# Patient Record
Sex: FEMALE | Race: WHITE | Employment: OTHER
[De-identification: names, ages, dates, MRNs, and addresses within clinical notes are randomized per-mention and may not be internally consistent; named-entity substitution may affect disease eponyms.]

---

## 2017-01-18 ENCOUNTER — OFFICE VISIT (OUTPATIENT)
Dept: FAMILY MEDICINE CLINIC | Facility: CLINIC | Age: 77
End: 2017-01-18

## 2017-01-18 VITALS — BODY MASS INDEX: 52.91 KG/M2 | DIASTOLIC BLOOD PRESSURE: 80 MMHG | WEIGHT: 280 LBS | SYSTOLIC BLOOD PRESSURE: 114 MMHG

## 2017-01-18 DIAGNOSIS — I10 ESSENTIAL HYPERTENSION, BENIGN: ICD-10-CM

## 2017-01-18 DIAGNOSIS — E78.2 MIXED HYPERLIPIDEMIA: ICD-10-CM

## 2017-01-18 DIAGNOSIS — L98.9 SKIN LESION OF LEFT ARM: ICD-10-CM

## 2017-01-18 DIAGNOSIS — E66.01 MORBID OBESITY DUE TO EXCESS CALORIES (HCC): ICD-10-CM

## 2017-01-18 DIAGNOSIS — L29.9 ITCHING: ICD-10-CM

## 2017-01-18 DIAGNOSIS — E11.9 TYPE 2 DIABETES MELLITUS WITHOUT COMPLICATION, WITHOUT LONG-TERM CURRENT USE OF INSULIN (HCC): Primary | ICD-10-CM

## 2017-01-18 PROCEDURE — 1123F ACP DISCUSS/DSCN MKR DOCD: CPT | Performed by: FAMILY MEDICINE

## 2017-01-18 PROCEDURE — 1090F PRES/ABSN URINE INCON ASSESS: CPT | Performed by: FAMILY MEDICINE

## 2017-01-18 PROCEDURE — G8419 CALC BMI OUT NRM PARAM NOF/U: HCPCS | Performed by: FAMILY MEDICINE

## 2017-01-18 PROCEDURE — 99214 OFFICE O/P EST MOD 30 MIN: CPT | Performed by: FAMILY MEDICINE

## 2017-01-18 PROCEDURE — 1036F TOBACCO NON-USER: CPT | Performed by: FAMILY MEDICINE

## 2017-01-18 PROCEDURE — G8484 FLU IMMUNIZE NO ADMIN: HCPCS | Performed by: FAMILY MEDICINE

## 2017-01-18 PROCEDURE — G8399 PT W/DXA RESULTS DOCUMENT: HCPCS | Performed by: FAMILY MEDICINE

## 2017-01-18 PROCEDURE — G8427 DOCREV CUR MEDS BY ELIG CLIN: HCPCS | Performed by: FAMILY MEDICINE

## 2017-01-18 PROCEDURE — 4040F PNEUMOC VAC/ADMIN/RCVD: CPT | Performed by: FAMILY MEDICINE

## 2017-01-18 ASSESSMENT — ENCOUNTER SYMPTOMS
ABDOMINAL PAIN: 0
COUGH: 0
BLOOD IN STOOL: 0
VOMITING: 0
EYE DISCHARGE: 0
EYE REDNESS: 0
CONSTIPATION: 0
NAUSEA: 0
SHORTNESS OF BREATH: 0
DIARRHEA: 0

## 2017-03-15 ENCOUNTER — HOSPITAL ENCOUNTER (OUTPATIENT)
Age: 77
Setting detail: SPECIMEN
Discharge: HOME | End: 2017-03-15
Payer: MEDICARE

## 2017-03-15 ENCOUNTER — OFFICE VISIT (OUTPATIENT)
Dept: FAMILY MEDICINE CLINIC | Age: 77
End: 2017-03-15
Payer: MEDICARE

## 2017-03-15 DIAGNOSIS — D04.9 SQUAMOUS CELL CARCINOMA IN SITU OF SKIN: Primary | ICD-10-CM

## 2017-03-15 PROCEDURE — 11100 PR BIOPSY OF SKIN LESION: CPT | Performed by: FAMILY MEDICINE

## 2017-03-15 PROCEDURE — 1036F TOBACCO NON-USER: CPT | Performed by: FAMILY MEDICINE

## 2017-03-15 PROCEDURE — 88305 TISSUE EXAM BY PATHOLOGIST: CPT

## 2017-03-17 LAB — DERMATOLOGY PATHOLOGY REPORT: NORMAL

## 2017-03-19 ASSESSMENT — ENCOUNTER SYMPTOMS: COLOR CHANGE: 1

## 2017-03-22 ENCOUNTER — OFFICE VISIT (OUTPATIENT)
Dept: FAMILY MEDICINE CLINIC | Age: 77
End: 2017-03-22
Payer: MEDICARE

## 2017-03-22 VITALS — TEMPERATURE: 98.8 F

## 2017-03-22 DIAGNOSIS — D04.9 SQUAMOUS CELL CARCINOMA IN SITU OF SKIN: Primary | ICD-10-CM

## 2017-03-22 DIAGNOSIS — J06.9 ACUTE URI: ICD-10-CM

## 2017-03-22 DIAGNOSIS — Z48.02 VISIT FOR SUTURE REMOVAL: ICD-10-CM

## 2017-03-22 PROCEDURE — G8484 FLU IMMUNIZE NO ADMIN: HCPCS | Performed by: FAMILY MEDICINE

## 2017-03-22 PROCEDURE — G8428 CUR MEDS NOT DOCUMENT: HCPCS | Performed by: FAMILY MEDICINE

## 2017-03-22 PROCEDURE — 1036F TOBACCO NON-USER: CPT | Performed by: FAMILY MEDICINE

## 2017-03-22 PROCEDURE — G8419 CALC BMI OUT NRM PARAM NOF/U: HCPCS | Performed by: FAMILY MEDICINE

## 2017-03-22 PROCEDURE — 1090F PRES/ABSN URINE INCON ASSESS: CPT | Performed by: FAMILY MEDICINE

## 2017-03-22 PROCEDURE — G8399 PT W/DXA RESULTS DOCUMENT: HCPCS | Performed by: FAMILY MEDICINE

## 2017-03-22 PROCEDURE — 4040F PNEUMOC VAC/ADMIN/RCVD: CPT | Performed by: FAMILY MEDICINE

## 2017-03-22 PROCEDURE — 99213 OFFICE O/P EST LOW 20 MIN: CPT | Performed by: FAMILY MEDICINE

## 2017-03-22 PROCEDURE — 1123F ACP DISCUSS/DSCN MKR DOCD: CPT | Performed by: FAMILY MEDICINE

## 2017-03-22 ASSESSMENT — ENCOUNTER SYMPTOMS
COUGH: 1
RHINORRHEA: 1

## 2017-03-26 ASSESSMENT — ENCOUNTER SYMPTOMS
EYE REDNESS: 0
SORE THROAT: 0
EYE DISCHARGE: 0
ROS SKIN COMMENTS: SUTURES
TROUBLE SWALLOWING: 0

## 2017-03-28 RX ORDER — CEPHALEXIN 500 MG/1
500 CAPSULE ORAL 3 TIMES DAILY
Qty: 21 CAPSULE | Refills: 0 | Status: SHIPPED | OUTPATIENT
Start: 2017-03-28 | End: 2017-04-04

## 2017-03-29 ENCOUNTER — HOSPITAL ENCOUNTER (OUTPATIENT)
Age: 77
Discharge: HOME | End: 2017-03-29
Payer: MEDICARE

## 2017-03-29 ENCOUNTER — HOSPITAL ENCOUNTER (OUTPATIENT)
Dept: GENERAL RADIOLOGY | Age: 77
Discharge: HOME | End: 2017-03-29
Payer: MEDICARE

## 2017-03-29 ENCOUNTER — OFFICE VISIT (OUTPATIENT)
Dept: FAMILY MEDICINE CLINIC | Age: 77
End: 2017-03-29
Payer: MEDICARE

## 2017-03-29 VITALS
SYSTOLIC BLOOD PRESSURE: 120 MMHG | DIASTOLIC BLOOD PRESSURE: 68 MMHG | BODY MASS INDEX: 53.09 KG/M2 | TEMPERATURE: 98.3 F | WEIGHT: 281 LBS

## 2017-03-29 DIAGNOSIS — J06.9 ACUTE URI: Primary | ICD-10-CM

## 2017-03-29 DIAGNOSIS — J06.9 ACUTE URI: ICD-10-CM

## 2017-03-29 PROCEDURE — G8399 PT W/DXA RESULTS DOCUMENT: HCPCS | Performed by: FAMILY MEDICINE

## 2017-03-29 PROCEDURE — 1090F PRES/ABSN URINE INCON ASSESS: CPT | Performed by: FAMILY MEDICINE

## 2017-03-29 PROCEDURE — 99213 OFFICE O/P EST LOW 20 MIN: CPT | Performed by: FAMILY MEDICINE

## 2017-03-29 PROCEDURE — 4040F PNEUMOC VAC/ADMIN/RCVD: CPT | Performed by: FAMILY MEDICINE

## 2017-03-29 PROCEDURE — 1036F TOBACCO NON-USER: CPT | Performed by: FAMILY MEDICINE

## 2017-03-29 PROCEDURE — 1123F ACP DISCUSS/DSCN MKR DOCD: CPT | Performed by: FAMILY MEDICINE

## 2017-03-29 PROCEDURE — 71020 XR CHEST STANDARD TWO VW: CPT

## 2017-03-29 PROCEDURE — G8484 FLU IMMUNIZE NO ADMIN: HCPCS | Performed by: FAMILY MEDICINE

## 2017-03-29 PROCEDURE — G8419 CALC BMI OUT NRM PARAM NOF/U: HCPCS | Performed by: FAMILY MEDICINE

## 2017-03-29 PROCEDURE — G8427 DOCREV CUR MEDS BY ELIG CLIN: HCPCS | Performed by: FAMILY MEDICINE

## 2017-03-29 RX ORDER — BENZONATATE 100 MG/1
100 CAPSULE ORAL 3 TIMES DAILY PRN
Qty: 20 CAPSULE | Refills: 0 | Status: SHIPPED | OUTPATIENT
Start: 2017-03-29 | End: 2017-04-05

## 2017-03-29 ASSESSMENT — ENCOUNTER SYMPTOMS
VOMITING: 0
NAUSEA: 0
EYE DISCHARGE: 0
WHEEZING: 0
DIARRHEA: 0
SHORTNESS OF BREATH: 1
SORE THROAT: 0
EYE REDNESS: 0
RHINORRHEA: 1
COUGH: 1

## 2017-04-24 ENCOUNTER — HOSPITAL ENCOUNTER (OUTPATIENT)
Age: 77
Discharge: HOME | End: 2017-04-24
Payer: MEDICARE

## 2017-04-24 DIAGNOSIS — I10 ESSENTIAL HYPERTENSION, BENIGN: ICD-10-CM

## 2017-04-24 DIAGNOSIS — E11.9 TYPE 2 DIABETES MELLITUS WITHOUT COMPLICATION, WITHOUT LONG-TERM CURRENT USE OF INSULIN (HCC): ICD-10-CM

## 2017-04-24 DIAGNOSIS — E78.2 MIXED HYPERLIPIDEMIA: ICD-10-CM

## 2017-04-24 LAB
ALBUMIN SERPL-MCNC: 3.6 G/DL (ref 3.5–5.2)
ALBUMIN/GLOBULIN RATIO: ABNORMAL (ref 1–2.5)
ALP BLD-CCNC: 175 U/L (ref 35–104)
ALT SERPL-CCNC: 26 U/L (ref 5–33)
ANION GAP SERPL CALCULATED.3IONS-SCNC: 11 MMOL/L (ref 9–17)
AST SERPL-CCNC: 29 U/L
BILIRUB SERPL-MCNC: 0.41 MG/DL (ref 0.3–1.2)
BUN BLDV-MCNC: 17 MG/DL (ref 8–23)
BUN/CREAT BLD: 23 (ref 9–20)
CALCIUM SERPL-MCNC: 9.6 MG/DL (ref 8.6–10.4)
CHLORIDE BLD-SCNC: 96 MMOL/L (ref 98–107)
CHOLESTEROL/HDL RATIO: 2
CHOLESTEROL: 141 MG/DL
CO2: 30 MMOL/L (ref 20–31)
CREAT SERPL-MCNC: 0.75 MG/DL (ref 0.5–0.9)
CREATININE URINE: 200.8 MG/DL (ref 28–217)
ESTIMATED AVERAGE GLUCOSE: 131 MG/DL
GFR AFRICAN AMERICAN: >60 ML/MIN
GFR NON-AFRICAN AMERICAN: >60 ML/MIN
GFR SERPL CREATININE-BSD FRML MDRD: ABNORMAL ML/MIN/{1.73_M2}
GFR SERPL CREATININE-BSD FRML MDRD: ABNORMAL ML/MIN/{1.73_M2}
GLUCOSE BLD-MCNC: 160 MG/DL (ref 70–99)
HBA1C MFR BLD: 6.2 % (ref 4.8–5.9)
HDLC SERPL-MCNC: 70 MG/DL
LDL CHOLESTEROL: 49 MG/DL (ref 0–130)
MICROALBUMIN/CREAT 24H UR: 43 MG/L
MICROALBUMIN/CREAT UR-RTO: 21 MCG/MG CREAT
PATIENT FASTING?: YES
POTASSIUM SERPL-SCNC: 3.8 MMOL/L (ref 3.7–5.3)
SODIUM BLD-SCNC: 137 MMOL/L (ref 135–144)
TOTAL PROTEIN: 8.9 G/DL (ref 6.4–8.3)
TRIGL SERPL-MCNC: 110 MG/DL
VLDLC SERPL CALC-MCNC: NORMAL MG/DL (ref 1–30)

## 2017-04-24 PROCEDURE — 36415 COLL VENOUS BLD VENIPUNCTURE: CPT

## 2017-04-24 PROCEDURE — 82043 UR ALBUMIN QUANTITATIVE: CPT

## 2017-04-24 PROCEDURE — 80053 COMPREHEN METABOLIC PANEL: CPT

## 2017-04-24 PROCEDURE — 82570 ASSAY OF URINE CREATININE: CPT

## 2017-04-24 PROCEDURE — 83036 HEMOGLOBIN GLYCOSYLATED A1C: CPT

## 2017-04-24 PROCEDURE — 80061 LIPID PANEL: CPT

## 2017-04-25 ENCOUNTER — OFFICE VISIT (OUTPATIENT)
Dept: FAMILY MEDICINE CLINIC | Age: 77
End: 2017-04-25
Payer: MEDICARE

## 2017-04-25 VITALS — SYSTOLIC BLOOD PRESSURE: 124 MMHG | BODY MASS INDEX: 52.72 KG/M2 | DIASTOLIC BLOOD PRESSURE: 64 MMHG | WEIGHT: 279 LBS

## 2017-04-25 DIAGNOSIS — Z23 NEED FOR 23-POLYVALENT PNEUMOCOCCAL POLYSACCHARIDE VACCINE: ICD-10-CM

## 2017-04-25 DIAGNOSIS — E11.9 TYPE 2 DIABETES MELLITUS WITHOUT COMPLICATION, WITHOUT LONG-TERM CURRENT USE OF INSULIN (HCC): ICD-10-CM

## 2017-04-25 DIAGNOSIS — I10 ESSENTIAL HYPERTENSION, BENIGN: Primary | ICD-10-CM

## 2017-04-25 DIAGNOSIS — E78.2 MIXED HYPERLIPIDEMIA: ICD-10-CM

## 2017-04-25 PROCEDURE — 1036F TOBACCO NON-USER: CPT | Performed by: FAMILY MEDICINE

## 2017-04-25 PROCEDURE — 1090F PRES/ABSN URINE INCON ASSESS: CPT | Performed by: FAMILY MEDICINE

## 2017-04-25 PROCEDURE — G8399 PT W/DXA RESULTS DOCUMENT: HCPCS | Performed by: FAMILY MEDICINE

## 2017-04-25 PROCEDURE — 90732 PPSV23 VACC 2 YRS+ SUBQ/IM: CPT | Performed by: FAMILY MEDICINE

## 2017-04-25 PROCEDURE — G0009 ADMIN PNEUMOCOCCAL VACCINE: HCPCS | Performed by: FAMILY MEDICINE

## 2017-04-25 PROCEDURE — 99214 OFFICE O/P EST MOD 30 MIN: CPT | Performed by: FAMILY MEDICINE

## 2017-04-25 PROCEDURE — G8427 DOCREV CUR MEDS BY ELIG CLIN: HCPCS | Performed by: FAMILY MEDICINE

## 2017-04-25 PROCEDURE — G8419 CALC BMI OUT NRM PARAM NOF/U: HCPCS | Performed by: FAMILY MEDICINE

## 2017-04-25 PROCEDURE — 4040F PNEUMOC VAC/ADMIN/RCVD: CPT | Performed by: FAMILY MEDICINE

## 2017-04-25 PROCEDURE — 1123F ACP DISCUSS/DSCN MKR DOCD: CPT | Performed by: FAMILY MEDICINE

## 2017-04-25 RX ORDER — LOSARTAN POTASSIUM 100 MG/1
100 TABLET ORAL DAILY
Qty: 90 TABLET | Refills: 1 | Status: SHIPPED | OUTPATIENT
Start: 2017-04-25 | End: 2017-10-25 | Stop reason: SDUPTHER

## 2017-04-25 RX ORDER — FUROSEMIDE 80 MG
80 TABLET ORAL DAILY
Qty: 90 TABLET | Refills: 1 | Status: SHIPPED | OUTPATIENT
Start: 2017-04-25 | End: 2018-04-16 | Stop reason: SDUPTHER

## 2017-04-25 RX ORDER — SIMVASTATIN 20 MG
20 TABLET ORAL NIGHTLY
Qty: 90 TABLET | Refills: 1 | Status: SHIPPED | OUTPATIENT
Start: 2017-04-25 | End: 2017-10-25 | Stop reason: SDUPTHER

## 2017-04-25 RX ORDER — ATENOLOL 50 MG/1
50 TABLET ORAL DAILY
Qty: 90 TABLET | Refills: 1 | Status: SHIPPED | OUTPATIENT
Start: 2017-04-25 | End: 2017-08-23 | Stop reason: ALTCHOICE

## 2017-04-25 ASSESSMENT — ENCOUNTER SYMPTOMS
VISUAL CHANGE: 0
EYE DISCHARGE: 0
FACIAL SWELLING: 0
NAUSEA: 0
COUGH: 0
VOMITING: 0
DIARRHEA: 0
BLURRED VISION: 0
CONSTIPATION: 0
SHORTNESS OF BREATH: 0
EYE REDNESS: 0
ABDOMINAL PAIN: 0
BLOOD IN STOOL: 0

## 2017-04-28 DIAGNOSIS — E11.9 TYPE 2 DIABETES MELLITUS WITHOUT COMPLICATION, WITHOUT LONG-TERM CURRENT USE OF INSULIN (HCC): ICD-10-CM

## 2017-05-01 RX ORDER — GLIPIZIDE 5 MG/1
TABLET ORAL
Qty: 90 TABLET | Refills: 1 | Status: SHIPPED | OUTPATIENT
Start: 2017-05-01 | End: 2017-10-25 | Stop reason: SDUPTHER

## 2017-06-03 ENCOUNTER — HOSPITAL ENCOUNTER (EMERGENCY)
Age: 77
Discharge: HOME | End: 2017-06-03
Attending: EMERGENCY MEDICINE
Payer: MEDICARE

## 2017-06-03 ENCOUNTER — APPOINTMENT (OUTPATIENT)
Dept: GENERAL RADIOLOGY | Age: 77
End: 2017-06-03
Payer: MEDICARE

## 2017-06-03 VITALS
WEIGHT: 280 LBS | OXYGEN SATURATION: 97 % | HEART RATE: 77 BPM | DIASTOLIC BLOOD PRESSURE: 63 MMHG | TEMPERATURE: 98.3 F | SYSTOLIC BLOOD PRESSURE: 136 MMHG | BODY MASS INDEX: 52.91 KG/M2 | RESPIRATION RATE: 20 BRPM

## 2017-06-03 DIAGNOSIS — R09.89 CHOKING EPISODE: Primary | ICD-10-CM

## 2017-06-03 PROCEDURE — 71010 XR CHEST LIMITED ONE VIEW: CPT

## 2017-06-03 PROCEDURE — 99283 EMERGENCY DEPT VISIT LOW MDM: CPT

## 2017-06-03 PROCEDURE — 6370000000 HC RX 637 (ALT 250 FOR IP): Performed by: EMERGENCY MEDICINE

## 2017-06-03 RX ADMIN — SALINE NASAL SPRAY 1 SPRAY: 1.5 SOLUTION NASAL at 04:04

## 2017-06-19 ENCOUNTER — OFFICE VISIT (OUTPATIENT)
Dept: FAMILY MEDICINE CLINIC | Age: 77
End: 2017-06-19
Payer: MEDICARE

## 2017-06-19 ENCOUNTER — HOSPITAL ENCOUNTER (EMERGENCY)
Age: 77
Discharge: HOME | End: 2017-06-20
Attending: FAMILY MEDICINE
Payer: MEDICARE

## 2017-06-19 ENCOUNTER — HOSPITAL ENCOUNTER (OUTPATIENT)
Age: 77
Discharge: HOME | End: 2017-06-19
Payer: MEDICARE

## 2017-06-19 ENCOUNTER — APPOINTMENT (OUTPATIENT)
Dept: CT IMAGING | Age: 77
End: 2017-06-19
Payer: MEDICARE

## 2017-06-19 ENCOUNTER — HOSPITAL ENCOUNTER (OUTPATIENT)
Dept: GENERAL RADIOLOGY | Age: 77
Discharge: HOME | End: 2017-06-19
Payer: MEDICARE

## 2017-06-19 VITALS
SYSTOLIC BLOOD PRESSURE: 130 MMHG | BODY MASS INDEX: 51.53 KG/M2 | RESPIRATION RATE: 20 BRPM | HEART RATE: 70 BPM | DIASTOLIC BLOOD PRESSURE: 60 MMHG | HEIGHT: 62 IN | WEIGHT: 280 LBS

## 2017-06-19 DIAGNOSIS — Z51.81 ENCOUNTER FOR MONITORING DIURETIC THERAPY: ICD-10-CM

## 2017-06-19 DIAGNOSIS — Z79.899 ENCOUNTER FOR MONITORING DIURETIC THERAPY: ICD-10-CM

## 2017-06-19 DIAGNOSIS — M25.562 ACUTE PAIN OF LEFT KNEE: Primary | ICD-10-CM

## 2017-06-19 DIAGNOSIS — R60.0 LEG EDEMA, LEFT: Primary | ICD-10-CM

## 2017-06-19 DIAGNOSIS — R79.89 ELEVATED D-DIMER: ICD-10-CM

## 2017-06-19 DIAGNOSIS — I89.0 LYMPHEDEMA: ICD-10-CM

## 2017-06-19 DIAGNOSIS — M79.605 LEFT LEG PAIN: ICD-10-CM

## 2017-06-19 DIAGNOSIS — M25.562 ACUTE PAIN OF LEFT KNEE: ICD-10-CM

## 2017-06-19 DIAGNOSIS — E79.0 ELEVATED URIC ACID IN BLOOD: ICD-10-CM

## 2017-06-19 DIAGNOSIS — R70.0 ELEVATED SED RATE: ICD-10-CM

## 2017-06-19 LAB
ANION GAP SERPL CALCULATED.3IONS-SCNC: 14 MMOL/L (ref 9–17)
BUN BLDV-MCNC: 18 MG/DL (ref 8–23)
BUN/CREAT BLD: 21 (ref 9–20)
CALCIUM SERPL-MCNC: 10.1 MG/DL (ref 8.6–10.4)
CHLORIDE BLD-SCNC: 96 MMOL/L (ref 98–107)
CO2: 30 MMOL/L (ref 20–31)
CREAT SERPL-MCNC: 0.86 MG/DL (ref 0.5–0.9)
D-DIMER QUANTITATIVE: 6.39 MG/L FEU (ref 0–0.5)
GFR AFRICAN AMERICAN: >60 ML/MIN
GFR NON-AFRICAN AMERICAN: >60 ML/MIN
GFR SERPL CREATININE-BSD FRML MDRD: ABNORMAL ML/MIN/{1.73_M2}
GFR SERPL CREATININE-BSD FRML MDRD: ABNORMAL ML/MIN/{1.73_M2}
GLUCOSE BLD-MCNC: 111 MG/DL (ref 70–99)
POTASSIUM SERPL-SCNC: 3.6 MMOL/L (ref 3.7–5.3)
SEDIMENTATION RATE, ERYTHROCYTE: 103 MM (ref 0–30)
SODIUM BLD-SCNC: 140 MMOL/L (ref 135–144)
URIC ACID: 7.3 MG/DL (ref 2.4–5.7)

## 2017-06-19 PROCEDURE — 1123F ACP DISCUSS/DSCN MKR DOCD: CPT | Performed by: NURSE PRACTITIONER

## 2017-06-19 PROCEDURE — 85379 FIBRIN DEGRADATION QUANT: CPT

## 2017-06-19 PROCEDURE — 99284 EMERGENCY DEPT VISIT MOD MDM: CPT

## 2017-06-19 PROCEDURE — G8427 DOCREV CUR MEDS BY ELIG CLIN: HCPCS | Performed by: NURSE PRACTITIONER

## 2017-06-19 PROCEDURE — G8399 PT W/DXA RESULTS DOCUMENT: HCPCS | Performed by: NURSE PRACTITIONER

## 2017-06-19 PROCEDURE — 84550 ASSAY OF BLOOD/URIC ACID: CPT

## 2017-06-19 PROCEDURE — 71275 CT ANGIOGRAPHY CHEST: CPT

## 2017-06-19 PROCEDURE — 1090F PRES/ABSN URINE INCON ASSESS: CPT | Performed by: NURSE PRACTITIONER

## 2017-06-19 PROCEDURE — 73562 X-RAY EXAM OF KNEE 3: CPT

## 2017-06-19 PROCEDURE — 1036F TOBACCO NON-USER: CPT | Performed by: NURSE PRACTITIONER

## 2017-06-19 PROCEDURE — 80048 BASIC METABOLIC PNL TOTAL CA: CPT

## 2017-06-19 PROCEDURE — 6360000004 HC RX CONTRAST MEDICATION: Performed by: FAMILY MEDICINE

## 2017-06-19 PROCEDURE — 99214 OFFICE O/P EST MOD 30 MIN: CPT | Performed by: NURSE PRACTITIONER

## 2017-06-19 PROCEDURE — 36415 COLL VENOUS BLD VENIPUNCTURE: CPT

## 2017-06-19 PROCEDURE — 4040F PNEUMOC VAC/ADMIN/RCVD: CPT | Performed by: NURSE PRACTITIONER

## 2017-06-19 PROCEDURE — G8417 CALC BMI ABV UP PARAM F/U: HCPCS | Performed by: NURSE PRACTITIONER

## 2017-06-19 PROCEDURE — 85651 RBC SED RATE NONAUTOMATED: CPT

## 2017-06-19 RX ADMIN — IOVERSOL 100 ML: 741 INJECTION INTRA-ARTERIAL; INTRAVENOUS at 23:08

## 2017-06-19 ASSESSMENT — PAIN DESCRIPTION - PAIN TYPE: TYPE: ACUTE PAIN

## 2017-06-19 ASSESSMENT — PAIN DESCRIPTION - LOCATION: LOCATION: LEG

## 2017-06-19 ASSESSMENT — ENCOUNTER SYMPTOMS
DIARRHEA: 0
RHINORRHEA: 0
COUGH: 0
NAUSEA: 0
EYES NEGATIVE: 1

## 2017-06-19 ASSESSMENT — PAIN SCALES - GENERAL: PAINLEVEL_OUTOF10: 10

## 2017-06-19 ASSESSMENT — PAIN DESCRIPTION - ORIENTATION: ORIENTATION: LEFT

## 2017-06-20 ENCOUNTER — HOSPITAL ENCOUNTER (OUTPATIENT)
Dept: VASCULAR LAB | Age: 77
Discharge: HOME | End: 2017-06-20
Payer: MEDICARE

## 2017-06-20 ENCOUNTER — TELEPHONE (OUTPATIENT)
Dept: FAMILY MEDICINE CLINIC | Age: 77
End: 2017-06-20

## 2017-06-20 VITALS
WEIGHT: 280 LBS | HEIGHT: 62 IN | TEMPERATURE: 98.1 F | OXYGEN SATURATION: 95 % | BODY MASS INDEX: 51.53 KG/M2 | HEART RATE: 82 BPM | DIASTOLIC BLOOD PRESSURE: 57 MMHG | RESPIRATION RATE: 20 BRPM | SYSTOLIC BLOOD PRESSURE: 142 MMHG

## 2017-06-20 DIAGNOSIS — M25.562 ACUTE PAIN OF LEFT KNEE: ICD-10-CM

## 2017-06-20 DIAGNOSIS — R60.0 LEG EDEMA, LEFT: ICD-10-CM

## 2017-06-20 PROCEDURE — 93971 EXTREMITY STUDY: CPT

## 2017-06-20 RX ORDER — ALLOPURINOL 100 MG/1
100 TABLET ORAL DAILY
Qty: 30 TABLET | Refills: 2 | Status: SHIPPED | OUTPATIENT
Start: 2017-06-20 | End: 2017-08-23 | Stop reason: SDUPTHER

## 2017-06-21 ENCOUNTER — TELEPHONE (OUTPATIENT)
Dept: FAMILY MEDICINE CLINIC | Age: 77
End: 2017-06-21

## 2017-06-22 ENCOUNTER — HOSPITAL ENCOUNTER (OUTPATIENT)
Age: 77
Discharge: HOME | End: 2017-06-22
Payer: MEDICARE

## 2017-06-22 ENCOUNTER — OFFICE VISIT (OUTPATIENT)
Dept: FAMILY MEDICINE CLINIC | Age: 77
End: 2017-06-22
Payer: MEDICARE

## 2017-06-22 VITALS
HEIGHT: 62 IN | DIASTOLIC BLOOD PRESSURE: 64 MMHG | WEIGHT: 280 LBS | SYSTOLIC BLOOD PRESSURE: 118 MMHG | BODY MASS INDEX: 51.53 KG/M2 | HEART RATE: 72 BPM | RESPIRATION RATE: 20 BRPM

## 2017-06-22 DIAGNOSIS — M10.262 ACUTE DRUG-INDUCED GOUT OF LEFT KNEE: ICD-10-CM

## 2017-06-22 DIAGNOSIS — R21 SKIN RASH: ICD-10-CM

## 2017-06-22 DIAGNOSIS — E11.9 TYPE 2 DIABETES MELLITUS WITHOUT COMPLICATION, WITHOUT LONG-TERM CURRENT USE OF INSULIN (HCC): Primary | ICD-10-CM

## 2017-06-22 DIAGNOSIS — E11.9 TYPE 2 DIABETES MELLITUS WITHOUT COMPLICATION, WITHOUT LONG-TERM CURRENT USE OF INSULIN (HCC): ICD-10-CM

## 2017-06-22 LAB
BUN BLDV-MCNC: 17 MG/DL (ref 8–23)
CREAT SERPL-MCNC: 0.77 MG/DL (ref 0.5–0.9)
GFR AFRICAN AMERICAN: >60 ML/MIN
GFR NON-AFRICAN AMERICAN: >60 ML/MIN
GFR SERPL CREATININE-BSD FRML MDRD: NORMAL ML/MIN/{1.73_M2}
GFR SERPL CREATININE-BSD FRML MDRD: NORMAL ML/MIN/{1.73_M2}

## 2017-06-22 PROCEDURE — 1090F PRES/ABSN URINE INCON ASSESS: CPT | Performed by: NURSE PRACTITIONER

## 2017-06-22 PROCEDURE — G8427 DOCREV CUR MEDS BY ELIG CLIN: HCPCS | Performed by: NURSE PRACTITIONER

## 2017-06-22 PROCEDURE — G8417 CALC BMI ABV UP PARAM F/U: HCPCS | Performed by: NURSE PRACTITIONER

## 2017-06-22 PROCEDURE — 99213 OFFICE O/P EST LOW 20 MIN: CPT | Performed by: NURSE PRACTITIONER

## 2017-06-22 PROCEDURE — 82565 ASSAY OF CREATININE: CPT

## 2017-06-22 PROCEDURE — 4040F PNEUMOC VAC/ADMIN/RCVD: CPT | Performed by: NURSE PRACTITIONER

## 2017-06-22 PROCEDURE — 1123F ACP DISCUSS/DSCN MKR DOCD: CPT | Performed by: NURSE PRACTITIONER

## 2017-06-22 PROCEDURE — 36415 COLL VENOUS BLD VENIPUNCTURE: CPT

## 2017-06-22 PROCEDURE — 84520 ASSAY OF UREA NITROGEN: CPT

## 2017-06-22 PROCEDURE — G8399 PT W/DXA RESULTS DOCUMENT: HCPCS | Performed by: NURSE PRACTITIONER

## 2017-06-22 PROCEDURE — 1036F TOBACCO NON-USER: CPT | Performed by: NURSE PRACTITIONER

## 2017-06-22 RX ORDER — ASPIRIN 325 MG
81 TABLET ORAL DAILY
Status: ON HOLD | COMMUNITY
End: 2022-01-28 | Stop reason: HOSPADM

## 2017-06-22 RX ORDER — NYSTATIN 100000 [USP'U]/G
POWDER TOPICAL
Qty: 1 BOTTLE | Refills: 0 | Status: SHIPPED | OUTPATIENT
Start: 2017-06-22 | End: 2020-10-29

## 2017-06-23 ASSESSMENT — ENCOUNTER SYMPTOMS
ABDOMINAL DISTENTION: 0
SHORTNESS OF BREATH: 0
EYES NEGATIVE: 1
COUGH: 0
SINUS PRESSURE: 0

## 2017-07-22 ENCOUNTER — APPOINTMENT (OUTPATIENT)
Dept: GENERAL RADIOLOGY | Age: 77
End: 2017-07-22
Payer: MEDICARE

## 2017-07-22 ENCOUNTER — HOSPITAL ENCOUNTER (EMERGENCY)
Age: 77
Discharge: HOME | End: 2017-07-22
Attending: FAMILY MEDICINE
Payer: MEDICARE

## 2017-07-22 VITALS
DIASTOLIC BLOOD PRESSURE: 68 MMHG | BODY MASS INDEX: 51.21 KG/M2 | TEMPERATURE: 97.6 F | SYSTOLIC BLOOD PRESSURE: 134 MMHG | OXYGEN SATURATION: 99 % | WEIGHT: 280 LBS | RESPIRATION RATE: 16 BRPM | HEART RATE: 84 BPM

## 2017-07-22 DIAGNOSIS — N30.00 ACUTE CYSTITIS WITHOUT HEMATURIA: ICD-10-CM

## 2017-07-22 DIAGNOSIS — K59.00 CONSTIPATION, UNSPECIFIED CONSTIPATION TYPE: Primary | ICD-10-CM

## 2017-07-22 LAB
-: ABNORMAL
ABSOLUTE EOS #: 0.1 K/UL (ref 0–0.4)
ABSOLUTE LYMPH #: 1.6 K/UL (ref 1.1–2.7)
ABSOLUTE MONO #: 0.7 K/UL (ref 0–1)
ALBUMIN SERPL-MCNC: 3.5 G/DL (ref 3.5–5.2)
ALBUMIN/GLOBULIN RATIO: ABNORMAL (ref 1–2.5)
ALP BLD-CCNC: 158 U/L (ref 35–104)
ALT SERPL-CCNC: 14 U/L (ref 5–33)
AMORPHOUS: ABNORMAL
ANION GAP SERPL CALCULATED.3IONS-SCNC: 12 MMOL/L (ref 9–17)
AST SERPL-CCNC: 20 U/L
BACTERIA: ABNORMAL
BASOPHILS # BLD: 1 %
BASOPHILS ABSOLUTE: 0 K/UL (ref 0–0.2)
BILIRUB SERPL-MCNC: 0.58 MG/DL (ref 0.3–1.2)
BILIRUBIN URINE: NEGATIVE
BUN BLDV-MCNC: 21 MG/DL (ref 8–23)
BUN/CREAT BLD: 28 (ref 9–20)
CALCIUM SERPL-MCNC: 9.6 MG/DL (ref 8.6–10.4)
CASTS UA: ABNORMAL /LPF
CHLORIDE BLD-SCNC: 99 MMOL/L (ref 98–107)
CO2: 28 MMOL/L (ref 20–31)
COLOR: YELLOW
COMMENT UA: ABNORMAL
CREAT SERPL-MCNC: 0.75 MG/DL (ref 0.5–0.9)
CRYSTALS, UA: ABNORMAL /HPF
DIFFERENTIAL TYPE: YES
EOSINOPHILS RELATIVE PERCENT: 1 %
EPITHELIAL CELLS UA: ABNORMAL /HPF
GFR AFRICAN AMERICAN: >60 ML/MIN
GFR NON-AFRICAN AMERICAN: >60 ML/MIN
GFR SERPL CREATININE-BSD FRML MDRD: ABNORMAL ML/MIN/{1.73_M2}
GFR SERPL CREATININE-BSD FRML MDRD: ABNORMAL ML/MIN/{1.73_M2}
GLUCOSE BLD-MCNC: 116 MG/DL (ref 70–99)
GLUCOSE URINE: NEGATIVE
HCT VFR BLD CALC: 35.3 % (ref 36–46)
HEMOGLOBIN: 11.7 G/DL (ref 12–16)
KETONES, URINE: NEGATIVE
LEUKOCYTE ESTERASE, URINE: ABNORMAL
LIPASE: 26 U/L (ref 13–60)
LYMPHOCYTES # BLD: 24 %
MCH RBC QN AUTO: 29.3 PG (ref 26–34)
MCHC RBC AUTO-ENTMCNC: 33.2 G/DL (ref 31–37)
MCV RBC AUTO: 88.3 FL (ref 80–100)
MONOCYTES # BLD: 10 %
MUCUS: ABNORMAL
NITRITE, URINE: NEGATIVE
OTHER OBSERVATIONS UA: ABNORMAL
PDW BLD-RTO: 13.8 % (ref 12.1–15.2)
PH UA: 6 (ref 5–8)
PLATELET # BLD: 201 K/UL (ref 140–450)
PLATELET ESTIMATE: ABNORMAL
PMV BLD AUTO: ABNORMAL FL (ref 6–12)
POTASSIUM SERPL-SCNC: 3.9 MMOL/L (ref 3.7–5.3)
PROTEIN UA: ABNORMAL
RBC # BLD: 4 M/UL (ref 4–5.2)
RBC # BLD: ABNORMAL 10*6/UL
RBC UA: ABNORMAL /HPF (ref 0–2)
RENAL EPITHELIAL, UA: ABNORMAL /HPF
SEG NEUTROPHILS: 64 %
SEGMENTED NEUTROPHILS ABSOLUTE COUNT: 4.5 K/UL (ref 2.5–7)
SODIUM BLD-SCNC: 139 MMOL/L (ref 135–144)
SPECIFIC GRAVITY UA: 1.01 (ref 1–1.03)
TOTAL PROTEIN: 8.6 G/DL (ref 6.4–8.3)
TRICHOMONAS: ABNORMAL
TURBIDITY: ABNORMAL
URINE HGB: ABNORMAL
UROBILINOGEN, URINE: NORMAL
WBC # BLD: 6.9 K/UL (ref 3.5–11)
WBC # BLD: ABNORMAL 10*3/UL
WBC UA: ABNORMAL /HPF
YEAST: ABNORMAL

## 2017-07-22 PROCEDURE — 87186 SC STD MICRODIL/AGAR DIL: CPT

## 2017-07-22 PROCEDURE — 2580000003 HC RX 258: Performed by: FAMILY MEDICINE

## 2017-07-22 PROCEDURE — 80053 COMPREHEN METABOLIC PANEL: CPT

## 2017-07-22 PROCEDURE — 99284 EMERGENCY DEPT VISIT MOD MDM: CPT

## 2017-07-22 PROCEDURE — 87086 URINE CULTURE/COLONY COUNT: CPT

## 2017-07-22 PROCEDURE — 83690 ASSAY OF LIPASE: CPT

## 2017-07-22 PROCEDURE — 85025 COMPLETE CBC W/AUTO DIFF WBC: CPT

## 2017-07-22 PROCEDURE — 87088 URINE BACTERIA CULTURE: CPT

## 2017-07-22 PROCEDURE — 96361 HYDRATE IV INFUSION ADD-ON: CPT

## 2017-07-22 PROCEDURE — 96360 HYDRATION IV INFUSION INIT: CPT

## 2017-07-22 PROCEDURE — 74022 RADEX COMPL AQT ABD SERIES: CPT

## 2017-07-22 PROCEDURE — 81001 URINALYSIS AUTO W/SCOPE: CPT

## 2017-07-22 RX ORDER — SODIUM CHLORIDE 9 MG/ML
100 INJECTION, SOLUTION INTRAVENOUS CONTINUOUS
Status: DISCONTINUED | OUTPATIENT
Start: 2017-07-22 | End: 2017-07-22 | Stop reason: HOSPADM

## 2017-07-22 RX ORDER — NITROFURANTOIN MACROCRYSTALS 100 MG/1
100 CAPSULE ORAL 4 TIMES DAILY
Qty: 40 CAPSULE | Refills: 0 | Status: SHIPPED | OUTPATIENT
Start: 2017-07-22 | End: 2017-08-01

## 2017-07-22 RX ORDER — POLYETHYLENE GLYCOL 3350 17 G/17G
17 POWDER, FOR SOLUTION ORAL DAILY
Qty: 510 G | Refills: 0 | Status: SHIPPED | OUTPATIENT
Start: 2017-07-22 | End: 2017-08-21

## 2017-07-22 RX ADMIN — SODIUM CHLORIDE 100 ML/HR: 9 INJECTION, SOLUTION INTRAVENOUS at 11:26

## 2017-07-22 ASSESSMENT — PAIN DESCRIPTION - LOCATION: LOCATION: ABDOMEN

## 2017-07-22 ASSESSMENT — PAIN DESCRIPTION - PAIN TYPE: TYPE: ACUTE PAIN

## 2017-07-22 ASSESSMENT — PAIN SCALES - GENERAL: PAINLEVEL_OUTOF10: 5

## 2017-07-22 ASSESSMENT — PAIN DESCRIPTION - ORIENTATION: ORIENTATION: LEFT;LOWER

## 2017-07-22 ASSESSMENT — PAIN DESCRIPTION - FREQUENCY: FREQUENCY: CONTINUOUS

## 2017-07-22 ASSESSMENT — PAIN DESCRIPTION - ONSET: ONSET: AWAKENED FROM SLEEP

## 2017-07-22 ASSESSMENT — PAIN DESCRIPTION - DESCRIPTORS: DESCRIPTORS: ACHING;CRAMPING

## 2017-07-23 LAB
CULTURE: ABNORMAL
CULTURE: ABNORMAL
Lab: ABNORMAL
ORGANISM: ABNORMAL
SPECIMEN DESCRIPTION: ABNORMAL
SPECIMEN DESCRIPTION: ABNORMAL
STATUS: ABNORMAL

## 2017-07-24 ENCOUNTER — TELEPHONE (OUTPATIENT)
Dept: FAMILY MEDICINE CLINIC | Age: 77
End: 2017-07-24

## 2017-07-24 RX ORDER — METOPROLOL TARTRATE 50 MG/1
50 TABLET, FILM COATED ORAL 2 TIMES DAILY
Qty: 180 TABLET | Refills: 1 | Status: SHIPPED | OUTPATIENT
Start: 2017-07-24 | End: 2017-07-24 | Stop reason: SDUPTHER

## 2017-07-24 RX ORDER — METOPROLOL TARTRATE 50 MG/1
50 TABLET, FILM COATED ORAL 2 TIMES DAILY
Qty: 180 TABLET | Refills: 1 | Status: SHIPPED | OUTPATIENT
Start: 2017-07-24 | End: 2017-10-25 | Stop reason: SDUPTHER

## 2017-08-22 ENCOUNTER — HOSPITAL ENCOUNTER (OUTPATIENT)
Age: 77
Discharge: HOME | End: 2017-08-22
Payer: MEDICARE

## 2017-08-22 DIAGNOSIS — M10.262 ACUTE DRUG-INDUCED GOUT OF LEFT KNEE: ICD-10-CM

## 2017-08-22 LAB — URIC ACID: 6.1 MG/DL (ref 2.4–5.7)

## 2017-08-22 PROCEDURE — 36415 COLL VENOUS BLD VENIPUNCTURE: CPT

## 2017-08-22 PROCEDURE — 84550 ASSAY OF BLOOD/URIC ACID: CPT

## 2017-08-23 ENCOUNTER — CARE COORDINATION (OUTPATIENT)
Dept: CARE COORDINATION | Age: 77
End: 2017-08-23

## 2017-08-23 ENCOUNTER — OFFICE VISIT (OUTPATIENT)
Dept: FAMILY MEDICINE CLINIC | Age: 77
End: 2017-08-23
Payer: MEDICARE

## 2017-08-23 ENCOUNTER — HOSPITAL ENCOUNTER (OUTPATIENT)
Age: 77
Discharge: HOME | End: 2017-08-23
Payer: MEDICARE

## 2017-08-23 VITALS
DIASTOLIC BLOOD PRESSURE: 64 MMHG | SYSTOLIC BLOOD PRESSURE: 134 MMHG | WEIGHT: 274 LBS | HEART RATE: 109 BPM | OXYGEN SATURATION: 96 % | HEIGHT: 62 IN | BODY MASS INDEX: 50.42 KG/M2 | RESPIRATION RATE: 20 BRPM

## 2017-08-23 DIAGNOSIS — R00.0 TACHYCARDIA: Primary | ICD-10-CM

## 2017-08-23 DIAGNOSIS — I10 ESSENTIAL HYPERTENSION, BENIGN: Primary | ICD-10-CM

## 2017-08-23 DIAGNOSIS — M1A.2620 CHRONIC GOUT OF LEFT KNEE DUE TO DRUG WITHOUT TOPHUS: ICD-10-CM

## 2017-08-23 DIAGNOSIS — Z91.89 COMPLIANCE WITH MEDICATION REGIMEN: ICD-10-CM

## 2017-08-23 PROCEDURE — 1090F PRES/ABSN URINE INCON ASSESS: CPT | Performed by: NURSE PRACTITIONER

## 2017-08-23 PROCEDURE — G8427 DOCREV CUR MEDS BY ELIG CLIN: HCPCS | Performed by: NURSE PRACTITIONER

## 2017-08-23 PROCEDURE — 1036F TOBACCO NON-USER: CPT | Performed by: NURSE PRACTITIONER

## 2017-08-23 PROCEDURE — 93005 ELECTROCARDIOGRAM TRACING: CPT

## 2017-08-23 PROCEDURE — 4040F PNEUMOC VAC/ADMIN/RCVD: CPT | Performed by: NURSE PRACTITIONER

## 2017-08-23 PROCEDURE — G8417 CALC BMI ABV UP PARAM F/U: HCPCS | Performed by: NURSE PRACTITIONER

## 2017-08-23 PROCEDURE — 1123F ACP DISCUSS/DSCN MKR DOCD: CPT | Performed by: NURSE PRACTITIONER

## 2017-08-23 PROCEDURE — G8399 PT W/DXA RESULTS DOCUMENT: HCPCS | Performed by: NURSE PRACTITIONER

## 2017-08-23 PROCEDURE — 99213 OFFICE O/P EST LOW 20 MIN: CPT | Performed by: NURSE PRACTITIONER

## 2017-08-23 RX ORDER — ALLOPURINOL 100 MG/1
100 TABLET ORAL DAILY
Qty: 90 TABLET | Refills: 3 | Status: SHIPPED | OUTPATIENT
Start: 2017-08-23 | End: 2018-07-17 | Stop reason: SDUPTHER

## 2017-08-24 LAB
EKG ATRIAL RATE: 78 BPM
EKG P AXIS: 55 DEGREES
EKG P-R INTERVAL: 212 MS
EKG Q-T INTERVAL: 416 MS
EKG QRS DURATION: 142 MS
EKG QTC CALCULATION (BAZETT): 474 MS
EKG R AXIS: -31 DEGREES
EKG T AXIS: 41 DEGREES
EKG VENTRICULAR RATE: 78 BPM

## 2017-08-24 ASSESSMENT — ENCOUNTER SYMPTOMS
SHORTNESS OF BREATH: 0
ABDOMINAL DISTENTION: 0
CHEST TIGHTNESS: 0
ABDOMINAL PAIN: 0
EYES NEGATIVE: 1
SINUS PRESSURE: 0
COUGH: 0
RHINORRHEA: 0

## 2017-08-25 ENCOUNTER — CARE COORDINATION (OUTPATIENT)
Dept: CARE COORDINATION | Age: 77
End: 2017-08-25

## 2017-09-13 ENCOUNTER — CARE COORDINATION (OUTPATIENT)
Dept: CARE COORDINATION | Age: 77
End: 2017-09-13

## 2017-09-22 ENCOUNTER — CARE COORDINATION (OUTPATIENT)
Dept: CARE COORDINATION | Age: 77
End: 2017-09-22

## 2017-09-28 ENCOUNTER — CARE COORDINATION (OUTPATIENT)
Dept: CARE COORDINATION | Age: 77
End: 2017-09-28

## 2017-10-10 ENCOUNTER — CARE COORDINATION (OUTPATIENT)
Dept: CARE COORDINATION | Age: 77
End: 2017-10-10

## 2017-10-10 NOTE — CARE COORDINATION
MG tablet Take 1 tablet by mouth nightly 4/25/17   Jerry Jacob MD   losartan (COZAAR) 100 MG tablet Take 1 tablet by mouth daily 4/25/17   Jerry Jacob MD   glucose blood VI test strips (ONE TOUCH ULTRA TEST) strip Test blood sugar once daily and as needed. 8/24/16   Jerry Jacob MD   [facility] LANCETS 70R MISC Testing blood sugar once daily and as needed, uses OneTouch UltraMini 8/24/16   Jerry Jacob MD   Glucose Blood (CHOICE DM FORA G20 TEST STRIPS VI) by In Vitro route.     Historical Provider, MD       Future Appointments  Date Time Provider Eriberto Baird   10/25/2017 7:30 AM Jerry Jacob MD [facility]

## 2017-10-23 ENCOUNTER — HOSPITAL ENCOUNTER (OUTPATIENT)
Age: 77
Discharge: HOME | End: 2017-10-23
Payer: MEDICARE

## 2017-10-23 DIAGNOSIS — E78.2 MIXED HYPERLIPIDEMIA: ICD-10-CM

## 2017-10-23 DIAGNOSIS — E11.9 TYPE 2 DIABETES MELLITUS WITHOUT COMPLICATION, WITHOUT LONG-TERM CURRENT USE OF INSULIN (HCC): ICD-10-CM

## 2017-10-23 DIAGNOSIS — I10 ESSENTIAL HYPERTENSION, BENIGN: ICD-10-CM

## 2017-10-23 LAB
ALBUMIN SERPL-MCNC: 3.6 G/DL (ref 3.5–5.2)
ALBUMIN/GLOBULIN RATIO: ABNORMAL (ref 1–2.5)
ALP BLD-CCNC: 163 U/L (ref 35–104)
ALT SERPL-CCNC: 19 U/L (ref 5–33)
ANION GAP SERPL CALCULATED.3IONS-SCNC: 10 MMOL/L (ref 9–17)
AST SERPL-CCNC: 22 U/L
BILIRUB SERPL-MCNC: 0.48 MG/DL (ref 0.3–1.2)
BUN BLDV-MCNC: 20 MG/DL (ref 8–23)
BUN/CREAT BLD: 26 (ref 9–20)
CALCIUM SERPL-MCNC: 9.5 MG/DL (ref 8.6–10.4)
CHLORIDE BLD-SCNC: 99 MMOL/L (ref 98–107)
CHOLESTEROL/HDL RATIO: 2.1
CHOLESTEROL: 134 MG/DL
CO2: 29 MMOL/L (ref 20–31)
CREAT SERPL-MCNC: 0.78 MG/DL (ref 0.5–0.9)
ESTIMATED AVERAGE GLUCOSE: 120 MG/DL
GFR AFRICAN AMERICAN: >60 ML/MIN
GFR NON-AFRICAN AMERICAN: >60 ML/MIN
GFR SERPL CREATININE-BSD FRML MDRD: ABNORMAL ML/MIN/{1.73_M2}
GFR SERPL CREATININE-BSD FRML MDRD: ABNORMAL ML/MIN/{1.73_M2}
GLUCOSE BLD-MCNC: 148 MG/DL (ref 70–99)
HBA1C MFR BLD: 5.8 % (ref 4.8–5.9)
HDLC SERPL-MCNC: 63 MG/DL
LDL CHOLESTEROL: 50 MG/DL (ref 0–130)
PATIENT FASTING?: YES
POTASSIUM SERPL-SCNC: 4 MMOL/L (ref 3.7–5.3)
SODIUM BLD-SCNC: 138 MMOL/L (ref 135–144)
TOTAL PROTEIN: 8.6 G/DL (ref 6.4–8.3)
TRIGL SERPL-MCNC: 103 MG/DL
VLDLC SERPL CALC-MCNC: NORMAL MG/DL (ref 1–30)

## 2017-10-23 PROCEDURE — 83036 HEMOGLOBIN GLYCOSYLATED A1C: CPT

## 2017-10-23 PROCEDURE — 80053 COMPREHEN METABOLIC PANEL: CPT

## 2017-10-23 PROCEDURE — 80061 LIPID PANEL: CPT

## 2017-10-23 PROCEDURE — 36415 COLL VENOUS BLD VENIPUNCTURE: CPT

## 2017-10-25 ENCOUNTER — OFFICE VISIT (OUTPATIENT)
Dept: FAMILY MEDICINE CLINIC | Age: 77
End: 2017-10-25
Payer: MEDICARE

## 2017-10-25 VITALS
WEIGHT: 277 LBS | SYSTOLIC BLOOD PRESSURE: 126 MMHG | BODY MASS INDEX: 54.38 KG/M2 | DIASTOLIC BLOOD PRESSURE: 80 MMHG | HEART RATE: 86 BPM | HEIGHT: 60 IN

## 2017-10-25 DIAGNOSIS — I10 ESSENTIAL HYPERTENSION, BENIGN: ICD-10-CM

## 2017-10-25 DIAGNOSIS — E78.2 MIXED HYPERLIPIDEMIA: ICD-10-CM

## 2017-10-25 DIAGNOSIS — E11.9 TYPE 2 DIABETES MELLITUS WITHOUT COMPLICATION, WITHOUT LONG-TERM CURRENT USE OF INSULIN (HCC): ICD-10-CM

## 2017-10-25 DIAGNOSIS — Z23 NEED FOR INFLUENZA VACCINATION: Primary | ICD-10-CM

## 2017-10-25 PROCEDURE — 1036F TOBACCO NON-USER: CPT | Performed by: FAMILY MEDICINE

## 2017-10-25 PROCEDURE — G8417 CALC BMI ABV UP PARAM F/U: HCPCS | Performed by: FAMILY MEDICINE

## 2017-10-25 PROCEDURE — 99214 OFFICE O/P EST MOD 30 MIN: CPT | Performed by: FAMILY MEDICINE

## 2017-10-25 PROCEDURE — G8399 PT W/DXA RESULTS DOCUMENT: HCPCS | Performed by: FAMILY MEDICINE

## 2017-10-25 PROCEDURE — G8427 DOCREV CUR MEDS BY ELIG CLIN: HCPCS | Performed by: FAMILY MEDICINE

## 2017-10-25 PROCEDURE — G8510 SCR DEP NEG, NO PLAN REQD: HCPCS | Performed by: FAMILY MEDICINE

## 2017-10-25 PROCEDURE — 1090F PRES/ABSN URINE INCON ASSESS: CPT | Performed by: FAMILY MEDICINE

## 2017-10-25 PROCEDURE — 1123F ACP DISCUSS/DSCN MKR DOCD: CPT | Performed by: FAMILY MEDICINE

## 2017-10-25 PROCEDURE — G8484 FLU IMMUNIZE NO ADMIN: HCPCS | Performed by: FAMILY MEDICINE

## 2017-10-25 PROCEDURE — G0008 ADMIN INFLUENZA VIRUS VAC: HCPCS | Performed by: FAMILY MEDICINE

## 2017-10-25 PROCEDURE — 4040F PNEUMOC VAC/ADMIN/RCVD: CPT | Performed by: FAMILY MEDICINE

## 2017-10-25 PROCEDURE — 90686 IIV4 VACC NO PRSV 0.5 ML IM: CPT | Performed by: FAMILY MEDICINE

## 2017-10-25 RX ORDER — METOPROLOL TARTRATE 50 MG/1
50 TABLET, FILM COATED ORAL 2 TIMES DAILY
Qty: 180 TABLET | Refills: 1 | Status: SHIPPED | OUTPATIENT
Start: 2017-10-25 | End: 2018-04-25 | Stop reason: SDUPTHER

## 2017-10-25 RX ORDER — LOSARTAN POTASSIUM 100 MG/1
100 TABLET ORAL DAILY
Qty: 90 TABLET | Refills: 1 | Status: SHIPPED | OUTPATIENT
Start: 2017-10-25 | End: 2018-04-16 | Stop reason: SDUPTHER

## 2017-10-25 RX ORDER — FUROSEMIDE 80 MG
80 TABLET ORAL DAILY
Qty: 90 TABLET | Refills: 1 | Status: CANCELLED | OUTPATIENT
Start: 2017-10-25

## 2017-10-25 RX ORDER — GLIPIZIDE 5 MG/1
5 TABLET ORAL DAILY
Qty: 90 TABLET | Refills: 1 | Status: SHIPPED | OUTPATIENT
Start: 2017-10-25 | End: 2018-04-16 | Stop reason: SDUPTHER

## 2017-10-25 RX ORDER — SIMVASTATIN 20 MG
20 TABLET ORAL NIGHTLY
Qty: 90 TABLET | Refills: 1 | Status: SHIPPED | OUTPATIENT
Start: 2017-10-25 | End: 2018-04-16 | Stop reason: SDUPTHER

## 2017-10-25 ASSESSMENT — PATIENT HEALTH QUESTIONNAIRE - PHQ9
2. FEELING DOWN, DEPRESSED OR HOPELESS: 0
1. LITTLE INTEREST OR PLEASURE IN DOING THINGS: 0
SUM OF ALL RESPONSES TO PHQ QUESTIONS 1-9: 0
SUM OF ALL RESPONSES TO PHQ9 QUESTIONS 1 & 2: 0

## 2017-10-25 ASSESSMENT — ENCOUNTER SYMPTOMS
BLOOD IN STOOL: 0
CONSTIPATION: 0
EYE REDNESS: 0
VISUAL CHANGE: 0
DIARRHEA: 0
EYE DISCHARGE: 0
FACIAL SWELLING: 0
SHORTNESS OF BREATH: 0

## 2017-10-25 NOTE — PROGRESS NOTES
to light. Right eye exhibits no discharge. Left eye exhibits no discharge. Neck: Neck supple. No thyromegaly present. Cardiovascular: Normal rate, regular rhythm and normal heart sounds. No murmur heard. Pulmonary/Chest: Effort normal and breath sounds normal. No respiratory distress. Abdominal: Soft. Bowel sounds are normal. She exhibits no distension. There is no tenderness. Musculoskeletal: She exhibits no edema. Lymphadenopathy:     She has no cervical adenopathy. Neurological: She is alert and oriented to person, place, and time. Skin: Skin is warm and dry. No rash noted. Psychiatric: She has a normal mood and affect. Her behavior is normal.   Vitals reviewed. Vitals:  /80   Pulse 86   Ht 5' (1.524 m)   Wt 277 lb (125.6 kg)   BMI 54.10 kg/m²       Data:     Lab Results   Component Value Date     10/23/2017    K 4.0 10/23/2017    CL 99 10/23/2017    CO2 29 10/23/2017    BUN 20 10/23/2017    CREATININE 0.78 10/23/2017    GLUCOSE 148 10/23/2017    GLUCOSE 177 04/21/2012    PROT 8.6 10/23/2017    LABALBU 3.6 10/23/2017    LABALBU 4.1 01/20/2012    BILITOT 0.48 10/23/2017    ALKPHOS 163 10/23/2017    AST 22 10/23/2017    ALT 19 10/23/2017     Lab Results   Component Value Date    WBC 6.9 07/22/2017    RBC 4.00 07/22/2017    HGB 11.7 07/22/2017    HCT 35.3 07/22/2017    MCV 88.3 07/22/2017    MCH 29.3 07/22/2017    MCHC 33.2 07/22/2017    RDW 13.8 07/22/2017     07/22/2017    MPV NOT REPORTED 07/22/2017     Lab Results   Component Value Date    TSH 2.59 10/19/2015     Lab Results   Component Value Date    CHOL 134 10/23/2017    HDL 63 10/23/2017    LABA1C 5.8 10/23/2017          Assessment/Plan:       1. Type 2 diabetes mellitus without complication, without long-term current use of insulin (HCC)  F/U 6mos, prior CMP, Lipids, HgbA1C. Do daily foot checks and yearly eye exams  Continue on the same medication  - glipiZIDE (GLUCOTROL) 5 MG tablet;    Dispense: 90 tablet;

## 2017-11-03 ENCOUNTER — CARE COORDINATION (OUTPATIENT)
Dept: CARE COORDINATION | Age: 77
End: 2017-11-03

## 2017-11-03 NOTE — CARE COORDINATION
Ambulatory Care Coordination Note  11/3/2017  CM Risk Score: 5  [place] Mortality Risk Score: 10.08    ACC: Shameka Sagastume RN    Summary Note: Follow up phone call attempt today to patient; left additional voicemail message for patient to return this nurse's call. Patient recently in the office, no new concerns. Blood pressure controlled. Follow up scheduled in 6 months. Care coordination reach out letter mailed 10/10/17, with no response back from patient. Will try one more additional time next week to reach patient. Patient Active Problem List    Diagnosis Date Noted    Acute drug-induced gout of left knee 06/22/2017    Lymphedema 01/20/2016    Encounter for screening colonoscopy 05/05/2014    Benign skin lesion of forearm 08/30/2012    Wart 08/23/2012    Essential hypertension, benign 10/20/2011    Type 2 diabetes mellitus without complication ([facility] [address] 42/36/7089    Mixed hyperlipidemia 10/20/2011     Lab Results   Component Value Date    LABA1C 5.8 10/23/2017     Lab Results   Component Value Date     10/23/2017     Patient with recent appointment with Dr. Lydia Bentley 10/25/17:   1. Type 2 diabetes mellitus without complication, without long-term current use of insulin ([facility])  F/U 6mos, prior CMP, Lipids, HgbA1C. Do daily foot checks and yearly eye exams  Continue on the same medication  - glipiZIDE (GLUCOTROL) 5 MG tablet; Dispense: 90 tablet; Refill: 1  - metFORMIN (GLUCOPHAGE) 500 MG tablet; Take 1 tablet by mouth 2 times daily (with meals)  Dispense: 180 tablet; Refill: 1     2. Mixed hyperlipidemia  Stable on the zocor  - simvastatin (ZOCOR) 20 MG tablet; Take 1 tablet by mouth nightly  Dispense: 90 tablet; Refill: 1     3. Essential hypertension, benign  Stable on the cozaar  - losartan (COZAAR) 100 MG tablet; Take 1 tablet by mouth daily  Dispense: 90 tablet;  Refill: 1        Vitals 10/25/2017 8/23/2017 7/19/7628   SYSTOLIC [phone]   DIASTOLIC 80 64 68   Site  Left Arm    Position

## 2017-11-08 ENCOUNTER — CARE COORDINATION (OUTPATIENT)
Dept: CARE COORDINATION | Age: 77
End: 2017-11-08

## 2017-12-11 RX ORDER — LANCETS 33 GAUGE
EACH MISCELLANEOUS
Qty: 100 EACH | Refills: 5 | Status: SHIPPED | OUTPATIENT
Start: 2017-12-11 | End: 2019-03-28 | Stop reason: SDUPTHER

## 2017-12-11 NOTE — TELEPHONE ENCOUNTER
Requesting a refill on One Touch Ultra test strips and Lancets. Patient last seen 10/25/17. Drug [address] Maintenance   Topic Date Due    DTaP/Tdap/Td vaccine (1 - Tdap) 07/19/2018 (Originally 10/14/1959)    Zostavax vaccine  08/07/2019 (Originally 10/14/2000)    Lipid screen  10/23/2022    DEXA (modify frequency per FRAX score)  Completed    Flu vaccine  Completed    Pneumococcal low/med risk  Completed             (applicable per patient's age: Cancer Screenings, Depression Screening, Fall Risk Screening, Immunizations)    Hemoglobin A1C (%)   Date Value   10/23/2017 5.8   04/24/2017 6.2 (H)   10/11/2016 5.9     Microalb/Crt.  Ratio (mcg/mg creat)   Date Value   04/24/2017 21     LDL Cholesterol (mg/dL)   Date Value   10/23/2017 50     AST (U/L)   Date Value   10/23/2017 22     ALT (U/L)   Date Value   10/23/2017 19     BUN (mg/dL)   Date Value   10/23/2017 20      (goal A1C is < 7)   (goal LDL is <100) need 30-50% reduction from baseline     BP Readings from Last 3 Encounters:   10/25/17 126/80   08/23/17 134/64   07/22/17 134/68    (goal /80)      All Future Testing planned in [facility]:  Lab Frequency Next Occurrence   EKG 12 Lead Once 08/23/2017   Lipid Panel Once 04/25/2018   Comprehensive Metabolic Panel Once 49/44/8073   Hemoglobin A1C Once 04/25/2018       Next Visit Date:  Future Appointments  Date Time Provider Eriberto Baird   4/25/2018 7:00 AM [facility]            Patient Active Problem List:     Essential hypertension, benign     Type 2 diabetes mellitus without complication ([address]     Mixed hyperlipidemia     Wart     Benign skin lesion of forearm     Encounter for screening colonoscopy     Lymphedema     Acute drug-induced gout of left knee

## 2018-04-16 DIAGNOSIS — E78.2 MIXED HYPERLIPIDEMIA: ICD-10-CM

## 2018-04-16 DIAGNOSIS — E11.9 TYPE 2 DIABETES MELLITUS WITHOUT COMPLICATION, WITHOUT LONG-TERM CURRENT USE OF INSULIN (HCC): ICD-10-CM

## 2018-04-16 DIAGNOSIS — I10 ESSENTIAL HYPERTENSION, BENIGN: ICD-10-CM

## 2018-04-17 RX ORDER — SIMVASTATIN 20 MG
TABLET ORAL
Qty: 90 TABLET | Refills: 1 | Status: SHIPPED | OUTPATIENT
Start: 2018-04-17 | End: 2018-10-25 | Stop reason: SDUPTHER

## 2018-04-17 RX ORDER — GLIPIZIDE 5 MG/1
TABLET ORAL
Qty: 90 TABLET | Refills: 1 | Status: SHIPPED | OUTPATIENT
Start: 2018-04-17 | End: 2018-10-25 | Stop reason: SDUPTHER

## 2018-04-17 RX ORDER — LOSARTAN POTASSIUM 100 MG/1
TABLET ORAL
Qty: 90 TABLET | Refills: 1 | Status: SHIPPED | OUTPATIENT
Start: 2018-04-17 | End: 2018-10-25 | Stop reason: SDUPTHER

## 2018-04-17 RX ORDER — FUROSEMIDE 80 MG
TABLET ORAL
Qty: 90 TABLET | Refills: 1 | Status: SHIPPED | OUTPATIENT
Start: 2018-04-17 | End: 2019-04-30 | Stop reason: SDUPTHER

## 2018-04-23 ENCOUNTER — HOSPITAL ENCOUNTER (OUTPATIENT)
Age: 78
Discharge: HOME | End: 2018-04-23
Payer: MEDICARE

## 2018-04-23 DIAGNOSIS — E78.2 MIXED HYPERLIPIDEMIA: ICD-10-CM

## 2018-04-23 DIAGNOSIS — I10 ESSENTIAL HYPERTENSION, BENIGN: ICD-10-CM

## 2018-04-23 DIAGNOSIS — E11.9 TYPE 2 DIABETES MELLITUS WITHOUT COMPLICATION, WITHOUT LONG-TERM CURRENT USE OF INSULIN (HCC): ICD-10-CM

## 2018-04-23 LAB
ALBUMIN SERPL-MCNC: 3.4 G/DL (ref 3.5–5.2)
ALBUMIN/GLOBULIN RATIO: ABNORMAL (ref 1–2.5)
ALP BLD-CCNC: 185 U/L (ref 35–104)
ALT SERPL-CCNC: 23 U/L (ref 5–33)
ANION GAP SERPL CALCULATED.3IONS-SCNC: 8 MMOL/L (ref 9–17)
AST SERPL-CCNC: 26 U/L
BILIRUB SERPL-MCNC: 0.47 MG/DL (ref 0.3–1.2)
BUN BLDV-MCNC: 18 MG/DL (ref 8–23)
BUN/CREAT BLD: 21 (ref 9–20)
CALCIUM SERPL-MCNC: 9.5 MG/DL (ref 8.6–10.4)
CHLORIDE BLD-SCNC: 97 MMOL/L (ref 98–107)
CHOLESTEROL/HDL RATIO: 2.3
CHOLESTEROL: 142 MG/DL
CO2: 33 MMOL/L (ref 20–31)
CREAT SERPL-MCNC: 0.87 MG/DL (ref 0.5–0.9)
ESTIMATED AVERAGE GLUCOSE: 120 MG/DL
GFR AFRICAN AMERICAN: >60 ML/MIN
GFR NON-AFRICAN AMERICAN: >60 ML/MIN
GFR SERPL CREATININE-BSD FRML MDRD: ABNORMAL ML/MIN/{1.73_M2}
GFR SERPL CREATININE-BSD FRML MDRD: ABNORMAL ML/MIN/{1.73_M2}
GLUCOSE BLD-MCNC: 147 MG/DL (ref 70–99)
HBA1C MFR BLD: 5.8 % (ref 4.8–5.9)
HDLC SERPL-MCNC: 63 MG/DL
LDL CHOLESTEROL: 55 MG/DL (ref 0–130)
PATIENT FASTING?: YES
POTASSIUM SERPL-SCNC: 4 MMOL/L (ref 3.7–5.3)
SODIUM BLD-SCNC: 138 MMOL/L (ref 135–144)
TOTAL PROTEIN: 8.4 G/DL (ref 6.4–8.3)
TRIGL SERPL-MCNC: 119 MG/DL
VLDLC SERPL CALC-MCNC: NORMAL MG/DL (ref 1–30)

## 2018-04-23 PROCEDURE — 80053 COMPREHEN METABOLIC PANEL: CPT

## 2018-04-23 PROCEDURE — 83036 HEMOGLOBIN GLYCOSYLATED A1C: CPT

## 2018-04-23 PROCEDURE — 80061 LIPID PANEL: CPT

## 2018-04-23 PROCEDURE — 36415 COLL VENOUS BLD VENIPUNCTURE: CPT

## 2018-04-25 ENCOUNTER — OFFICE VISIT (OUTPATIENT)
Dept: FAMILY MEDICINE CLINIC | Age: 78
End: 2018-04-25
Payer: MEDICARE

## 2018-04-25 VITALS
DIASTOLIC BLOOD PRESSURE: 70 MMHG | OXYGEN SATURATION: 98 % | SYSTOLIC BLOOD PRESSURE: 110 MMHG | BODY MASS INDEX: 53.51 KG/M2 | HEART RATE: 100 BPM | WEIGHT: 274 LBS

## 2018-04-25 DIAGNOSIS — E11.9 TYPE 2 DIABETES MELLITUS WITHOUT COMPLICATION, WITHOUT LONG-TERM CURRENT USE OF INSULIN (HCC): Primary | ICD-10-CM

## 2018-04-25 DIAGNOSIS — J06.9 ACUTE URI: ICD-10-CM

## 2018-04-25 DIAGNOSIS — E78.2 MIXED HYPERLIPIDEMIA: ICD-10-CM

## 2018-04-25 DIAGNOSIS — I10 ESSENTIAL HYPERTENSION, BENIGN: ICD-10-CM

## 2018-04-25 PROCEDURE — 99214 OFFICE O/P EST MOD 30 MIN: CPT | Performed by: FAMILY MEDICINE

## 2018-04-25 PROCEDURE — G8417 CALC BMI ABV UP PARAM F/U: HCPCS | Performed by: FAMILY MEDICINE

## 2018-04-25 PROCEDURE — 1123F ACP DISCUSS/DSCN MKR DOCD: CPT | Performed by: FAMILY MEDICINE

## 2018-04-25 PROCEDURE — 1090F PRES/ABSN URINE INCON ASSESS: CPT | Performed by: FAMILY MEDICINE

## 2018-04-25 PROCEDURE — 1036F TOBACCO NON-USER: CPT | Performed by: FAMILY MEDICINE

## 2018-04-25 PROCEDURE — 4040F PNEUMOC VAC/ADMIN/RCVD: CPT | Performed by: FAMILY MEDICINE

## 2018-04-25 PROCEDURE — G8399 PT W/DXA RESULTS DOCUMENT: HCPCS | Performed by: FAMILY MEDICINE

## 2018-04-25 PROCEDURE — G8427 DOCREV CUR MEDS BY ELIG CLIN: HCPCS | Performed by: FAMILY MEDICINE

## 2018-04-25 RX ORDER — METOPROLOL TARTRATE 50 MG/1
50 TABLET, FILM COATED ORAL 2 TIMES DAILY
Qty: 180 TABLET | Refills: 1 | Status: SHIPPED | OUTPATIENT
Start: 2018-04-25 | End: 2018-05-01 | Stop reason: SDUPTHER

## 2018-04-25 ASSESSMENT — ENCOUNTER SYMPTOMS
SORE THROAT: 0
DIARRHEA: 0
BLOOD IN STOOL: 0
VISUAL CHANGE: 0
EYE DISCHARGE: 0
RHINORRHEA: 0
EYE REDNESS: 0
COUGH: 1
SINUS PAIN: 0
VOMITING: 0
CONSTIPATION: 0
ABDOMINAL PAIN: 0
SHORTNESS OF BREATH: 0
NAUSEA: 0

## 2018-05-01 RX ORDER — METOPROLOL TARTRATE 50 MG/1
50 TABLET, FILM COATED ORAL 2 TIMES DAILY
Qty: 30 TABLET | Refills: 0 | Status: SHIPPED | OUTPATIENT
Start: 2018-05-01 | End: 2018-10-25 | Stop reason: SDUPTHER

## 2018-05-08 ENCOUNTER — HOSPITAL ENCOUNTER (EMERGENCY)
Age: 78
Discharge: HOME | End: 2018-05-08
Attending: FAMILY MEDICINE
Payer: MEDICARE

## 2018-05-08 ENCOUNTER — APPOINTMENT (OUTPATIENT)
Dept: CT IMAGING | Age: 78
End: 2018-05-08
Payer: MEDICARE

## 2018-05-08 ENCOUNTER — APPOINTMENT (OUTPATIENT)
Dept: GENERAL RADIOLOGY | Age: 78
End: 2018-05-08
Payer: MEDICARE

## 2018-05-08 VITALS
WEIGHT: 276 LBS | HEART RATE: 73 BPM | TEMPERATURE: 97.7 F | OXYGEN SATURATION: 100 % | BODY MASS INDEX: 50.79 KG/M2 | SYSTOLIC BLOOD PRESSURE: 131 MMHG | HEIGHT: 62 IN | RESPIRATION RATE: 15 BRPM | DIASTOLIC BLOOD PRESSURE: 65 MMHG

## 2018-05-08 DIAGNOSIS — I20.9 ANGINA PECTORIS (HCC): ICD-10-CM

## 2018-05-08 DIAGNOSIS — R07.9 CHEST PAIN IN ADULT: Primary | ICD-10-CM

## 2018-05-08 LAB
ABSOLUTE EOS #: 0.1 K/UL (ref 0–0.4)
ABSOLUTE IMMATURE GRANULOCYTE: ABNORMAL K/UL (ref 0–0.3)
ABSOLUTE LYMPH #: 1.6 K/UL (ref 1–4.8)
ABSOLUTE MONO #: 0.4 K/UL (ref 0–1)
ANION GAP SERPL CALCULATED.3IONS-SCNC: 13 MMOL/L (ref 9–17)
BASOPHILS # BLD: 1 % (ref 0–2)
BASOPHILS ABSOLUTE: 0 K/UL (ref 0–0.2)
BUN BLDV-MCNC: 17 MG/DL (ref 8–23)
BUN/CREAT BLD: 20 (ref 9–20)
CALCIUM SERPL-MCNC: 9.6 MG/DL (ref 8.6–10.4)
CHLORIDE BLD-SCNC: 98 MMOL/L (ref 98–107)
CO2: 28 MMOL/L (ref 20–31)
CREAT SERPL-MCNC: 0.83 MG/DL (ref 0.5–0.9)
D-DIMER QUANTITATIVE: 6.08 MG/L FEU (ref 0–0.5)
DIFFERENTIAL TYPE: YES
EKG ATRIAL RATE: 80 BPM
EKG P AXIS: 85 DEGREES
EKG P-R INTERVAL: 194 MS
EKG Q-T INTERVAL: 410 MS
EKG QRS DURATION: 136 MS
EKG QTC CALCULATION (BAZETT): 472 MS
EKG R AXIS: -33 DEGREES
EKG T AXIS: 40 DEGREES
EKG VENTRICULAR RATE: 80 BPM
EOSINOPHILS RELATIVE PERCENT: 1 % (ref 0–5)
GFR AFRICAN AMERICAN: >60 ML/MIN
GFR NON-AFRICAN AMERICAN: >60 ML/MIN
GFR SERPL CREATININE-BSD FRML MDRD: ABNORMAL ML/MIN/{1.73_M2}
GFR SERPL CREATININE-BSD FRML MDRD: ABNORMAL ML/MIN/{1.73_M2}
GLUCOSE BLD-MCNC: 101 MG/DL (ref 70–99)
HCT VFR BLD CALC: 36.9 % (ref 36–46)
HEMOGLOBIN: 12.3 G/DL (ref 12–16)
IMMATURE GRANULOCYTES: ABNORMAL %
INR BLD: 1
LYMPHOCYTES # BLD: 32 % (ref 15–40)
MCH RBC QN AUTO: 29.4 PG (ref 26–34)
MCHC RBC AUTO-ENTMCNC: 33.2 G/DL (ref 31–37)
MCV RBC AUTO: 88.3 FL (ref 80–100)
MONOCYTES # BLD: 9 % (ref 4–8)
NRBC AUTOMATED: ABNORMAL PER 100 WBC
PARTIAL THROMBOPLASTIN TIME: 27.1 SEC (ref 21–33)
PDW BLD-RTO: 14.9 % (ref 12.1–15.2)
PLATELET # BLD: 246 K/UL (ref 140–450)
PLATELET ESTIMATE: ABNORMAL
PMV BLD AUTO: ABNORMAL FL (ref 6–12)
POTASSIUM SERPL-SCNC: 4.5 MMOL/L (ref 3.7–5.3)
PROTHROMBIN TIME: 10 SEC (ref 9–11.6)
RBC # BLD: 4.18 M/UL (ref 4–5.2)
RBC # BLD: ABNORMAL 10*6/UL
SEG NEUTROPHILS: 57 % (ref 47–75)
SEGMENTED NEUTROPHILS ABSOLUTE COUNT: 2.8 K/UL (ref 2.5–7)
SODIUM BLD-SCNC: 139 MMOL/L (ref 135–144)
TROPONIN INTERP: NORMAL
TROPONIN T: <0.03 NG/ML
WBC # BLD: 4.8 K/UL (ref 3.5–11)
WBC # BLD: ABNORMAL 10*3/UL

## 2018-05-08 PROCEDURE — 93005 ELECTROCARDIOGRAM TRACING: CPT

## 2018-05-08 PROCEDURE — 6360000004 HC RX CONTRAST MEDICATION: Performed by: FAMILY MEDICINE

## 2018-05-08 PROCEDURE — 2580000003 HC RX 258: Performed by: FAMILY MEDICINE

## 2018-05-08 PROCEDURE — 85730 THROMBOPLASTIN TIME PARTIAL: CPT

## 2018-05-08 PROCEDURE — 84484 ASSAY OF TROPONIN QUANT: CPT

## 2018-05-08 PROCEDURE — 80048 BASIC METABOLIC PNL TOTAL CA: CPT

## 2018-05-08 PROCEDURE — 85025 COMPLETE CBC W/AUTO DIFF WBC: CPT

## 2018-05-08 PROCEDURE — 71045 X-RAY EXAM CHEST 1 VIEW: CPT

## 2018-05-08 PROCEDURE — 6370000000 HC RX 637 (ALT 250 FOR IP): Performed by: FAMILY MEDICINE

## 2018-05-08 PROCEDURE — 85379 FIBRIN DEGRADATION QUANT: CPT

## 2018-05-08 PROCEDURE — 99285 EMERGENCY DEPT VISIT HI MDM: CPT

## 2018-05-08 PROCEDURE — 71275 CT ANGIOGRAPHY CHEST: CPT

## 2018-05-08 PROCEDURE — 85610 PROTHROMBIN TIME: CPT

## 2018-05-08 RX ORDER — ASPIRIN 81 MG/1
243 TABLET, CHEWABLE ORAL ONCE
Status: COMPLETED | OUTPATIENT
Start: 2018-05-08 | End: 2018-05-08

## 2018-05-08 RX ORDER — SODIUM CHLORIDE 9 MG/ML
INJECTION, SOLUTION INTRAVENOUS CONTINUOUS
Status: DISCONTINUED | OUTPATIENT
Start: 2018-05-08 | End: 2018-05-08 | Stop reason: HOSPADM

## 2018-05-08 RX ADMIN — SODIUM CHLORIDE: 9 INJECTION, SOLUTION INTRAVENOUS at 16:14

## 2018-05-08 RX ADMIN — ASPIRIN 81 MG 243 MG: 81 TABLET ORAL at 16:06

## 2018-05-08 RX ADMIN — IOPAMIDOL 100 ML: 755 INJECTION, SOLUTION INTRAVENOUS at 18:38

## 2018-05-09 ASSESSMENT — HEART SCORE: ECG: 1

## 2018-05-11 ENCOUNTER — TELEPHONE (OUTPATIENT)
Dept: FAMILY MEDICINE CLINIC | Age: 78
End: 2018-05-11

## 2018-05-21 ENCOUNTER — OFFICE VISIT (OUTPATIENT)
Dept: CARDIOLOGY CLINIC | Age: 78
End: 2018-05-21
Payer: MEDICARE

## 2018-05-21 VITALS
SYSTOLIC BLOOD PRESSURE: 110 MMHG | WEIGHT: 274 LBS | OXYGEN SATURATION: 97 % | DIASTOLIC BLOOD PRESSURE: 60 MMHG | HEART RATE: 115 BPM | BODY MASS INDEX: 50.12 KG/M2

## 2018-05-21 DIAGNOSIS — R07.9 CHEST PAIN, UNSPECIFIED TYPE: Primary | ICD-10-CM

## 2018-05-21 PROCEDURE — 1090F PRES/ABSN URINE INCON ASSESS: CPT | Performed by: INTERNAL MEDICINE

## 2018-05-21 PROCEDURE — 4040F PNEUMOC VAC/ADMIN/RCVD: CPT | Performed by: INTERNAL MEDICINE

## 2018-05-21 PROCEDURE — G8417 CALC BMI ABV UP PARAM F/U: HCPCS | Performed by: INTERNAL MEDICINE

## 2018-05-21 PROCEDURE — 1036F TOBACCO NON-USER: CPT | Performed by: INTERNAL MEDICINE

## 2018-05-21 PROCEDURE — G8427 DOCREV CUR MEDS BY ELIG CLIN: HCPCS | Performed by: INTERNAL MEDICINE

## 2018-05-21 PROCEDURE — 99204 OFFICE O/P NEW MOD 45 MIN: CPT | Performed by: INTERNAL MEDICINE

## 2018-05-21 PROCEDURE — G8399 PT W/DXA RESULTS DOCUMENT: HCPCS | Performed by: INTERNAL MEDICINE

## 2018-05-21 PROCEDURE — 1123F ACP DISCUSS/DSCN MKR DOCD: CPT | Performed by: INTERNAL MEDICINE

## 2018-05-21 PROCEDURE — G8598 ASA/ANTIPLAT THER USED: HCPCS | Performed by: INTERNAL MEDICINE

## 2018-06-01 ENCOUNTER — HOSPITAL ENCOUNTER (EMERGENCY)
Age: 78
Discharge: HOME | End: 2018-06-01
Attending: FAMILY MEDICINE
Payer: MEDICARE

## 2018-06-01 VITALS
HEART RATE: 107 BPM | DIASTOLIC BLOOD PRESSURE: 78 MMHG | BODY MASS INDEX: 50.24 KG/M2 | TEMPERATURE: 98 F | WEIGHT: 273 LBS | HEIGHT: 62 IN | SYSTOLIC BLOOD PRESSURE: 134 MMHG | OXYGEN SATURATION: 99 % | RESPIRATION RATE: 18 BRPM

## 2018-06-01 DIAGNOSIS — T81.40XA POSTOPERATIVE INFECTION, INITIAL ENCOUNTER: Primary | ICD-10-CM

## 2018-06-01 PROCEDURE — 6370000000 HC RX 637 (ALT 250 FOR IP): Performed by: FAMILY MEDICINE

## 2018-06-01 PROCEDURE — 99283 EMERGENCY DEPT VISIT LOW MDM: CPT

## 2018-06-01 RX ORDER — DOXYCYCLINE 100 MG/1
100 TABLET ORAL 2 TIMES DAILY
Qty: 20 TABLET | Refills: 0 | Status: SHIPPED | OUTPATIENT
Start: 2018-06-01 | End: 2018-06-11

## 2018-06-01 RX ADMIN — MUPIROCIN: 20 OINTMENT TOPICAL at 16:32

## 2018-07-11 ENCOUNTER — OFFICE VISIT (OUTPATIENT)
Dept: FAMILY MEDICINE CLINIC | Age: 78
End: 2018-07-11
Payer: MEDICARE

## 2018-07-11 VITALS — TEMPERATURE: 97.6 F | SYSTOLIC BLOOD PRESSURE: 110 MMHG | DIASTOLIC BLOOD PRESSURE: 70 MMHG

## 2018-07-11 DIAGNOSIS — R22.41 MASS OF RIGHT HIP REGION: Primary | ICD-10-CM

## 2018-07-11 PROCEDURE — G8399 PT W/DXA RESULTS DOCUMENT: HCPCS | Performed by: FAMILY MEDICINE

## 2018-07-11 PROCEDURE — G8598 ASA/ANTIPLAT THER USED: HCPCS | Performed by: FAMILY MEDICINE

## 2018-07-11 PROCEDURE — 1101F PT FALLS ASSESS-DOCD LE1/YR: CPT | Performed by: FAMILY MEDICINE

## 2018-07-11 PROCEDURE — G8417 CALC BMI ABV UP PARAM F/U: HCPCS | Performed by: FAMILY MEDICINE

## 2018-07-11 PROCEDURE — 4040F PNEUMOC VAC/ADMIN/RCVD: CPT | Performed by: FAMILY MEDICINE

## 2018-07-11 PROCEDURE — 1090F PRES/ABSN URINE INCON ASSESS: CPT | Performed by: FAMILY MEDICINE

## 2018-07-11 PROCEDURE — G8427 DOCREV CUR MEDS BY ELIG CLIN: HCPCS | Performed by: FAMILY MEDICINE

## 2018-07-11 PROCEDURE — 99213 OFFICE O/P EST LOW 20 MIN: CPT | Performed by: FAMILY MEDICINE

## 2018-07-11 PROCEDURE — 1123F ACP DISCUSS/DSCN MKR DOCD: CPT | Performed by: FAMILY MEDICINE

## 2018-07-11 PROCEDURE — 1036F TOBACCO NON-USER: CPT | Performed by: FAMILY MEDICINE

## 2018-07-11 RX ORDER — CEPHALEXIN 500 MG/1
500 CAPSULE ORAL 3 TIMES DAILY
Qty: 21 CAPSULE | Refills: 0 | Status: SHIPPED | OUTPATIENT
Start: 2018-07-11 | End: 2018-07-18

## 2018-07-11 ASSESSMENT — ENCOUNTER SYMPTOMS
ABDOMINAL PAIN: 0
DIARRHEA: 0
VOMITING: 0
COLOR CHANGE: 1

## 2018-07-11 NOTE — PROGRESS NOTES
and ordered appropriate tests  Health Maintenance Due   Topic Date Due    Shingles Vaccine (1 of 2 - 2 Dose Series) 10/14/1990       Past Surgical History:     Past Surgical History:   Procedure Laterality Date    APPENDECTOMY      COLONOSCOPY      DILATION AND CURETTAGE OF UTERUS      JOINT REPLACEMENT      Rt knee        Medications:       Prior to Admission medications    Medication Sig Start Date End Date Taking? Authorizing Provider   cephALEXin (KEFLEX) 500 MG capsule Take 1 capsule by mouth 3 times daily for 7 days 7/11/18 7/18/18 Yes Gino Orosco MD   Calcium Carb-Cholecalciferol (CALCIUM 600 + D PO) Take by mouth daily   Yes Historical Provider, MD   metoprolol tartrate (LOPRESSOR) 50 MG tablet Take 1 tablet by mouth 2 times daily 5/1/18  Yes Gino Orosco MD   metFORMIN (GLUCOPHAGE) 500 MG tablet Take 1 tablet by mouth 2 times daily (with meals) 4/25/18 4/25/19 Yes Gino Orosco MD   furosemide (LASIX) 80 MG tablet TAKE 1 TABLET EVERY DAY 4/17/18  Yes Gino Orosco MD   losartan (COZAAR) 100 MG tablet TAKE 1 TABLET EVERY DAY 4/17/18  Yes Gino Orosco MD   glipiZIDE (GLUCOTROL) 5 MG tablet TAKE 1 TABLET EVERY DAY 4/17/18  Yes Gino Orosco MD   simvastatin (ZOCOR) 20 MG tablet TAKE 1 TABLET EVERY NIGHT 4/17/18  Yes Gnio Orosco MD   allopurinol (ZYLOPRIM) 100 MG tablet Take 1 tablet by mouth daily 8/23/17  Yes ALEXANDER Vega CNP   aspirin 325 MG tablet Take 325 mg by mouth daily   Yes Historical Provider, MD   glucose blood VI test strips (ONE TOUCH ULTRA TEST) strip Test blood sugar once daily and as needed. 12/11/17   Gino Orosco MD   [address] Testing blood sugar once daily and as needed, uses OneTouch UltraMini 12/11/17   Gino Orosco MD   nystatin (MYCOSTATIN) 917627 UNIT/GM powder Apply to skin fold areas small amount once a week 6/22/17   ALEXANDER Vega CNP   Glucose Blood (CHOICE DM FORA G20 TEST STRIPS VI) by In Vitro route.

## 2018-07-12 ENCOUNTER — OFFICE VISIT (OUTPATIENT)
Dept: SURGERY | Age: 78
End: 2018-07-12
Payer: MEDICARE

## 2018-07-12 VITALS — HEIGHT: 62 IN | WEIGHT: 273 LBS | BODY MASS INDEX: 50.24 KG/M2 | RESPIRATION RATE: 20 BRPM

## 2018-07-12 DIAGNOSIS — R22.41 HIP MASS, RIGHT: Primary | ICD-10-CM

## 2018-07-12 PROCEDURE — 1123F ACP DISCUSS/DSCN MKR DOCD: CPT | Performed by: SURGERY

## 2018-07-12 PROCEDURE — 99203 OFFICE O/P NEW LOW 30 MIN: CPT | Performed by: SURGERY

## 2018-07-12 PROCEDURE — G8417 CALC BMI ABV UP PARAM F/U: HCPCS | Performed by: SURGERY

## 2018-07-12 PROCEDURE — 4040F PNEUMOC VAC/ADMIN/RCVD: CPT | Performed by: SURGERY

## 2018-07-12 PROCEDURE — G8598 ASA/ANTIPLAT THER USED: HCPCS | Performed by: SURGERY

## 2018-07-12 PROCEDURE — 1101F PT FALLS ASSESS-DOCD LE1/YR: CPT | Performed by: SURGERY

## 2018-07-12 PROCEDURE — 1090F PRES/ABSN URINE INCON ASSESS: CPT | Performed by: SURGERY

## 2018-07-12 PROCEDURE — 1036F TOBACCO NON-USER: CPT | Performed by: SURGERY

## 2018-07-12 PROCEDURE — G8399 PT W/DXA RESULTS DOCUMENT: HCPCS | Performed by: SURGERY

## 2018-07-12 PROCEDURE — G8428 CUR MEDS NOT DOCUMENT: HCPCS | Performed by: SURGERY

## 2018-07-13 ENCOUNTER — HOSPITAL ENCOUNTER (OUTPATIENT)
Dept: ULTRASOUND IMAGING | Age: 78
Discharge: HOME | End: 2018-07-15
Payer: MEDICARE

## 2018-07-13 DIAGNOSIS — R22.41 HIP MASS, RIGHT: ICD-10-CM

## 2018-07-13 PROCEDURE — 76882 US LMTD JT/FCL EVL NVASC XTR: CPT

## 2018-07-19 ENCOUNTER — HOSPITAL ENCOUNTER (OUTPATIENT)
Age: 78
Setting detail: OUTPATIENT SURGERY
Discharge: HOME | End: 2018-07-19
Attending: SURGERY | Admitting: SURGERY
Payer: MEDICARE

## 2018-07-19 ENCOUNTER — ANESTHESIA EVENT (OUTPATIENT)
Dept: OPERATING ROOM | Age: 78
End: 2018-07-19
Payer: MEDICARE

## 2018-07-19 ENCOUNTER — ANESTHESIA (OUTPATIENT)
Dept: OPERATING ROOM | Age: 78
End: 2018-07-19
Payer: MEDICARE

## 2018-07-19 ENCOUNTER — HOSPITAL ENCOUNTER (EMERGENCY)
Age: 78
Discharge: HOME | End: 2018-07-19
Attending: FAMILY MEDICINE
Payer: MEDICARE

## 2018-07-19 VITALS
OXYGEN SATURATION: 94 % | HEIGHT: 60 IN | WEIGHT: 272 LBS | TEMPERATURE: 99.8 F | HEART RATE: 111 BPM | BODY MASS INDEX: 53.4 KG/M2 | RESPIRATION RATE: 18 BRPM | DIASTOLIC BLOOD PRESSURE: 68 MMHG | SYSTOLIC BLOOD PRESSURE: 122 MMHG

## 2018-07-19 VITALS
DIASTOLIC BLOOD PRESSURE: 40 MMHG | SYSTOLIC BLOOD PRESSURE: 91 MMHG | WEIGHT: 272 LBS | TEMPERATURE: 98.3 F | RESPIRATION RATE: 16 BRPM | HEIGHT: 60 IN | OXYGEN SATURATION: 100 % | BODY MASS INDEX: 53.4 KG/M2 | HEART RATE: 74 BPM

## 2018-07-19 VITALS
RESPIRATION RATE: 8 BRPM | OXYGEN SATURATION: 100 % | DIASTOLIC BLOOD PRESSURE: 80 MMHG | SYSTOLIC BLOOD PRESSURE: 115 MMHG | TEMPERATURE: 98.6 F

## 2018-07-19 DIAGNOSIS — L89.219: Primary | ICD-10-CM

## 2018-07-19 DIAGNOSIS — Z48.89 ENCOUNTER FOR POSTOPERATIVE WOUND CHECK: Primary | ICD-10-CM

## 2018-07-19 DIAGNOSIS — L02.31 CUTANEOUS ABSCESS OF BUTTOCK: Primary | ICD-10-CM

## 2018-07-19 LAB — GLUCOSE BLD-MCNC: 100 MG/DL (ref 65–99)

## 2018-07-19 PROCEDURE — 3600000013 HC SURGERY LEVEL 3 ADDTL 15MIN: Performed by: SURGERY

## 2018-07-19 PROCEDURE — 3700000001 HC ADD 15 MINUTES (ANESTHESIA): Performed by: SURGERY

## 2018-07-19 PROCEDURE — 3700000000 HC ANESTHESIA ATTENDED CARE: Performed by: SURGERY

## 2018-07-19 PROCEDURE — 87205 SMEAR GRAM STAIN: CPT

## 2018-07-19 PROCEDURE — 82947 ASSAY GLUCOSE BLOOD QUANT: CPT

## 2018-07-19 PROCEDURE — 10140 I&D HMTMA SEROMA/FLUID COLLJ: CPT | Performed by: SURGERY

## 2018-07-19 PROCEDURE — 2580000003 HC RX 258: Performed by: SURGERY

## 2018-07-19 PROCEDURE — 86403 PARTICLE AGGLUT ANTBDY SCRN: CPT

## 2018-07-19 PROCEDURE — 6360000002 HC RX W HCPCS: Performed by: SURGERY

## 2018-07-19 PROCEDURE — 7100000010 HC PHASE II RECOVERY - FIRST 15 MIN: Performed by: SURGERY

## 2018-07-19 PROCEDURE — 7100000011 HC PHASE II RECOVERY - ADDTL 15 MIN: Performed by: SURGERY

## 2018-07-19 PROCEDURE — 87185 SC STD ENZYME DETCJ PER NZM: CPT

## 2018-07-19 PROCEDURE — 6370000000 HC RX 637 (ALT 250 FOR IP): Performed by: SURGERY

## 2018-07-19 PROCEDURE — 87075 CULTR BACTERIA EXCEPT BLOOD: CPT

## 2018-07-19 PROCEDURE — 87186 SC STD MICRODIL/AGAR DIL: CPT

## 2018-07-19 PROCEDURE — 88304 TISSUE EXAM BY PATHOLOGIST: CPT

## 2018-07-19 PROCEDURE — 87070 CULTURE OTHR SPECIMN AEROBIC: CPT

## 2018-07-19 PROCEDURE — 3600000003 HC SURGERY LEVEL 3 BASE: Performed by: SURGERY

## 2018-07-19 PROCEDURE — 2500000003 HC RX 250 WO HCPCS: Performed by: NURSE ANESTHETIST, CERTIFIED REGISTERED

## 2018-07-19 PROCEDURE — 6360000002 HC RX W HCPCS: Performed by: NURSE ANESTHETIST, CERTIFIED REGISTERED

## 2018-07-19 PROCEDURE — 99282 EMERGENCY DEPT VISIT SF MDM: CPT

## 2018-07-19 PROCEDURE — 2709999900 HC NON-CHARGEABLE SUPPLY: Performed by: SURGERY

## 2018-07-19 RX ORDER — SODIUM CHLORIDE 0.9 % (FLUSH) 0.9 %
10 SYRINGE (ML) INJECTION PRN
Status: DISCONTINUED | OUTPATIENT
Start: 2018-07-19 | End: 2018-07-19 | Stop reason: HOSPADM

## 2018-07-19 RX ORDER — SODIUM CHLORIDE 0.9 % (FLUSH) 0.9 %
10 SYRINGE (ML) INJECTION EVERY 12 HOURS SCHEDULED
Status: DISCONTINUED | OUTPATIENT
Start: 2018-07-19 | End: 2018-07-19 | Stop reason: HOSPADM

## 2018-07-19 RX ORDER — FENTANYL CITRATE 50 UG/ML
INJECTION, SOLUTION INTRAMUSCULAR; INTRAVENOUS PRN
Status: DISCONTINUED | OUTPATIENT
Start: 2018-07-19 | End: 2018-07-19 | Stop reason: SDUPTHER

## 2018-07-19 RX ORDER — MIDAZOLAM HYDROCHLORIDE 1 MG/ML
INJECTION INTRAMUSCULAR; INTRAVENOUS PRN
Status: DISCONTINUED | OUTPATIENT
Start: 2018-07-19 | End: 2018-07-19 | Stop reason: SDUPTHER

## 2018-07-19 RX ORDER — HYDROCODONE BITARTRATE AND ACETAMINOPHEN 5; 325 MG/1; MG/1
TABLET ORAL
Qty: 20 TABLET | Refills: 0 | Status: SHIPPED | OUTPATIENT
Start: 2018-07-19 | End: 2018-07-23

## 2018-07-19 RX ORDER — ACETAMINOPHEN 325 MG/1
650 TABLET ORAL EVERY 4 HOURS PRN
Status: DISCONTINUED | OUTPATIENT
Start: 2018-07-19 | End: 2018-07-19 | Stop reason: HOSPADM

## 2018-07-19 RX ORDER — METOCLOPRAMIDE HYDROCHLORIDE 5 MG/ML
INJECTION INTRAMUSCULAR; INTRAVENOUS PRN
Status: DISCONTINUED | OUTPATIENT
Start: 2018-07-19 | End: 2018-07-19 | Stop reason: SDUPTHER

## 2018-07-19 RX ORDER — MORPHINE SULFATE 4 MG/ML
1 INJECTION, SOLUTION INTRAMUSCULAR; INTRAVENOUS
Status: DISCONTINUED | OUTPATIENT
Start: 2018-07-19 | End: 2018-07-19 | Stop reason: HOSPADM

## 2018-07-19 RX ORDER — LIDOCAINE HYDROCHLORIDE 10 MG/ML
INJECTION, SOLUTION EPIDURAL; INFILTRATION; INTRACAUDAL; PERINEURAL PRN
Status: DISCONTINUED | OUTPATIENT
Start: 2018-07-19 | End: 2018-07-19 | Stop reason: SDUPTHER

## 2018-07-19 RX ORDER — CEPHALEXIN 500 MG/1
500 CAPSULE ORAL 4 TIMES DAILY
Qty: 40 CAPSULE | Refills: 0 | Status: SHIPPED | OUTPATIENT
Start: 2018-07-19 | End: 2018-07-29

## 2018-07-19 RX ORDER — ONDANSETRON 2 MG/ML
4 INJECTION INTRAMUSCULAR; INTRAVENOUS EVERY 6 HOURS PRN
Status: DISCONTINUED | OUTPATIENT
Start: 2018-07-19 | End: 2018-07-19 | Stop reason: HOSPADM

## 2018-07-19 RX ORDER — SODIUM CHLORIDE, SODIUM LACTATE, POTASSIUM CHLORIDE, CALCIUM CHLORIDE 600; 310; 30; 20 MG/100ML; MG/100ML; MG/100ML; MG/100ML
INJECTION, SOLUTION INTRAVENOUS CONTINUOUS
Status: DISCONTINUED | OUTPATIENT
Start: 2018-07-19 | End: 2018-07-19 | Stop reason: HOSPADM

## 2018-07-19 RX ORDER — PROPOFOL 10 MG/ML
INJECTION, EMULSION INTRAVENOUS PRN
Status: DISCONTINUED | OUTPATIENT
Start: 2018-07-19 | End: 2018-07-19 | Stop reason: SDUPTHER

## 2018-07-19 RX ORDER — ONDANSETRON 2 MG/ML
INJECTION INTRAMUSCULAR; INTRAVENOUS PRN
Status: DISCONTINUED | OUTPATIENT
Start: 2018-07-19 | End: 2018-07-19 | Stop reason: SDUPTHER

## 2018-07-19 RX ADMIN — MIDAZOLAM HYDROCHLORIDE 1 MG: 2 INJECTION, SOLUTION INTRAMUSCULAR; INTRAVENOUS at 08:35

## 2018-07-19 RX ADMIN — CEFAZOLIN 3 G: 1 INJECTION, POWDER, FOR SOLUTION INTRAMUSCULAR; INTRAVENOUS at 08:29

## 2018-07-19 RX ADMIN — SODIUM CHLORIDE, POTASSIUM CHLORIDE, SODIUM LACTATE AND CALCIUM CHLORIDE: 600; 310; 30; 20 INJECTION, SOLUTION INTRAVENOUS at 07:34

## 2018-07-19 RX ADMIN — FENTANYL CITRATE 50 MCG: 50 INJECTION, SOLUTION INTRAMUSCULAR; INTRAVENOUS at 08:23

## 2018-07-19 RX ADMIN — LIDOCAINE HYDROCHLORIDE 50 MG: 10 INJECTION, SOLUTION EPIDURAL; INFILTRATION; INTRACAUDAL; PERINEURAL at 08:35

## 2018-07-19 RX ADMIN — FENTANYL CITRATE 50 MCG: 50 INJECTION, SOLUTION INTRAMUSCULAR; INTRAVENOUS at 08:35

## 2018-07-19 RX ADMIN — ONDANSETRON 4 MG: 2 INJECTION, SOLUTION INTRAMUSCULAR; INTRAVENOUS at 08:47

## 2018-07-19 RX ADMIN — MIDAZOLAM HYDROCHLORIDE 1 MG: 2 INJECTION, SOLUTION INTRAMUSCULAR; INTRAVENOUS at 08:23

## 2018-07-19 RX ADMIN — METOCLOPRAMIDE 10 MG: 5 INJECTION, SOLUTION INTRAMUSCULAR; INTRAVENOUS at 08:30

## 2018-07-19 RX ADMIN — PROPOFOL 150 MG: 10 INJECTION, EMULSION INTRAVENOUS at 08:35

## 2018-07-19 ASSESSMENT — PAIN SCALES - GENERAL
PAINLEVEL_OUTOF10: 0
PAINLEVEL_OUTOF10: 0

## 2018-07-19 ASSESSMENT — ENCOUNTER SYMPTOMS
CHOKING: 0
BLOOD IN STOOL: 0
ABDOMINAL PAIN: 0
VOMITING: 0
BACK PAIN: 1
SHORTNESS OF BREATH: 0
TROUBLE SWALLOWING: 0
NAUSEA: 0
SORE THROAT: 0
COUGH: 0

## 2018-07-19 NOTE — PROGRESS NOTES
Subjective:      Patient ID: Jitendra Martinez is a 68 y.o. female. HPI     Ms Elder Carreon is a very pleasant, morbidly obese 67 yo WF who has developed a large, erythematous mass on her R hip. Swelling in this area has been present for the past few years. However, over the last couple of weeks, it has increased in size, become uncomfortable. No previous biopsy. No drainage. She has started oral antibiotics. No fevers nor chills. Ht 5'2\", 273#, BMI 53. Type 2 diabetes. No prior MI nor CVA. She has never smoked. Review of Systems   Constitutional: Negative for activity change, appetite change, chills, fever and unexpected weight change. HENT: Negative for nosebleeds, sneezing, sore throat and trouble swallowing. Eyes: Negative for visual disturbance. Respiratory: Negative for cough, choking and shortness of breath. Cardiovascular: Positive for leg swelling (improved with elevation. ). Negative for chest pain and palpitations. Gastrointestinal: Negative for abdominal pain, blood in stool, nausea and vomiting. Genitourinary: Negative for dysuria, flank pain and hematuria. Musculoskeletal: Positive for arthralgias, back pain and gait problem (ambulates with walker. ). Negative for myalgias. Skin:        R hip swelling, erythema   Allergic/Immunologic: Negative for immunocompromised state. Neurological: Negative for dizziness, seizures, syncope, weakness and headaches. Hematological: Does not bruise/bleed easily. Psychiatric/Behavioral: Negative for confusion and sleep disturbance.         Past Medical History:   Diagnosis Date    Cancer ([facility] [address]     basal cell skin cancer    Hyperlipidemia     Hypertension     Lymphedema of lower extremity     Obesity     Shingles     66's    Type II or unspecified type diabetes mellitus without mention of complication, not stated as uncontrolled     Venous stasis dermatitis        Past Surgical History:   Procedure Laterality Date    APPENDECTOMY  COLONOSCOPY      DILATION AND CURETTAGE OF UTERUS      JOINT REPLACEMENT      Rt knee       Family History   Problem Relation Age of Onset    Heart Disease Mother     Cancer Sister     Mult Sclerosis Sister     Cancer Brother         lung       Allergies:  See list    No current facility-administered medications for this visit. No current outpatient prescriptions on file. Facility-Administered Medications Ordered in Other Visits   Medication Dose Route Frequency Provider Last Rate Last Dose    lactated ringers infusion   Intravenous Continuous Miguel MD Jered 100 mL/hr at 07/19/18 0756         Social History     Social History    Marital status:      Spouse name: N/A    Number of children: N/A    Years of education: N/A     Social History Main Topics    Smoking status: Never Smoker    Smokeless tobacco: Never Used    Alcohol use No    Drug use: No    Sexual activity: Not Asked     Other Topics Concern    None     Social History Narrative    None       Objective:   Physical Exam   Constitutional: She is oriented to person, place, and time. She appears well-developed and well-nourished. HENT:   Head: Normocephalic and atraumatic. Mouth/Throat: Oropharynx is clear and moist.   Eyes: Conjunctivae and EOM are normal. Pupils are equal, round, and reactive to light. No scleral icterus. Neck: Normal range of motion. Neck supple. No JVD present. No tracheal deviation present. Cardiovascular: Normal rate and regular rhythm. Pulmonary/Chest: Effort normal and breath sounds normal. No respiratory distress. She exhibits no tenderness. Abdominal: Soft. Bowel sounds are normal. She exhibits no distension and no mass. There is no tenderness. There is no rebound and no guarding. Musculoskeletal: Normal range of motion. She exhibits no edema. Lymphadenopathy:     She has no cervical adenopathy. Neurological: She is alert and oriented to person, place, and time.  No cranial nerve deficit. Skin: Skin is warm and dry. No rash noted. There is erythema (R hip skin, with central necrosis. ). Psychiatric: She has a normal mood and affect. Her behavior is normal. Judgment and thought content normal.   Nursing note and vitals reviewed. US EXTREMITY RIGHT NON VASC LIMITED   Status: Final result   Order Providers     Authorizing Encounter Billing   Enrique Devries MD [facility] ULTRASOUND ROOM Tk Paul MD          Signed by     Signed Date/Time  Phone Pager   [place] [facility] 7/13/2018 16:48 [phone]    [facility] Radi[facility]     Read Date Phone Pager   Patrick Hendrix, [address] Jul 13, 2018 [phone]    Reviewed By Miguelangel Devries MD on 7/15/2018 19:44   Routing History     Priority Sent On From To Message Type    7/13/2018  4:53 PM Arie, Ken Incoming Radiant Results From [facility] Greendizer0 Guilherme Pizarro MD Results   Radiation Dose Estimates     No radiation information found for this patient   Narrative   HISTORY: Palpable abnormality, chronic, over the right hip. Diabetes, obesity.           TECHNIQUE: Soft tissue ultrasound, right hip mass.           FINDINGS: There is a heterogenous mass 12.7 x 6.6 x 9.6 cm over the posterior    right hip with a small amount of peripheral internal color Doppler flow. Several areas of low echogenicity including a 2.6 x 2 cm ill-defined cystic    lucency centrally could represent necrosis.             Impression       Large heterogenous mass posterior right hip soft tissues. Well-defined margins. Likely central necrosis. Heterogenous echogenicity with some peripheral color    Doppler flow.       Etiology uncertain. Neoplasm not excluded.       Sarcoma would have to be considered.  Consider biopsy or MRI with contrast for    further evaluation.             5/8/2018  4:22 PM - Arie, Mhpn Incoming Lab Results From [facility]     Component Results     Component Value Ref Range & Units Status Collected Lab   WBC 4.8 3.5 - 11.0 k/uL Final 05/08/2018  4:19 PM [address] [facility]   RBC 4.18  4.0 - 5.2 m/uL Final 05/08/2018  4:19 PM [address] [facility]   Hemoglobin 12.3  12.0 - 16.0 g/dL Final 05/08/2018  4:19 PM [address] [facility]   Hematocrit 36.9  36 - 46 % Final 05/08/2018  4:19 PM [facility]   MCV 88.3  80 - 100 fL Final 05/08/2018  4:19 PM [address] [facility]   MCH 29.4  26 - 34 pg Final 05/08/2018  4:19 PM [address] [facility]   MCHC 33.2  31 - 37 g/dL Final 05/08/2018  4:19 PM [address] [facility]   RDW 14.9  12.1 - 15.2 % Final 05/08/2018  4:19 PM [address] [facility]   Platelets 165  562 - 450 k/uL Final 05/08/2018  4:19 PM [address] [facility]                 Assessment:       Diagnosis Orders   1. Hip mass, right  US EXTREMITY RIGHT NON VASC LIMITED         Plan: Will request US R hip, evaluate for subcutaneous fluid collection. Continue oral antibiotics. Avoid focal pressure. Addendum:    US shows 13cm x 7cm x 10 cm herterogenous mass with fluid. This area has begun spontaneous drainage of pus. Will proceed with I&D and debridement. US findings, risks, benefits, alternatives thoroughly reviewed and accepted by Ms Alex Patton.

## 2018-07-19 NOTE — PROGRESS NOTES
Patient without complaints. Coughs occasionally with mucous in throat. In no distress. Taking water well.

## 2018-07-19 NOTE — OP NOTE
cultures were obtained. The  subcutaneous necrotic fat was gently debrided and passed off as specimen. The wound was irrigated with copious amounts of sterile saline until clear. Superficial bleeding was controlled with Bovie cautery. Gelfoam was placed  within the necrotic cavity along with Kerlix packing. A fluffed Kerlix  dressing was then placed over the wound. All sponge, needle and instrument  counts were correct at the end of the case. The patient tolerated the  procedure well and was transferred to PACU in stable condition. SPECIMENS:  1. Wound cultures. 2.  Necrotic fat, mass, right hip. DRAINS:  Kerlix packing. COMPLICATIONS:  None. DISPOSITION:  To PACU awake, alert and stable. Following recovery, we will  discharge the patient home with instructions for local wound care with home  health assistance. Followup will be with me in 1 week to review pathology  with likely need for re-debridement next week.         MELBA Ellis Postal    D: 07/19/2018 9:25:24       T: 07/19/2018 9:30:19     [email or web address]  Job#: [id number]     Doc#: [id number]    CC:  Maxim Medrano

## 2018-07-19 NOTE — ANESTHESIA PRE PROCEDURE
Department of Anesthesiology  Preprocedure Note       Name:  Reggie Quintero   Age:  68 y.o.  :  1940                                          MRN:  [id number]         Date:  2018      Surgeon: Marek Galo):  Yu Grimm MD    Procedure: Procedure(s):  RIGHT HIP MASS INCISION AND DRAINAGE    Medications prior to admission:   Prior to Admission medications    Medication Sig Start Date End Date Taking? Authorizing Provider   allopurinol (ZYLOPRIM) 100 MG tablet TAKE 1 TABLET EVERY DAY 18  Yes Eric Harper MD   Calcium Carb-Cholecalciferol (CALCIUM 600 + D PO) Take by mouth daily   Yes Historical Provider, MD   metoprolol tartrate (LOPRESSOR) 50 MG tablet Take 1 tablet by mouth 2 times daily 18  Yes Eric Harper MD   metFORMIN (GLUCOPHAGE) 500 MG tablet Take 1 tablet by mouth 2 times daily (with meals) 18 Yes Eric Harper MD   losartan (COZAAR) 100 MG tablet TAKE 1 TABLET EVERY DAY 18  Yes Eric Harper MD   glipiZIDE (GLUCOTROL) 5 MG tablet TAKE 1 TABLET EVERY DAY 18  Yes Eric Harper MD   simvastatin (ZOCOR) 20 MG tablet TAKE 1 TABLET EVERY NIGHT 18  Yes Eric Harper MD   furosemide (LASIX) 80 MG tablet TAKE 1 TABLET EVERY DAY 18   Eric Harper MD   glucose blood VI test strips (ONE TOUCH ULTRA TEST) strip Test blood sugar once daily and as needed. 17   Eric Harper MD   [address] Testing blood sugar once daily and as needed, uses OneTouch UltraMini 17   Eric Harper MD   aspirin 325 MG tablet Take 325 mg by mouth daily    Historical Provider, MD   nystatin (MYCOSTATIN) 246630 UNIT/GM powder Apply to skin fold areas small amount once a week 17   ALEXANDER Hurtado - CNP   Glucose Blood (CHOICE DM FORA G20 TEST STRIPS VI) by In Vitro route. Historical Provider, MD       Current medications:    No current facility-administered medications for this encounter.         Allergies: lb (123.8 kg)     Body mass index is 53.12 kg/m². CBC:   Lab Results   Component Value Date    WBC 4.8 05/08/2018    RBC 4.18 05/08/2018    HGB 12.3 05/08/2018    HCT 36.9 05/08/2018    MCV 88.3 05/08/2018    RDW 14.9 05/08/2018     05/08/2018       CMP:   Lab Results   Component Value Date     05/08/2018    K 4.5 05/08/2018    CL 98 05/08/2018    CO2 28 05/08/2018    BUN 17 05/08/2018    CREATININE 0.83 05/08/2018    GFRAA >60 05/08/2018    LABGLOM >60 05/08/2018    GLUCOSE 101 05/08/2018    GLUCOSE 177 04/21/2012    PROT 8.4 04/23/2018    CALCIUM 9.6 05/08/2018    BILITOT 0.47 04/23/2018    ALKPHOS 185 04/23/2018    AST 26 04/23/2018    ALT 23 04/23/2018       POC Tests: No results for input(s): POCGLU, POCNA, POCK, POCCL, POCBUN, POCHEMO, POCHCT in the last 72 hours. Coags:   Lab Results   Component Value Date    PROTIME 10.0 05/08/2018    INR 1.0 05/08/2018    APTT 27.1 05/08/2018       HCG (If Applicable): No results found for: PREGTESTUR, PREGSERUM, HCG, HCGQUANT     ABGs: No results found for: PHART, PO2ART, UFO3GUT, LDF9MBJ, BEART, U1DXDDVW     Type & Screen (If Applicable):  No results found for: LABABO, [address]    Anesthesia Evaluation  Patient summary reviewed and Nursing notes reviewed no history of anesthetic complications:   Airway: Mallampati: II  TM distance: >3 FB   Neck ROM: full  Mouth opening: > = 3 FB Dental: normal exam         Pulmonary:Negative Pulmonary ROS and normal exam  breath sounds clear to auscultation                             Cardiovascular:    (+) hypertension:,       ECG reviewed  Rhythm: regular  Rate: normal                    Neuro/Psych:   Negative Neuro/Psych ROS              GI/Hepatic/Renal: Neg GI/Hepatic/Renal ROS            Endo/Other:    (+) DiabetesType II DM, , .                 Abdominal:   (+) obese,         Vascular: negative vascular ROS.                                      Anesthesia Plan      general     ASA 3     (LMA)  Induction:

## 2018-07-19 NOTE — H&P
APPENDECTOMY        COLONOSCOPY        DILATION AND CURETTAGE OF UTERUS        JOINT REPLACEMENT         Rt knee            Family History         Family History   Problem Relation Age of Onset    Heart Disease Mother      Cancer Sister      Mult Sclerosis Sister      Cancer Brother           lung            Allergies:  See list     Current Facility-Administered Medications   No current facility-administered medications for this visit.       No current outpatient prescriptions on file.                Facility-Administered Medications Ordered in Other Visits   Medication Dose Route Frequency Provider Last Rate Last Dose    lactated ringers infusion   Intravenous Continuous Maisha Arenas  mL/hr at 07/19/18 0734              Social History   Social History            Social History    Marital status:        Spouse name: N/A    Number of children: N/A    Years of education: N/A           Social History Main Topics    Smoking status: Never Smoker    Smokeless tobacco: Never Used    Alcohol use No    Drug use: No    Sexual activity: Not Asked           Other Topics Concern    None          Social History Narrative    None            Objective:   Physical Exam   Constitutional: She is oriented to person, place, and time. She appears well-developed and well-nourished. HENT:   Head: Normocephalic and atraumatic. Mouth/Throat: Oropharynx is clear and moist.   Eyes: Conjunctivae and EOM are normal. Pupils are equal, round, and reactive to light. No scleral icterus. Neck: Normal range of motion. Neck supple. No JVD present. No tracheal deviation present. Cardiovascular: Normal rate and regular rhythm. Pulmonary/Chest: Effort normal and breath sounds normal. No respiratory distress. She exhibits no tenderness. Abdominal: Soft. Bowel sounds are normal. She exhibits no distension and no mass. There is no tenderness. There is no rebound and no guarding.    Musculoskeletal: Normal range of motion. She exhibits no edema. Lymphadenopathy:     She has no cervical adenopathy. Neurological: She is alert and oriented to person, place, and time. No cranial nerve deficit. Skin: Skin is warm and dry. No rash noted. There is erythema (R hip skin, with central necrosis. ). Psychiatric: She has a normal mood and affect. Her behavior is normal. Judgment and thought content normal.   Nursing note and vitals reviewed.        US EXTREMITY RIGHT NON VASC LIMITED   Status: Final result   Order Providers      Authorizing Encounter Billing   Corazon Avina MD [address] [facility] Eyal Martino MD           Signed by      Signed Date/Time   Phone Pager   [facility] 7/13/2018 16:48 [phone]     [facility]      Read Date Phone Pager   Elie Doran, [address] [place] [address] Jul 13, 2018 [phone]     Reviewed By Miguelangel Avina MD on 7/15/2018 19:44   Routing History      Priority Sent On From To Message Type     7/13/2018  4:53 PM Arie, Mhpn Incoming Radiant Results From [employer] 1540 Eyal Martino MD Results   Radiation Dose Estimates      No radiation information found for this patient   Narrative   HISTORY: Palpable abnormality, chronic, over the right hip. Diabetes, obesity.           TECHNIQUE: Soft tissue ultrasound, right hip mass.           FINDINGS: There is a heterogenous mass 12.7 x 6.6 x 9.6 cm over the posterior    right hip with a small amount of peripheral internal color Doppler flow. Several areas of low echogenicity including a 2.6 x 2 cm ill-defined cystic    lucency centrally could represent necrosis.             Impression       Large heterogenous mass posterior right hip soft tissues. Well-defined margins. Likely central necrosis. Heterogenous echogenicity with some peripheral color    Doppler flow.       Etiology uncertain. Neoplasm not excluded.       Sarcoma would have to be considered.  Consider biopsy or MRI with contrast for    further

## 2018-07-20 ENCOUNTER — CARE COORDINATION (OUTPATIENT)
Dept: CARE COORDINATION | Age: 78
End: 2018-07-20

## 2018-07-20 LAB — SURGICAL PATHOLOGY REPORT: NORMAL

## 2018-07-20 NOTE — ED PROVIDER NOTES
HPI 77-year-old female who comes to the emergency room with has been because of bleeding after surgery to her right gluteal area. Her  could not take care of the address she has been seen this week by Dr. Sabrina Drake in for gluteal abscess and concern over his decubitus care. Surgery was 2 gluteal abscess which has been packed and dressed at the time of surgery. Great concern by patient and  because of significant bleeding from the area. There bleeding through the bandage. The nature. No trauma to the area. She has not had any fever    Review of Systems oboe systems reviewed with pertinent positive findings mentioned in the history of present illness. Physical Exam   Constitutional: She is oriented to person, place, and time. Well-developed, well-nourished 77-year-old female who is a base for her age. No acute distress. She wears diaper type garment that reinforces an area of the right gluteal surgical site. Eyes:   Show conjugate movement with EOMI. No conjunctival drainage. She is wearing glasses. Neck: Normal range of motion. Neck supple. Cardiovascular: Normal rate and regular rhythm. Pulmonary/Chest: Effort normal and breath sounds normal.   Abdominal:   Soft and obese and nontender. Genitourinary:   Genitourinary Comments: No vaginal discharge or irritation   Musculoskeletal:   The patient shows controlled and coordinated movements of her extremities. Neurological: She is alert and oriented to person, place, and time. Skin:        Surgical site is infected with open area packed with gauze dressing. There is no active bleeding. There is bloody saturation of the dressing. Wound appears clear and open. No purulence. There is mild surrounding erythema. There is some erythema of the other gluteal area but no skin breakdown on the gluteal area examined. No rectal bleeding.        Procedures    MDM     77-year-old female with concern for right gluteal surgical site packing. This area bled today and is examined in the ER. The bleeding has stopped and may relate to drainage. There is stable surgical site with packing in place. The wound appears clean. It is redressed. Findings are discussed with patient and her . Overall she is stable. On review of her current treatment plan and she has follow-up care nursing and wound attention already planned. She was afebrile and has stable vital signs. She will continue her current regimen and is discharged in stable condition.                [address], DO  07/20/18 182

## 2018-07-20 NOTE — CARE COORDINATION
Patient was called to follow up with most recent ER visit. There was no answer. A message was left on voicemail to have patient call back regarding ER visit. Office number given [phone].

## 2018-07-22 ENCOUNTER — HOSPITAL ENCOUNTER (OUTPATIENT)
Dept: NURSING | Age: 78
Setting detail: INFUSION SERIES
Discharge: HOME | End: 2018-07-22
Payer: MEDICARE

## 2018-07-22 NOTE — PROGRESS NOTES
Dressing and packing to right gluteal wound removed. Moderate amount of bright red bloody drainage noted to old packing. Surrounding tissue to wound pink, red, warm and flaky. Small pinpoint blister above wound opening draining scant clear yellow drainage. Edges of wound opening blackened and flaky. Wound irrigated with NS and packed with kerlix. Covered with stack of 4x4's and ABD with paper tape. Tolerated without complaint or pain. Leaves with spouse.

## 2018-07-23 ENCOUNTER — HOSPITAL ENCOUNTER (OUTPATIENT)
Dept: NURSING | Age: 78
Setting detail: INFUSION SERIES
Discharge: HOME | End: 2018-07-23
Payer: MEDICARE

## 2018-07-23 PROCEDURE — 99211 OFF/OP EST MAY X REQ PHY/QHP: CPT

## 2018-07-23 NOTE — PROGRESS NOTES
Pt arrives per w/c for wound care/drsg change. Old packing removed from open wound right hip with sero-sang drng noted. Irrigated with NS and blotted dry. Packed with approx 12 inches of Kerlix, then covered with 4x4's, ABD and secured with paper tape. Surrounding tissue is pink, warm and flakey. Tolerates procedure with minimal c/o pain.

## 2018-07-24 ENCOUNTER — HOSPITAL ENCOUNTER (OUTPATIENT)
Dept: NURSING | Age: 78
Setting detail: INFUSION SERIES
Discharge: HOME | End: 2018-07-24
Payer: MEDICARE

## 2018-07-24 LAB
CULTURE: ABNORMAL
DIRECT EXAM: ABNORMAL
DIRECT EXAM: ABNORMAL
Lab: ABNORMAL
ORGANISM: ABNORMAL
SPECIMEN DESCRIPTION: ABNORMAL
STATUS: ABNORMAL

## 2018-07-24 PROCEDURE — 99211 OFF/OP EST MAY X REQ PHY/QHP: CPT

## 2018-07-25 ENCOUNTER — HOSPITAL ENCOUNTER (OUTPATIENT)
Dept: NURSING | Age: 78
Setting detail: INFUSION SERIES
Discharge: HOME | End: 2018-07-25
Payer: MEDICARE

## 2018-07-25 PROCEDURE — 99211 OFF/OP EST MAY X REQ PHY/QHP: CPT

## 2018-07-25 NOTE — PROGRESS NOTES
Pt arrives ambulatory for right hip/buttock drsg change. Old packing removed with sero-sang drng noted. Open wound irrigated with NS, then blotted dry with 4x4. Wound then packed with kerlix, then covered with 4x4 and ABD and secured with paper tape. Area surrounding open wound reddened and firm to touch.

## 2018-07-26 ENCOUNTER — HOSPITAL ENCOUNTER (OUTPATIENT)
Dept: NURSING | Age: 78
Setting detail: INFUSION SERIES
Discharge: HOME | End: 2018-07-26
Payer: MEDICARE

## 2018-07-26 PROCEDURE — 99211 OFF/OP EST MAY X REQ PHY/QHP: CPT

## 2018-07-27 ENCOUNTER — HOSPITAL ENCOUNTER (OUTPATIENT)
Dept: NURSING | Age: 78
Setting detail: INFUSION SERIES
Discharge: HOME | End: 2018-07-27
Payer: MEDICARE

## 2018-07-27 PROCEDURE — 99211 OFF/OP EST MAY X REQ PHY/QHP: CPT

## 2018-07-27 NOTE — PROGRESS NOTES
Pt arrives ambulatory for right hip/buttocks dressing change. Old packing and dressing removed with large amount sero-sang drng noted. Wound irrigated with NS, then packed with approx 10 inches of Kerlix packing. Wound then covered with 4x4's and ABD's x2 and secured with paper tape. Tolerated well with no c/o pain. Less redness around the area, but skin surrounding is still dk pink and firm.

## 2018-07-28 ENCOUNTER — HOSPITAL ENCOUNTER (OUTPATIENT)
Dept: NURSING | Age: 78
Setting detail: INFUSION SERIES
Discharge: HOME | End: 2018-07-28
Payer: MEDICARE

## 2018-07-28 PROCEDURE — 99213 OFFICE O/P EST LOW 20 MIN: CPT

## 2018-07-28 NOTE — PROGRESS NOTES
Arrives for dressing change. Dressing and packing removed from right hip. Moderate amount of sero-sang drainage present on packing. Irrigated with normal saline. Re-packed with approximately 6 inches of curlex. Covered with 4x4 and ABD pad. Held in place with paper tape. Tolerated with out complaint. Denies further needs. Ambulates to personal car.

## 2018-07-29 ENCOUNTER — HOSPITAL ENCOUNTER (OUTPATIENT)
Dept: NURSING | Age: 78
Setting detail: INFUSION SERIES
Discharge: HOME | End: 2018-07-29
Payer: MEDICARE

## 2018-07-29 PROCEDURE — 99211 OFF/OP EST MAY X REQ PHY/QHP: CPT

## 2018-07-29 NOTE — PROGRESS NOTES
Arrives for dressing change. Dressing and packing removed from right hip. Moderate amount of sero-sang drainage present on packing-small amount of yellow drainage. Irrigated with normal saline. Re-packed with approximately 6 inches of kerlex. Covered with 4x4 and ABD pad. Held in place with paper tape. Tolerated with out complaint. Denies further needs. Ambulates to personal car.

## 2018-07-30 ENCOUNTER — HOSPITAL ENCOUNTER (OUTPATIENT)
Dept: NURSING | Age: 78
Setting detail: INFUSION SERIES
Discharge: HOME | End: 2018-07-30
Payer: MEDICARE

## 2018-07-30 PROCEDURE — 99211 OFF/OP EST MAY X REQ PHY/QHP: CPT

## 2018-07-30 NOTE — PROGRESS NOTES
Pt arrives ambulatory for dressing change. Old packing and dressing with mod amt sero-sang drng noted. Open wound to right hip/buttocks irrigated with NS, then patted dry. Wound repacked with approx 6 inches of Kerlix, then covered with 4x4's and ABD and secured with paper tape. No c/o pain. Pt discharged ambulatory in stable condition.

## 2018-07-31 ENCOUNTER — ANESTHESIA EVENT (OUTPATIENT)
Dept: OPERATING ROOM | Age: 78
End: 2018-07-31
Payer: MEDICARE

## 2018-07-31 ENCOUNTER — OFFICE VISIT (OUTPATIENT)
Dept: SURGERY | Age: 78
End: 2018-07-31

## 2018-07-31 VITALS — WEIGHT: 272 LBS | HEIGHT: 60 IN | BODY MASS INDEX: 53.4 KG/M2

## 2018-07-31 DIAGNOSIS — L89.219: ICD-10-CM

## 2018-07-31 DIAGNOSIS — Z48.89 POSTOPERATIVE VISIT: Primary | ICD-10-CM

## 2018-07-31 PROCEDURE — 99024 POSTOP FOLLOW-UP VISIT: CPT | Performed by: SURGERY

## 2018-07-31 NOTE — LETTER
[facility] Surgical Specialist - Dereje Zuniga  [address] [address]  Phone: [phone]  Fax: [phone]    Darion Gomez MD        August 1, 2018     Radha Torres MD  [facility] [id number]    Patient: Ángel Ramirez  MR Number: [id number]  YOB: 1940  Date of Visit: 7/31/2018    Dear Dr. Radha Torres: Thank you for the request for consultation for Jackie Melvin to me for the evaluation of L hip wound. Below are the relevant portions of my assessment and plan of care. Assessment:      Diagnosis Orders   1. Postoperative visit     2. Decubitus ulcer of right hip, unspecified ulcer stage           Plan:     Pathology reviewed with patient. There are areas within the wound which require surgical debridement. Will return to surgery Wednesday, Aug 1, 2018. Risks, benefits, alternatives thoroughly reviewed and accepted by Ms Clancy, including bleeding, infection, perioperative morbidities, etc.  Continue local wound care thereafter. Will request wound vac over the next week. If you have questions, please do not hesitate to call me. I look forward to following Shayla Loaiza along with you.     Sincerely,        Darion Gomez MD

## 2018-08-01 ENCOUNTER — ANESTHESIA (OUTPATIENT)
Dept: OPERATING ROOM | Age: 78
End: 2018-08-01
Payer: MEDICARE

## 2018-08-01 ENCOUNTER — HOSPITAL ENCOUNTER (OUTPATIENT)
Age: 78
Setting detail: OUTPATIENT SURGERY
Discharge: HOME | End: 2018-08-01
Attending: SURGERY | Admitting: SURGERY
Payer: MEDICARE

## 2018-08-01 VITALS
TEMPERATURE: 98.6 F | OXYGEN SATURATION: 100 % | DIASTOLIC BLOOD PRESSURE: 35 MMHG | SYSTOLIC BLOOD PRESSURE: 131 MMHG | RESPIRATION RATE: 1 BRPM

## 2018-08-01 VITALS
RESPIRATION RATE: 16 BRPM | HEART RATE: 78 BPM | DIASTOLIC BLOOD PRESSURE: 54 MMHG | WEIGHT: 267 LBS | BODY MASS INDEX: 52.42 KG/M2 | OXYGEN SATURATION: 99 % | SYSTOLIC BLOOD PRESSURE: 124 MMHG | HEIGHT: 60 IN | TEMPERATURE: 98 F

## 2018-08-01 PROBLEM — L89.219 DECUBITUS ULCER OF RIGHT HIP: Status: ACTIVE | Noted: 2018-08-01

## 2018-08-01 LAB — GLUCOSE BLD-MCNC: 127 MG/DL (ref 65–99)

## 2018-08-01 PROCEDURE — 3700000001 HC ADD 15 MINUTES (ANESTHESIA): Performed by: SURGERY

## 2018-08-01 PROCEDURE — 2500000003 HC RX 250 WO HCPCS: Performed by: NURSE ANESTHETIST, CERTIFIED REGISTERED

## 2018-08-01 PROCEDURE — 2580000003 HC RX 258: Performed by: SURGERY

## 2018-08-01 PROCEDURE — 7100000011 HC PHASE II RECOVERY - ADDTL 15 MIN: Performed by: SURGERY

## 2018-08-01 PROCEDURE — 3600000013 HC SURGERY LEVEL 3 ADDTL 15MIN: Performed by: SURGERY

## 2018-08-01 PROCEDURE — 2709999900 HC NON-CHARGEABLE SUPPLY: Performed by: SURGERY

## 2018-08-01 PROCEDURE — 3700000000 HC ANESTHESIA ATTENDED CARE: Performed by: SURGERY

## 2018-08-01 PROCEDURE — 11042 DBRDMT SUBQ TIS 1ST 20SQCM/<: CPT | Performed by: SURGERY

## 2018-08-01 PROCEDURE — 7100000010 HC PHASE II RECOVERY - FIRST 15 MIN: Performed by: SURGERY

## 2018-08-01 PROCEDURE — 3600000003 HC SURGERY LEVEL 3 BASE: Performed by: SURGERY

## 2018-08-01 PROCEDURE — 88304 TISSUE EXAM BY PATHOLOGIST: CPT

## 2018-08-01 PROCEDURE — 6360000002 HC RX W HCPCS: Performed by: SURGERY

## 2018-08-01 PROCEDURE — 82947 ASSAY GLUCOSE BLOOD QUANT: CPT

## 2018-08-01 PROCEDURE — 6360000002 HC RX W HCPCS: Performed by: NURSE ANESTHETIST, CERTIFIED REGISTERED

## 2018-08-01 PROCEDURE — 11045 DBRDMT SUBQ TISS EACH ADDL: CPT | Performed by: SURGERY

## 2018-08-01 RX ORDER — SODIUM CHLORIDE 0.9 % (FLUSH) 0.9 %
10 SYRINGE (ML) INJECTION PRN
Status: DISCONTINUED | OUTPATIENT
Start: 2018-08-01 | End: 2018-08-01 | Stop reason: HOSPADM

## 2018-08-01 RX ORDER — MORPHINE SULFATE 4 MG/ML
1 INJECTION, SOLUTION INTRAMUSCULAR; INTRAVENOUS
Status: DISCONTINUED | OUTPATIENT
Start: 2018-08-01 | End: 2018-08-01 | Stop reason: HOSPADM

## 2018-08-01 RX ORDER — METOPROLOL TARTRATE 5 MG/5ML
INJECTION INTRAVENOUS PRN
Status: DISCONTINUED | OUTPATIENT
Start: 2018-08-01 | End: 2018-08-01 | Stop reason: SDUPTHER

## 2018-08-01 RX ORDER — SODIUM CHLORIDE, SODIUM LACTATE, POTASSIUM CHLORIDE, CALCIUM CHLORIDE 600; 310; 30; 20 MG/100ML; MG/100ML; MG/100ML; MG/100ML
INJECTION, SOLUTION INTRAVENOUS CONTINUOUS
Status: DISCONTINUED | OUTPATIENT
Start: 2018-08-01 | End: 2018-08-01 | Stop reason: HOSPADM

## 2018-08-01 RX ORDER — SODIUM CHLORIDE 0.9 % (FLUSH) 0.9 %
10 SYRINGE (ML) INJECTION EVERY 12 HOURS SCHEDULED
Status: DISCONTINUED | OUTPATIENT
Start: 2018-08-01 | End: 2018-08-01 | Stop reason: HOSPADM

## 2018-08-01 RX ORDER — DEXAMETHASONE SODIUM PHOSPHATE 10 MG/ML
INJECTION INTRAMUSCULAR; INTRAVENOUS PRN
Status: DISCONTINUED | OUTPATIENT
Start: 2018-08-01 | End: 2018-08-01 | Stop reason: SDUPTHER

## 2018-08-01 RX ORDER — LIDOCAINE HYDROCHLORIDE 10 MG/ML
INJECTION, SOLUTION EPIDURAL; INFILTRATION; INTRACAUDAL; PERINEURAL PRN
Status: DISCONTINUED | OUTPATIENT
Start: 2018-08-01 | End: 2018-08-01 | Stop reason: SDUPTHER

## 2018-08-01 RX ORDER — MIDAZOLAM HYDROCHLORIDE 1 MG/ML
INJECTION INTRAMUSCULAR; INTRAVENOUS PRN
Status: DISCONTINUED | OUTPATIENT
Start: 2018-08-01 | End: 2018-08-01 | Stop reason: SDUPTHER

## 2018-08-01 RX ORDER — ACETAMINOPHEN 325 MG/1
650 TABLET ORAL EVERY 4 HOURS PRN
Status: DISCONTINUED | OUTPATIENT
Start: 2018-08-01 | End: 2018-08-01 | Stop reason: HOSPADM

## 2018-08-01 RX ORDER — PROPOFOL 10 MG/ML
INJECTION, EMULSION INTRAVENOUS PRN
Status: DISCONTINUED | OUTPATIENT
Start: 2018-08-01 | End: 2018-08-01 | Stop reason: SDUPTHER

## 2018-08-01 RX ORDER — ONDANSETRON 2 MG/ML
INJECTION INTRAMUSCULAR; INTRAVENOUS PRN
Status: DISCONTINUED | OUTPATIENT
Start: 2018-08-01 | End: 2018-08-01 | Stop reason: SDUPTHER

## 2018-08-01 RX ORDER — SODIUM CHLORIDE 0.9 % (FLUSH) 0.9 %
10 SYRINGE (ML) INJECTION PRN
Status: DISCONTINUED | OUTPATIENT
Start: 2018-08-01 | End: 2018-08-01 | Stop reason: SDUPTHER

## 2018-08-01 RX ORDER — ONDANSETRON 2 MG/ML
4 INJECTION INTRAMUSCULAR; INTRAVENOUS EVERY 6 HOURS PRN
Status: DISCONTINUED | OUTPATIENT
Start: 2018-08-01 | End: 2018-08-01 | Stop reason: HOSPADM

## 2018-08-01 RX ORDER — SODIUM CHLORIDE, SODIUM LACTATE, POTASSIUM CHLORIDE, CALCIUM CHLORIDE 600; 310; 30; 20 MG/100ML; MG/100ML; MG/100ML; MG/100ML
INJECTION, SOLUTION INTRAVENOUS CONTINUOUS
Status: DISCONTINUED | OUTPATIENT
Start: 2018-08-01 | End: 2018-08-01 | Stop reason: SDUPTHER

## 2018-08-01 RX ORDER — FENTANYL CITRATE 50 UG/ML
INJECTION, SOLUTION INTRAMUSCULAR; INTRAVENOUS PRN
Status: DISCONTINUED | OUTPATIENT
Start: 2018-08-01 | End: 2018-08-01 | Stop reason: SDUPTHER

## 2018-08-01 RX ADMIN — FENTANYL CITRATE 50 MCG: 50 INJECTION, SOLUTION INTRAMUSCULAR; INTRAVENOUS at 08:55

## 2018-08-01 RX ADMIN — DEXAMETHASONE SODIUM PHOSPHATE 10 MG: 10 INJECTION INTRAMUSCULAR; INTRAVENOUS at 08:50

## 2018-08-01 RX ADMIN — FENTANYL CITRATE 50 MCG: 50 INJECTION, SOLUTION INTRAMUSCULAR; INTRAVENOUS at 08:45

## 2018-08-01 RX ADMIN — LIDOCAINE HYDROCHLORIDE 4 ML: 10 INJECTION, SOLUTION EPIDURAL; INFILTRATION; INTRACAUDAL; PERINEURAL at 08:45

## 2018-08-01 RX ADMIN — SODIUM CHLORIDE, POTASSIUM CHLORIDE, SODIUM LACTATE AND CALCIUM CHLORIDE: 600; 310; 30; 20 INJECTION, SOLUTION INTRAVENOUS at 07:20

## 2018-08-01 RX ADMIN — METOPROLOL TARTRATE 2.5 MG: 5 INJECTION INTRAVENOUS at 09:38

## 2018-08-01 RX ADMIN — PROPOFOL 150 MG: 10 INJECTION, EMULSION INTRAVENOUS at 08:45

## 2018-08-01 RX ADMIN — ONDANSETRON 4 MG: 2 INJECTION, SOLUTION INTRAMUSCULAR; INTRAVENOUS at 08:50

## 2018-08-01 RX ADMIN — MIDAZOLAM HYDROCHLORIDE 2 MG: 2 INJECTION, SOLUTION INTRAMUSCULAR; INTRAVENOUS at 08:30

## 2018-08-01 RX ADMIN — SODIUM CHLORIDE, POTASSIUM CHLORIDE, SODIUM LACTATE AND CALCIUM CHLORIDE: 600; 310; 30; 20 INJECTION, SOLUTION INTRAVENOUS at 08:40

## 2018-08-01 ASSESSMENT — ENCOUNTER SYMPTOMS
SHORTNESS OF BREATH: 0
CHOKING: 0
ABDOMINAL PAIN: 0
NAUSEA: 0
TROUBLE SWALLOWING: 0
SORE THROAT: 0
BACK PAIN: 1
BLOOD IN STOOL: 0
COUGH: 0
VOMITING: 0

## 2018-08-01 ASSESSMENT — PAIN DESCRIPTION - PAIN TYPE
TYPE: SURGICAL PAIN
TYPE: SURGICAL PAIN

## 2018-08-01 ASSESSMENT — PAIN - FUNCTIONAL ASSESSMENT: PAIN_FUNCTIONAL_ASSESSMENT: 0-10

## 2018-08-01 ASSESSMENT — PAIN DESCRIPTION - LOCATION: LOCATION: BUTTOCKS

## 2018-08-01 ASSESSMENT — PAIN SCALES - GENERAL
PAINLEVEL_OUTOF10: 2
PAINLEVEL_OUTOF10: 2

## 2018-08-01 ASSESSMENT — PAIN DESCRIPTION - DESCRIPTORS: DESCRIPTORS: SORE

## 2018-08-01 ASSESSMENT — PAIN DESCRIPTION - ORIENTATION: ORIENTATION: RIGHT

## 2018-08-01 NOTE — ANESTHESIA PRE PROCEDURE
Dose Route Frequency Provider Last Rate Last Dose    sodium chloride flush 0.9 % injection 10 mL  10 mL Intravenous PRN Rosa Branch MD        lactated ringers infusion   Intravenous Continuous Rosa Branch  mL/hr at 08/01/18 0720         Allergies: Allergies   Allergen Reactions    Adhesive Tape      Takes her skin off uses paper tape.    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    Cutaneous abscess of buttock L02.31       Past Medical History:        Diagnosis Date    Cancer ([facility] [address]     basal cell skin cancer    Hyperlipidemia     Hypertension     Lymphedema of lower extremity     Obesity     Shingles     66's    Type II or unspecified type diabetes mellitus without mention of complication, not stated as uncontrolled     Venous stasis dermatitis        Past Surgical History:        Procedure Laterality Date    APPENDECTOMY      COLONOSCOPY      DILATION AND CURETTAGE OF UTERUS      JOINT REPLACEMENT      Rt knee    NM OFFICE/OUTPT VISIT,PROCEDURE ONLY Right 7/19/2018    RIGHT HIP MASS INCISION AND DRAINAGE performed by Rosa Branch MD at [facility] OR       Social History:    Social History   Substance Use Topics    Smoking status: Never Smoker    Smokeless tobacco: Never Used    Alcohol use No                                Counseling given: Not Answered      Vital Signs (Current):   Vitals:    08/01/18 0719   BP: 133/78   Pulse: 100   Resp: 16   Temp: 36.9 °C (98.5 °F)   TempSrc: Temporal   SpO2: 98%   Weight: 267 lb (121.1 kg)   Height: 5' (1.524 m)                                              BP Readings from Last 3 Encounters: 08/01/18 133/78   07/19/18 122/68   07/19/18 (!) 91/40       NPO Status: Time of last liquid consumption: 2350                        Time of last solid consumption: 1800                        Date of last liquid consumption: 07/31/18                        Date of last solid food consumption: 07/31/18    BMI:   Wt Readings from Last 3 Encounters:   08/01/18 267 lb (121.1 kg)   07/31/18 272 lb (123.4 kg)   07/19/18 272 lb (123.4 kg)     Body mass index is 52.14 kg/m².     CBC:   Lab Results   Component Value Date    WBC 4.8 05/08/2018    RBC 4.18 05/08/2018    HGB 12.3 05/08/2018    HCT 36.9 05/08/2018    MCV 88.3 05/08/2018    RDW 14.9 05/08/2018     05/08/2018       CMP:   Lab Results   Component Value Date     05/08/2018    K 4.5 05/08/2018    CL 98 05/08/2018    CO2 28 05/08/2018    BUN 17 05/08/2018    CREATININE 0.83 05/08/2018    GFRAA >60 05/08/2018    LABGLOM >60 05/08/2018    GLUCOSE 101 05/08/2018    GLUCOSE 177 04/21/2012    PROT 8.4 04/23/2018    CALCIUM 9.6 05/08/2018    BILITOT 0.47 04/23/2018    ALKPHOS 185 04/23/2018    AST 26 04/23/2018    ALT 23 04/23/2018       POC Tests:   Recent Labs      08/01/18   0728   POCGLU  127*       Coags:   Lab Results   Component Value Date    PROTIME 10.0 05/08/2018    INR 1.0 05/08/2018    APTT 27.1 05/08/2018       HCG (If Applicable): No results found for: PREGTESTUR, PREGSERUM, HCG, HCGQUANT     ABGs: No results found for: PHART, PO2ART, SRH7IUP, JMQ6ORG, BEART, D9PISSQO     Type & Screen (If Applicable):  No results found for: LABABO, [address]    Anesthesia Evaluation  Patient summary reviewed and Nursing notes reviewed no history of anesthetic complications:   Airway: Mallampati: II  TM distance: >3 FB   Neck ROM: full  Mouth opening: > = 3 FB Dental:    (+) edentulous      Pulmonary:Negative Pulmonary ROS and normal exam                               Cardiovascular:    (+) hypertension:,       ECG reviewed               Beta Blocker:  Dose within

## 2018-08-01 NOTE — PROGRESS NOTES
respiratory distress. Musculoskeletal: She exhibits no edema. Neurological: She is alert and oriented to person, place, and time. Skin: Skin is warm and dry. Surgical site is clean. There remains areas of necrotic tissue at the base of the wound. Psychiatric: She has a normal mood and affect. Her behavior is normal. Judgment and thought content normal.   Nursing note and vitals reviewed. 7/20/2018  1:48 PM - Arie, Mhpn Incoming Lab Results From [employer]     Component Results     Component Collected Lab   Surgical Pathology Report 07/19/2018  3:10  Mamadou Hanley   (NOTE)   [id number]   [address],  [address]. [place], [address]   [phone]   Fax: [phone] [address] [facility]     Patient Name: Grayson Cantrell Med Rec: [id number]   Path Number: [id number]   Collected: 7/19/2018   Received: 7/19/2018   Reported: 7/20/2018 13:48     -- Diagnosis --   RIGHT HIP DEBRIDEMENT TISSUE:  ACUTE AND ORGANIZING HEMORRHAGE/   HEMATOMA WITH FOCAL ACUTE INFLAMMATION. RENAN Shankar   **Electronically Signed Out**         ajb/7/20/2018       Clinical Information   Pre-op Diagnosis:  RIGHT HIP MASS   Operative Findings:  CULTURES OF RIGHT HIP DRAINAGE  FROM I&D   (ANAEROBIC AND AEROBIC CULTURES); TISSUE FROM I&D OF MASS ON RIGHT   HIP   Operation Performed:  INCISION AND DRAINAGE     Source of Specimen   1: TISSUE FROM I & D OF MASS ON RIGHT HIP     Gross Description   \"MIRACLE BRICENO, TISSUE FROM I&D OF MASS ON RIGHT HIP\" Fragments of   focally necrotic and hemorrhagic soft tissue with faint myxoid areas,   15.0 x 15.0 x 6.0 cm in aggregate.  No masses are seen. Representative sections 2cs.  tm       Microscopic Description   Microscopic examination performed. Assessment:       Diagnosis Orders   1. Postoperative visit     2.  Decubitus ulcer of right hip, unspecified ulcer stage           Plan:      Pathology reviewed with patient. There are areas within the wound which require surgical debridement. Will return to surgery Wednesday, Aug 1, 2018. Risks, benefits, alternatives thoroughly reviewed and accepted by Ms Clancy, including bleeding, infection, perioperative morbidities, etc.  Continue local wound care thereafter. Will request wound vac over the next week.

## 2018-08-01 NOTE — COMMUNICATION BODY
Assessment:      Diagnosis Orders   1. Postoperative visit     2. Decubitus ulcer of right hip, unspecified ulcer stage           Plan:     Pathology reviewed with patient. There are areas within the wound which require surgical debridement. Will return to surgery Wednesday, Aug 1, 2018. Risks, benefits, alternatives thoroughly reviewed and accepted by Ms Gmmaggie, including bleeding, infection, perioperative morbidities, etc.  Continue local wound care thereafter. Will request wound vac over the next week.

## 2018-08-01 NOTE — H&P
HPI      Ms Alex Patton returns for follow up 2 weeks s/p I&D R hip decubitus ulcer with underlying fat necrosis. She is a pleasant, 67 yo WF who developed a large, erythematous mass on her R hip.  Swelling in this area has been present for the past few years. [facility] [place], operated by [facility], it increased in size, become uncomfortable. At time of debridement, she was found to have areas of underlying necrotic fat.  She has completed oral antibiotics.  No fevers nor chills.  Ht 5'2\", 273#, BMI 53.   Type 2 diabetes.  No prior MI nor CVA.  She has never smoked.     Review of Systems   Constitutional: Negative for activity change, appetite change, chills, fever and unexpected weight change. HENT: Negative for nosebleeds, sneezing, sore throat and trouble swallowing. Eyes: Negative for visual disturbance. Respiratory: Negative for cough, choking and shortness of breath. Cardiovascular: Negative for chest pain, palpitations and leg swelling. Gastrointestinal: Negative for abdominal pain, blood in stool, nausea and vomiting. Genitourinary: Negative for dysuria, flank pain and hematuria. Musculoskeletal: Positive for arthralgias, back pain, gait problem and joint swelling. Negative for myalgias. Allergic/Immunologic: Negative for immunocompromised state. Neurological: Negative for dizziness, seizures, syncope, weakness and headaches. Hematological: Does not bruise/bleed easily. Psychiatric/Behavioral: Negative for confusion and sleep disturbance.         Objective:   Physical Exam   Constitutional: She is oriented to person, place, and time. She appears well-developed and well-nourished. No distress. HENT:   Head: Normocephalic and atraumatic. Eyes: Conjunctivae are normal. Pupils are equal, round, and reactive to light. No scleral icterus. Neck: No tracheal deviation present. Cardiovascular: Normal rate. Pulmonary/Chest: Effort normal. No respiratory distress. Musculoskeletal: She exhibits no edema. Neurological: She is alert and oriented to person, place, and time. Skin: Skin is warm and dry. Surgical site is clean. There remains areas of necrotic tissue at the base of the wound. Psychiatric: She has a normal mood and affect. Her behavior is normal. Judgment and thought content normal.   Nursing note and vitals reviewed.        7/20/2018  1:48 PM - Ken Sargent Incoming Lab Results From [facility]      Component Results      Component Collected Lab   Surgical Pathology Report 07/19/2018  3:10  Mamadou Hanley   (NOTE)   [id number]   [address],  [address]. [place], [address]   [phone]   Fax: [phone] [address] [facility]     Patient Name: Arianna Dos Santos [facility] Rec: [id number]   Path Number: [id number]   Collected: 7/19/2018   Received: 7/19/2018   Reported: 7/20/2018 13:48     -- Diagnosis --   RIGHT HIP DEBRIDEMENT TISSUE:  ACUTE AND ORGANIZING HEMORRHAGE/   HEMATOMA WITH FOCAL ACUTE INFLAMMATION. RENAN Madison   **Electronically Signed Out**         ajb/7/20/2018       Clinical Information   Pre-op Diagnosis:  RIGHT HIP MASS   Operative Findings:  CULTURES OF RIGHT HIP DRAINAGE  FROM I&D   (ANAEROBIC AND AEROBIC CULTURES); TISSUE FROM I&D OF MASS ON RIGHT   HIP   Operation Performed:  INCISION AND DRAINAGE     Source of Specimen   1: TISSUE FROM I & D OF MASS ON RIGHT HIP     Gross Description   \"MIRACLE BRICENO, TISSUE FROM I&D OF MASS ON RIGHT HIP\" Fragments of   focally necrotic and hemorrhagic soft tissue with faint myxoid areas,   15.0 x 15.0 x 6.0 cm in aggregate.  No masses are seen. Representative sections 2cs.  tm       Microscopic Description   Microscopic examination performed.             Assessment:     Diagnosis Orders   1. Postoperative visit      2.  Decubitus ulcer of right hip, unspecified ulcer stage                       Plan:   Pathology reviewed

## 2018-08-01 NOTE — OP NOTE
[facility]                              [address] [address]                                 OPERATIVE REPORT    PATIENT NAME: Aminata Sevilla                   :        1940  MED REC NO:   [id number]                              ROOM:  ACCOUNT NO:   [de-identified]                           ADMIT DATE: 2018  PROVIDER:     Aleks Flores    DATE OF PROCEDURE:  2018    ATTENDING SURGEON:  Aleks Flores MD    PCP:  Anil Rizvi. PREOPERATIVE DIAGNOSIS:  Decubitus ulcer, right hip. POSTOPERATIVE DIAGNOSIS:  Decubitus ulcer, right hip. OPERATION:  I and D, decubitus ulcer, right hip (approximately 15 x 10 cm). ANESTHESIA:  General.    IV FLUIDS:  500 mL of crystalloid. ESTIMATED BLOOD LOSS:  Less than 20 mL. INTRAOPERATIVE FINDINGS:  As mentioned above, extensive necrotic tissue  debrided back to healthy viable tissue. Good postoperative hemostasis. INDICATIONS:  The patient is a 70-year-old white female, who developed what  appears to be a pressure necrosis of the subcutaneous tissues of the right  hip. This was debrided 2 weeks ago. On follow up, she was found to have  continued underlying necrotic tissue requiring further debridement. At  this time, debridement is indicated. OPERATIVE PROCEDURE:  After obtaining informed consent with discussion of  risks, benefits and alternatives, the patient was taken to the operating  theater and placed in the left lateral recumbent position. She was prepped  and draped in the standard fashion. The area of previous debridement was  opened further extending the incision both anteriorly and posteriorly. Fairly extensive underlying necrotic fat was debrided with blunt and sharp  dissection back to healthy viable tissue. The bed was cauterized for  hemostasis. It was irrigated with dilute hydrogen peroxide solution  followed by saline.   The wound

## 2018-08-01 NOTE — BRIEF OP NOTE
Brief Postoperative Note  ______________________________________________________________    Patient: Bruna Tracy  YOB: 1940  MRN: [id number]  Date of Procedure: 8/1/2018    Pre-Op Diagnosis:     1. Decubitus ulcer, R hip    Post-Op Diagnosis:     1. Decubitus ulcer, R hip       Procedure(s):      1. I&D decubitus ulcer, R hip (~15cm x 10cm)    Anesthesia: General     Surgeon(s):  Annamaria Barrow MD    Staff:  * No surgical staff found *     Estimated Blood Loss:  Less than 76BZ    Complications: None    Specimens:      1. Necrotic fat, R hip decubitus ulcer    Drains:  Kerlix packing    Findings:   As above.     Dictated # [id number]    Annamaria Barrow MD  Date: 8/1/2018  Time: 8:40 AM

## 2018-08-01 NOTE — ANESTHESIA POSTPROCEDURE EVALUATION
Department of Anesthesiology  Postprocedure Note    Patient: Kristel Torres  MRN: [id number]  YOB: 1940  Date of evaluation: 8/1/2018  Time:  9:53 AM     Procedure Summary     Date:  08/01/18 Room / Location:  [address] [place] [address] 02 / [address] [place] [address] Way OR    Anesthesia Start:  5223 Anesthesia Stop:  0895    Procedure:  HIP INCISION AND DRAINAGE (Necrotic Fat Right Hip Debridement) (Right ) Diagnosis:  (Necrotic fat right hip)    Surgeon:  Uzma Blackburn MD Responsible Provider:  ALEXANDER Ricks CRNA    Anesthesia Type:  general, MAC ASA Status:  3          Anesthesia Type: general, MAC    Tono Phase I: Tono Score: 10    Tono Phase II: Tono Score: 9    Last vitals: Reviewed and per EMR flowsheets.        Anesthesia Post Evaluation    Patient location during evaluation: bedside  Patient participation: complete - patient participated  Level of consciousness: awake and alert  Pain score: 0  Airway patency: patent  Nausea & Vomiting: no nausea and no vomiting  Complications: no  Cardiovascular status: hemodynamically stable  Respiratory status: acceptable and spontaneous ventilation  Hydration status: stable

## 2018-08-02 ENCOUNTER — HOSPITAL ENCOUNTER (OUTPATIENT)
Dept: NURSING | Age: 78
Setting detail: INFUSION SERIES
Discharge: HOME | End: 2018-08-02
Payer: MEDICARE

## 2018-08-02 PROCEDURE — 99211 OFF/OP EST MAY X REQ PHY/QHP: CPT

## 2018-08-02 RX ORDER — DIAPER,BRIEF,INFANT-TODD,DISP
EACH MISCELLANEOUS ONCE
Status: CANCELLED
Start: 2018-08-02 | End: 2018-08-02

## 2018-08-02 NOTE — PROGRESS NOTES
Patient arrives for dressing change rt hip incision,  Old dressing removed and contained moderate amount serous drainage. Wound appears dark and no bleeding or signs of infection noted. Surrounding skin and tissue soft and pink. Wound irrigated with sterile saline, then Bacitracin coated Kerlix piece inserted and covers all exposed areas of wound. Covered with sterile 4x4. Patient discharged in stable condition.

## 2018-08-03 ENCOUNTER — HOSPITAL ENCOUNTER (OUTPATIENT)
Dept: NURSING | Age: 78
Setting detail: INFUSION SERIES
Discharge: HOME | End: 2018-08-03
Payer: MEDICARE

## 2018-08-03 DIAGNOSIS — L02.31 CUTANEOUS ABSCESS OF BUTTOCK: ICD-10-CM

## 2018-08-03 LAB — SURGICAL PATHOLOGY REPORT: NORMAL

## 2018-08-03 PROCEDURE — 99211 OFF/OP EST MAY X REQ PHY/QHP: CPT

## 2018-08-03 RX ORDER — DIAPER,BRIEF,INFANT-TODD,DISP
EACH MISCELLANEOUS ONCE
Status: DISCONTINUED | OUTPATIENT
Start: 2018-08-03 | End: 2018-08-04 | Stop reason: HOSPADM

## 2018-08-03 RX ORDER — DIAPER,BRIEF,INFANT-TODD,DISP
EACH MISCELLANEOUS ONCE
Status: CANCELLED
Start: 2018-08-03 | End: 2018-08-03

## 2018-08-03 NOTE — PROGRESS NOTES
patient arrives for dressing change,  Old dressing contains saturated serous fluid. No purulent drainage. [de-identified] old Kerlix removed. Wound irrigated with saline and dried. Bacitracin coated Kerlix inserted into wound and covered with dry 4x4's. No signs of infection in wound or surrounding skin. patient discharged in stable condition.

## 2018-08-04 ENCOUNTER — HOSPITAL ENCOUNTER (OUTPATIENT)
Dept: NURSING | Age: 78
Setting detail: INFUSION SERIES
Discharge: HOME | End: 2018-08-04
Payer: MEDICARE

## 2018-08-06 ENCOUNTER — OFFICE VISIT (OUTPATIENT)
Dept: SURGERY | Age: 78
End: 2018-08-06

## 2018-08-06 VITALS — BODY MASS INDEX: 52.42 KG/M2 | WEIGHT: 267 LBS | HEIGHT: 60 IN

## 2018-08-06 DIAGNOSIS — Z98.890 STATUS POST INCISION AND DRAINAGE: Primary | ICD-10-CM

## 2018-08-06 DIAGNOSIS — Z48.00 DRESSING CHANGE: ICD-10-CM

## 2018-08-06 PROCEDURE — 99024 POSTOP FOLLOW-UP VISIT: CPT | Performed by: SURGERY

## 2018-08-07 ENCOUNTER — OFFICE VISIT (OUTPATIENT)
Dept: SURGERY | Age: 78
End: 2018-08-07

## 2018-08-07 DIAGNOSIS — Z48.00 DRESSING CHANGE: ICD-10-CM

## 2018-08-07 DIAGNOSIS — Z98.890 STATUS POST INCISION AND DRAINAGE: Primary | ICD-10-CM

## 2018-08-07 PROCEDURE — 99024 POSTOP FOLLOW-UP VISIT: CPT | Performed by: SURGERY

## 2018-08-07 NOTE — PROGRESS NOTES
Patient arrived at [facility] surgery office for dressing change. Status post I&D of decubitus ulcer on right hip 08/01/18. Removed dressing and packing. Moderate amount of serosanguinous drainage present. Irrigated wound with normal saline, packed with bacitracin coated Kerlix 4x4 and covered with 4x4's and then an abdominal pad. Patient denies any further needs at this time. Will follow up with [facility] regarding wound vac and keep patient informed. Patient verbalizes understanding.

## 2018-08-08 ENCOUNTER — HOSPITAL ENCOUNTER (OUTPATIENT)
Dept: NURSING | Age: 78
Setting detail: INFUSION SERIES
Discharge: HOME | End: 2018-08-08
Payer: MEDICARE

## 2018-08-08 DIAGNOSIS — L02.31 CUTANEOUS ABSCESS OF BUTTOCK: ICD-10-CM

## 2018-08-08 PROCEDURE — 99212 OFFICE O/P EST SF 10 MIN: CPT

## 2018-08-08 RX ORDER — DIAPER,BRIEF,INFANT-TODD,DISP
EACH MISCELLANEOUS ONCE
Status: CANCELLED
Start: 2018-08-08 | End: 2018-08-08

## 2018-08-08 RX ORDER — DIAPER,BRIEF,INFANT-TODD,DISP
EACH MISCELLANEOUS ONCE
Status: DISCONTINUED | OUTPATIENT
Start: 2018-08-08 | End: 2018-08-09 | Stop reason: HOSPADM

## 2018-08-08 NOTE — PROGRESS NOTES
Patient arrives for dressing change rt hip. Old dressing contains mod amount serous drainage. Wound irrigated with saline and dried. Bacitracin coated Kerlix then inserted and covered with 4x4 and ABD. Patient discharged in stable condition. No signs of infection in or around wound.

## 2018-08-09 ENCOUNTER — HOSPITAL ENCOUNTER (OUTPATIENT)
Dept: NURSING | Age: 78
Setting detail: INFUSION SERIES
Discharge: HOME | End: 2018-08-09
Payer: MEDICARE

## 2018-08-09 ENCOUNTER — OFFICE VISIT (OUTPATIENT)
Dept: SURGERY | Age: 78
End: 2018-08-09
Payer: MEDICARE

## 2018-08-09 VITALS — HEIGHT: 60 IN | WEIGHT: 266 LBS | BODY MASS INDEX: 52.22 KG/M2

## 2018-08-09 DIAGNOSIS — Z48.00 DRESSING CHANGE: Primary | ICD-10-CM

## 2018-08-09 DIAGNOSIS — Z98.890 STATUS POST INCISION AND DRAINAGE: ICD-10-CM

## 2018-08-09 PROCEDURE — 99211 OFF/OP EST MAY X REQ PHY/QHP: CPT | Performed by: SURGERY

## 2018-08-09 PROCEDURE — G8417 CALC BMI ABV UP PARAM F/U: HCPCS | Performed by: SURGERY

## 2018-08-09 PROCEDURE — G8427 DOCREV CUR MEDS BY ELIG CLIN: HCPCS | Performed by: SURGERY

## 2018-08-10 ENCOUNTER — HOSPITAL ENCOUNTER (OUTPATIENT)
Dept: NURSING | Age: 78
Setting detail: INFUSION SERIES
Discharge: HOME | End: 2018-08-10
Payer: MEDICARE

## 2018-08-10 DIAGNOSIS — L02.31 CUTANEOUS ABSCESS OF BUTTOCK: ICD-10-CM

## 2018-08-10 PROCEDURE — 99212 OFFICE O/P EST SF 10 MIN: CPT

## 2018-08-10 RX ORDER — DIAPER,BRIEF,INFANT-TODD,DISP
EACH MISCELLANEOUS ONCE
Status: DISCONTINUED | OUTPATIENT
Start: 2018-08-10 | End: 2018-08-11 | Stop reason: HOSPADM

## 2018-08-10 RX ORDER — DIAPER,BRIEF,INFANT-TODD,DISP
EACH MISCELLANEOUS ONCE
Status: CANCELLED
Start: 2018-08-10 | End: 2018-08-10

## 2018-08-10 NOTE — PROGRESS NOTES
Patient arrives for dressing change rt hip. Old dressing removed. Serous drainage on old dressing. No new signs of infection in or around wound. Bacitracin coated Kerlix piece inserted in wound covering all edges. Covered with dry 4x4 and ABD. Patient discharged in stable condition.

## 2018-08-11 ENCOUNTER — HOSPITAL ENCOUNTER (OUTPATIENT)
Dept: NURSING | Age: 78
Setting detail: INFUSION SERIES
Discharge: HOME | End: 2018-08-11
Payer: MEDICARE

## 2018-08-11 NOTE — PROGRESS NOTES
Dressing changed to right hip. Old packing had moderate amount serous drainage. Pt denies pain with dressing change. Wound bed irrigated with NS. Kerlix lightly coated with bacitracin ointment and packed into wound bed touching all surfaces and edges. Stack of 4x4's placed over opening and ABD covering 4x4's. Paper tape used to secure. Leaves with spouse.

## 2018-08-12 ENCOUNTER — HOSPITAL ENCOUNTER (OUTPATIENT)
Dept: NURSING | Age: 78
Setting detail: INFUSION SERIES
Discharge: HOME | End: 2018-08-12
Payer: MEDICARE

## 2018-08-12 NOTE — PROGRESS NOTES
Arrives for wound care to right hip. Moderate amt serous drainage note. Denies pain with wound are. Irrigated with saline and kerlix with bacitracin packed into wound. Tolerates well with no pain. Covered with 4x4 and abd and paper tape.

## 2018-08-13 ENCOUNTER — OFFICE VISIT (OUTPATIENT)
Dept: SURGERY | Age: 78
End: 2018-08-13
Payer: MEDICARE

## 2018-08-13 VITALS — HEIGHT: 60 IN | BODY MASS INDEX: 52.22 KG/M2 | WEIGHT: 266 LBS

## 2018-08-13 DIAGNOSIS — Z48.00 DRESSING CHANGE: ICD-10-CM

## 2018-08-13 DIAGNOSIS — Z98.890 STATUS POST INCISION AND DRAINAGE: ICD-10-CM

## 2018-08-13 PROCEDURE — 99211 OFF/OP EST MAY X REQ PHY/QHP: CPT | Performed by: SURGERY

## 2018-08-13 PROCEDURE — G8427 DOCREV CUR MEDS BY ELIG CLIN: HCPCS | Performed by: SURGERY

## 2018-08-13 PROCEDURE — G8417 CALC BMI ABV UP PARAM F/U: HCPCS | Performed by: SURGERY

## 2018-08-14 ENCOUNTER — OFFICE VISIT (OUTPATIENT)
Dept: SURGERY | Age: 78
End: 2018-08-14

## 2018-08-14 VITALS — BODY MASS INDEX: 52.22 KG/M2 | WEIGHT: 266 LBS | HEIGHT: 60 IN

## 2018-08-14 DIAGNOSIS — Z48.00 DRESSING CHANGE: Primary | ICD-10-CM

## 2018-08-14 DIAGNOSIS — Z98.890 STATUS POST INCISION AND DRAINAGE: ICD-10-CM

## 2018-08-14 PROCEDURE — 99024 POSTOP FOLLOW-UP VISIT: CPT | Performed by: SURGERY

## 2018-08-15 ENCOUNTER — OFFICE VISIT (OUTPATIENT)
Dept: SURGERY | Age: 78
End: 2018-08-15
Payer: MEDICARE

## 2018-08-15 VITALS — BODY MASS INDEX: 52.22 KG/M2 | HEIGHT: 60 IN | WEIGHT: 266 LBS

## 2018-08-15 DIAGNOSIS — Z98.890 STATUS POST INCISION AND DRAINAGE: ICD-10-CM

## 2018-08-15 DIAGNOSIS — Z48.00 DRESSING CHANGE: Primary | ICD-10-CM

## 2018-08-15 PROCEDURE — 99211 OFF/OP EST MAY X REQ PHY/QHP: CPT | Performed by: SURGERY

## 2018-08-15 PROCEDURE — G8427 DOCREV CUR MEDS BY ELIG CLIN: HCPCS | Performed by: SURGERY

## 2018-08-15 PROCEDURE — G8417 CALC BMI ABV UP PARAM F/U: HCPCS | Performed by: SURGERY

## 2018-08-15 NOTE — PROGRESS NOTES
Patient returned to [facility] surgery office for dressing change. Status post I&D decubitus ulcer, right hip 08/01/18. Dressing removed. Moderate amount of serous drainage was present. No signs or symptoms of infection. Wound irrigated with normal saline and dried. Wound packed with bacitracin coated 4x4 Kerlix and covered with 4x4 and ABD. Picture uploaded into media. Patient tolerated well, denies any further needs at this time. Scheduled at the hospital for dressing change Thursday-Sunday at 8:00am, patient aware.

## 2018-08-16 ENCOUNTER — HOSPITAL ENCOUNTER (OUTPATIENT)
Dept: NURSING | Age: 78
Setting detail: INFUSION SERIES
Discharge: HOME | End: 2018-08-16
Payer: MEDICARE

## 2018-08-16 PROCEDURE — 99211 OFF/OP EST MAY X REQ PHY/QHP: CPT

## 2018-08-17 ENCOUNTER — HOSPITAL ENCOUNTER (OUTPATIENT)
Dept: NURSING | Age: 78
Setting detail: INFUSION SERIES
Discharge: HOME | End: 2018-08-17
Payer: MEDICARE

## 2018-08-17 PROCEDURE — 99211 OFF/OP EST MAY X REQ PHY/QHP: CPT

## 2018-08-17 NOTE — PROGRESS NOTES
Patient arrives for dressing change left hip. Old dressing contains minimal amount serous drainage. No purulent drainage. Wound irrigated with saline and dried. Bacitracin coated gauze inserted in wound and covered with dry dressing. Patient discharged in stable condition. No new signs of infection in or around wound.

## 2018-08-18 ENCOUNTER — HOSPITAL ENCOUNTER (OUTPATIENT)
Dept: NURSING | Age: 78
Setting detail: INFUSION SERIES
Discharge: HOME | End: 2018-08-18
Payer: MEDICARE

## 2018-08-18 PROCEDURE — 99211 OFF/OP EST MAY X REQ PHY/QHP: CPT

## 2018-08-18 NOTE — PROGRESS NOTES
Patient arrives for dressing change left hip. Old dressing contains small amount serosanguinous  drainage. No purulent drainage. Wound irrigated with saline and dried. Bacitracin coated gauze inserted in wound and covered with dry dressing. Patient discharged in stable condition. No new signs of infection in or around wound.

## 2018-08-19 ENCOUNTER — HOSPITAL ENCOUNTER (OUTPATIENT)
Dept: NURSING | Age: 78
Setting detail: INFUSION SERIES
Discharge: HOME | End: 2018-08-19
Payer: MEDICARE

## 2018-08-19 PROCEDURE — 99211 OFF/OP EST MAY X REQ PHY/QHP: CPT

## 2018-08-19 NOTE — PROGRESS NOTES
Patient arrives for dressing change left hip.  Old dressing contains small amount serosanguinous  drainage.  No purulent drainage.  Wound irrigated with saline and dried.  Bacitracin coated gauze inserted in wound and covered with dry dressing.  Patient discharged in stable condition.  No new signs of infection in or around wound. Instructed to come to hospital for dressing change tomorrow (Monday) as directed by Dr Gómez Mckenzie office staff. Then return to office on Tuesday.

## 2018-08-20 ENCOUNTER — HOSPITAL ENCOUNTER (OUTPATIENT)
Dept: NURSING | Age: 78
Setting detail: INFUSION SERIES
Discharge: HOME | End: 2018-08-20
Payer: MEDICARE

## 2018-08-20 PROCEDURE — 99211 OFF/OP EST MAY X REQ PHY/QHP: CPT

## 2018-08-20 NOTE — PROGRESS NOTES
Pt arrives ambulatory for wound care/dressing change to right buttocks. Old dressing removed with small-mod amt sero-sang drng. Open wound irrigated with NS, then patted dry. Wound then packed with Bacitracin-coated gauze and covered with DSD and secured with paper tape. Denies pain. Discharged ambulatory in stable condition.

## 2018-08-21 ENCOUNTER — OFFICE VISIT (OUTPATIENT)
Dept: SURGERY | Age: 78
End: 2018-08-21
Payer: MEDICARE

## 2018-08-21 VITALS — BODY MASS INDEX: 52.22 KG/M2 | WEIGHT: 266 LBS | HEIGHT: 60 IN

## 2018-08-21 DIAGNOSIS — Z48.00 DRESSING CHANGE: Primary | ICD-10-CM

## 2018-08-21 PROCEDURE — 99211 OFF/OP EST MAY X REQ PHY/QHP: CPT | Performed by: SURGERY

## 2018-08-21 PROCEDURE — G8417 CALC BMI ABV UP PARAM F/U: HCPCS | Performed by: SURGERY

## 2018-08-21 PROCEDURE — G8427 DOCREV CUR MEDS BY ELIG CLIN: HCPCS | Performed by: SURGERY

## 2018-08-21 NOTE — PROGRESS NOTES
Patient returned to [facility] surgery office for dressing change. Status post I&D decubitus ulcer, right hip 08/01/18. Dressing removed. Small amount of serosanguinous drainage was present. No signs or symptoms of infection. Wound measurements: 7 cm x 1.75 cm x 2 cm. Wound irrigated with normal saline and dried. Wound packed with bacitracin coated 4x4 Kerlix and covered with 4x4 and ABD. Picture uploaded into media. Patient tolerated well, denies any further needs at this time. Instructed to return to office again tomorrow for dressing change.

## 2018-08-22 ENCOUNTER — OFFICE VISIT (OUTPATIENT)
Dept: SURGERY | Age: 78
End: 2018-08-22
Payer: MEDICARE

## 2018-08-22 DIAGNOSIS — Z48.00 DRESSING CHANGE: Primary | ICD-10-CM

## 2018-08-22 PROCEDURE — 99211 OFF/OP EST MAY X REQ PHY/QHP: CPT | Performed by: SURGERY

## 2018-08-22 PROCEDURE — G8417 CALC BMI ABV UP PARAM F/U: HCPCS | Performed by: SURGERY

## 2018-08-22 PROCEDURE — G8427 DOCREV CUR MEDS BY ELIG CLIN: HCPCS | Performed by: SURGERY

## 2018-08-22 NOTE — PROGRESS NOTES
Patient returned to [facility] surgery office for dressing change. Status post I&D decubitus ulcer, right hip 08/01/18. Dressing removed. Small amount of serosanguinous drainage was present. No signs or symptoms of infection. Wound irrigated with normal saline and dried. Wound packed with bacitracin coated 4x4 Kerlix and covered with 4x4 and ABD. Patient tolerated well, denies any further needs at this time. Instructed to return to office again tomorrow for dressing change.

## 2018-08-24 ENCOUNTER — HOSPITAL ENCOUNTER (OUTPATIENT)
Dept: NURSING | Age: 78
Setting detail: INFUSION SERIES
Discharge: HOME | End: 2018-08-24
Payer: MEDICARE

## 2018-08-24 PROCEDURE — 99211 OFF/OP EST MAY X REQ PHY/QHP: CPT

## 2018-08-24 NOTE — PROGRESS NOTES
Pt arrives ambulatory for right buttocks dressing change. Old dressing removed with small amount sero-sang drng noted. Wound irrigated with NS, patted dry, then bacitracin-coated Kerlix packed into wound. Covered with 4x4's x2, ABD x1 and secured with paper tape. Denies pain. Discharged ambulatory with .

## 2018-08-25 ENCOUNTER — HOSPITAL ENCOUNTER (OUTPATIENT)
Dept: NURSING | Age: 78
Setting detail: INFUSION SERIES
Discharge: HOME | End: 2018-08-25
Payer: MEDICARE

## 2018-08-25 PROCEDURE — 99212 OFFICE O/P EST SF 10 MIN: CPT

## 2018-08-25 NOTE — PROGRESS NOTES
Dressing changed to right buttocks. Old packing/dressing had small amount serosanguinous drainage. Wound bed pink and red. Irrigated with NS and pat dry. Denies pain. Wound packed with bacitracin ointment coated kerlix. Covered with 4x4s and ABD. Secured with paper tape. Tolerated well.

## 2018-08-26 ENCOUNTER — HOSPITAL ENCOUNTER (OUTPATIENT)
Dept: NURSING | Age: 78
Setting detail: INFUSION SERIES
Discharge: HOME | End: 2018-08-26
Payer: MEDICARE

## 2018-08-26 PROCEDURE — 99212 OFFICE O/P EST SF 10 MIN: CPT

## 2018-08-26 NOTE — PROGRESS NOTES
Wound care per order. Old dressing small amt serosang drainage. Wound bed pink/red. Irrigated with saline, dried and then packed with bacitracin coated kerlix and covered with 4x4, abd and paper tape. Tolerated well.

## 2018-08-28 ENCOUNTER — OFFICE VISIT (OUTPATIENT)
Dept: SURGERY | Age: 78
End: 2018-08-28
Payer: MEDICARE

## 2018-08-28 VITALS — WEIGHT: 266 LBS | BODY MASS INDEX: 52.22 KG/M2 | HEIGHT: 60 IN

## 2018-08-28 DIAGNOSIS — Z48.00 DRESSING CHANGE: Primary | ICD-10-CM

## 2018-08-28 PROCEDURE — G8427 DOCREV CUR MEDS BY ELIG CLIN: HCPCS | Performed by: SURGERY

## 2018-08-28 PROCEDURE — G8417 CALC BMI ABV UP PARAM F/U: HCPCS | Performed by: SURGERY

## 2018-08-28 PROCEDURE — 99211 OFF/OP EST MAY X REQ PHY/QHP: CPT | Performed by: SURGERY

## 2018-08-29 ENCOUNTER — OFFICE VISIT (OUTPATIENT)
Dept: SURGERY | Age: 78
End: 2018-08-29
Payer: MEDICARE

## 2018-08-29 VITALS — WEIGHT: 261 LBS | HEIGHT: 60 IN | BODY MASS INDEX: 51.24 KG/M2

## 2018-08-29 DIAGNOSIS — Z48.00 DRESSING CHANGE: Primary | ICD-10-CM

## 2018-08-29 PROCEDURE — G8427 DOCREV CUR MEDS BY ELIG CLIN: HCPCS | Performed by: SURGERY

## 2018-08-29 PROCEDURE — 99211 OFF/OP EST MAY X REQ PHY/QHP: CPT | Performed by: SURGERY

## 2018-08-29 PROCEDURE — G8417 CALC BMI ABV UP PARAM F/U: HCPCS | Performed by: SURGERY

## 2018-08-30 ENCOUNTER — OFFICE VISIT (OUTPATIENT)
Dept: SURGERY | Age: 78
End: 2018-08-30
Payer: MEDICARE

## 2018-08-30 VITALS — WEIGHT: 261 LBS | HEIGHT: 60 IN | BODY MASS INDEX: 51.24 KG/M2

## 2018-08-30 DIAGNOSIS — Z48.00 DRESSING CHANGE: Primary | ICD-10-CM

## 2018-08-30 PROCEDURE — G8427 DOCREV CUR MEDS BY ELIG CLIN: HCPCS | Performed by: SURGERY

## 2018-08-30 PROCEDURE — G8417 CALC BMI ABV UP PARAM F/U: HCPCS | Performed by: SURGERY

## 2018-08-30 PROCEDURE — 99211 OFF/OP EST MAY X REQ PHY/QHP: CPT | Performed by: SURGERY

## 2018-08-30 NOTE — PROGRESS NOTES
Patient returned to the [place] surgery office for a dressing change. Status post I&D decubitus ulcer, right hip 08/01/18. Dressing removed. Small amount of serosanguinous drainage was present on the packing strip. No signs or symptoms of infection. Wound irrigated with normal saline and dried. Wound packed with bacitracin coated plain one inch plain packing strip and covered with 4x4. Patient tolerated well, denies any further needs at this time.

## 2018-08-31 ENCOUNTER — HOSPITAL ENCOUNTER (OUTPATIENT)
Dept: NURSING | Age: 78
Setting detail: INFUSION SERIES
Discharge: HOME | End: 2018-08-31
Payer: MEDICARE

## 2018-08-31 PROCEDURE — 99211 OFF/OP EST MAY X REQ PHY/QHP: CPT

## 2018-08-31 NOTE — PROGRESS NOTES
Pt arrives ambulatory for right buttocks dressing change. Old dressing removed with small amount sero-sang drng noted. Wound irrigated with NS and blotted dry. Wound then packed with Bacitracin-coated plain packing strip (approx 6 inches), then covered with 4x4's x2 and secured with paper tape. Wound edges clean. Denies pain. Discharged ambulatory in stable condition.

## 2018-09-01 ENCOUNTER — HOSPITAL ENCOUNTER (OUTPATIENT)
Dept: NURSING | Age: 78
Setting detail: INFUSION SERIES
Discharge: HOME | End: 2018-09-01
Payer: MEDICARE

## 2018-09-01 PROCEDURE — 99211 OFF/OP EST MAY X REQ PHY/QHP: CPT

## 2018-09-02 ENCOUNTER — HOSPITAL ENCOUNTER (OUTPATIENT)
Dept: NURSING | Age: 78
Setting detail: INFUSION SERIES
Discharge: HOME | End: 2018-09-02
Payer: MEDICARE

## 2018-09-02 PROCEDURE — 99211 OFF/OP EST MAY X REQ PHY/QHP: CPT

## 2018-09-02 NOTE — PROGRESS NOTES
Pt arrives for scheduled wound care. Small amt serosang drainage noted. Irrigated with saline and packed with antibiotic infused gauze and abd applied and secured with paper tape.

## 2018-09-03 ENCOUNTER — HOSPITAL ENCOUNTER (OUTPATIENT)
Dept: NURSING | Age: 78
Setting detail: INFUSION SERIES
Discharge: HOME | End: 2018-09-03
Payer: MEDICARE

## 2018-09-03 PROCEDURE — 99211 OFF/OP EST MAY X REQ PHY/QHP: CPT

## 2018-09-03 NOTE — PROGRESS NOTES
Pt to outpatient treatment room for dressing change. Old dressing removed with small amount of serosang drainage. Wound irrigated with NS then packed with antibiotic infused gauzed, covered with ABD and secured with paper tape.

## 2018-09-04 ENCOUNTER — OFFICE VISIT (OUTPATIENT)
Dept: SURGERY | Age: 78
End: 2018-09-04

## 2018-09-04 VITALS — WEIGHT: 261 LBS | HEIGHT: 60 IN | BODY MASS INDEX: 51.24 KG/M2

## 2018-09-04 DIAGNOSIS — Z98.890 STATUS POST INCISION AND DRAINAGE: Primary | ICD-10-CM

## 2018-09-04 DIAGNOSIS — Z48.00 DRESSING CHANGE: ICD-10-CM

## 2018-09-04 PROCEDURE — 99024 POSTOP FOLLOW-UP VISIT: CPT | Performed by: SURGERY

## 2018-09-05 ENCOUNTER — OFFICE VISIT (OUTPATIENT)
Dept: SURGERY | Age: 78
End: 2018-09-05
Payer: MEDICARE

## 2018-09-05 VITALS — BODY MASS INDEX: 51.24 KG/M2 | HEIGHT: 60 IN | WEIGHT: 261 LBS

## 2018-09-05 DIAGNOSIS — Z48.00 DRESSING CHANGE: Primary | ICD-10-CM

## 2018-09-05 PROCEDURE — G8417 CALC BMI ABV UP PARAM F/U: HCPCS | Performed by: SURGERY

## 2018-09-05 PROCEDURE — G8427 DOCREV CUR MEDS BY ELIG CLIN: HCPCS | Performed by: SURGERY

## 2018-09-05 PROCEDURE — 99211 OFF/OP EST MAY X REQ PHY/QHP: CPT | Performed by: SURGERY

## 2018-09-05 NOTE — PROGRESS NOTES
Patient arrived at the [place] surgery office for a dressing change. Status post I&D decubitus ulcer, right hip 08/01/18. Dressing removed. Small amount of serosanguinous drainage was present on the packing strip. No signs or symptoms of infection. Wound irrigated with normal saline and dried. Wound packed with bacitracin coated plain one inch plain packing strip and covered with 4x4. Patient tolerated well, denies any further needs at this time. Patient instructed to return to office tomorrow for dressing change.

## 2018-09-06 ENCOUNTER — OFFICE VISIT (OUTPATIENT)
Dept: SURGERY | Age: 78
End: 2018-09-06
Payer: MEDICARE

## 2018-09-06 DIAGNOSIS — Z48.00 DRESSING CHANGE: Primary | ICD-10-CM

## 2018-09-06 PROCEDURE — G8427 DOCREV CUR MEDS BY ELIG CLIN: HCPCS | Performed by: SURGERY

## 2018-09-06 PROCEDURE — 99211 OFF/OP EST MAY X REQ PHY/QHP: CPT | Performed by: SURGERY

## 2018-09-06 PROCEDURE — G8417 CALC BMI ABV UP PARAM F/U: HCPCS | Performed by: SURGERY

## 2018-09-07 ENCOUNTER — HOSPITAL ENCOUNTER (OUTPATIENT)
Dept: NURSING | Age: 78
Setting detail: INFUSION SERIES
Discharge: HOME | End: 2018-09-07
Payer: MEDICARE

## 2018-09-07 PROCEDURE — 99211 OFF/OP EST MAY X REQ PHY/QHP: CPT

## 2018-09-07 NOTE — PROGRESS NOTES
Patient arrives for dressing change rt hip. Old dressing contains small amount serous drainage. Wound irrigated with saline and dried. New packing strip coated with Bacitracin ointment packed into wound. Covered with sterile 4x4. patientt tolerated well. no signs of infection in or around wound.

## 2018-09-08 ENCOUNTER — HOSPITAL ENCOUNTER (OUTPATIENT)
Dept: NURSING | Age: 78
Setting detail: INFUSION SERIES
Discharge: HOME | End: 2018-09-08
Payer: MEDICARE

## 2018-09-08 PROCEDURE — 99211 OFF/OP EST MAY X REQ PHY/QHP: CPT

## 2018-09-08 NOTE — PROGRESS NOTES
Dressing rt hip changed as ordered. Irrigated with saline , dried, and repacked with Bacitracin coated ointment. All areas healing without signs of infection. Small amount serous drainage on old dressing. Patient discharged in stable condition.

## 2018-09-09 ENCOUNTER — HOSPITAL ENCOUNTER (OUTPATIENT)
Dept: NURSING | Age: 78
Setting detail: INFUSION SERIES
Discharge: HOME | End: 2018-09-09
Payer: MEDICARE

## 2018-09-09 NOTE — PROGRESS NOTES
Dressing to right hip changed as ordered. Dressing removed and noted to have small amount of sanguinous drainage on old packing. Irrigated wound with saline, patted dry with 4x4, and repacked with Bacitracin coated packing. Areas appear to be healing without s/s of infection. Pt tolerates dressing change well, without complaints.

## 2018-09-10 ENCOUNTER — HOSPITAL ENCOUNTER (OUTPATIENT)
Dept: NURSING | Age: 78
Setting detail: INFUSION SERIES
Discharge: HOME | End: 2018-09-10
Payer: MEDICARE

## 2018-09-10 PROCEDURE — 99211 OFF/OP EST MAY X REQ PHY/QHP: CPT

## 2018-09-10 NOTE — PROGRESS NOTES
MG tablet TAKE 1 TABLET EVERY DAY 90 tablet 1    simvastatin (ZOCOR) 20 MG tablet TAKE 1 TABLET EVERY NIGHT 90 tablet 1    glucose blood VI test strips (ONE TOUCH ULTRA TEST) strip Test blood sugar once daily and as needed. 100 each 5    ONETOUCH DELICA LANCETS 17I MISC Testing blood sugar once daily and as needed, uses OneTouch UltraMini 100 each 5    aspirin 325 MG tablet Take 325 mg by mouth daily        nystatin (MYCOSTATIN) 887590 UNIT/GM powder Apply to skin fold areas small amount once a week 1 Bottle 0    Glucose Blood (CHOICE DM FORA G20 TEST STRIPS VI) by In Vitro route.          No current facility-administered medications for this visit.             Social History   Social History            Social History    Marital status:        Spouse name: N/A    Number of children: N/A    Years of education: N/A           Social History Main Topics    Smoking status: Never Smoker    Smokeless tobacco: Never Used    Alcohol use No    Drug use: No    Sexual activity: Not Asked           Other Topics Concern    None          Social History Narrative    None            Objective:   Physical Exam   Constitutional: She is oriented to person, place, and time. She appears well-developed and well-nourished. No distress. HENT:   Head: Normocephalic and atraumatic. Eyes: Pupils are equal, round, and reactive to light. Conjunctivae are normal. No scleral icterus. Neck: No tracheal deviation present. Cardiovascular: Normal rate. Pulmonary/Chest: Effort normal. No respiratory distress. Musculoskeletal: She exhibits no edema. Neurological: She is alert and oriented to person, place, and time. Skin: Skin is warm and dry. Wound is open, clean, granulating well. No infection. No necrotic debris. Psychiatric: She has a normal mood and affect.  Her behavior is normal. Judgment and thought content normal.   Nursing note and vitals reviewed.        8/3/2018 11:55 AM - Arie, Ken Incoming Lab

## 2018-09-10 NOTE — PROGRESS NOTES
Patient arrives for dressing change rt hip. Old dressing contains small amount pinkish drainage. Area irrigated with saline, dried, and repacked with Bacitracin coated strip gauze. Covered with dry 4x4. Patient tolerated well and discharged in stable condition. No signs of infection at wound.

## 2018-09-12 ENCOUNTER — OFFICE VISIT (OUTPATIENT)
Dept: SURGERY | Age: 78
End: 2018-09-12
Payer: MEDICARE

## 2018-09-12 VITALS — WEIGHT: 261 LBS | HEIGHT: 60 IN | BODY MASS INDEX: 51.24 KG/M2

## 2018-09-12 DIAGNOSIS — Z48.00 DRESSING CHANGE: Primary | ICD-10-CM

## 2018-09-12 PROCEDURE — 99211 OFF/OP EST MAY X REQ PHY/QHP: CPT | Performed by: SURGERY

## 2018-09-12 PROCEDURE — G8417 CALC BMI ABV UP PARAM F/U: HCPCS | Performed by: SURGERY

## 2018-09-12 PROCEDURE — G8427 DOCREV CUR MEDS BY ELIG CLIN: HCPCS | Performed by: SURGERY

## 2018-09-12 NOTE — PROGRESS NOTES
Patient arrived at the [place] surgery office for a dressing change. Status post I&D decubitus ulcer, right hip 08/01/18. Dressing removed. Small amount of serosanguinous drainage was present on the packing strip. No signs or symptoms of infection. Wound irrigated with normal saline and dried. Wound packed with bacitracin coated plain one inch plain packing strip and covered with 4x4. Patient tolerated well, denies any further needs at this time. Patient instructed to return to the office tomorrow for dressing change.

## 2018-09-13 ENCOUNTER — OFFICE VISIT (OUTPATIENT)
Dept: SURGERY | Age: 78
End: 2018-09-13
Payer: MEDICARE

## 2018-09-13 VITALS — WEIGHT: 261 LBS | BODY MASS INDEX: 51.24 KG/M2 | HEIGHT: 60 IN

## 2018-09-13 DIAGNOSIS — Z48.00 DRESSING CHANGE: Primary | ICD-10-CM

## 2018-09-13 PROCEDURE — G8417 CALC BMI ABV UP PARAM F/U: HCPCS | Performed by: SURGERY

## 2018-09-13 PROCEDURE — 99211 OFF/OP EST MAY X REQ PHY/QHP: CPT | Performed by: SURGERY

## 2018-09-13 PROCEDURE — G8427 DOCREV CUR MEDS BY ELIG CLIN: HCPCS | Performed by: SURGERY

## 2018-09-13 NOTE — PROGRESS NOTES
Patient returned to the [place] surgery office for a dressing change. Status post I&D decubitus ulcer, right hip 08/01/18. Dressing removed. Small amount of serosanguinous drainage was present on the packing strip. No signs or symptoms of infection. Wound irrigated with normal saline and dried. Wound packed with bacitracin coated plain one inch plain packing strip and covered with 4x4. Patient tolerated well, denies any further needs at this time. Patient instructed to go to the hospital tomorrow and over the weekend for daily dressing changes, returning to the office on Monday.

## 2018-09-14 ENCOUNTER — HOSPITAL ENCOUNTER (OUTPATIENT)
Dept: NURSING | Age: 78
Setting detail: INFUSION SERIES
Discharge: HOME | End: 2018-09-14
Payer: MEDICARE

## 2018-09-14 PROCEDURE — 99211 OFF/OP EST MAY X REQ PHY/QHP: CPT

## 2018-09-14 NOTE — PROGRESS NOTES
Patient rt hip dressing change done as ordered. Old dressing contains minimal amount serous drainage. No signs of infection in or around wound. Healing well. Wound irrigated with saline and dried. Bacitracin coated strip inserted and covered with 4x4. discharged in stable condition.

## 2018-09-15 ENCOUNTER — HOSPITAL ENCOUNTER (OUTPATIENT)
Dept: NURSING | Age: 78
Setting detail: INFUSION SERIES
Discharge: HOME | End: 2018-09-15
Payer: MEDICARE

## 2018-09-15 NOTE — PROGRESS NOTES
Dressing to right hip removed with small amount serosanguinous drainage noted. No s/s of infection. Healing well at this time. Wound irrigated with NS and patted dry. Bacitracin coated gauze inserted and covered with 4x4s. Pt tolerates well.

## 2018-09-16 ENCOUNTER — HOSPITAL ENCOUNTER (OUTPATIENT)
Dept: NURSING | Age: 78
Setting detail: INFUSION SERIES
Discharge: HOME | End: 2018-09-16
Payer: MEDICARE

## 2018-09-17 ENCOUNTER — OFFICE VISIT (OUTPATIENT)
Dept: SURGERY | Age: 78
End: 2018-09-17
Payer: MEDICARE

## 2018-09-17 VITALS — BODY MASS INDEX: 51.04 KG/M2 | WEIGHT: 260 LBS | HEIGHT: 60 IN

## 2018-09-17 DIAGNOSIS — Z48.00 DRESSING CHANGE: Primary | ICD-10-CM

## 2018-09-17 PROCEDURE — G8417 CALC BMI ABV UP PARAM F/U: HCPCS | Performed by: SURGERY

## 2018-09-17 PROCEDURE — 99211 OFF/OP EST MAY X REQ PHY/QHP: CPT | Performed by: SURGERY

## 2018-09-17 PROCEDURE — G8427 DOCREV CUR MEDS BY ELIG CLIN: HCPCS | Performed by: SURGERY

## 2018-09-18 ENCOUNTER — OFFICE VISIT (OUTPATIENT)
Dept: SURGERY | Age: 78
End: 2018-09-18
Payer: MEDICARE

## 2018-09-18 VITALS — HEIGHT: 60 IN | BODY MASS INDEX: 51.04 KG/M2 | WEIGHT: 260 LBS

## 2018-09-18 DIAGNOSIS — Z48.00 DRESSING CHANGE: Primary | ICD-10-CM

## 2018-09-18 PROCEDURE — 99211 OFF/OP EST MAY X REQ PHY/QHP: CPT | Performed by: SURGERY

## 2018-09-18 PROCEDURE — G8417 CALC BMI ABV UP PARAM F/U: HCPCS | Performed by: SURGERY

## 2018-09-18 PROCEDURE — G8427 DOCREV CUR MEDS BY ELIG CLIN: HCPCS | Performed by: SURGERY

## 2018-09-19 ENCOUNTER — OFFICE VISIT (OUTPATIENT)
Dept: SURGERY | Age: 78
End: 2018-09-19
Payer: MEDICARE

## 2018-09-19 VITALS — WEIGHT: 260 LBS | HEIGHT: 60 IN | BODY MASS INDEX: 51.04 KG/M2

## 2018-09-19 DIAGNOSIS — Z48.00 DRESSING CHANGE: Primary | ICD-10-CM

## 2018-09-19 PROCEDURE — G8417 CALC BMI ABV UP PARAM F/U: HCPCS | Performed by: SURGERY

## 2018-09-19 PROCEDURE — 99211 OFF/OP EST MAY X REQ PHY/QHP: CPT | Performed by: SURGERY

## 2018-09-19 PROCEDURE — G8427 DOCREV CUR MEDS BY ELIG CLIN: HCPCS | Performed by: SURGERY

## 2018-09-20 ENCOUNTER — OFFICE VISIT (OUTPATIENT)
Dept: SURGERY | Age: 78
End: 2018-09-20
Payer: MEDICARE

## 2018-09-20 VITALS — WEIGHT: 260 LBS | BODY MASS INDEX: 51.04 KG/M2 | HEIGHT: 60 IN

## 2018-09-20 DIAGNOSIS — Z48.00 DRESSING CHANGE: Primary | ICD-10-CM

## 2018-09-20 PROCEDURE — 99211 OFF/OP EST MAY X REQ PHY/QHP: CPT | Performed by: SURGERY

## 2018-09-20 PROCEDURE — G8417 CALC BMI ABV UP PARAM F/U: HCPCS | Performed by: SURGERY

## 2018-09-20 PROCEDURE — G8427 DOCREV CUR MEDS BY ELIG CLIN: HCPCS | Performed by: SURGERY

## 2018-09-21 ENCOUNTER — HOSPITAL ENCOUNTER (OUTPATIENT)
Dept: NURSING | Age: 78
Setting detail: INFUSION SERIES
Discharge: HOME | End: 2018-09-21
Payer: MEDICARE

## 2018-09-21 PROCEDURE — 99211 OFF/OP EST MAY X REQ PHY/QHP: CPT

## 2018-09-21 NOTE — PROGRESS NOTES
Pt arrives ambulatory for right buttocks dressing change. Old dressing/packing removed with scant amt sero-sang drng noted. Open wound irrigated with NS and patted dry. Wound then packed with Bacitracin-coated plain 1 inch packing, covered with DSD and secured with paper tape. Skin surrounding wound is pink and healthy. Denies pain with the procedure. Discharged ambulatory in stable condition with .

## 2018-09-22 ENCOUNTER — HOSPITAL ENCOUNTER (OUTPATIENT)
Dept: NURSING | Age: 78
Setting detail: INFUSION SERIES
Discharge: HOME | End: 2018-09-22
Payer: MEDICARE

## 2018-09-22 PROCEDURE — 99211 OFF/OP EST MAY X REQ PHY/QHP: CPT

## 2018-09-22 NOTE — PROGRESS NOTES
Pt arrives to outpatient room with rollator. Old dressing removed with no drainage noted. Wound on right buttock irrigated with NS then packed loosely with bacitracin covered gauze. Dry 4x4 applied over wound with paper tape covering. Pt leaves in stable condition.

## 2018-09-23 ENCOUNTER — HOSPITAL ENCOUNTER (OUTPATIENT)
Dept: NURSING | Age: 78
Setting detail: INFUSION SERIES
Discharge: HOME | End: 2018-09-23
Payer: MEDICARE

## 2018-09-23 PROCEDURE — 99211 OFF/OP EST MAY X REQ PHY/QHP: CPT

## 2018-09-24 ENCOUNTER — OFFICE VISIT (OUTPATIENT)
Dept: SURGERY | Age: 78
End: 2018-09-24
Payer: MEDICARE

## 2018-09-24 VITALS — WEIGHT: 260 LBS | BODY MASS INDEX: 51.04 KG/M2 | HEIGHT: 60 IN

## 2018-09-24 DIAGNOSIS — Z48.00 DRESSING CHANGE: Primary | ICD-10-CM

## 2018-09-24 PROCEDURE — G8427 DOCREV CUR MEDS BY ELIG CLIN: HCPCS | Performed by: SURGERY

## 2018-09-24 PROCEDURE — G8417 CALC BMI ABV UP PARAM F/U: HCPCS | Performed by: SURGERY

## 2018-09-24 PROCEDURE — 99211 OFF/OP EST MAY X REQ PHY/QHP: CPT | Performed by: SURGERY

## 2018-09-25 ENCOUNTER — OFFICE VISIT (OUTPATIENT)
Dept: SURGERY | Age: 78
End: 2018-09-25

## 2018-09-25 DIAGNOSIS — Z98.890 STATUS POST INCISION AND DRAINAGE: Primary | ICD-10-CM

## 2018-09-25 DIAGNOSIS — Z48.00 DRESSING CHANGE: ICD-10-CM

## 2018-09-25 PROCEDURE — 99024 POSTOP FOLLOW-UP VISIT: CPT | Performed by: SURGERY

## 2018-09-25 NOTE — PROGRESS NOTES
HPI      Ms Catalina Service returns for follow up s/p I&D R hip wound Aug 1, 2018.  She is doing well, without complaints.  Managing dressing changes with assistance.     Review of Systems      Past Medical History           Past Medical History:   Diagnosis Date    Cancer ([address]       basal cell skin cancer    Hyperlipidemia      Hypertension      Lymphedema of lower extremity      Obesity      Shingles       70's    Type II or unspecified type diabetes mellitus without mention of complication, not stated as uncontrolled      Venous stasis dermatitis              Past Surgical History             Past Surgical History:   Procedure Laterality Date    APPENDECTOMY        COLONOSCOPY        DILATION AND CURETTAGE OF UTERUS        JOINT REPLACEMENT         Rt knee    [place] OFFICE/OUTPT VISIT,PROCEDURE ONLY Right 7/19/2018     RIGHT HIP MASS INCISION AND DRAINAGE performed by Legrand Fleischer, MD at [address] [facility] OFFICE/OUTPT VISIT,PROCEDURE ONLY Right 8/1/2018     HIP INCISION AND DRAINAGE (Necrotic Fat Right Hip Debridement) performed by Legrand Fleischer, MD at [facility]            Family History             Family History   Problem Relation Age of Onset    Heart Disease Mother      Cancer Sister      Mult Sclerosis Sister      Cancer Brother           lung            Allergies:  See list     Current Facility-Administered Medications               Current Outpatient Prescriptions   Medication Sig Dispense Refill    allopurinol (ZYLOPRIM) 100 MG tablet TAKE 1 TABLET EVERY DAY 90 tablet 3    Calcium Carb-Cholecalciferol (CALCIUM 600 + D PO) Take by mouth daily        metoprolol tartrate (LOPRESSOR) 50 MG tablet Take 1 tablet by mouth 2 times daily 30 tablet 0    metFORMIN (GLUCOPHAGE) 500 MG tablet Take 1 tablet by mouth 2 times daily (with meals) 180 tablet 1    furosemide (LASIX) 80 MG tablet TAKE 1 TABLET EVERY DAY 90 tablet 1    losartan (COZAAR) 100 MG tablet TAKE 1 TABLET EVERY DAY 90 tablet 1    glipiZIDE (GLUCOTROL) 5 MG tablet TAKE 1 TABLET EVERY DAY 90 tablet 1    simvastatin (ZOCOR) 20 MG tablet TAKE 1 TABLET EVERY NIGHT 90 tablet 1    glucose blood VI test strips (ONE TOUCH ULTRA TEST) strip Test blood sugar once daily and as needed. 100 each 5    ONETOUCH DELICA LANCETS 52V MISC Testing blood sugar once daily and as needed, uses OneTouch UltraMini 100 each 5    aspirin 325 MG tablet Take 325 mg by mouth daily        nystatin (MYCOSTATIN) 016905 UNIT/GM powder Apply to skin fold areas small amount once a week 1 Bottle 0    Glucose Blood (CHOICE DM FORA G20 TEST STRIPS VI) by In Vitro route.          No current facility-administered medications for this visit.             Social History   Social History                Social History    Marital status:        Spouse name: N/A    Number of children: N/A    Years of education: N/A              Social History Main Topics    Smoking status: Never Smoker    Smokeless tobacco: Never Used    Alcohol use No    Drug use: No    Sexual activity: Not Asked              Other Topics Concern    None            Social History Narrative    None            Objective:   Physical Exam   Constitutional: She is oriented to person, place, and time. She appears well-developed and well-nourished. No distress. HENT:   Head: Normocephalic and atraumatic. Eyes: Pupils are equal, round, and reactive to light. Conjunctivae are normal. No scleral icterus. Neck: No tracheal deviation present. Cardiovascular: Normal rate.    Pulmonary/Chest: Effort normal. No respiratory distress. Musculoskeletal: She exhibits no edema. Neurological: She is alert and oriented to person, place, and time. Skin: Skin is warm and dry. Wound is open, clean, granulating well.  No infection.  No necrotic debris.   Psychiatric: She has a normal mood and affect.  Her behavior is normal. Judgment and thought content normal.   Nursing note and vitals reviewed.        8/3/2018 11:55 AM - Ken Sargent Incoming Lab Results From [facility]      Component Results      Component Collected Lab   Surgical Pathology Report 08/01/2018  8:34  Mamadou Hanley   (NOTE)   [id number]   [facility]   [id number] [address],  [address]. [place], [address]   [phone]   Fax: [phone]     [address] [facility]     Patient Name: Arianna Dos Santos [facility] Rec: [id number]   Path Number: [id number]   Collected: 8/1/2018   Received: 8/2/2018   Reported: 8/3/2018 11:54     -- Diagnosis --   SOFT TISSUE, RIGHT HIP, DEBRIDEMENT:            -  SKIN AND SOFT TISSUE WITH DIFFUSE GANGRENOUS NECROSIS OF   DEEP SOFT TISSUES. Sharda Yip M.D.   **Electronically Signed Out**         jet/8/3/2018       Clinical Information   Pre-op Diagnosis:  NECROTIC FAT RIGHT HIP   Operative Findings:  TISSUE RIGHT HIP DECUBITUS   Operation Performed:  INCISION AND DRAINAGE (DEBRIDEMENT)     Source of Specimen   1: TISSUE RIGHT HIP DECUBITUS (A)     Gross Description   \"MIRACLE TAYLORLEY, TISSUE  RIGHT HIP DECUBITUS\" Fragments of friable   brown-tan tissue and a wrinkled tan skin ellipse, 11 x 8.5 x 4 cm in   aggregate.  Sectioning reveals brown-tan friable cut surfaces and the   subcutaneous tissue is grossly necrotic.  Representative sections 1cs.    as       Microscopic Description   Microscopic examination performed.             Assessment:     Diagnosis Orders   1. Dressing change      2. Status post incision and drainage            Plan:     Doing very well. Granulation tissue treated with silver nitrate.   Continue local wound care with daily dressing changes.  Follow up with me in 1-2 weeks.        Darion Gomez MD

## 2018-09-26 ENCOUNTER — OFFICE VISIT (OUTPATIENT)
Dept: SURGERY | Age: 78
End: 2018-09-26
Payer: MEDICARE

## 2018-09-26 VITALS — HEIGHT: 60 IN | BODY MASS INDEX: 51.04 KG/M2 | WEIGHT: 260 LBS

## 2018-09-26 DIAGNOSIS — Z48.00 DRESSING CHANGE: Primary | ICD-10-CM

## 2018-09-26 PROCEDURE — G8417 CALC BMI ABV UP PARAM F/U: HCPCS | Performed by: SURGERY

## 2018-09-26 PROCEDURE — 99211 OFF/OP EST MAY X REQ PHY/QHP: CPT | Performed by: SURGERY

## 2018-09-26 PROCEDURE — G8427 DOCREV CUR MEDS BY ELIG CLIN: HCPCS | Performed by: SURGERY

## 2018-09-28 ENCOUNTER — HOSPITAL ENCOUNTER (OUTPATIENT)
Dept: NURSING | Age: 78
Setting detail: INFUSION SERIES
Discharge: HOME | End: 2018-09-28
Payer: MEDICARE

## 2018-09-28 PROCEDURE — 99211 OFF/OP EST MAY X REQ PHY/QHP: CPT

## 2018-09-28 NOTE — PROGRESS NOTES
Patient arrives for dressing change rt hip. Old dressing contains small amount serous drainage. No signs of infection. Irrigated with saline and repacked with Bacitracin coated strip. Covered with dry 4x4 . Discharged in stable condition.

## 2018-09-29 ENCOUNTER — HOSPITAL ENCOUNTER (OUTPATIENT)
Dept: NURSING | Age: 78
Setting detail: INFUSION SERIES
Discharge: HOME | End: 2018-09-29
Payer: MEDICARE

## 2018-09-29 PROCEDURE — 99211 OFF/OP EST MAY X REQ PHY/QHP: CPT

## 2018-09-29 NOTE — PROGRESS NOTES
Pt arrives for dressing change to right hip. Old dressing with small amount of yellow drainage. Wound irrigated with NS and repacked with bacitracin coated strip and covered with 4x4. In stable condition at discharge.

## 2018-09-30 ENCOUNTER — HOSPITAL ENCOUNTER (OUTPATIENT)
Dept: NURSING | Age: 78
Setting detail: INFUSION SERIES
Discharge: HOME | End: 2018-09-30
Payer: MEDICARE

## 2018-09-30 NOTE — PROGRESS NOTES
Dressing to right hip removed with small amount of yellow drainage noted. No s/s of infection. Healing well at this time. Wound irrigated with NS and patted dry. Bacitracin coated gauze inserted and covered with 4x4s. Pt tolerates well.

## 2018-10-01 ENCOUNTER — OFFICE VISIT (OUTPATIENT)
Dept: SURGERY | Age: 78
End: 2018-10-01
Payer: MEDICARE

## 2018-10-01 VITALS — WEIGHT: 260 LBS | BODY MASS INDEX: 51.04 KG/M2 | HEIGHT: 60 IN

## 2018-10-01 DIAGNOSIS — Z48.00 DRESSING CHANGE: Primary | ICD-10-CM

## 2018-10-01 PROCEDURE — 99211 OFF/OP EST MAY X REQ PHY/QHP: CPT | Performed by: SURGERY

## 2018-10-01 PROCEDURE — G8417 CALC BMI ABV UP PARAM F/U: HCPCS | Performed by: SURGERY

## 2018-10-01 PROCEDURE — G8427 DOCREV CUR MEDS BY ELIG CLIN: HCPCS | Performed by: SURGERY

## 2018-10-02 ENCOUNTER — OFFICE VISIT (OUTPATIENT)
Dept: SURGERY | Age: 78
End: 2018-10-02
Payer: MEDICARE

## 2018-10-02 VITALS — HEIGHT: 60 IN | WEIGHT: 260 LBS | BODY MASS INDEX: 51.04 KG/M2

## 2018-10-02 DIAGNOSIS — Z48.00 DRESSING CHANGE: Primary | ICD-10-CM

## 2018-10-02 PROCEDURE — G8427 DOCREV CUR MEDS BY ELIG CLIN: HCPCS | Performed by: SURGERY

## 2018-10-02 PROCEDURE — G8417 CALC BMI ABV UP PARAM F/U: HCPCS | Performed by: SURGERY

## 2018-10-02 PROCEDURE — 99211 OFF/OP EST MAY X REQ PHY/QHP: CPT | Performed by: SURGERY

## 2018-10-03 ENCOUNTER — OFFICE VISIT (OUTPATIENT)
Dept: SURGERY | Age: 78
End: 2018-10-03
Payer: MEDICARE

## 2018-10-03 VITALS — WEIGHT: 260 LBS | HEIGHT: 60 IN | BODY MASS INDEX: 51.04 KG/M2

## 2018-10-03 DIAGNOSIS — Z48.00 DRESSING CHANGE: Primary | ICD-10-CM

## 2018-10-03 PROCEDURE — G8427 DOCREV CUR MEDS BY ELIG CLIN: HCPCS | Performed by: SURGERY

## 2018-10-03 PROCEDURE — G8417 CALC BMI ABV UP PARAM F/U: HCPCS | Performed by: SURGERY

## 2018-10-03 PROCEDURE — 99211 OFF/OP EST MAY X REQ PHY/QHP: CPT | Performed by: SURGERY

## 2018-10-04 ENCOUNTER — OFFICE VISIT (OUTPATIENT)
Dept: SURGERY | Age: 78
End: 2018-10-04
Payer: MEDICARE

## 2018-10-04 VITALS — WEIGHT: 260 LBS | HEIGHT: 60 IN | BODY MASS INDEX: 51.04 KG/M2

## 2018-10-04 DIAGNOSIS — Z48.00 DRESSING CHANGE: Primary | ICD-10-CM

## 2018-10-04 PROCEDURE — G8417 CALC BMI ABV UP PARAM F/U: HCPCS | Performed by: SURGERY

## 2018-10-04 PROCEDURE — G8427 DOCREV CUR MEDS BY ELIG CLIN: HCPCS | Performed by: SURGERY

## 2018-10-04 PROCEDURE — 99211 OFF/OP EST MAY X REQ PHY/QHP: CPT | Performed by: SURGERY

## 2018-10-04 NOTE — PROGRESS NOTES
Patient returned to the [place] surgery office for a dressing change. Status post I&D decubitus ulcer, right hip 08/01/18. Dressing removed. Small amount of serosanguinous drainage was present on the packing strip. No signs or symptoms of infection. Wound irrigated with normal saline and dried. Wound packed with bacitracin coated plain 1/4 inch plain packing strip and covered with 4x4. Patient tolerated well, denies any further needs at this time. Patient instructed to follow up in office Monday.

## 2018-10-08 ENCOUNTER — OFFICE VISIT (OUTPATIENT)
Dept: SURGERY | Age: 78
End: 2018-10-08
Payer: MEDICARE

## 2018-10-08 VITALS — WEIGHT: 260 LBS | HEIGHT: 60 IN | BODY MASS INDEX: 51.04 KG/M2

## 2018-10-08 DIAGNOSIS — Z48.00 DRESSING CHANGE: Primary | ICD-10-CM

## 2018-10-08 PROCEDURE — G8427 DOCREV CUR MEDS BY ELIG CLIN: HCPCS | Performed by: SURGERY

## 2018-10-08 PROCEDURE — 99211 OFF/OP EST MAY X REQ PHY/QHP: CPT | Performed by: SURGERY

## 2018-10-08 PROCEDURE — G8417 CALC BMI ABV UP PARAM F/U: HCPCS | Performed by: SURGERY

## 2018-10-09 ENCOUNTER — OFFICE VISIT (OUTPATIENT)
Dept: SURGERY | Age: 78
End: 2018-10-09
Payer: MEDICARE

## 2018-10-09 VITALS — WEIGHT: 260 LBS | HEIGHT: 60 IN | BODY MASS INDEX: 51.04 KG/M2

## 2018-10-09 DIAGNOSIS — Z48.00 DRESSING CHANGE: Primary | ICD-10-CM

## 2018-10-09 PROCEDURE — G8427 DOCREV CUR MEDS BY ELIG CLIN: HCPCS | Performed by: SURGERY

## 2018-10-09 PROCEDURE — 99211 OFF/OP EST MAY X REQ PHY/QHP: CPT | Performed by: SURGERY

## 2018-10-09 PROCEDURE — G8417 CALC BMI ABV UP PARAM F/U: HCPCS | Performed by: SURGERY

## 2018-10-11 ENCOUNTER — OFFICE VISIT (OUTPATIENT)
Dept: SURGERY | Age: 78
End: 2018-10-11
Payer: MEDICARE

## 2018-10-11 VITALS — HEIGHT: 60 IN | BODY MASS INDEX: 51.04 KG/M2 | WEIGHT: 260 LBS

## 2018-10-11 DIAGNOSIS — Z48.00 DRESSING CHANGE: Primary | ICD-10-CM

## 2018-10-11 PROCEDURE — G8427 DOCREV CUR MEDS BY ELIG CLIN: HCPCS | Performed by: SURGERY

## 2018-10-11 PROCEDURE — 99211 OFF/OP EST MAY X REQ PHY/QHP: CPT | Performed by: SURGERY

## 2018-10-11 PROCEDURE — G8417 CALC BMI ABV UP PARAM F/U: HCPCS | Performed by: SURGERY

## 2018-10-16 ENCOUNTER — OFFICE VISIT (OUTPATIENT)
Dept: SURGERY | Age: 78
End: 2018-10-16

## 2018-10-16 DIAGNOSIS — Z98.890 STATUS POST INCISION AND DRAINAGE: ICD-10-CM

## 2018-10-16 DIAGNOSIS — Z48.89 POSTOPERATIVE VISIT: Primary | ICD-10-CM

## 2018-10-16 PROCEDURE — 99024 POSTOP FOLLOW-UP VISIT: CPT | Performed by: SURGERY

## 2018-10-18 VITALS — BODY MASS INDEX: 53.2 KG/M2 | RESPIRATION RATE: 20 BRPM | HEIGHT: 60 IN | WEIGHT: 271 LBS

## 2018-10-24 ENCOUNTER — HOSPITAL ENCOUNTER (OUTPATIENT)
Age: 78
Discharge: HOME | End: 2018-10-24
Payer: MEDICARE

## 2018-10-24 DIAGNOSIS — E78.2 MIXED HYPERLIPIDEMIA: ICD-10-CM

## 2018-10-24 DIAGNOSIS — E11.9 TYPE 2 DIABETES MELLITUS WITHOUT COMPLICATION, WITHOUT LONG-TERM CURRENT USE OF INSULIN (HCC): ICD-10-CM

## 2018-10-24 DIAGNOSIS — I10 ESSENTIAL HYPERTENSION, BENIGN: ICD-10-CM

## 2018-10-24 LAB
ALBUMIN SERPL-MCNC: 3.9 G/DL (ref 3.5–5.2)
ALBUMIN/GLOBULIN RATIO: ABNORMAL (ref 1–2.5)
ALP BLD-CCNC: 292 U/L (ref 35–104)
ALT SERPL-CCNC: 35 U/L (ref 5–33)
ANION GAP SERPL CALCULATED.3IONS-SCNC: 11 MMOL/L (ref 9–17)
AST SERPL-CCNC: 37 U/L
BILIRUB SERPL-MCNC: 0.6 MG/DL (ref 0.3–1.2)
BUN BLDV-MCNC: 20 MG/DL (ref 8–23)
BUN/CREAT BLD: 27 (ref 9–20)
CALCIUM SERPL-MCNC: 9.7 MG/DL (ref 8.6–10.4)
CHLORIDE BLD-SCNC: 100 MMOL/L (ref 98–107)
CHOLESTEROL/HDL RATIO: 2.4
CHOLESTEROL: 158 MG/DL
CO2: 27 MMOL/L (ref 20–31)
CREAT SERPL-MCNC: 0.74 MG/DL (ref 0.5–0.9)
ESTIMATED AVERAGE GLUCOSE: 111 MG/DL
GFR AFRICAN AMERICAN: >60 ML/MIN
GFR NON-AFRICAN AMERICAN: >60 ML/MIN
GFR SERPL CREATININE-BSD FRML MDRD: ABNORMAL ML/MIN/{1.73_M2}
GFR SERPL CREATININE-BSD FRML MDRD: ABNORMAL ML/MIN/{1.73_M2}
GLUCOSE BLD-MCNC: 148 MG/DL (ref 70–99)
HBA1C MFR BLD: 5.5 % (ref 4.8–5.9)
HDLC SERPL-MCNC: 67 MG/DL
LDL CHOLESTEROL: 67 MG/DL (ref 0–130)
PATIENT FASTING?: YES
POTASSIUM SERPL-SCNC: 4.1 MMOL/L (ref 3.7–5.3)
SODIUM BLD-SCNC: 138 MMOL/L (ref 135–144)
TOTAL PROTEIN: 9.1 G/DL (ref 6.4–8.3)
TRIGL SERPL-MCNC: 120 MG/DL
VLDLC SERPL CALC-MCNC: NORMAL MG/DL (ref 1–30)

## 2018-10-24 PROCEDURE — 83036 HEMOGLOBIN GLYCOSYLATED A1C: CPT

## 2018-10-24 PROCEDURE — 36415 COLL VENOUS BLD VENIPUNCTURE: CPT

## 2018-10-24 PROCEDURE — 80061 LIPID PANEL: CPT

## 2018-10-24 PROCEDURE — 80053 COMPREHEN METABOLIC PANEL: CPT

## 2018-10-25 ENCOUNTER — OFFICE VISIT (OUTPATIENT)
Dept: FAMILY MEDICINE CLINIC | Age: 78
End: 2018-10-25
Payer: MEDICARE

## 2018-10-25 VITALS
DIASTOLIC BLOOD PRESSURE: 72 MMHG | BODY MASS INDEX: 52.54 KG/M2 | HEART RATE: 88 BPM | OXYGEN SATURATION: 98 % | SYSTOLIC BLOOD PRESSURE: 120 MMHG | WEIGHT: 269 LBS

## 2018-10-25 DIAGNOSIS — R79.89 ELEVATED LFTS: ICD-10-CM

## 2018-10-25 DIAGNOSIS — Z23 NEED FOR INFLUENZA VACCINATION: ICD-10-CM

## 2018-10-25 DIAGNOSIS — E78.2 MIXED HYPERLIPIDEMIA: ICD-10-CM

## 2018-10-25 DIAGNOSIS — I10 ESSENTIAL HYPERTENSION, BENIGN: Primary | ICD-10-CM

## 2018-10-25 DIAGNOSIS — E11.9 TYPE 2 DIABETES MELLITUS WITHOUT COMPLICATION, WITHOUT LONG-TERM CURRENT USE OF INSULIN (HCC): ICD-10-CM

## 2018-10-25 PROCEDURE — G8482 FLU IMMUNIZE ORDER/ADMIN: HCPCS | Performed by: FAMILY MEDICINE

## 2018-10-25 PROCEDURE — 99214 OFFICE O/P EST MOD 30 MIN: CPT | Performed by: FAMILY MEDICINE

## 2018-10-25 PROCEDURE — 1090F PRES/ABSN URINE INCON ASSESS: CPT | Performed by: FAMILY MEDICINE

## 2018-10-25 PROCEDURE — 1123F ACP DISCUSS/DSCN MKR DOCD: CPT | Performed by: FAMILY MEDICINE

## 2018-10-25 PROCEDURE — 90686 IIV4 VACC NO PRSV 0.5 ML IM: CPT | Performed by: FAMILY MEDICINE

## 2018-10-25 PROCEDURE — 1101F PT FALLS ASSESS-DOCD LE1/YR: CPT | Performed by: FAMILY MEDICINE

## 2018-10-25 PROCEDURE — G8399 PT W/DXA RESULTS DOCUMENT: HCPCS | Performed by: FAMILY MEDICINE

## 2018-10-25 PROCEDURE — 4040F PNEUMOC VAC/ADMIN/RCVD: CPT | Performed by: FAMILY MEDICINE

## 2018-10-25 PROCEDURE — G8417 CALC BMI ABV UP PARAM F/U: HCPCS | Performed by: FAMILY MEDICINE

## 2018-10-25 PROCEDURE — G8598 ASA/ANTIPLAT THER USED: HCPCS | Performed by: FAMILY MEDICINE

## 2018-10-25 PROCEDURE — G0008 ADMIN INFLUENZA VIRUS VAC: HCPCS | Performed by: FAMILY MEDICINE

## 2018-10-25 PROCEDURE — 1036F TOBACCO NON-USER: CPT | Performed by: FAMILY MEDICINE

## 2018-10-25 PROCEDURE — G8427 DOCREV CUR MEDS BY ELIG CLIN: HCPCS | Performed by: FAMILY MEDICINE

## 2018-10-25 RX ORDER — METOPROLOL TARTRATE 50 MG/1
50 TABLET, FILM COATED ORAL 2 TIMES DAILY
Qty: 180 TABLET | Refills: 1 | Status: SHIPPED | OUTPATIENT
Start: 2018-10-25 | End: 2019-04-30 | Stop reason: SDUPTHER

## 2018-10-25 RX ORDER — LOSARTAN POTASSIUM 100 MG/1
TABLET ORAL
Qty: 30 TABLET | Refills: 0 | Status: SHIPPED | OUTPATIENT
Start: 2018-10-25 | End: 2019-04-30 | Stop reason: SDUPTHER

## 2018-10-25 RX ORDER — GLIPIZIDE 5 MG/1
TABLET ORAL
Qty: 90 TABLET | Refills: 1 | Status: SHIPPED | OUTPATIENT
Start: 2018-10-25 | End: 2019-04-30 | Stop reason: SDUPTHER

## 2018-10-25 RX ORDER — SIMVASTATIN 20 MG
TABLET ORAL
Qty: 90 TABLET | Refills: 1 | Status: SHIPPED | OUTPATIENT
Start: 2018-10-25 | End: 2019-04-30 | Stop reason: SDUPTHER

## 2018-10-25 RX ORDER — LOSARTAN POTASSIUM 100 MG/1
TABLET ORAL
Qty: 90 TABLET | Refills: 1 | Status: SHIPPED | OUTPATIENT
Start: 2018-10-25 | End: 2018-10-25 | Stop reason: SDUPTHER

## 2018-10-25 ASSESSMENT — ENCOUNTER SYMPTOMS
NAUSEA: 0
EYE DISCHARGE: 0
BLOOD IN STOOL: 0
BLURRED VISION: 0
VOMITING: 0
CONSTIPATION: 0
VISUAL CHANGE: 0
ABDOMINAL PAIN: 0
EYE REDNESS: 0
SHORTNESS OF BREATH: 0
DIARRHEA: 0
FACIAL SWELLING: 0
COUGH: 0

## 2018-10-25 ASSESSMENT — PATIENT HEALTH QUESTIONNAIRE - PHQ9
SUM OF ALL RESPONSES TO PHQ9 QUESTIONS 1 & 2: 0
1. LITTLE INTEREST OR PLEASURE IN DOING THINGS: 0
SUM OF ALL RESPONSES TO PHQ QUESTIONS 1-9: 0
2. FEELING DOWN, DEPRESSED OR HOPELESS: 0
SUM OF ALL RESPONSES TO PHQ QUESTIONS 1-9: 0

## 2018-11-05 NOTE — PROGRESS NOTES
Subjective:      Patient ID: Roscoe Wang is a 66 y.o. female. HPI     Ms Kev Whyte returns for follow up s/p I&D R hip wound Aug 1, 2018.  She is doing well, without complaints.  Managing dressing changes with assistance. Review of Systems     Past Medical History:   Diagnosis Date    Cancer ([address]     basal cell skin cancer    Hyperlipidemia     Hypertension     Lymphedema of lower extremity     Obesity     Shingles     66's    Type II or unspecified type diabetes mellitus without mention of complication, not stated as uncontrolled     Venous stasis dermatitis        Past Surgical History:   Procedure Laterality Date    APPENDECTOMY      COLONOSCOPY      DILATION AND CURETTAGE OF UTERUS      JOINT REPLACEMENT      Rt knee    OK OFFICE/OUTPT VISIT,PROCEDURE ONLY Right 7/19/2018    RIGHT HIP MASS INCISION AND DRAINAGE performed by Oz Mcginnis MD at [address] [facility] OFFICE/OUTPT VISIT,PROCEDURE ONLY Right 8/1/2018    HIP INCISION AND DRAINAGE (Necrotic Fat Right Hip Debridement) performed by Oz Mcginnis MD at [facility] OR       Family History   Problem Relation Age of Onset    Heart Disease Mother     Cancer Sister     Mult Sclerosis Sister     Cancer Brother         lung       Allergies:  See list    Current Outpatient Prescriptions   Medication Sig Dispense Refill    allopurinol (ZYLOPRIM) 100 MG tablet TAKE 1 TABLET EVERY DAY 90 tablet 3    Calcium Carb-Cholecalciferol (CALCIUM 600 + D PO) Take by mouth daily      furosemide (LASIX) 80 MG tablet TAKE 1 TABLET EVERY DAY 90 tablet 1    glucose blood VI test strips (ONE TOUCH ULTRA TEST) strip Test blood sugar once daily and as needed.  100 each 5    ONETOUCH DELICA LANCETS 23M MISC Testing blood sugar once daily and as needed, uses OneTouch UltraMini 100 each 5    aspirin 325 MG tablet Take 325 mg by mouth daily      nystatin (MYCOSTATIN) 310226 UNIT/GM powder Apply to skin fold areas small amount once a week 1 Bottle 0    Glucose Blood (CHOICE DM FORA G20 TEST STRIPS VI) by In Vitro route.  simvastatin (ZOCOR) 20 MG tablet TAKE 1 TABLET EVERY NIGHT 90 tablet 1    metFORMIN (GLUCOPHAGE) 500 MG tablet Take 1 tablet by mouth 2 times daily (with meals) 180 tablet 1    metoprolol tartrate (LOPRESSOR) 50 MG tablet Take 1 tablet by mouth 2 times daily 180 tablet 1    glipiZIDE (GLUCOTROL) 5 MG tablet TAKE 1 TABLET EVERY DAY 90 tablet 1    losartan (COZAAR) 100 MG tablet TAKE 1 TABLET EVERY DAY 30 tablet 0     No current facility-administered medications for this visit. Social History     Social History    Marital status:      Spouse name: N/A    Number of children: N/A    Years of education: N/A     Social History Main Topics    Smoking status: Never Smoker    Smokeless tobacco: Never Used    Alcohol use No    Drug use: No    Sexual activity: Not Asked     Other Topics Concern    None     Social History Narrative    None       Objective:   Physical Exam   Constitutional: She is oriented to person, place, and time. She appears well-developed and well-nourished. No distress. HENT:   Head: Normocephalic and atraumatic. Eyes: Pupils are equal, round, and reactive to light. Conjunctivae are normal. No scleral icterus. Neck: No tracheal deviation present. Cardiovascular: Normal rate. Pulmonary/Chest: Effort normal. No respiratory distress. Musculoskeletal: She exhibits no edema. Neurological: She is alert and oriented to person, place, and time. Skin: Skin is warm and dry. Wound is healing very well. Mora. Without infection. Granulation tissue treated with silver nitrate, followed by bacitracin and kerlix dressing. Psychiatric: She has a normal mood and affect. Her behavior is normal. Judgment and thought content normal.   Nursing note and vitals reviewed. Assessment:       Diagnosis Orders   1. Postoperative visit     2.  Status post incision and drainage           Plan:      Doing very

## 2018-11-06 ENCOUNTER — NURSE ONLY (OUTPATIENT)
Dept: SURGERY | Age: 78
End: 2018-11-06
Payer: MEDICARE

## 2018-11-06 VITALS — WEIGHT: 270 LBS | BODY MASS INDEX: 53.01 KG/M2 | HEIGHT: 60 IN

## 2018-11-06 DIAGNOSIS — Z48.00 DRESSING CHANGE: Primary | ICD-10-CM

## 2018-11-06 PROCEDURE — 99211 OFF/OP EST MAY X REQ PHY/QHP: CPT | Performed by: SURGERY

## 2018-11-06 NOTE — PROGRESS NOTES
Patient returned to the [place] surgery office for a dressing change. Status post I&D decubitus ulcer, right hip 08/01/18. Dressing removed.  No signs or symptoms of infection. Wound irrigated with normal saline and dried. Wound covered with foam dressing. Patient tolerated well, denies any further needs at this time. Patient instructed to follow up in office Thursday for dressing change. Appointment scheduled with Dr Marlin Aguilera on 11/12/18.

## 2018-11-08 ENCOUNTER — NURSE ONLY (OUTPATIENT)
Dept: SURGERY | Age: 78
End: 2018-11-08

## 2018-11-08 VITALS — HEIGHT: 60 IN | BODY MASS INDEX: 53.01 KG/M2 | WEIGHT: 270 LBS

## 2018-11-08 DIAGNOSIS — Z48.00 DRESSING CHANGE: Primary | ICD-10-CM

## 2018-11-08 PROCEDURE — 99999 PR OFFICE/OUTPT VISIT,PROCEDURE ONLY: CPT | Performed by: SURGERY

## 2018-11-12 ENCOUNTER — OFFICE VISIT (OUTPATIENT)
Dept: SURGERY | Age: 78
End: 2018-11-12

## 2018-11-12 DIAGNOSIS — Z98.890 STATUS POST INCISION AND DRAINAGE: ICD-10-CM

## 2018-11-12 DIAGNOSIS — Z48.89 POSTOPERATIVE VISIT: Primary | ICD-10-CM

## 2018-11-12 PROCEDURE — 99024 POSTOP FOLLOW-UP VISIT: CPT | Performed by: SURGERY

## 2018-12-03 ENCOUNTER — OFFICE VISIT (OUTPATIENT)
Dept: SURGERY | Age: 78
End: 2018-12-03

## 2018-12-03 DIAGNOSIS — Z48.89 POSTOPERATIVE VISIT: Primary | ICD-10-CM

## 2018-12-03 DIAGNOSIS — Z98.890 STATUS POST INCISION AND DRAINAGE: ICD-10-CM

## 2018-12-03 PROCEDURE — 99024 POSTOP FOLLOW-UP VISIT: CPT | Performed by: SURGERY

## 2018-12-04 VITALS — HEIGHT: 60 IN | WEIGHT: 270 LBS | BODY MASS INDEX: 53.01 KG/M2

## 2018-12-10 ENCOUNTER — HOSPITAL ENCOUNTER (EMERGENCY)
Age: 78
Discharge: HOME | End: 2018-12-10
Attending: FAMILY MEDICINE
Payer: MEDICARE

## 2018-12-10 VITALS
OXYGEN SATURATION: 98 % | SYSTOLIC BLOOD PRESSURE: 134 MMHG | WEIGHT: 286 LBS | TEMPERATURE: 98.4 F | HEART RATE: 68 BPM | HEIGHT: 62 IN | BODY MASS INDEX: 52.63 KG/M2 | DIASTOLIC BLOOD PRESSURE: 62 MMHG | RESPIRATION RATE: 16 BRPM

## 2018-12-10 DIAGNOSIS — T14.8XXA BLEEDING FROM WOUND: Primary | ICD-10-CM

## 2018-12-10 PROCEDURE — 6370000000 HC RX 637 (ALT 250 FOR IP): Performed by: FAMILY MEDICINE

## 2018-12-10 PROCEDURE — 99283 EMERGENCY DEPT VISIT LOW MDM: CPT

## 2018-12-10 RX ADMIN — Medication: at 13:43

## 2018-12-10 NOTE — ED NOTES
Patient was up to BR and wound started to bleed again, returned to room and placed in bed.  Pressure maintained on wound     Tye Hodgkin, RN  12/10/18 1649

## 2018-12-11 ENCOUNTER — CARE COORDINATION (OUTPATIENT)
Dept: CARE COORDINATION | Age: 78
End: 2018-12-11

## 2019-03-28 RX ORDER — LANCETS 33 GAUGE
EACH MISCELLANEOUS
Qty: 100 EACH | Refills: 5 | Status: SHIPPED | OUTPATIENT
Start: 2019-03-28 | End: 2020-04-02

## 2019-04-29 ENCOUNTER — HOSPITAL ENCOUNTER (OUTPATIENT)
Age: 79
Discharge: HOME | End: 2019-04-29
Payer: MEDICARE

## 2019-04-29 DIAGNOSIS — R79.89 ELEVATED LFTS: ICD-10-CM

## 2019-04-29 DIAGNOSIS — E11.9 TYPE 2 DIABETES MELLITUS WITHOUT COMPLICATION, WITHOUT LONG-TERM CURRENT USE OF INSULIN (HCC): ICD-10-CM

## 2019-04-29 DIAGNOSIS — E78.2 MIXED HYPERLIPIDEMIA: ICD-10-CM

## 2019-04-29 LAB
ALBUMIN SERPL-MCNC: 4 G/DL (ref 3.5–5.2)
ALBUMIN/GLOBULIN RATIO: ABNORMAL (ref 1–2.5)
ALP BLD-CCNC: 233 U/L (ref 35–104)
ALT SERPL-CCNC: 27 U/L (ref 5–33)
ANION GAP SERPL CALCULATED.3IONS-SCNC: 11 MMOL/L (ref 9–17)
AST SERPL-CCNC: 29 U/L
BILIRUB SERPL-MCNC: 0.45 MG/DL (ref 0.3–1.2)
BILIRUBIN DIRECT: <0.08 MG/DL
BILIRUBIN, INDIRECT: ABNORMAL MG/DL (ref 0–1)
BUN BLDV-MCNC: 18 MG/DL (ref 8–23)
CALCIUM SERPL-MCNC: 10 MG/DL (ref 8.6–10.4)
CHLORIDE BLD-SCNC: 100 MMOL/L (ref 98–107)
CO2: 28 MMOL/L (ref 20–31)
CREAT SERPL-MCNC: 0.87 MG/DL (ref 0.5–0.9)
ESTIMATED AVERAGE GLUCOSE: 126 MG/DL
GFR AFRICAN AMERICAN: >60 ML/MIN
GFR NON-AFRICAN AMERICAN: >60 ML/MIN
GFR SERPL CREATININE-BSD FRML MDRD: ABNORMAL ML/MIN/{1.73_M2}
GFR SERPL CREATININE-BSD FRML MDRD: ABNORMAL ML/MIN/{1.73_M2}
GLUCOSE BLD-MCNC: 161 MG/DL (ref 70–99)
HBA1C MFR BLD: 6 % (ref 4.8–5.9)
POTASSIUM SERPL-SCNC: 4.2 MMOL/L (ref 3.7–5.3)
SODIUM BLD-SCNC: 139 MMOL/L (ref 135–144)
TOTAL PROTEIN: 9.3 G/DL (ref 6.4–8.3)

## 2019-04-29 PROCEDURE — 80053 COMPREHEN METABOLIC PANEL: CPT

## 2019-04-29 PROCEDURE — 82977 ASSAY OF GGT: CPT

## 2019-04-29 PROCEDURE — 83036 HEMOGLOBIN GLYCOSYLATED A1C: CPT

## 2019-04-29 PROCEDURE — 36415 COLL VENOUS BLD VENIPUNCTURE: CPT

## 2019-04-29 PROCEDURE — 82248 BILIRUBIN DIRECT: CPT

## 2019-04-30 ENCOUNTER — OFFICE VISIT (OUTPATIENT)
Dept: FAMILY MEDICINE CLINIC | Age: 79
End: 2019-04-30
Payer: MEDICARE

## 2019-04-30 VITALS
DIASTOLIC BLOOD PRESSURE: 78 MMHG | WEIGHT: 278 LBS | SYSTOLIC BLOOD PRESSURE: 126 MMHG | HEIGHT: 60 IN | BODY MASS INDEX: 54.58 KG/M2 | HEART RATE: 94 BPM | OXYGEN SATURATION: 99 %

## 2019-04-30 DIAGNOSIS — E66.01 MORBID OBESITY WITH BMI OF 50.0-59.9, ADULT (HCC): ICD-10-CM

## 2019-04-30 DIAGNOSIS — E78.2 MIXED HYPERLIPIDEMIA: ICD-10-CM

## 2019-04-30 DIAGNOSIS — I10 ESSENTIAL HYPERTENSION, BENIGN: ICD-10-CM

## 2019-04-30 DIAGNOSIS — B37.2 INTERTRIGINOUS CANDIDIASIS: ICD-10-CM

## 2019-04-30 DIAGNOSIS — E11.9 TYPE 2 DIABETES MELLITUS WITHOUT COMPLICATION, WITHOUT LONG-TERM CURRENT USE OF INSULIN (HCC): ICD-10-CM

## 2019-04-30 LAB — GGT: 195 U/L (ref 5–36)

## 2019-04-30 PROCEDURE — 99214 OFFICE O/P EST MOD 30 MIN: CPT | Performed by: FAMILY MEDICINE

## 2019-04-30 PROCEDURE — G8399 PT W/DXA RESULTS DOCUMENT: HCPCS | Performed by: FAMILY MEDICINE

## 2019-04-30 PROCEDURE — 1036F TOBACCO NON-USER: CPT | Performed by: FAMILY MEDICINE

## 2019-04-30 PROCEDURE — 1090F PRES/ABSN URINE INCON ASSESS: CPT | Performed by: FAMILY MEDICINE

## 2019-04-30 PROCEDURE — 4040F PNEUMOC VAC/ADMIN/RCVD: CPT | Performed by: FAMILY MEDICINE

## 2019-04-30 PROCEDURE — 1123F ACP DISCUSS/DSCN MKR DOCD: CPT | Performed by: FAMILY MEDICINE

## 2019-04-30 PROCEDURE — G8417 CALC BMI ABV UP PARAM F/U: HCPCS | Performed by: FAMILY MEDICINE

## 2019-04-30 PROCEDURE — G8427 DOCREV CUR MEDS BY ELIG CLIN: HCPCS | Performed by: FAMILY MEDICINE

## 2019-04-30 RX ORDER — LOSARTAN POTASSIUM 100 MG/1
TABLET ORAL
Qty: 90 TABLET | Refills: 3 | Status: SHIPPED | OUTPATIENT
Start: 2019-04-30 | End: 2019-10-30 | Stop reason: SDUPTHER

## 2019-04-30 RX ORDER — FUROSEMIDE 80 MG
TABLET ORAL
Qty: 90 TABLET | Refills: 1 | Status: SHIPPED | OUTPATIENT
Start: 2019-04-30 | End: 2019-10-30 | Stop reason: SDUPTHER

## 2019-04-30 RX ORDER — ALLOPURINOL 100 MG/1
TABLET ORAL
Qty: 90 TABLET | Refills: 3 | Status: SHIPPED | OUTPATIENT
Start: 2019-04-30 | End: 2019-10-30 | Stop reason: SDUPTHER

## 2019-04-30 RX ORDER — GLIPIZIDE 5 MG/1
TABLET ORAL
Qty: 90 TABLET | Refills: 1 | Status: SHIPPED | OUTPATIENT
Start: 2019-04-30 | End: 2019-10-30 | Stop reason: SDUPTHER

## 2019-04-30 RX ORDER — METOPROLOL TARTRATE 50 MG/1
50 TABLET, FILM COATED ORAL 2 TIMES DAILY
Qty: 180 TABLET | Refills: 1 | Status: SHIPPED | OUTPATIENT
Start: 2019-04-30 | End: 2019-08-05 | Stop reason: SDUPTHER

## 2019-04-30 RX ORDER — SIMVASTATIN 20 MG
TABLET ORAL
Qty: 90 TABLET | Refills: 1 | Status: SHIPPED | OUTPATIENT
Start: 2019-04-30 | End: 2019-10-30 | Stop reason: SDUPTHER

## 2019-04-30 RX ORDER — CLOTRIMAZOLE 1 %
CREAM (GRAM) TOPICAL
Qty: 45 G | Refills: 1 | Status: SHIPPED | OUTPATIENT
Start: 2019-04-30 | End: 2019-05-07

## 2019-04-30 ASSESSMENT — ENCOUNTER SYMPTOMS
ABDOMINAL PAIN: 0
NAUSEA: 0
FACIAL SWELLING: 0
EYE REDNESS: 0
CONSTIPATION: 0
SHORTNESS OF BREATH: 0
EYE DISCHARGE: 0
DIARRHEA: 0
COUGH: 0
BLOOD IN STOOL: 0
BLURRED VISION: 0
VOMITING: 0

## 2019-04-30 ASSESSMENT — PATIENT HEALTH QUESTIONNAIRE - PHQ9
SUM OF ALL RESPONSES TO PHQ QUESTIONS 1-9: 0
1. LITTLE INTEREST OR PLEASURE IN DOING THINGS: 0
SUM OF ALL RESPONSES TO PHQ QUESTIONS 1-9: 0
2. FEELING DOWN, DEPRESSED OR HOPELESS: 0
SUM OF ALL RESPONSES TO PHQ9 QUESTIONS 1 & 2: 0

## 2019-04-30 NOTE — PATIENT INSTRUCTIONS
SURVEY:    You may be receiving a survey from [employer] regarding your visit today. Please complete the survey to enable us to provide the highest quality of care to you and your family. If you cannot score us a very good on any question, please call the office to discuss how we could of made your experience a very good one. Thank you.

## 2019-04-30 NOTE — PROGRESS NOTES
HPI Notes    Name: Shantell Moore  : 1940        Chief Complaint:     Chief Complaint   Patient presents with    Diabetes    Hypertension    Hyperlipidemia    Rash       History of Present Illness:     Shantell Moore is a 66 y.o.  female who presents with Diabetes; Hypertension; Hyperlipidemia; and Rash      Diabetes   She presents for her follow-up diabetic visit. She has type 2 diabetes mellitus. Her disease course has been stable. There are no hypoglycemic associated symptoms. Pertinent negatives for hypoglycemia include no confusion, dizziness, headaches or pallor. Pertinent negatives for diabetes include no blurred vision, no chest pain and no fatigue. There are no hypoglycemic complications. There are no diabetic complications. Risk factors for coronary artery disease include diabetes mellitus, dyslipidemia and hypertension. Current diabetic treatment includes oral agent (dual therapy). She is compliant with treatment most of the time. Her weight is decreasing steadily (Pt gave up sweets and pop for lent. ). She is following a diabetic diet. She rarely participates in exercise. There is no change in her home blood glucose trend. An ACE inhibitor/angiotensin II receptor blocker is being taken. She sees a podiatrist.Eye exam is current. Hypertension   This is a chronic problem. The current episode started more than 1 year ago. The problem is unchanged. The problem is controlled. Associated symptoms include peripheral edema. Pertinent negatives include no blurred vision, chest pain, headaches, palpitations or shortness of breath. There are no associated agents to hypertension. Risk factors for coronary artery disease include dyslipidemia, diabetes mellitus, obesity and post-menopausal state. The current treatment provides significant improvement. Hyperlipidemia   This is a chronic problem. The current episode started more than 1 year ago. The problem is controlled.  Recent lipid tests were reviewed and are normal. Exacerbating diseases include diabetes. She has no history of hypothyroidism. Pertinent negatives include no chest pain or shortness of breath. Current antihyperlipidemic treatment includes statins. The current treatment provides significant improvement of lipids. Risk factors for coronary artery disease include diabetes mellitus, dyslipidemia, hypertension and obesity. Rash   This is a new problem. Episode onset: pt has had the rash for about 1mos. The problem is unchanged. Location: buttocks in the crack. The rash is characterized by itchiness, dryness and redness. She was exposed to nothing. Pertinent negatives include no congestion, cough, diarrhea, facial edema, fatigue, fever, shortness of breath or vomiting. Obesity - chronic but over several years pt has slowly lost wt. Pt had been gaining wt back but over lent gave up sweets and pop. Pt has lost about 10# over the past 6mos. Pt is just not very active due to wt and lymphedema.      Past Medical History:     Past Medical History:   Diagnosis Date    [address] [address]     basal cell skin cancer    Hyperlipidemia     Hypertension     Lymphedema of lower extremity     Obesity     Shingles     66's    Type II or unspecified type diabetes mellitus without mention of complication, not stated as uncontrolled     Venous stasis dermatitis       Reviewed all health maintenance requirements and ordered appropriate tests  Health Maintenance Due   Topic Date Due    DTaP/Tdap/Td vaccine (1 - Tdap) 10/14/1959    Shingles Vaccine (1 of 2) 10/14/1990       Past Surgical History:     Past Surgical History:   Procedure Laterality Date    APPENDECTOMY      COLONOSCOPY      DILATION AND CURETTAGE OF UTERUS      JOINT REPLACEMENT      Rt knee    IN OFFICE/OUTPT VISIT,PROCEDURE ONLY Right 7/19/2018    RIGHT HIP MASS INCISION AND DRAINAGE performed by Judy Martínez MD at [address] [facility] OFFICE/OUTPT VISIT,PROCEDURE ONLY Right 8/1/2018    HIP INCISION AND DRAINAGE (Necrotic Fat Right Hip Debridement) performed by Brooke Dewey MD at [facility] OR        Medications:       Prior to Admission medications    Medication Sig Start Date End Date Taking? Authorizing Provider   blood glucose test strips (ONE TOUCH ULTRA TEST) strip Test blood sugar once daily and as needed. 3/28/19  Yes Vee Lazar MD   [facility] LANCETS 51V MISC Testing blood sugar once daily and as needed, uses OneTouch UltraMini 3/28/19  Yes Vee Lazar MD   simvastatin (ZOCOR) 20 MG tablet TAKE 1 TABLET EVERY NIGHT 10/25/18  Yes Vee Lazar MD   metFORMIN (GLUCOPHAGE) 500 MG tablet Take 1 tablet by mouth 2 times daily (with meals) 10/25/18 10/25/19 Yes Vee Lazar MD   metoprolol tartrate (LOPRESSOR) 50 MG tablet Take 1 tablet by mouth 2 times daily 10/25/18  Yes Vee Lazar MD   glipiZIDE (GLUCOTROL) 5 MG tablet TAKE 1 TABLET EVERY DAY 10/25/18  Yes Vee Lazar MD   losartan (COZAAR) 100 MG tablet TAKE 1 TABLET EVERY DAY 10/25/18  Yes Vee Lazar MD   allopurinol (ZYLOPRIM) 100 MG tablet TAKE 1 TABLET EVERY DAY 7/18/18  Yes Vee Lazar MD   Calcium Carb-Cholecalciferol (CALCIUM 600 + D PO) Take by mouth daily   Yes Historical Provider, MD   furosemide (LASIX) 80 MG tablet TAKE 1 TABLET EVERY DAY 4/17/18  Yes Vee Lazar MD   aspirin 325 MG tablet Take 325 mg by mouth daily   Yes Historical Provider, MD   nystatin (MYCOSTATIN) 972635 UNIT/GM powder Apply to skin fold areas small amount once a week 6/22/17  Yes ALEXANDER Arriaga - CNP   Glucose Blood (CHOICE DM FORA G20 TEST STRIPS VI) by In Vitro route. Yes Historical Provider, MD        Allergies:       Adhesive tape and Sulfa antibiotics    Social History:     Tobacco:    reports that she has never smoked. She has never used smokeless tobacco.  Alcohol:      reports that she does not drink alcohol. Drug Use:  reports that she does not use drugs.     Family History: Family History   Problem Relation Age of Onset    Heart Disease Mother     Cancer Sister     Mult Sclerosis Sister     Cancer Brother         lung       Review of Systems:       Review of Systems   Constitutional: Negative for chills, fatigue, fever and unexpected weight change. HENT: Negative for congestion and facial swelling. Eyes: Negative for blurred vision, discharge and redness. Respiratory: Negative for cough and shortness of breath. Cardiovascular: Negative for chest pain and palpitations. Gastrointestinal: Negative for abdominal pain, blood in stool, constipation, diarrhea, nausea and vomiting. Genitourinary: Negative for dysuria and hematuria. Musculoskeletal: Negative for joint swelling and neck stiffness. Skin: Positive for rash. Negative for pallor. Neurological: Negative for dizziness, facial asymmetry, light-headedness and headaches. Psychiatric/Behavioral: Negative for confusion and sleep disturbance. Physical Exam:     Physical Exam   Constitutional: She is oriented to person, place, and time. She appears well-developed and well-nourished. HENT:   Head: Normocephalic and atraumatic. Eyes: Pupils are equal, round, and reactive to light. Conjunctivae are normal. Right eye exhibits no discharge. Left eye exhibits no discharge. Neck: Neck supple. No thyromegaly present. Cardiovascular: Normal rate, regular rhythm and normal heart sounds. No murmur heard. Lymphedema bilateral LE with no lesions or erythema on legs    Pulmonary/Chest: Effort normal and breath sounds normal. No respiratory distress. Abdominal: Soft. Bowel sounds are normal. She exhibits no distension. There is no tenderness. Musculoskeletal: She exhibits no edema. Lymphadenopathy:     She has no cervical adenopathy. Neurological: She is alert and oriented to person, place, and time. Skin: No rash noted. There is erythema.         A - lite erythematous patch with no open lesions in between buttocks    Psychiatric: She has a normal mood and affect. Her behavior is normal.   Vitals reviewed. Vitals:  /78 (Site: Right Lower Arm, Position: Sitting, Cuff Size: Large Adult)   Pulse 94   Ht 5' (1.524 m)   Wt 278 lb (126.1 kg)   SpO2 99%   BMI 54.29 kg/m²       Data:     Lab Results   Component Value Date     04/29/2019    K 4.2 04/29/2019     04/29/2019    CO2 28 04/29/2019    BUN 18 04/29/2019    CREATININE 0.87 04/29/2019    GLUCOSE 161 04/29/2019    GLUCOSE 177 04/21/2012    PROT 9.3 04/29/2019    LABALBU 4.0 04/29/2019    LABALBU 4.1 01/20/2012    BILITOT 0.45 04/29/2019    ALKPHOS 233 04/29/2019    AST 29 04/29/2019    ALT 27 04/29/2019     Lab Results   Component Value Date    WBC 4.8 05/08/2018    RBC 4.18 05/08/2018    HGB 12.3 05/08/2018    HCT 36.9 05/08/2018    MCV 88.3 05/08/2018    MCH 29.4 05/08/2018    MCHC 33.2 05/08/2018    RDW 14.9 05/08/2018     05/08/2018    MPV NOT REPORTED 05/08/2018     Lab Results   Component Value Date    TSH 2.59 10/19/2015     Lab Results   Component Value Date    CHOL 158 10/24/2018    HDL 67 10/24/2018    LABA1C 6.0 04/29/2019          Assessment/Plan:        1. Type 2 diabetes mellitus without complication, without long-term current use of insulin (HCC)  F/U 6mos, prior CMP, Lipids, HgbA1C. Do daily foot checks and yearly eye exams  Stable on current 2 medications and try to keep away from pop and sweets  - metFORMIN (GLUCOPHAGE) 500 MG tablet; Take 1 tablet by mouth 2 times daily (with meals)  Dispense: 180 tablet; Refill: 1  - glipiZIDE (GLUCOTROL) 5 MG tablet; TAKE 1 TABLET EVERY DAY  Dispense: 90 tablet; Refill: 1  - Hemoglobin A1C  - Lipid Panel  - Comprehensive Metabolic Panel    2. Essential hypertension, benign  Stable on lopressor and cozaar  - losartan (COZAAR) 100 MG tablet; TAKE 1 TABLET EVERY DAY  Dispense: 90 tablet; Refill: 3  - Lipid Panel  - Comprehensive Metabolic Panel    3.  Mixed hyperlipidemia  Stable on zocor  - simvastatin (ZOCOR) 20 MG tablet; TAKE 1 TABLET EVERY NIGHT  Dispense: 90 tablet; Refill: 1  - Lipid Panel  - Comprehensive Metabolic Panel    4. Morbid obesity with BMI of 50.0-59.9, adult ([address]  Pt encouraged to stay away from the pop and sweets and try walk more    5. Intertriginous candidiasis  Try lotrimin cream daily and get up and walk more        Boris Ling received counseling on the following healthy behaviors: nutrition and exercise  Reviewed prior labs and health maintenance  Continue current medications, diet and exercise. Discussed use, benefit, and side effects of prescribed medications. Barriers to medication compliance addressed. Patient given educational materials - see patient instructions  Was a self-tracking handout given in paper form or via [employer]? Yes    Requested Prescriptions     Pending Prescriptions Disp Refills    simvastatin (ZOCOR) 20 MG tablet 90 tablet 1     Sig: TAKE 1 TABLET EVERY NIGHT    metFORMIN (GLUCOPHAGE) 500 MG tablet 180 tablet 1     Sig: Take 1 tablet by mouth 2 times daily (with meals)    metoprolol tartrate (LOPRESSOR) 50 MG tablet 180 tablet 1     Sig: Take 1 tablet by mouth 2 times daily    glipiZIDE (GLUCOTROL) 5 MG tablet 90 tablet 1     Sig: TAKE 1 TABLET EVERY DAY    losartan (COZAAR) 100 MG tablet 90 tablet 3     Sig: TAKE 1 TABLET EVERY DAY    allopurinol (ZYLOPRIM) 100 MG tablet 90 tablet 3     Sig: TAKE 1 TABLET EVERY DAY       All patient questions answered. Patient voiced understanding. Quality Measures    Body mass index is 54.29 kg/m². Elevated. Weight control planned discussed Healthy diet and regular exercise. BP: 126/78. Blood pressure is normal. Treatment plan consists of No treatment change needed.     Fall Risk 4/30/2019 4/25/2018 3/22/2017 1/20/2016 7/22/2015 4/23/2014   2 or more falls in past year? no no no no no no   Fall with injury in past year? no no no no no no     The patient does not have a

## 2019-07-30 ENCOUNTER — TELEPHONE (OUTPATIENT)
Dept: FAMILY MEDICINE CLINIC | Age: 79
End: 2019-07-30

## 2019-07-30 ENCOUNTER — HOSPITAL ENCOUNTER (EMERGENCY)
Age: 79
Discharge: HOME | End: 2019-07-30
Attending: EMERGENCY MEDICINE
Payer: MEDICARE

## 2019-07-30 VITALS
DIASTOLIC BLOOD PRESSURE: 80 MMHG | WEIGHT: 178 LBS | RESPIRATION RATE: 20 BRPM | OXYGEN SATURATION: 95 % | SYSTOLIC BLOOD PRESSURE: 142 MMHG | TEMPERATURE: 98.6 F | HEART RATE: 101 BPM | BODY MASS INDEX: 32.76 KG/M2 | HEIGHT: 62 IN

## 2019-07-30 DIAGNOSIS — N95.0 POSTMENOPAUSAL VAGINAL BLEEDING: Primary | ICD-10-CM

## 2019-07-30 PROCEDURE — 99284 EMERGENCY DEPT VISIT MOD MDM: CPT

## 2019-07-30 ASSESSMENT — PAIN SCALES - GENERAL: PAINLEVEL_OUTOF10: 0

## 2019-07-30 ASSESSMENT — PAIN DESCRIPTION - LOCATION: LOCATION: ABDOMEN

## 2019-07-30 ASSESSMENT — PAIN DESCRIPTION - DESCRIPTORS: DESCRIPTORS: ACHING;DULL

## 2019-07-30 ASSESSMENT — PAIN DESCRIPTION - ORIENTATION: ORIENTATION: RIGHT

## 2019-07-30 ASSESSMENT — PAIN DESCRIPTION - PAIN TYPE: TYPE: ACUTE PAIN

## 2019-07-30 NOTE — ED NOTES
US of Pelvis set up for 7/31/19 at 8:30 AM.  Patient aware of test and time.       Dario Barajas RN  07/30/19 1508

## 2019-07-31 ENCOUNTER — CARE COORDINATION (OUTPATIENT)
Dept: CARE COORDINATION | Age: 79
End: 2019-07-31

## 2019-07-31 ENCOUNTER — HOSPITAL ENCOUNTER (OUTPATIENT)
Dept: ULTRASOUND IMAGING | Age: 79
Discharge: HOME | End: 2019-08-02
Payer: MEDICARE

## 2019-07-31 DIAGNOSIS — N95.0 POSTMENOPAUSAL VAGINAL BLEEDING: ICD-10-CM

## 2019-07-31 DIAGNOSIS — N93.9 VAGINAL BLEEDING: ICD-10-CM

## 2019-07-31 PROCEDURE — 76830 TRANSVAGINAL US NON-OB: CPT

## 2019-07-31 PROCEDURE — 76856 US EXAM PELVIC COMPLETE: CPT

## 2019-07-31 NOTE — ED PROVIDER NOTES
CVA tenderness. Musculoskeletal:  Intact distal pulses, bilateral lower extremity lympedema, No tenderness, No cyanosis, No clubbing. Good range of motion in all major joints. No tenderness to palpation or major deformities noted. Back- No tenderness. Integument:  Warm, Dry, No erythema, No rash. Lymphatic:  No lymphadenopathy noted. Neurologic:  Alert & oriented x 3, Normal motor function, Normal sensory function, No focal deficits noted. Psychiatric:  Affect normal, Judgment normal, Mood normal.         RADIOLOGY    US PELVIS COMPLETE    (Results Pending)       REEVALUATION   Counseled patient to get an ultrasound and follow-up with OB and referral was provided                Summation      Patient Course: Patient was seen in the ED for evaluation of postmenopausal vaginal bleeding. Patient was given a slip for getting outpatient ultrasound and following up with OB GYN-Dr. Sharp. ED Medications administered this visit:  Medications - No data to display    New Prescriptions from this visit:    Discharge Medication List as of 7/30/2019  4:52 PM          Follow-up:  Leonora Garcia MD  [address] [facility] [address]  [phone]    Call in 1 day      Crys Cast MD  [address] [facility] [address]  [phone]    Call in 1 day          Final Impression:   1.  Postmenopausal vaginal bleeding               (Please note that portions of this note were completed with a voice recognition program.  Efforts were made to edit the dictations but occasionally words are mis-transcribed.)            Mora Cheadle, MD  07/31/19 [id number]

## 2019-08-05 DIAGNOSIS — E11.9 TYPE 2 DIABETES MELLITUS WITHOUT COMPLICATION, WITHOUT LONG-TERM CURRENT USE OF INSULIN (HCC): ICD-10-CM

## 2019-08-05 RX ORDER — METOPROLOL TARTRATE 50 MG/1
50 TABLET, FILM COATED ORAL 2 TIMES DAILY
Qty: 60 TABLET | Refills: 0 | Status: SHIPPED | OUTPATIENT
Start: 2019-08-05 | End: 2019-10-30 | Stop reason: SDUPTHER

## 2019-08-13 ENCOUNTER — OFFICE VISIT (OUTPATIENT)
Dept: OBGYN CLINIC | Age: 79
End: 2019-08-13
Payer: MEDICARE

## 2019-08-13 VITALS
RESPIRATION RATE: 18 BRPM | DIASTOLIC BLOOD PRESSURE: 78 MMHG | HEIGHT: 60 IN | WEIGHT: 281 LBS | HEART RATE: 110 BPM | SYSTOLIC BLOOD PRESSURE: 134 MMHG | BODY MASS INDEX: 55.17 KG/M2

## 2019-08-13 DIAGNOSIS — Z12.31 ENCOUNTER FOR SCREENING MAMMOGRAM FOR BREAST CANCER: ICD-10-CM

## 2019-08-13 DIAGNOSIS — M81.0 OSTEOPOROSIS OF FEMUR WITHOUT PATHOLOGICAL FRACTURE: ICD-10-CM

## 2019-08-13 DIAGNOSIS — N95.0 PMB (POSTMENOPAUSAL BLEEDING): Primary | ICD-10-CM

## 2019-08-13 PROCEDURE — 1123F ACP DISCUSS/DSCN MKR DOCD: CPT | Performed by: OBSTETRICS & GYNECOLOGY

## 2019-08-13 PROCEDURE — G8417 CALC BMI ABV UP PARAM F/U: HCPCS | Performed by: OBSTETRICS & GYNECOLOGY

## 2019-08-13 PROCEDURE — G8399 PT W/DXA RESULTS DOCUMENT: HCPCS | Performed by: OBSTETRICS & GYNECOLOGY

## 2019-08-13 PROCEDURE — 1090F PRES/ABSN URINE INCON ASSESS: CPT | Performed by: OBSTETRICS & GYNECOLOGY

## 2019-08-13 PROCEDURE — G8427 DOCREV CUR MEDS BY ELIG CLIN: HCPCS | Performed by: OBSTETRICS & GYNECOLOGY

## 2019-08-13 PROCEDURE — 99202 OFFICE O/P NEW SF 15 MIN: CPT | Performed by: OBSTETRICS & GYNECOLOGY

## 2019-08-13 PROCEDURE — 4040F PNEUMOC VAC/ADMIN/RCVD: CPT | Performed by: OBSTETRICS & GYNECOLOGY

## 2019-08-13 PROCEDURE — 1036F TOBACCO NON-USER: CPT | Performed by: OBSTETRICS & GYNECOLOGY

## 2019-08-20 ENCOUNTER — HOSPITAL ENCOUNTER (OUTPATIENT)
Dept: NURSING | Age: 79
Setting detail: INFUSION SERIES
Discharge: HOME | End: 2019-08-20
Payer: MEDICARE

## 2019-08-20 ENCOUNTER — HOSPITAL ENCOUNTER (OUTPATIENT)
Age: 79
Setting detail: SPECIMEN
Discharge: HOME | End: 2019-08-20
Payer: MEDICARE

## 2019-08-20 DIAGNOSIS — Z12.4 ENCOUNTER FOR SCREENING FOR CERVICAL CANCER: ICD-10-CM

## 2019-08-20 DIAGNOSIS — N95.0 PMB (POSTMENOPAUSAL BLEEDING): ICD-10-CM

## 2019-08-20 DIAGNOSIS — Z12.4 ENCOUNTER FOR SCREENING FOR CERVICAL CANCER: Primary | ICD-10-CM

## 2019-08-20 PROCEDURE — G0145 SCR C/V CYTO,THINLAYER,RESCR: HCPCS

## 2019-08-20 PROCEDURE — 99211 OFF/OP EST MAY X REQ PHY/QHP: CPT

## 2019-08-20 PROCEDURE — 58100 BIOPSY OF UTERUS LINING: CPT | Performed by: OBSTETRICS & GYNECOLOGY

## 2019-08-20 PROCEDURE — 88305 TISSUE EXAM BY PATHOLOGIST: CPT

## 2019-08-20 PROCEDURE — 99212 OFFICE O/P EST SF 10 MIN: CPT | Performed by: OBSTETRICS & GYNECOLOGY

## 2019-08-20 NOTE — PROCEDURES
[place] [address]                                 PROCEDURE NOTE    PATIENT NAME: Keri Hernandez                   :        1940  MED REC NO:   [id number]                              ROOM:  ACCOUNT NO:   [de-identified]                           ADMIT DATE: 2019  PROVIDER:     Riley Hall        DATE OF PROCEDURE:  2019    OUTPATIENT VISIT NOTE    NOTE:  The patient is a 27-year-old white female, patient of Dr. Meño Samson, who had an episode of postmenopausal bleeding. Attempted to do  an exam in the office but the patient is morbidly obese with severe  mobility issues and we were unable to do the exam, so to do an adequate  exam, the patient was brought to the outpatient surgery area where she  could be properly examined. The patient is a 27-year-old white female with numerous medical problems  including type 2 diabetes, lymphedema of the lower extremities, venous  stasis and hypertension. She had had an episode of postmenopausal  bleeding and as a preoperative workup, she did have an ultrasound of the  pelvis in which there was good visualization of the uterus and ovaries. There was no endometrial thickening noted; however, there was some fluid  in the endometrial canal and the ovaries had a normal appearance. The patient was brought to the outpatient area today and positioned in  the examination bed. This did allow good visualization of the perineal  area, perianal area and the entire vulva. The patient does have  excoriations in several places and there is moderate-to-severe edema in  places of both labia majora and minora. There are no lesions, though  suspicious for any carcinoma of the vulva; however, these excoriations  could easily account for bleeding.     On speculum exam, the vagina is thoroughly viewed and has a normal  appearance for a postmenopausal woman.  The cervix is well viewed and  also has a normal appearance. A Pap smear was performed under direct  visualization. After the Pap smear was performed, the cervix was thoroughly covered  with Betadine. It was necessary to place a tenaculum on the anterior  lip of the cervix to use the os finder and dilator to dilate, and this  did allow admission of the pipelle to a depth of about 6.5 cm. The  pipelle was turned in the usual fashion and there was only a minimal  amount of endometrial tissue, mucus and fluid noted in the pipelle. After this was completed, the speculum was removed as well as the  tenaculum. No bleeding noted. No obvious fullness or masses in the  pelvis. ASSESSMENT:  No obvious cancer of the of vulva, vagina, cervix or  endometrium. We will await results of Pap smear and endometrial biopsy  for confirmation.         Geetha Castellon    D: 08/20/2019 9:31:02       T: 08/20/2019 9:36:48     [email or web address]  Job#: [id number]     Doc#: [id number]    CC:  Shantel Iverson

## 2019-08-20 NOTE — PROGRESS NOTES
Pt assisted to room with GYN bed and assisted with undressing and getting into bed and positioning by this RN. Johanna from the clinic assists Dr. Macrina Cortes for exam and OR staff assist with positioning patient and bed for procedure. Pt then assisted back into clothing and walked out.

## 2019-08-22 ENCOUNTER — TELEPHONE (OUTPATIENT)
Dept: FAMILY MEDICINE CLINIC | Age: 79
End: 2019-08-22

## 2019-08-22 LAB — SURGICAL PATHOLOGY REPORT: NORMAL

## 2019-08-23 ENCOUNTER — HOSPITAL ENCOUNTER (OUTPATIENT)
Dept: BONE DENSITY | Age: 79
Discharge: HOME | End: 2019-08-25
Payer: MEDICARE

## 2019-08-23 ENCOUNTER — HOSPITAL ENCOUNTER (OUTPATIENT)
Dept: MAMMOGRAPHY | Age: 79
Discharge: HOME | End: 2019-08-25
Payer: MEDICARE

## 2019-08-23 DIAGNOSIS — Z12.31 ENCOUNTER FOR SCREENING MAMMOGRAM FOR BREAST CANCER: ICD-10-CM

## 2019-08-23 DIAGNOSIS — M81.0 OSTEOPOROSIS OF FEMUR WITHOUT PATHOLOGICAL FRACTURE: ICD-10-CM

## 2019-08-23 PROCEDURE — 77080 DXA BONE DENSITY AXIAL: CPT

## 2019-08-23 PROCEDURE — 77067 SCR MAMMO BI INCL CAD: CPT

## 2019-08-26 DIAGNOSIS — M81.0 OSTEOPOROSIS OF FEMUR WITHOUT PATHOLOGICAL FRACTURE: Primary | ICD-10-CM

## 2019-08-29 LAB — CYTOLOGY REPORT: NORMAL

## 2019-09-03 ENCOUNTER — TELEPHONE (OUTPATIENT)
Dept: OBGYN CLINIC | Age: 79
End: 2019-09-03

## 2019-09-03 DIAGNOSIS — M81.0 HIGH RISK FOR FRACTURE DUE TO OSTEOPOROSIS BY DEXA SCAN: Primary | ICD-10-CM

## 2019-09-03 RX ORDER — RALOXIFENE HYDROCHLORIDE 60 MG/1
60 TABLET, FILM COATED ORAL DAILY
Qty: 30 TABLET | Refills: 12 | Status: SHIPPED | OUTPATIENT
Start: 2019-09-03 | End: 2020-09-08

## 2019-09-20 ENCOUNTER — TELEPHONE (OUTPATIENT)
Dept: FAMILY MEDICINE CLINIC | Age: 79
End: 2019-09-20

## 2019-09-20 NOTE — TELEPHONE ENCOUNTER
Called patient and left vm to let her know the letter for the handicap placard is ready to be picked up and with be up front.

## 2019-09-20 NOTE — LETTER
[facility] [address] [place] [id number]  Phone: [phone]  Fax: Saige Kee MD        September 20, 2019     Patient: Akua Weeks   YOB: 1940   Date of Visit: 9/20/2019       To Whom It May Concern: It is my medical opinion that Raj Blanco requires a disability parking placard for the following reasons:  She has limited walking ability due to an orthopedic condition. Duration of need: 5 years    If you have any questions or concerns, please don't hesitate to call.     Sincerely,        Milla Wright MD

## 2019-10-29 ENCOUNTER — HOSPITAL ENCOUNTER (OUTPATIENT)
Age: 79
Discharge: HOME | End: 2019-10-29
Payer: MEDICARE

## 2019-10-29 LAB
ALBUMIN SERPL-MCNC: 3.8 G/DL (ref 3.5–5.2)
ALBUMIN/GLOBULIN RATIO: ABNORMAL (ref 1–2.5)
ALP BLD-CCNC: 190 U/L (ref 35–104)
ALT SERPL-CCNC: 20 U/L (ref 5–33)
ANION GAP SERPL CALCULATED.3IONS-SCNC: 14 MMOL/L (ref 9–17)
AST SERPL-CCNC: 25 U/L
BILIRUB SERPL-MCNC: 0.47 MG/DL (ref 0.3–1.2)
BUN BLDV-MCNC: 14 MG/DL (ref 8–23)
BUN/CREAT BLD: 15 (ref 9–20)
CALCIUM SERPL-MCNC: 10.3 MG/DL (ref 8.6–10.4)
CHLORIDE BLD-SCNC: 100 MMOL/L (ref 98–107)
CHOLESTEROL/HDL RATIO: 2.2
CHOLESTEROL: 140 MG/DL
CO2: 24 MMOL/L (ref 20–31)
CREAT SERPL-MCNC: 0.92 MG/DL (ref 0.5–0.9)
ESTIMATED AVERAGE GLUCOSE: 128 MG/DL
GFR AFRICAN AMERICAN: >60 ML/MIN
GFR NON-AFRICAN AMERICAN: 59 ML/MIN
GFR SERPL CREATININE-BSD FRML MDRD: ABNORMAL ML/MIN/{1.73_M2}
GFR SERPL CREATININE-BSD FRML MDRD: ABNORMAL ML/MIN/{1.73_M2}
GLUCOSE BLD-MCNC: 148 MG/DL (ref 70–99)
HBA1C MFR BLD: 6.1 % (ref 4.8–5.9)
HDLC SERPL-MCNC: 63 MG/DL
LDL CHOLESTEROL: 48 MG/DL (ref 0–130)
PATIENT FASTING?: YES
POTASSIUM SERPL-SCNC: 4.1 MMOL/L (ref 3.7–5.3)
SODIUM BLD-SCNC: 138 MMOL/L (ref 135–144)
TOTAL PROTEIN: 9.6 G/DL (ref 6.4–8.3)
TRIGL SERPL-MCNC: 144 MG/DL
VLDLC SERPL CALC-MCNC: NORMAL MG/DL (ref 1–30)

## 2019-10-29 PROCEDURE — 36415 COLL VENOUS BLD VENIPUNCTURE: CPT

## 2019-10-29 PROCEDURE — 80053 COMPREHEN METABOLIC PANEL: CPT

## 2019-10-29 PROCEDURE — 83036 HEMOGLOBIN GLYCOSYLATED A1C: CPT

## 2019-10-29 PROCEDURE — 80061 LIPID PANEL: CPT

## 2019-10-30 ENCOUNTER — OFFICE VISIT (OUTPATIENT)
Dept: FAMILY MEDICINE CLINIC | Age: 79
End: 2019-10-30
Payer: MEDICARE

## 2019-10-30 VITALS
OXYGEN SATURATION: 98 % | WEIGHT: 276 LBS | HEART RATE: 85 BPM | BODY MASS INDEX: 54.19 KG/M2 | SYSTOLIC BLOOD PRESSURE: 116 MMHG | HEIGHT: 60 IN | DIASTOLIC BLOOD PRESSURE: 72 MMHG

## 2019-10-30 DIAGNOSIS — Z23 NEED FOR INFLUENZA VACCINATION: ICD-10-CM

## 2019-10-30 DIAGNOSIS — I10 ESSENTIAL HYPERTENSION, BENIGN: ICD-10-CM

## 2019-10-30 DIAGNOSIS — E78.2 MIXED HYPERLIPIDEMIA: ICD-10-CM

## 2019-10-30 DIAGNOSIS — E11.9 TYPE 2 DIABETES MELLITUS WITHOUT COMPLICATION, WITHOUT LONG-TERM CURRENT USE OF INSULIN (HCC): Primary | ICD-10-CM

## 2019-10-30 DIAGNOSIS — M1A.09X0 IDIOPATHIC CHRONIC GOUT OF MULTIPLE SITES WITHOUT TOPHUS: ICD-10-CM

## 2019-10-30 PROCEDURE — 4040F PNEUMOC VAC/ADMIN/RCVD: CPT | Performed by: FAMILY MEDICINE

## 2019-10-30 PROCEDURE — G8484 FLU IMMUNIZE NO ADMIN: HCPCS | Performed by: FAMILY MEDICINE

## 2019-10-30 PROCEDURE — 90686 IIV4 VACC NO PRSV 0.5 ML IM: CPT | Performed by: FAMILY MEDICINE

## 2019-10-30 PROCEDURE — 1036F TOBACCO NON-USER: CPT | Performed by: FAMILY MEDICINE

## 2019-10-30 PROCEDURE — 1090F PRES/ABSN URINE INCON ASSESS: CPT | Performed by: FAMILY MEDICINE

## 2019-10-30 PROCEDURE — G8427 DOCREV CUR MEDS BY ELIG CLIN: HCPCS | Performed by: FAMILY MEDICINE

## 2019-10-30 PROCEDURE — G8417 CALC BMI ABV UP PARAM F/U: HCPCS | Performed by: FAMILY MEDICINE

## 2019-10-30 PROCEDURE — G8399 PT W/DXA RESULTS DOCUMENT: HCPCS | Performed by: FAMILY MEDICINE

## 2019-10-30 PROCEDURE — 99214 OFFICE O/P EST MOD 30 MIN: CPT | Performed by: FAMILY MEDICINE

## 2019-10-30 PROCEDURE — 1123F ACP DISCUSS/DSCN MKR DOCD: CPT | Performed by: FAMILY MEDICINE

## 2019-10-30 PROCEDURE — G0008 ADMIN INFLUENZA VIRUS VAC: HCPCS | Performed by: FAMILY MEDICINE

## 2019-10-30 RX ORDER — METOPROLOL TARTRATE 50 MG/1
50 TABLET, FILM COATED ORAL 2 TIMES DAILY
Qty: 180 TABLET | Refills: 1 | Status: SHIPPED | OUTPATIENT
Start: 2019-10-30 | End: 2020-02-05 | Stop reason: SDUPTHER

## 2019-10-30 RX ORDER — GLIPIZIDE 5 MG/1
TABLET ORAL
Qty: 90 TABLET | Refills: 1 | Status: SHIPPED | OUTPATIENT
Start: 2019-10-30 | End: 2020-04-30 | Stop reason: SDUPTHER

## 2019-10-30 RX ORDER — ALLOPURINOL 100 MG/1
TABLET ORAL
Qty: 90 TABLET | Refills: 1 | Status: SHIPPED | OUTPATIENT
Start: 2019-10-30 | End: 2020-04-30 | Stop reason: SDUPTHER

## 2019-10-30 RX ORDER — SIMVASTATIN 20 MG
TABLET ORAL
Qty: 90 TABLET | Refills: 1 | Status: SHIPPED | OUTPATIENT
Start: 2019-10-30 | End: 2020-04-30 | Stop reason: SDUPTHER

## 2019-10-30 RX ORDER — LOSARTAN POTASSIUM 100 MG/1
TABLET ORAL
Qty: 90 TABLET | Refills: 1 | Status: SHIPPED | OUTPATIENT
Start: 2019-10-30 | End: 2020-04-30 | Stop reason: SDUPTHER

## 2019-10-30 RX ORDER — FUROSEMIDE 80 MG
TABLET ORAL
Qty: 90 TABLET | Refills: 1 | Status: SHIPPED | OUTPATIENT
Start: 2019-10-30 | End: 2020-04-30 | Stop reason: SDUPTHER

## 2019-10-30 ASSESSMENT — ENCOUNTER SYMPTOMS
ABDOMINAL PAIN: 0
BLOOD IN STOOL: 0
NAUSEA: 0
FACIAL SWELLING: 0
BLURRED VISION: 0
VOMITING: 0
CONSTIPATION: 0
DIARRHEA: 0
SHORTNESS OF BREATH: 0
EYE REDNESS: 0
COUGH: 0
EYE DISCHARGE: 0

## 2020-02-05 RX ORDER — METOPROLOL TARTRATE 50 MG/1
50 TABLET, FILM COATED ORAL 2 TIMES DAILY
Qty: 60 TABLET | Refills: 0 | Status: SHIPPED | OUTPATIENT
Start: 2020-02-05 | End: 2020-04-30 | Stop reason: SDUPTHER

## 2020-02-05 NOTE — TELEPHONE ENCOUNTER
Patient is waiting on her mail order of Metoprolol. Can she have a 30 day sent to [facility]? Health Maintenance   Topic Date Due    DTaP/Tdap/Td vaccine (1 - Tdap) 10/14/1951    Hepatitis B vaccine (1 of 3 - Risk 3-dose series) 10/14/1959    Shingles Vaccine (1 of 2) 10/14/1990    Annual Wellness Visit (AWV)  05/29/2019    Lipid screen  10/29/2020    Potassium monitoring  10/29/2020    Creatinine monitoring  10/29/2020    DEXA (modify frequency per FRAX score)  Completed    Flu vaccine  Completed    Pneumococcal 65+ years Vaccine  Completed    Hepatitis A vaccine  Aged Out    Hib vaccine  Aged Out    Meningococcal (ACWY) vaccine  Aged Out             (applicable per patient's age: Cancer Screenings, Depression Screening, Fall Risk Screening, Immunizations)    Hemoglobin A1C (%)   Date Value   10/29/2019 6.1 (H)   04/29/2019 6.0 (H)   10/24/2018 5.5     Microalb/Crt.  Ratio (mcg/mg creat)   Date Value   04/24/2017 21     LDL Cholesterol (mg/dL)   Date Value   10/29/2019 48     AST (U/L)   Date Value   10/29/2019 25     ALT (U/L)   Date Value   10/29/2019 20     BUN (mg/dL)   Date Value   10/29/2019 14      (goal A1C is < 7)   (goal LDL is <100) need 30-50% reduction from baseline     BP Readings from Last 3 Encounters:   10/30/19 116/72   08/13/19 134/78   07/30/19 (!) 142/80    (goal /80)      All Future Testing planned in [facility]:  Lab Frequency Next Occurrence   Comprehensive Metabolic Panel Once 95/93/2593   Hemoglobin A1C Once 04/30/2020   Lipid Panel Once 04/30/2020   Microalbumin, Ur Once 04/30/2020       Next Visit Date:  Future Appointments   Date Time Provider Eriberto Baird   4/30/2020  8:00 AM MD [facility]            Patient Active Problem List:     Essential hypertension, benign     Type 2 diabetes mellitus without complication ([address]     Mixed hyperlipidemia     Wart     Benign skin lesion of forearm     Lymphedema     Acute drug-induced gout of

## 2020-04-02 RX ORDER — LANCETS 33 GAUGE
EACH MISCELLANEOUS
Qty: 100 EACH | Refills: 5 | Status: SHIPPED | OUTPATIENT
Start: 2020-04-02 | End: 2021-05-07

## 2020-04-30 ENCOUNTER — OFFICE VISIT (OUTPATIENT)
Dept: FAMILY MEDICINE CLINIC | Age: 80
End: 2020-04-30
Payer: MEDICARE

## 2020-04-30 ENCOUNTER — HOSPITAL ENCOUNTER (OUTPATIENT)
Age: 80
Discharge: HOME | End: 2020-04-30
Payer: MEDICARE

## 2020-04-30 VITALS
SYSTOLIC BLOOD PRESSURE: 120 MMHG | DIASTOLIC BLOOD PRESSURE: 70 MMHG | HEART RATE: 120 BPM | WEIGHT: 264 LBS | OXYGEN SATURATION: 96 % | BODY MASS INDEX: 51.56 KG/M2

## 2020-04-30 PROBLEM — E66.01 MORBID OBESITY WITH BMI OF 50.0-59.9, ADULT (HCC): Status: ACTIVE | Noted: 2020-04-30

## 2020-04-30 LAB
ALBUMIN SERPL-MCNC: 3.5 G/DL (ref 3.5–5.2)
ALBUMIN/GLOBULIN RATIO: ABNORMAL (ref 1–2.5)
ALP BLD-CCNC: 174 U/L (ref 35–104)
ALT SERPL-CCNC: 15 U/L (ref 5–33)
ANION GAP SERPL CALCULATED.3IONS-SCNC: 11 MMOL/L (ref 9–17)
AST SERPL-CCNC: 18 U/L
BILIRUB SERPL-MCNC: 0.52 MG/DL (ref 0.3–1.2)
BUN BLDV-MCNC: 22 MG/DL (ref 8–23)
BUN/CREAT BLD: 22 (ref 9–20)
CALCIUM SERPL-MCNC: 10.1 MG/DL (ref 8.6–10.4)
CHLORIDE BLD-SCNC: 99 MMOL/L (ref 98–107)
CHOLESTEROL/HDL RATIO: 2.4
CHOLESTEROL: 159 MG/DL
CO2: 27 MMOL/L (ref 20–31)
CREAT SERPL-MCNC: 1 MG/DL (ref 0.5–0.9)
CREATININE URINE POCT: 100
ESTIMATED AVERAGE GLUCOSE: 123 MG/DL
GFR AFRICAN AMERICAN: >60 ML/MIN
GFR NON-AFRICAN AMERICAN: 53 ML/MIN
GFR SERPL CREATININE-BSD FRML MDRD: ABNORMAL ML/MIN/{1.73_M2}
GFR SERPL CREATININE-BSD FRML MDRD: ABNORMAL ML/MIN/{1.73_M2}
GLUCOSE BLD-MCNC: 157 MG/DL (ref 70–99)
HBA1C MFR BLD: 5.9 % (ref 4.8–5.9)
HDLC SERPL-MCNC: 65 MG/DL
LDL CHOLESTEROL: 74 MG/DL (ref 0–130)
MICROALBUMIN/CREAT 24H UR: NORMAL MG/G{CREAT}
MICROALBUMIN/CREAT UR-RTO: >300
PATIENT FASTING?: YES
POTASSIUM SERPL-SCNC: 4.1 MMOL/L (ref 3.7–5.3)
SODIUM BLD-SCNC: 137 MMOL/L (ref 135–144)
TOTAL PROTEIN: 9 G/DL (ref 6.4–8.3)
TRIGL SERPL-MCNC: 99 MG/DL
VLDLC SERPL CALC-MCNC: NORMAL MG/DL (ref 1–30)

## 2020-04-30 PROCEDURE — G8399 PT W/DXA RESULTS DOCUMENT: HCPCS | Performed by: FAMILY MEDICINE

## 2020-04-30 PROCEDURE — 1090F PRES/ABSN URINE INCON ASSESS: CPT | Performed by: FAMILY MEDICINE

## 2020-04-30 PROCEDURE — G8510 SCR DEP NEG, NO PLAN REQD: HCPCS | Performed by: FAMILY MEDICINE

## 2020-04-30 PROCEDURE — 1123F ACP DISCUSS/DSCN MKR DOCD: CPT | Performed by: FAMILY MEDICINE

## 2020-04-30 PROCEDURE — 82044 UR ALBUMIN SEMIQUANTITATIVE: CPT | Performed by: FAMILY MEDICINE

## 2020-04-30 PROCEDURE — 80061 LIPID PANEL: CPT

## 2020-04-30 PROCEDURE — 36415 COLL VENOUS BLD VENIPUNCTURE: CPT

## 2020-04-30 PROCEDURE — G8427 DOCREV CUR MEDS BY ELIG CLIN: HCPCS | Performed by: FAMILY MEDICINE

## 2020-04-30 PROCEDURE — G8417 CALC BMI ABV UP PARAM F/U: HCPCS | Performed by: FAMILY MEDICINE

## 2020-04-30 PROCEDURE — 80053 COMPREHEN METABOLIC PANEL: CPT

## 2020-04-30 PROCEDURE — 99214 OFFICE O/P EST MOD 30 MIN: CPT | Performed by: FAMILY MEDICINE

## 2020-04-30 PROCEDURE — 4040F PNEUMOC VAC/ADMIN/RCVD: CPT | Performed by: FAMILY MEDICINE

## 2020-04-30 PROCEDURE — 83036 HEMOGLOBIN GLYCOSYLATED A1C: CPT

## 2020-04-30 PROCEDURE — 1036F TOBACCO NON-USER: CPT | Performed by: FAMILY MEDICINE

## 2020-04-30 RX ORDER — FUROSEMIDE 80 MG
TABLET ORAL
Qty: 90 TABLET | Refills: 1 | Status: SHIPPED | OUTPATIENT
Start: 2020-04-30 | End: 2020-10-29 | Stop reason: SDUPTHER

## 2020-04-30 RX ORDER — SIMVASTATIN 20 MG
TABLET ORAL
Qty: 90 TABLET | Refills: 1 | Status: SHIPPED | OUTPATIENT
Start: 2020-04-30 | End: 2020-10-29 | Stop reason: SDUPTHER

## 2020-04-30 RX ORDER — ALLOPURINOL 100 MG/1
TABLET ORAL
Qty: 90 TABLET | Refills: 1 | Status: SHIPPED | OUTPATIENT
Start: 2020-04-30 | End: 2020-10-29 | Stop reason: SDUPTHER

## 2020-04-30 RX ORDER — METOPROLOL TARTRATE 50 MG/1
50 TABLET, FILM COATED ORAL 2 TIMES DAILY
Qty: 180 TABLET | Refills: 1 | Status: SHIPPED | OUTPATIENT
Start: 2020-04-30 | End: 2020-10-29 | Stop reason: SDUPTHER

## 2020-04-30 RX ORDER — LOSARTAN POTASSIUM 100 MG/1
TABLET ORAL
Qty: 90 TABLET | Refills: 1 | Status: SHIPPED | OUTPATIENT
Start: 2020-04-30 | End: 2020-10-29 | Stop reason: SDUPTHER

## 2020-04-30 RX ORDER — GLIPIZIDE 5 MG/1
TABLET ORAL
Qty: 90 TABLET | Refills: 1 | Status: SHIPPED | OUTPATIENT
Start: 2020-04-30 | End: 2020-10-29 | Stop reason: SDUPTHER

## 2020-04-30 ASSESSMENT — ENCOUNTER SYMPTOMS
NAUSEA: 0
BLURRED VISION: 0
EYE DISCHARGE: 0
COUGH: 0
SHORTNESS OF BREATH: 0
DIARRHEA: 0
BLOOD IN STOOL: 0
VOMITING: 0
CONSTIPATION: 0
ABDOMINAL PAIN: 0
EYE REDNESS: 0
FACIAL SWELLING: 0
VISUAL CHANGE: 0

## 2020-04-30 ASSESSMENT — PATIENT HEALTH QUESTIONNAIRE - PHQ9
SUM OF ALL RESPONSES TO PHQ QUESTIONS 1-9: 0
2. FEELING DOWN, DEPRESSED OR HOPELESS: 0
SUM OF ALL RESPONSES TO PHQ QUESTIONS 1-9: 0
1. LITTLE INTEREST OR PLEASURE IN DOING THINGS: 0
SUM OF ALL RESPONSES TO PHQ9 QUESTIONS 1 & 2: 0

## 2020-04-30 NOTE — PROGRESS NOTES
VISIT,PROCEDURE ONLY Right 7/19/2018    RIGHT HIP MASS INCISION AND DRAINAGE performed by Beto Amos MD at [address] [facility] OFFICE/OUTPT VISIT,PROCEDURE ONLY Right 8/1/2018    HIP INCISION AND DRAINAGE (Necrotic Fat Right Hip Debridement) performed by Beto Amos MD at [facility] OR        Medications:       Prior to Admission medications    Medication Sig Start Date End Date Taking? Authorizing Provider   allopurinol (ZYLOPRIM) 100 MG tablet TAKE 1 TABLET EVERY DAY 4/30/20  Yes Idalia Mas MD   furosemide (LASIX) 80 MG tablet TAKE 1 TABLET EVERY DAY 4/30/20  Yes Idalia Mas MD   glipiZIDE (GLUCOTROL) 5 MG tablet TAKE 1 TABLET EVERY DAY 4/30/20  Yes Eudelia Ranch, MD   losartan (COZAAR) 100 MG tablet TAKE 1 TABLET EVERY DAY 4/30/20  Yes Idalia Mas MD   metFORMIN (GLUCOPHAGE) 500 MG tablet Take 1 tablet by mouth 2 times daily (with meals) 4/30/20  Yes Idalia Mas MD   metoprolol tartrate (LOPRESSOR) 50 MG tablet Take 1 tablet by mouth 2 times daily 4/30/20  Yes Idalia Mas MD   simvastatin (ZOCOR) 20 MG tablet TAKE 1 TABLET EVERY NIGHT 4/30/20  Yes Idalia Mas MD   raloxifene (EVISTA) 60 MG tablet Take 1 tablet by mouth daily 9/3/19  Yes Henry Eagle MD   Calcium Carb-Cholecalciferol (CALCIUM 600 + D PO) Take by mouth daily   Yes Historical Provider, MD   aspirin 325 MG tablet Take 325 mg by mouth daily   Yes Historical Provider, MD   blood glucose test strips (ONE TOUCH ULTRA TEST) strip Test blood sugar once daily and as needed. 4/2/20   Idalia Msa MD   [facility] PLUS [id number]) MISC Testing blood sugar once daily and as needed, uses OneTouch UltraMini 4/2/20   Idalia Mas MD   nystatin (MYCOSTATIN) 515933 UNIT/GM powder Apply to skin fold areas small amount once a week  Patient not taking: Reported on 10/30/2019 6/22/17   Lito Dy, APRN - CNP   Glucose Blood (CHOICE DM FORA G20 TEST STRIPS VI) by In Vitro route.     Historical Provider, MD

## 2020-07-16 ENCOUNTER — APPOINTMENT (OUTPATIENT)
Dept: GENERAL RADIOLOGY | Age: 80
End: 2020-07-16
Payer: MEDICARE

## 2020-07-16 ENCOUNTER — HOSPITAL ENCOUNTER (EMERGENCY)
Age: 80
Discharge: HOME | End: 2020-07-16
Attending: FAMILY MEDICINE
Payer: MEDICARE

## 2020-07-16 ENCOUNTER — TELEPHONE (OUTPATIENT)
Dept: FAMILY MEDICINE CLINIC | Age: 80
End: 2020-07-16

## 2020-07-16 VITALS
TEMPERATURE: 98.8 F | RESPIRATION RATE: 18 BRPM | DIASTOLIC BLOOD PRESSURE: 77 MMHG | HEIGHT: 60 IN | OXYGEN SATURATION: 100 % | WEIGHT: 260 LBS | SYSTOLIC BLOOD PRESSURE: 134 MMHG | HEART RATE: 76 BPM | BODY MASS INDEX: 51.04 KG/M2

## 2020-07-16 LAB
ABSOLUTE EOS #: 0 K/UL (ref 0–0.4)
ABSOLUTE IMMATURE GRANULOCYTE: ABNORMAL K/UL (ref 0–0.3)
ABSOLUTE LYMPH #: 1.4 K/UL (ref 1–4.8)
ABSOLUTE MONO #: 0.5 K/UL (ref 0–1)
ANION GAP SERPL CALCULATED.3IONS-SCNC: 9 MMOL/L (ref 9–17)
BASOPHILS # BLD: 0 % (ref 0–2)
BASOPHILS ABSOLUTE: 0 K/UL (ref 0–0.2)
BUN BLDV-MCNC: 20 MG/DL (ref 8–23)
BUN/CREAT BLD: 20 (ref 9–20)
CALCIUM SERPL-MCNC: 10.2 MG/DL (ref 8.6–10.4)
CHLORIDE BLD-SCNC: 102 MMOL/L (ref 98–107)
CO2: 30 MMOL/L (ref 20–31)
CREAT SERPL-MCNC: 1.02 MG/DL (ref 0.5–0.9)
DIFFERENTIAL TYPE: YES
EOSINOPHILS RELATIVE PERCENT: 1 % (ref 0–5)
GFR AFRICAN AMERICAN: >60 ML/MIN
GFR NON-AFRICAN AMERICAN: 52 ML/MIN
GFR SERPL CREATININE-BSD FRML MDRD: ABNORMAL ML/MIN/{1.73_M2}
GFR SERPL CREATININE-BSD FRML MDRD: ABNORMAL ML/MIN/{1.73_M2}
GLUCOSE BLD-MCNC: 141 MG/DL (ref 70–99)
HCT VFR BLD CALC: 33.7 % (ref 36–46)
HEMOGLOBIN: 11.5 G/DL (ref 12–16)
IMMATURE GRANULOCYTES: ABNORMAL %
LYMPHOCYTES # BLD: 28 % (ref 15–40)
MCH RBC QN AUTO: 29.6 PG (ref 26–34)
MCHC RBC AUTO-ENTMCNC: 34.1 G/DL (ref 31–37)
MCV RBC AUTO: 86.8 FL (ref 80–100)
MONOCYTES # BLD: 10 % (ref 4–8)
NRBC AUTOMATED: ABNORMAL PER 100 WBC
PDW BLD-RTO: 14.9 % (ref 12.1–15.2)
PLATELET # BLD: 178 K/UL (ref 140–450)
PLATELET ESTIMATE: ABNORMAL
PMV BLD AUTO: ABNORMAL FL (ref 6–12)
POTASSIUM SERPL-SCNC: 4.4 MMOL/L (ref 3.7–5.3)
RBC # BLD: 3.89 M/UL (ref 4–5.2)
RBC # BLD: ABNORMAL 10*6/UL
SEG NEUTROPHILS: 61 % (ref 47–75)
SEGMENTED NEUTROPHILS ABSOLUTE COUNT: 3.2 K/UL (ref 2.5–7)
SODIUM BLD-SCNC: 141 MMOL/L (ref 135–144)
URIC ACID: 5.2 MG/DL (ref 2.4–5.7)
WBC # BLD: 5.3 K/UL (ref 3.5–11)
WBC # BLD: ABNORMAL 10*3/UL

## 2020-07-16 PROCEDURE — 80048 BASIC METABOLIC PNL TOTAL CA: CPT

## 2020-07-16 PROCEDURE — 99284 EMERGENCY DEPT VISIT MOD MDM: CPT

## 2020-07-16 PROCEDURE — 36415 COLL VENOUS BLD VENIPUNCTURE: CPT

## 2020-07-16 PROCEDURE — 6360000002 HC RX W HCPCS: Performed by: FAMILY MEDICINE

## 2020-07-16 PROCEDURE — 84550 ASSAY OF BLOOD/URIC ACID: CPT

## 2020-07-16 PROCEDURE — 85025 COMPLETE CBC W/AUTO DIFF WBC: CPT

## 2020-07-16 PROCEDURE — 73564 X-RAY EXAM KNEE 4 OR MORE: CPT

## 2020-07-16 PROCEDURE — 96372 THER/PROPH/DIAG INJ SC/IM: CPT

## 2020-07-16 PROCEDURE — 73610 X-RAY EXAM OF ANKLE: CPT

## 2020-07-16 RX ORDER — KETOROLAC TROMETHAMINE 30 MG/ML
15 INJECTION, SOLUTION INTRAMUSCULAR; INTRAVENOUS ONCE
Status: COMPLETED | OUTPATIENT
Start: 2020-07-16 | End: 2020-07-16

## 2020-07-16 RX ADMIN — KETOROLAC TROMETHAMINE 15 MG: 30 INJECTION, SOLUTION INTRAMUSCULAR; INTRAVENOUS at 10:59

## 2020-07-16 ASSESSMENT — PAIN SCALES - GENERAL
PAINLEVEL_OUTOF10: 10
PAINLEVEL_OUTOF10: 10
PAINLEVEL_OUTOF10: 0

## 2020-07-16 ASSESSMENT — PAIN DESCRIPTION - PAIN TYPE: TYPE: ACUTE PAIN

## 2020-07-16 ASSESSMENT — PAIN DESCRIPTION - ORIENTATION: ORIENTATION: LEFT

## 2020-07-16 ASSESSMENT — PAIN DESCRIPTION - LOCATION: LOCATION: KNEE;FOOT

## 2020-07-16 NOTE — TELEPHONE ENCOUNTER
Since pt having difficulty getting out of her own home would recommend pt to call [facility] and go to ER . She may need xray, labs to ck for uric acid and look for signs of infection or a blood clot.

## 2020-07-16 NOTE — TELEPHONE ENCOUNTER
Pt called in complaining of pain behind her Lt knee. States she woke up last night to use the restroom and was not able to put weight on it, denies it being warm or cold, no pain when sitting down. Did state she gets gout in this leg. Offered appt with ashutosh today but pt states that she is not able to walk to get out of the house. Please advise. Health Maintenance   Topic Date Due    DTaP/Tdap/Td vaccine (1 - Tdap) 10/14/1959    Shingles Vaccine (1 of 2) 10/14/1990    Annual Wellness Visit (AWV)  05/29/2019    Flu vaccine (1) 09/01/2020    Lipid screen  04/30/2021    Potassium monitoring  04/30/2021    Creatinine monitoring  04/30/2021    DEXA (modify frequency per FRAX score)  Completed    Pneumococcal 65+ years Vaccine  Completed    Hepatitis A vaccine  Aged Out    Hib vaccine  Aged Out    Meningococcal (ACWY) vaccine  Aged Out             (applicable per patient's age: Cancer Screenings, Depression Screening, Fall Risk Screening, Immunizations)    Hemoglobin A1C (%)   Date Value   04/30/2020 5.9   10/29/2019 6.1 (H)   04/29/2019 6.0 (H)     Microalb/Crt.  Ratio (mcg/mg creat)   Date Value   04/24/2017 21     LDL Cholesterol (mg/dL)   Date Value   04/30/2020 74     AST (U/L)   Date Value   04/30/2020 18     ALT (U/L)   Date Value   04/30/2020 15     BUN (mg/dL)   Date Value   04/30/2020 22      (goal A1C is < 7)   (goal LDL is <100) need 30-50% reduction from baseline     BP Readings from Last 3 Encounters:   04/30/20 120/70   10/30/19 116/72   08/13/19 134/78    (goal /80)      All Future Testing planned in [facility]:  Lab Frequency Next Occurrence   Lipid Panel Once 10/30/2020   Comprehensive Metabolic Panel Once 34/16/5428   Hemoglobin A1C Once 10/30/2020       Next Visit Date:  Future Appointments   Date Time Provider Eriberto Baird   10/29/2020  8:00 AM Heber Starr MD [facility]            Patient Active Problem List:     Essential hypertension, benign     Type 2 diabetes mellitus without complication (HCC)     Mixed hyperlipidemia     Wart     Benign skin lesion of forearm     Lymphedema     Acute drug-induced gout of left knee     Cutaneous abscess of buttock     Decubitus ulcer of right hip     PMB (postmenopausal bleeding)     Morbid obesity with BMI of 50.0-59.9, adult ([facility] [address]

## 2020-07-16 NOTE — ED PROVIDER NOTES
eMERGENCY dEPARTMENT eNCOUnter        [address] [facility]    Chief Complaint   Patient presents with    Knee Pain     Pt has been unable to put pressure on her left knee and foot to walk. Pt has hx of gout. HPI    Jeanette Busch is a 78 y.o. female who presentswith left knee pain which is severe and present for 1 week. It hurts to bear weight. She thinks her left knee has gout. History of prior right knee replacement. No recent trauma to her left knee or ankle. Increased difficulty walking over the last 2 days. Large body habitus. Some increase in swelling. No skin infection in the legs. REVIEW OF SYSTEMS    All body systems reviewed. Pertinent positive and negative findings are mentioned in the HPI.     PAST MEDICAL HISTORY    Past Medical History:   Diagnosis Date    Cancer ([facility] [address]     basal cell skin cancer    Hyperlipidemia     Hypertension     Lymphedema of lower extremity     Obesity     Shingles     70's    Type II or unspecified type diabetes mellitus without mention of complication, not stated as uncontrolled     Venous stasis dermatitis        SURGICAL HISTORY    Past Surgical History:   Procedure Laterality Date    APPENDECTOMY      COLONOSCOPY      DILATION AND CURETTAGE OF UTERUS      JOINT REPLACEMENT      Rt knee    [place] OFFICE/OUTPT VISIT,PROCEDURE ONLY Right 7/19/2018    RIGHT HIP MASS INCISION AND DRAINAGE performed by Li Kamara MD at [address] [facility] OFFICE/OUTPT VISIT,PROCEDURE ONLY Right 8/1/2018    HIP INCISION AND DRAINAGE (Necrotic Fat Right Hip Debridement) performed by Li Kamara MD at [place] [address]    Current Outpatient Rx   Medication Sig Dispense Refill    allopurinol (ZYLOPRIM) 100 MG tablet TAKE 1 TABLET EVERY DAY 90 tablet 1    furosemide (LASIX) 80 MG tablet TAKE 1 TABLET EVERY DAY 90 tablet 1    glipiZIDE (GLUCOTROL) 5 MG tablet TAKE 1 TABLET EVERY DAY 90 tablet 1    losartan (COZAAR) 100 MG tablet TAKE 1 TABLET EVERY DAY 90 tablet 1    metFORMIN (GLUCOPHAGE) 500 MG tablet Take 1 tablet by mouth 2 times daily (with meals) 180 tablet 1    metoprolol tartrate (LOPRESSOR) 50 MG tablet Take 1 tablet by mouth 2 times daily 180 tablet 1    simvastatin (ZOCOR) 20 MG tablet TAKE 1 TABLET EVERY NIGHT 90 tablet 1    raloxifene (EVISTA) 60 MG tablet Take 1 tablet by mouth daily 30 tablet 12    Calcium Carb-Cholecalciferol (CALCIUM 600 + D PO) Take by mouth daily      aspirin 325 MG tablet Take 325 mg by mouth daily      blood glucose test strips (ONE TOUCH ULTRA TEST) strip Test blood sugar once daily and as needed. 100 strip 3    Lancets (ONETOUCH DELICA PLUS [id number]) MISC Testing blood sugar once daily and as needed, uses OneTouch UltraMini 100 each 5    nystatin (MYCOSTATIN) 440819 UNIT/GM powder Apply to skin fold areas small amount once a week (Patient not taking: Reported on 10/30/2019) 1 Bottle 0    Glucose Blood (CHOICE DM FORA G20 TEST STRIPS VI) by In Vitro route. ALLERGIES    Allergies   Allergen Reactions    Adhesive Tape      Takes her skin off uses paper tape.     Sulfa Antibiotics        FAMILY HISTORY    Family History   Problem Relation Age of Onset    Heart Disease Mother     Cancer Sister     Mult Sclerosis Sister     Cancer Brother         lung       SOCIAL HISTORY    Social History     Socioeconomic History    Marital status:      Spouse name: None    Number of children: None    Years of education: None    Highest education level: None   Occupational History    None   Social Needs    Financial resource strain: None    Food insecurity     Worry: None     Inability: None    Transportation needs     Medical: None     Non-medical: None   Tobacco Use    Smoking status: Never Smoker    Smokeless tobacco: Never Used   Substance and Sexual Activity    Alcohol use: No    Drug use: No    Sexual activity: None   Lifestyle    Physical activity     Days per week: None     Minutes per session: None    Stress: None   Relationships    Social connections     Talks on phone: None     Gets together: None     Attends Taoism service: None     Active member of club or organization: None     Attends meetings of clubs or organizations: None     Relationship status: None    Intimate partner violence     Fear of current or ex partner: None     Emotionally abused: None     Physically abused: None     Forced sexual activity: None   Other Topics Concern    None   Social History Narrative    None       PHYSICAL EXAM    VITAL SIGNS: /77   Pulse 76   Temp 98.8 °F (37.1 °C) (Oral)   Resp 18   Ht 5' (1.524 m)   Wt 260 lb (117.9 kg)   SpO2 100%   BMI 50.78 kg/m²   Constitutional:  Well developed, well nourished, no acute distress, non-toxic appearance   HENT:  Atraumatic, external ears normal, nose normal, oropharynx moist.  Neck- normal range of motion, no tenderness, supple   Respiratory:  No respiratory distress, normal breath sounds. Cardiovascular:  Normal rate, normal rhythm, no murmurs, no gallops, no rubs. Neurovascular intact to feet. GI:  Soft, morbidly obese, active bowel sounds, nontender   Musculoskeletal: Left knee tenderness most prominent anterior. The patient has morbidly obese legs with edema. 225 in midcaqlf. No anasarca. Integument:  Well hydrated, significant leg edema without significant skin breakdown  Neurologic: Alert and oriented female. She has good movement of her extremities. There is leg pain which limits some movement. No foot drop. No gross motor deficit noted    RADIOLOGY/PROCEDURES    Left knee shows significant degenerative arthritis. Especially lateral compartment. The left ankle shows significant heel spur. No fracture or dislocation. ED COURSE & MEDICAL DECISION MAKING    Pertinent Labs & Imaging studies reviewed. (See chart for details)    Summation      Patient Course: 75-year-old female with recent flareup of her left knee pain.   She did

## 2020-09-08 RX ORDER — RALOXIFENE HYDROCHLORIDE 60 MG/1
TABLET, FILM COATED ORAL
Qty: 30 TABLET | Refills: 12 | Status: SHIPPED | OUTPATIENT
Start: 2020-09-08 | End: 2021-10-06

## 2020-09-16 ENCOUNTER — TELEPHONE (OUTPATIENT)
Dept: FAMILY MEDICINE CLINIC | Age: 80
End: 2020-09-16

## 2020-10-28 ENCOUNTER — HOSPITAL ENCOUNTER (OUTPATIENT)
Age: 80
Discharge: HOME | End: 2020-10-28
Payer: MEDICARE

## 2020-10-28 LAB
ALBUMIN SERPL-MCNC: 3.5 G/DL (ref 3.5–5.2)
ALBUMIN/GLOBULIN RATIO: ABNORMAL (ref 1–2.5)
ALP BLD-CCNC: 187 U/L (ref 35–104)
ALT SERPL-CCNC: 15 U/L (ref 5–33)
ANION GAP SERPL CALCULATED.3IONS-SCNC: 12 MMOL/L (ref 9–17)
AST SERPL-CCNC: 20 U/L
BILIRUB SERPL-MCNC: 0.51 MG/DL (ref 0.3–1.2)
BUN BLDV-MCNC: 16 MG/DL (ref 8–23)
BUN/CREAT BLD: 17 (ref 9–20)
CALCIUM SERPL-MCNC: 9.5 MG/DL (ref 8.6–10.4)
CHLORIDE BLD-SCNC: 99 MMOL/L (ref 98–107)
CHOLESTEROL/HDL RATIO: 2.3
CHOLESTEROL: 129 MG/DL
CO2: 25 MMOL/L (ref 20–31)
CREAT SERPL-MCNC: 0.94 MG/DL (ref 0.5–0.9)
ESTIMATED AVERAGE GLUCOSE: 154 MG/DL
GFR AFRICAN AMERICAN: >60 ML/MIN
GFR NON-AFRICAN AMERICAN: 57 ML/MIN
GFR SERPL CREATININE-BSD FRML MDRD: ABNORMAL ML/MIN/{1.73_M2}
GFR SERPL CREATININE-BSD FRML MDRD: ABNORMAL ML/MIN/{1.73_M2}
GLUCOSE BLD-MCNC: 214 MG/DL (ref 70–99)
HBA1C MFR BLD: 7 % (ref 4–6)
HDLC SERPL-MCNC: 56 MG/DL
LDL CHOLESTEROL: 53 MG/DL (ref 0–130)
PATIENT FASTING?: YES
POTASSIUM SERPL-SCNC: 3.9 MMOL/L (ref 3.7–5.3)
SODIUM BLD-SCNC: 136 MMOL/L (ref 135–144)
TOTAL PROTEIN: 8.8 G/DL (ref 6.4–8.3)
TRIGL SERPL-MCNC: 99 MG/DL
VLDLC SERPL CALC-MCNC: NORMAL MG/DL (ref 1–30)

## 2020-10-28 PROCEDURE — 80061 LIPID PANEL: CPT

## 2020-10-28 PROCEDURE — 36415 COLL VENOUS BLD VENIPUNCTURE: CPT

## 2020-10-28 PROCEDURE — 80053 COMPREHEN METABOLIC PANEL: CPT

## 2020-10-28 PROCEDURE — 83036 HEMOGLOBIN GLYCOSYLATED A1C: CPT

## 2020-10-29 ENCOUNTER — OFFICE VISIT (OUTPATIENT)
Dept: FAMILY MEDICINE CLINIC | Age: 80
End: 2020-10-29
Payer: MEDICARE

## 2020-10-29 VITALS
WEIGHT: 274 LBS | OXYGEN SATURATION: 98 % | BODY MASS INDEX: 53.79 KG/M2 | SYSTOLIC BLOOD PRESSURE: 134 MMHG | DIASTOLIC BLOOD PRESSURE: 78 MMHG | HEART RATE: 129 BPM | TEMPERATURE: 98.2 F | HEIGHT: 60 IN

## 2020-10-29 PROCEDURE — 1036F TOBACCO NON-USER: CPT | Performed by: FAMILY MEDICINE

## 2020-10-29 PROCEDURE — G8417 CALC BMI ABV UP PARAM F/U: HCPCS | Performed by: FAMILY MEDICINE

## 2020-10-29 PROCEDURE — 3051F HG A1C>EQUAL 7.0%<8.0%: CPT | Performed by: FAMILY MEDICINE

## 2020-10-29 PROCEDURE — G0008 ADMIN INFLUENZA VIRUS VAC: HCPCS | Performed by: FAMILY MEDICINE

## 2020-10-29 PROCEDURE — 1123F ACP DISCUSS/DSCN MKR DOCD: CPT | Performed by: FAMILY MEDICINE

## 2020-10-29 PROCEDURE — 4040F PNEUMOC VAC/ADMIN/RCVD: CPT | Performed by: FAMILY MEDICINE

## 2020-10-29 PROCEDURE — G8482 FLU IMMUNIZE ORDER/ADMIN: HCPCS | Performed by: FAMILY MEDICINE

## 2020-10-29 PROCEDURE — G8427 DOCREV CUR MEDS BY ELIG CLIN: HCPCS | Performed by: FAMILY MEDICINE

## 2020-10-29 PROCEDURE — G8399 PT W/DXA RESULTS DOCUMENT: HCPCS | Performed by: FAMILY MEDICINE

## 2020-10-29 PROCEDURE — 90686 IIV4 VACC NO PRSV 0.5 ML IM: CPT | Performed by: FAMILY MEDICINE

## 2020-10-29 PROCEDURE — 1090F PRES/ABSN URINE INCON ASSESS: CPT | Performed by: FAMILY MEDICINE

## 2020-10-29 PROCEDURE — 99214 OFFICE O/P EST MOD 30 MIN: CPT | Performed by: FAMILY MEDICINE

## 2020-10-29 RX ORDER — ALLOPURINOL 100 MG/1
TABLET ORAL
Qty: 90 TABLET | Refills: 1 | Status: SHIPPED | OUTPATIENT
Start: 2020-10-29 | End: 2021-04-28 | Stop reason: SDUPTHER

## 2020-10-29 RX ORDER — METOPROLOL TARTRATE 50 MG/1
50 TABLET, FILM COATED ORAL 2 TIMES DAILY
Qty: 180 TABLET | Refills: 1 | Status: SHIPPED | OUTPATIENT
Start: 2020-10-29 | End: 2021-04-28 | Stop reason: SDUPTHER

## 2020-10-29 RX ORDER — SIMVASTATIN 20 MG
TABLET ORAL
Qty: 90 TABLET | Refills: 1 | Status: SHIPPED | OUTPATIENT
Start: 2020-10-29 | End: 2021-04-28 | Stop reason: SDUPTHER

## 2020-10-29 RX ORDER — LOSARTAN POTASSIUM 100 MG/1
TABLET ORAL
Qty: 90 TABLET | Refills: 1 | Status: SHIPPED | OUTPATIENT
Start: 2020-10-29 | End: 2021-04-28 | Stop reason: SDUPTHER

## 2020-10-29 RX ORDER — FUROSEMIDE 80 MG
TABLET ORAL
Qty: 90 TABLET | Refills: 1 | Status: SHIPPED | OUTPATIENT
Start: 2020-10-29 | End: 2021-04-28 | Stop reason: SDUPTHER

## 2020-10-29 RX ORDER — GLIPIZIDE 5 MG/1
TABLET ORAL
Qty: 90 TABLET | Refills: 1 | Status: SHIPPED | OUTPATIENT
Start: 2020-10-29 | End: 2020-12-10 | Stop reason: DRUGHIGH

## 2020-10-29 SDOH — ECONOMIC STABILITY: FOOD INSECURITY: WITHIN THE PAST 12 MONTHS, THE FOOD YOU BOUGHT JUST DIDN'T LAST AND YOU DIDN'T HAVE MONEY TO GET MORE.: NEVER TRUE

## 2020-10-29 SDOH — ECONOMIC STABILITY: FOOD INSECURITY: WITHIN THE PAST 12 MONTHS, YOU WORRIED THAT YOUR FOOD WOULD RUN OUT BEFORE YOU GOT MONEY TO BUY MORE.: NEVER TRUE

## 2020-10-29 SDOH — ECONOMIC STABILITY: INCOME INSECURITY: HOW HARD IS IT FOR YOU TO PAY FOR THE VERY BASICS LIKE FOOD, HOUSING, MEDICAL CARE, AND HEATING?: NOT HARD AT ALL

## 2020-10-29 SDOH — ECONOMIC STABILITY: TRANSPORTATION INSECURITY
IN THE PAST 12 MONTHS, HAS THE LACK OF TRANSPORTATION KEPT YOU FROM MEDICAL APPOINTMENTS OR FROM GETTING MEDICATIONS?: NO

## 2020-10-29 SDOH — ECONOMIC STABILITY: TRANSPORTATION INSECURITY
IN THE PAST 12 MONTHS, HAS LACK OF TRANSPORTATION KEPT YOU FROM MEETINGS, WORK, OR FROM GETTING THINGS NEEDED FOR DAILY LIVING?: NO

## 2020-10-29 ASSESSMENT — ENCOUNTER SYMPTOMS
VOMITING: 0
NAUSEA: 0
ABDOMINAL PAIN: 0
CONSTIPATION: 0
DIARRHEA: 0
EYE DISCHARGE: 0
BLOOD IN STOOL: 0
EYE REDNESS: 0
SHORTNESS OF BREATH: 0
COUGH: 0
BLURRED VISION: 0
FACIAL SWELLING: 0

## 2020-10-29 NOTE — PROGRESS NOTES
HPI Notes    Name: Bartolo Wright  : 1940        Chief Complaint:     Chief Complaint   Patient presents with    Check-Up     htn, dm, hld       History of Present Illness:     Bartolo Wright is a [de-identified] y.o.  female who presents with Check-Up (htn, dm, hld)      Diabetes   She presents for her follow-up diabetic visit. She has type 2 diabetes mellitus. Her disease course has been stable. There are no hypoglycemic associated symptoms. Pertinent negatives for hypoglycemia include no confusion, dizziness or headaches. There are no diabetic associated symptoms. Pertinent negatives for diabetes include no blurred vision, no chest pain and no fatigue. There are no hypoglycemic complications. Risk factors for coronary artery disease include diabetes mellitus, dyslipidemia, hypertension, post-menopausal and obesity. Current diabetic treatment includes oral agent (dual therapy). She is compliant with treatment most of the time. Her weight is increasing steadily (Pt is not acitve with COVID as she is Not going to the [employer]. ). She rarely participates in exercise. Her home blood glucose trend is increasing steadily (pt has not been active at all since COVID. Pt used to go to [employer] so now just sits at home. ). An ACE inhibitor/angiotensin II receptor blocker is being taken. Eye exam is current. Hypertension   This is a chronic problem. The current episode started more than 1 year ago. The problem is unchanged. The problem is controlled. Pertinent negatives include no blurred vision, chest pain, headaches, neck pain, palpitations, peripheral edema or shortness of breath. There are no associated agents to hypertension. Risk factors for coronary artery disease include diabetes mellitus, dyslipidemia, obesity and post-menopausal state. The current treatment provides significant improvement. Hyperlipidemia   This is a chronic problem. The current episode started more than 1 year ago.  The problem is controlled. Recent lipid tests were reviewed and are normal. Exacerbating diseases include diabetes. She has no history of hypothyroidism. Pertinent negatives include no chest pain or shortness of breath. Current antihyperlipidemic treatment includes statins. The current treatment provides significant improvement of lipids. Risk factors for coronary artery disease include dyslipidemia, diabetes mellitus, hypertension and obesity. Lt leg wound-  Pt has had a Lt scab fell off the Lt lower and outer leg. Pt has had this for months. Pt \"can't get it to heal\". It bleeds when you take band aid off and pt only been covering with bandaid with gauze and peroxide. Past Medical History:     Past Medical History:   Diagnosis Date    Cancer [address] [address]     basal cell skin cancer    Hyperlipidemia     Hypertension     Lymphedema of lower extremity     Obesity     Shingles     70's    Type II or unspecified type diabetes mellitus without mention of complication, not stated as uncontrolled     Venous stasis dermatitis       Reviewed all health maintenance requirements and ordered appropriate tests  Health Maintenance Due   Topic Date Due    DTaP/Tdap/Td vaccine (1 - Tdap) 10/14/1959    Shingles Vaccine (1 of 2) 10/14/1990    Annual Wellness Visit (AWV)  05/29/2019    Flu vaccine (1) 09/01/2020       Past Surgical History:     Past Surgical History:   Procedure Laterality Date    APPENDECTOMY      COLONOSCOPY      DILATION AND CURETTAGE OF UTERUS      JOINT REPLACEMENT      Rt knee    [place] OFFICE/OUTPT VISIT,PROCEDURE ONLY Right 7/19/2018    RIGHT HIP MASS INCISION AND DRAINAGE performed by Kelli Ashley MD at [address] [facility] OFFICE/OUTPT VISIT,PROCEDURE ONLY Right 8/1/2018    HIP INCISION AND DRAINAGE (Necrotic Fat Right Hip Debridement) performed by Kelli Ashley MD at [facility] OR        Medications:       Prior to Admission medications    Medication Sig Start Date End Date Taking?  Authorizing Provider raloxifene (EVISTA) 60 MG tablet TAKE 1 TABLET BY MOUTH EVERY DAY 9/8/20  Yes Laurita De La Torre MD   allopurinol (ZYLOPRIM) 100 MG tablet TAKE 1 TABLET EVERY DAY 4/30/20  Yes Marino Alexander MD   furosemide (LASIX) 80 MG tablet TAKE 1 TABLET EVERY DAY 4/30/20  Yes Marino Alexander MD   glipiZIDE (GLUCOTROL) 5 MG tablet TAKE 1 TABLET EVERY DAY 4/30/20  Yes Marino Alexander MD   losartan (COZAAR) 100 MG tablet TAKE 1 TABLET EVERY DAY 4/30/20  Yes Marino Alexander MD   metFORMIN (GLUCOPHAGE) 500 MG tablet Take 1 tablet by mouth 2 times daily (with meals) 4/30/20  Yes Marino Alexander MD   metoprolol tartrate (LOPRESSOR) 50 MG tablet Take 1 tablet by mouth 2 times daily 4/30/20  Yes Marino Alexander MD   simvastatin (ZOCOR) 20 MG tablet TAKE 1 TABLET EVERY NIGHT 4/30/20  Yes Marino Alexander MD   blood glucose test strips (ONE TOUCH ULTRA TEST) strip Test blood sugar once daily and as needed. 4/2/20  Yes Marino Alexander MD   [facility] PLUS [id number]) MISC Testing blood sugar once daily and as needed, uses OneTouch UltraMini 4/2/20  Yes Marino Alexander MD   Calcium Carb-Cholecalciferol (CALCIUM 600 + D PO) Take by mouth daily   Yes Historical Provider, MD   aspirin 325 MG tablet Take 325 mg by mouth daily   Yes Historical Provider, MD   Glucose Blood (CHOICE DM FORA G20 TEST STRIPS VI) by In Vitro route. Yes Historical Provider, MD        Allergies:       Adhesive tape and Sulfa antibiotics    Social History:     Tobacco:    reports that she has never smoked. She has never used smokeless tobacco.  Alcohol:      reports no history of alcohol use. Drug Use:  reports no history of drug use. Family History:     Family History   Problem Relation Age of Onset    Heart Disease Mother     Cancer Sister     Mult Sclerosis Sister     Cancer Brother         lung       Review of Systems:       Review of Systems   Constitutional: Negative for chills, fatigue, fever and unexpected weight change.    HENT: Negative for congestion and facial swelling. Eyes: Negative for blurred vision, discharge, redness and visual disturbance. Respiratory: Negative for cough and shortness of breath. Cardiovascular: Negative for chest pain and palpitations. Gastrointestinal: Negative for abdominal pain, blood in stool, constipation, diarrhea, nausea and vomiting. Genitourinary: Negative for dysuria and hematuria. Musculoskeletal: Negative for joint swelling and neck pain. Skin: Positive for wound. Negative for rash. Neurological: Negative for dizziness, light-headedness and headaches. Psychiatric/Behavioral: Negative for confusion and sleep disturbance. Physical Exam:     Physical Exam  Vitals signs reviewed. Constitutional:       General: She is not in acute distress. Appearance: Normal appearance. She is well-developed. She is obese. She is not ill-appearing. HENT:      Head: Normocephalic and atraumatic. Eyes:      General:         Right eye: No discharge. Left eye: No discharge. Conjunctiva/sclera: Conjunctivae normal.      Pupils: Pupils are equal, round, and reactive to light. Neck:      Musculoskeletal: Neck supple. Thyroid: No thyromegaly. Vascular: No carotid bruit. Cardiovascular:      Rate and Rhythm: Normal rate and regular rhythm. Heart sounds: Normal heart sounds. No murmur. Pulmonary:      Effort: Pulmonary effort is normal.      Breath sounds: Normal breath sounds. Abdominal:      General: Bowel sounds are normal.      Palpations: Abdomen is soft. Tenderness: There is no abdominal tenderness. Musculoskeletal:      Right lower leg: No edema. Left lower leg: No edema. Lymphadenopathy:      Cervical: No cervical adenopathy. Skin:     Findings: No erythema or rash.       Comments: Lt leg wound-- 1cm circular lesion with scab that comes off and then bright red blood when take band aid off   Neurological:      General: No focal deficit Results   Component Value Date    LDLCHOLESTEROL 53 10/28/2020    (goal LDL reduction with dx if diabetes is 50% LDL reduction)    PHQ Scores 4/30/2020 4/30/2019 10/25/2018 10/25/2017 10/12/2016 7/22/2015 4/23/2014   PHQ2 Score 0 0 0 0 0 0 0   PHQ9 Score 0 0 0 0 0 0 0     Interpretation of Total Score Depression Severity: 1-4 = Minimal depression, 5-9 = Mild depression, 10-14 = Moderate depression, 15-19 = Moderately severe depression, 20-27 = Severe depression        Return in about 3 months (around 1/29/2021) for DM, HTN, Hyperlipidemia.       Electronically signed by Janet Diaz MD on 10/29/2020 at 8:29 AM

## 2020-10-29 NOTE — PATIENT INSTRUCTIONS
You may be receiving a survey from [employer] regarding your visit today. You may get this in the mail, through your MyChart or in your email. Please complete the survey to enable us to provide the highest quality of care to you and your family. If you cannot score us as very good ( 5 Stars) on any question, please feel free to call the office to discuss how we could have made your experience exceptional.     Thank You! MD Huey Dietrich.  Bry Carcamo

## 2020-12-07 ENCOUNTER — TELEPHONE (OUTPATIENT)
Dept: FAMILY MEDICINE CLINIC | Age: 80
End: 2020-12-07

## 2020-12-07 NOTE — TELEPHONE ENCOUNTER
I spoke with Patient and she said she took her sugar this morning and it was 130 and she was sitting in her chair and started to feel Wuzzy. She took her pills and ate breakfast and retook her Blood sugar and it was 176. She said her sugars have been doing this to her for about a week and doesn't know what she can do    Please advise.

## 2020-12-07 NOTE — TELEPHONE ENCOUNTER
Patient notified of recommendations. She will log her sugars and call us next week with log. Voices understanding.

## 2020-12-07 NOTE — TELEPHONE ENCOUNTER
Please tell pt the 130 is not a bad reading as long as AM fasting sugars stay under 150 I am ok now if she wants better control then 130 or under is better. If she takes her blood sugar within 2hrs AFTER eating then her sugars will be higher so the 176 is not bad as long as under 200 within 2hrs after eating ok but lower always better. Pt needs to just ck sugars fasting in AM and maybe occ before eat supper if nothing to eating for 2hrs prior then record these. Call in blood sugar readings next wed/thurs and I can review.

## 2020-12-10 ENCOUNTER — TELEPHONE (OUTPATIENT)
Dept: FAMILY MEDICINE CLINIC | Age: 80
End: 2020-12-10

## 2020-12-10 RX ORDER — GLIPIZIDE 5 MG/1
5 TABLET ORAL
COMMUNITY
End: 2021-02-11 | Stop reason: SDUPTHER

## 2020-12-10 NOTE — TELEPHONE ENCOUNTER
Message left for patient to take glipizide 5mg one bid, take with breakfast and supper. Watch diet and call with blood glucose readings in 10 days. Told patient to call office to make sure she got this message regarding med change.

## 2020-12-18 ENCOUNTER — TELEPHONE (OUTPATIENT)
Dept: FAMILY MEDICINE CLINIC | Age: 80
End: 2020-12-18

## 2020-12-18 NOTE — TELEPHONE ENCOUNTER
Pt called in to let us know her sugar readings for the last week.        Am  afternoon  Pm      2 hrs after dinner  12/9- 162     207  12/10- 156  131   155  12/11- 144     120  12/12- 113     93  12/13- 121     141       Before dinner  12/14- 91     138  12/15- 91     109  12/16- 121     100  12/17- 113     171  12/18- 95

## 2020-12-18 NOTE — TELEPHONE ENCOUNTER
Tell pt her sugars better. Keep checking twice a day and keep taking glipizide 5mg one BID. If the sugars go up again have her call us.

## 2020-12-28 ENCOUNTER — TELEPHONE (OUTPATIENT)
Dept: FAMILY MEDICINE CLINIC | Age: 80
End: 2020-12-28

## 2020-12-28 NOTE — TELEPHONE ENCOUNTER
Patient concerned with BS dropping. On 12/10/20 glipizide was increased from 5 mg QD to 5 mg BID. Has only had 2 episodes of BS dropping below 100 in past couple days, drank orange juice and ate peanut butter. Past weeks readings:  12/21 102 131  12/22 93 131   12/23 107 144  12/24 110 115  12/25 107 118  12/26 122 86   12/27 95 106  12/28 96 71      Health Maintenance   Topic Date Due    DTaP/Tdap/Td vaccine (1 - Tdap) 10/14/1959    Shingles Vaccine (1 of 2) 10/14/1990    Annual Wellness Visit (AWV)  05/29/2019    Lipid screen  10/28/2021    Potassium monitoring  10/28/2021    Creatinine monitoring  10/28/2021    DEXA (modify frequency per FRAX score)  Completed    Flu vaccine  Completed    Pneumococcal 65+ years Vaccine  Completed    Hepatitis A vaccine  Aged Out    Hib vaccine  Aged Out    Meningococcal (ACWY) vaccine  Aged Out             (applicable per patient's age: Cancer Screenings, Depression Screening, Fall Risk Screening, Immunizations)    Hemoglobin A1C (%)   Date Value   10/28/2020 7.0 (H)   04/30/2020 5.9   10/29/2019 6.1 (H)     Microalb/Crt.  Ratio (mcg/mg creat)   Date Value   04/24/2017 21     LDL Cholesterol (mg/dL)   Date Value   10/28/2020 53     AST (U/L)   Date Value   10/28/2020 20     ALT (U/L)   Date Value   10/28/2020 15     BUN (mg/dL)   Date Value   10/28/2020 16      (goal A1C is < 7)   (goal LDL is <100) need 30-50% reduction from baseline     BP Readings from Last 3 Encounters:   10/29/20 134/78   07/16/20 134/77   04/30/20 120/70    (goal /80)      All Future Testing planned in [facility]:  Lab Frequency Next Occurrence   Comprehensive Metabolic Panel Once 44/14/0450   Hemoglobin A1C Once 04/29/2021   Lipid Panel Once 04/29/2021       Next Visit Date:  Future Appointments   Date Time Provider Eriberto Baird   4/28/2021  7:20 AM [facility]            Patient Active Problem List:     Essential hypertension, benign     Type 2 diabetes

## 2021-02-08 NOTE — TELEPHONE ENCOUNTER
Patient needs One Touch Ultra Test Strips sent to [facility] Maintenance   Topic Date Due    COVID-19 Vaccine (1 of 2) 10/14/1956    DTaP/Tdap/Td vaccine (1 - Tdap) 10/14/1959    Shingles Vaccine (1 of 2) 10/14/1990    Annual Wellness Visit (AWV)  05/29/2019    Lipid screen  10/28/2021    Potassium monitoring  10/28/2021    Creatinine monitoring  10/28/2021    DEXA (modify frequency per FRAX score)  Completed    Flu vaccine  Completed    Pneumococcal 65+ years Vaccine  Completed    Hepatitis A vaccine  Aged Out    Hib vaccine  Aged Out    Meningococcal (ACWY) vaccine  Aged Out             (applicable per patient's age: Cancer Screenings, Depression Screening, Fall Risk Screening, Immunizations)    Hemoglobin A1C (%)   Date Value   10/28/2020 7.0 (H)   04/30/2020 5.9   10/29/2019 6.1 (H)     Microalb/Crt.  Ratio (mcg/mg creat)   Date Value   04/24/2017 21     LDL Cholesterol (mg/dL)   Date Value   10/28/2020 53     AST (U/L)   Date Value   10/28/2020 20     ALT (U/L)   Date Value   10/28/2020 15     BUN (mg/dL)   Date Value   10/28/2020 16      (goal A1C is < 7)   (goal LDL is <100) need 30-50% reduction from baseline     BP Readings from Last 3 Encounters:   10/29/20 134/78   07/16/20 134/77   04/30/20 120/70    (goal /80)      All Future Testing planned in [facility]:  Lab Frequency Next Occurrence   Comprehensive Metabolic Panel Once 52/70/8079   Hemoglobin A1C Once 04/29/2021   Lipid Panel Once 04/29/2021       Next Visit Date:  Future Appointments   Date Time Provider Eriberto Baird   4/28/2021  7:20 [facility]            Patient Active Problem List:     Essential hypertension, benign     Type 2 diabetes mellitus without complication ([address]     Mixed hyperlipidemia     Wart     Benign skin lesion of forearm     Lymphedema     Acute drug-induced gout of left knee     Cutaneous abscess of buttock     Decubitus ulcer of right hip     PMB (postmenopausal bleeding)     Morbid obesity with BMI of 50.0-59.9, adult ([facility] [address]

## 2021-02-11 RX ORDER — GLIPIZIDE 5 MG/1
5 TABLET ORAL
Qty: 180 TABLET | Refills: 1 | Status: SHIPPED | OUTPATIENT
Start: 2021-02-11 | End: 2021-04-28 | Stop reason: SDUPTHER

## 2021-02-11 NOTE — TELEPHONE ENCOUNTER
Pharmacy left a message stating this was last attempt to get new script for patient for Glipizide 5 mg    Health Maintenance   Topic Date Due    COVID-19 Vaccine (1 of 2) 10/14/1956    DTaP/Tdap/Td vaccine (1 - Tdap) 10/14/1959    Shingles Vaccine (1 of 2) 10/14/1990    Annual Wellness Visit (AWV)  05/29/2019    Lipid screen  10/28/2021    Potassium monitoring  10/28/2021    Creatinine monitoring  10/28/2021    DEXA (modify frequency per FRAX score)  Completed    Flu vaccine  Completed    Pneumococcal 65+ years Vaccine  Completed    Hepatitis A vaccine  Aged Out    Hib vaccine  Aged Out    Meningococcal (ACWY) vaccine  Aged Out             (applicable per patient's age: Cancer Screenings, Depression Screening, Fall Risk Screening, Immunizations)    Hemoglobin A1C (%)   Date Value   10/28/2020 7.0 (H)   04/30/2020 5.9   10/29/2019 6.1 (H)     Microalb/Crt.  Ratio (mcg/mg creat)   Date Value   04/24/2017 21     LDL Cholesterol (mg/dL)   Date Value   10/28/2020 53     AST (U/L)   Date Value   10/28/2020 20     ALT (U/L)   Date Value   10/28/2020 15     BUN (mg/dL)   Date Value   10/28/2020 16      (goal A1C is < 7)   (goal LDL is <100) need 30-50% reduction from baseline     BP Readings from Last 3 Encounters:   10/29/20 134/78   07/16/20 134/77   04/30/20 120/70    (goal /80)      All Future Testing planned in [facility]:  Lab Frequency Next Occurrence   Comprehensive Metabolic Panel Once 24/22/9066   Hemoglobin A1C Once 04/29/2021   Lipid Panel Once 04/29/2021       Next Visit Date:  Future Appointments   Date Time Provider Eriberto Baird   4/28/2021  7:20 AM Mary Herrmann MD [facility]            Patient Active Problem List:     Essential hypertension, benign     Type 2 diabetes mellitus without complication ([address]     Mixed hyperlipidemia     Wart     Benign skin lesion of forearm     Lymphedema     Acute drug-induced gout of left knee     Cutaneous abscess of buttock     Decubitus ulcer of right hip     PMB (postmenopausal bleeding)     Morbid obesity with BMI of 50.0-59.9, adult ([facility] [address]

## 2021-04-27 ENCOUNTER — HOSPITAL ENCOUNTER (OUTPATIENT)
Age: 81
Discharge: HOME | End: 2021-04-27
Payer: MEDICARE

## 2021-04-27 DIAGNOSIS — E11.9 TYPE 2 DIABETES MELLITUS WITHOUT COMPLICATION, WITHOUT LONG-TERM CURRENT USE OF INSULIN (HCC): ICD-10-CM

## 2021-04-27 LAB
ALBUMIN SERPL-MCNC: 3.1 G/DL (ref 3.5–5.2)
ALBUMIN/GLOBULIN RATIO: ABNORMAL (ref 1–2.5)
ALP BLD-CCNC: 196 U/L (ref 35–104)
ALT SERPL-CCNC: 12 U/L (ref 5–33)
ANION GAP SERPL CALCULATED.3IONS-SCNC: 9 MMOL/L (ref 9–17)
AST SERPL-CCNC: 15 U/L
BILIRUB SERPL-MCNC: 0.41 MG/DL (ref 0.3–1.2)
BUN BLDV-MCNC: 17 MG/DL (ref 8–23)
BUN/CREAT BLD: 18 (ref 9–20)
CALCIUM SERPL-MCNC: 9.1 MG/DL (ref 8.6–10.4)
CHLORIDE BLD-SCNC: 101 MMOL/L (ref 98–107)
CHOLESTEROL/HDL RATIO: 2.1
CHOLESTEROL: 120 MG/DL
CO2: 26 MMOL/L (ref 20–31)
CREAT SERPL-MCNC: 0.96 MG/DL (ref 0.5–0.9)
ESTIMATED AVERAGE GLUCOSE: 131 MG/DL
GFR AFRICAN AMERICAN: >60 ML/MIN
GFR NON-AFRICAN AMERICAN: 56 ML/MIN
GFR SERPL CREATININE-BSD FRML MDRD: ABNORMAL ML/MIN/{1.73_M2}
GFR SERPL CREATININE-BSD FRML MDRD: ABNORMAL ML/MIN/{1.73_M2}
GLUCOSE BLD-MCNC: 130 MG/DL (ref 70–99)
HBA1C MFR BLD: 6.2 % (ref 4–6)
HDLC SERPL-MCNC: 56 MG/DL
LDL CHOLESTEROL: 46 MG/DL (ref 0–130)
PATIENT FASTING?: YES
POTASSIUM SERPL-SCNC: 3.8 MMOL/L (ref 3.7–5.3)
SODIUM BLD-SCNC: 136 MMOL/L (ref 135–144)
TOTAL PROTEIN: 8.2 G/DL (ref 6.4–8.3)
TRIGL SERPL-MCNC: 90 MG/DL
VLDLC SERPL CALC-MCNC: NORMAL MG/DL (ref 1–30)

## 2021-04-27 PROCEDURE — 80053 COMPREHEN METABOLIC PANEL: CPT

## 2021-04-27 PROCEDURE — 36415 COLL VENOUS BLD VENIPUNCTURE: CPT

## 2021-04-27 PROCEDURE — 83036 HEMOGLOBIN GLYCOSYLATED A1C: CPT

## 2021-04-27 PROCEDURE — 80061 LIPID PANEL: CPT

## 2021-04-28 ENCOUNTER — OFFICE VISIT (OUTPATIENT)
Dept: FAMILY MEDICINE CLINIC | Age: 81
End: 2021-04-28
Payer: MEDICARE

## 2021-04-28 VITALS
HEIGHT: 60 IN | HEART RATE: 104 BPM | OXYGEN SATURATION: 97 % | DIASTOLIC BLOOD PRESSURE: 60 MMHG | BODY MASS INDEX: 53.51 KG/M2 | SYSTOLIC BLOOD PRESSURE: 122 MMHG

## 2021-04-28 DIAGNOSIS — E66.01 MORBID OBESITY WITH BMI OF 50.0-59.9, ADULT (HCC): ICD-10-CM

## 2021-04-28 DIAGNOSIS — R07.81 RIB PAIN ON RIGHT SIDE: ICD-10-CM

## 2021-04-28 DIAGNOSIS — I10 ESSENTIAL HYPERTENSION, BENIGN: ICD-10-CM

## 2021-04-28 DIAGNOSIS — Z00.00 ROUTINE GENERAL MEDICAL EXAMINATION AT A HEALTH CARE FACILITY: Primary | ICD-10-CM

## 2021-04-28 DIAGNOSIS — E78.2 MIXED HYPERLIPIDEMIA: ICD-10-CM

## 2021-04-28 DIAGNOSIS — L98.9 NON-HEALING SKIN LESION: ICD-10-CM

## 2021-04-28 DIAGNOSIS — E11.9 TYPE 2 DIABETES MELLITUS WITHOUT COMPLICATION, WITHOUT LONG-TERM CURRENT USE OF INSULIN (HCC): ICD-10-CM

## 2021-04-28 PROCEDURE — 1123F ACP DISCUSS/DSCN MKR DOCD: CPT | Performed by: FAMILY MEDICINE

## 2021-04-28 PROCEDURE — G0438 PPPS, INITIAL VISIT: HCPCS | Performed by: FAMILY MEDICINE

## 2021-04-28 PROCEDURE — G8427 DOCREV CUR MEDS BY ELIG CLIN: HCPCS | Performed by: FAMILY MEDICINE

## 2021-04-28 PROCEDURE — G8399 PT W/DXA RESULTS DOCUMENT: HCPCS | Performed by: FAMILY MEDICINE

## 2021-04-28 PROCEDURE — 1036F TOBACCO NON-USER: CPT | Performed by: FAMILY MEDICINE

## 2021-04-28 PROCEDURE — G8417 CALC BMI ABV UP PARAM F/U: HCPCS | Performed by: FAMILY MEDICINE

## 2021-04-28 PROCEDURE — 99215 OFFICE O/P EST HI 40 MIN: CPT | Performed by: FAMILY MEDICINE

## 2021-04-28 PROCEDURE — 1090F PRES/ABSN URINE INCON ASSESS: CPT | Performed by: FAMILY MEDICINE

## 2021-04-28 PROCEDURE — 4040F PNEUMOC VAC/ADMIN/RCVD: CPT | Performed by: FAMILY MEDICINE

## 2021-04-28 RX ORDER — SIMVASTATIN 20 MG
TABLET ORAL
Qty: 90 TABLET | Refills: 1 | Status: SHIPPED | OUTPATIENT
Start: 2021-04-28

## 2021-04-28 RX ORDER — ALLOPURINOL 100 MG/1
TABLET ORAL
Qty: 90 TABLET | Refills: 1 | Status: SHIPPED | OUTPATIENT
Start: 2021-04-28

## 2021-04-28 RX ORDER — GLIPIZIDE 5 MG/1
5 TABLET ORAL
Qty: 180 TABLET | Refills: 1 | Status: SHIPPED | OUTPATIENT
Start: 2021-04-28 | End: 2021-11-01

## 2021-04-28 RX ORDER — METOPROLOL TARTRATE 50 MG/1
50 TABLET, FILM COATED ORAL 2 TIMES DAILY
Qty: 180 TABLET | Refills: 1 | Status: ON HOLD | OUTPATIENT
Start: 2021-04-28 | End: 2021-11-05 | Stop reason: HOSPADM

## 2021-04-28 RX ORDER — LOSARTAN POTASSIUM 100 MG/1
TABLET ORAL
Qty: 90 TABLET | Refills: 1 | Status: ON HOLD | OUTPATIENT
Start: 2021-04-28 | End: 2021-11-05 | Stop reason: HOSPADM

## 2021-04-28 RX ORDER — FUROSEMIDE 80 MG
TABLET ORAL
Qty: 90 TABLET | Refills: 1 | Status: ON HOLD | OUTPATIENT
Start: 2021-04-28 | End: 2021-11-05 | Stop reason: HOSPADM

## 2021-04-28 ASSESSMENT — ENCOUNTER SYMPTOMS
VOMITING: 0
EYE REDNESS: 0
SHORTNESS OF BREATH: 0
COUGH: 0
BLOOD IN STOOL: 0
CONSTIPATION: 0
ABDOMINAL PAIN: 0
NAUSEA: 0
EYE DISCHARGE: 0
DIARRHEA: 0
FACIAL SWELLING: 0

## 2021-04-28 ASSESSMENT — PATIENT HEALTH QUESTIONNAIRE - PHQ9
SUM OF ALL RESPONSES TO PHQ QUESTIONS 1-9: 2
2. FEELING DOWN, DEPRESSED OR HOPELESS: 1
SUM OF ALL RESPONSES TO PHQ QUESTIONS 1-9: 2
SUM OF ALL RESPONSES TO PHQ QUESTIONS 1-9: 2

## 2021-04-28 ASSESSMENT — LIFESTYLE VARIABLES: HOW OFTEN DO YOU HAVE A DRINK CONTAINING ALCOHOL: 0

## 2021-04-28 NOTE — PATIENT INSTRUCTIONS
Survey: You may be receiving a survey from [employer] regarding your visit today. You may get this in the mail, through your MyChart or in your email. Please complete the survey to enable us to provide the highest quality of care to you and your family. Please also, mention our names. If you cannot score us as very good (5 Stars) on any question, please feel free to call the office to discuss how we could have made your experience exceptional.      Thank You! Dr. Nitin Denise, [address] [place] [address], [facility]    Personalized Preventive Plan for Marbella Machado - 4/28/2021  Medicare offers a range of preventive health benefits. Some of the tests and screenings are paid in full while other may be subject to a deductible, co-insurance, and/or copay. Some of these benefits include a comprehensive review of your medical history including lifestyle, illnesses that may run in your family, and various assessments and screenings as appropriate. After reviewing your medical record and screening and assessments performed today your provider may have ordered immunizations, labs, imaging, and/or referrals for you. A list of these orders (if applicable) as well as your Preventive Care list are included within your After Visit Summary for your review. Other Preventive Recommendations:    · A preventive eye exam performed by an eye specialist is recommended every 1-2 years to screen for glaucoma; cataracts, macular degeneration, and other eye disorders. · A preventive dental visit is recommended every 6 months. · Try to get at least 150 minutes of exercise per week or 10,000 steps per day on a pedometer . · Order or download the FREE \"Exercise & Physical Activity: Your Everyday Guide\" from [employer]. Call 9-496.786.5550 or search [employer] online. · You need 8116-8697 mg of calcium and 8509-2070 IU of vitamin D per day.  It is possible to meet your calcium

## 2021-04-28 NOTE — PROGRESS NOTES
HPI Notes    Name: Tori Siegel  : 1940        Chief Complaint:     Chief Complaint   Patient presents with    Medicare AWV    Diabetes     Labs 21.  Hypertension    Hyperlipidemia    Osteoporosis       History of Present Illness:     Tori Siegel is a [de-identified] y.o.  female who presents with Medicare AWV, Diabetes (Labs 21. ), Hypertension, Hyperlipidemia, and Osteoporosis      Diabetes  She presents for her follow-up diabetic visit. She has type 2 diabetes mellitus. Her disease course has been stable. There are no hypoglycemic associated symptoms. Pertinent negatives for hypoglycemia include no dizziness or headaches. There are no diabetic associated symptoms. Pertinent negatives for diabetes include no chest pain and no fatigue. There are no hypoglycemic complications. Risk factors for coronary artery disease include diabetes mellitus, dyslipidemia, hypertension and obesity. Current diabetic treatment includes oral agent (dual therapy). She is compliant with treatment most of the time. Her weight is decreasing steadily. She is following a generally healthy diet. Her home blood glucose trend is decreasing steadily. An ACE inhibitor/angiotensin II receptor blocker is being taken. Eye exam is current. Hypertension  This is a chronic problem. The current episode started more than 1 year ago. The problem is unchanged. The problem is controlled. Pertinent negatives include no chest pain, headaches, neck pain, palpitations, peripheral edema or shortness of breath. Risk factors for coronary artery disease include dyslipidemia, diabetes mellitus, post-menopausal state and obesity. The current treatment provides significant improvement. Hyperlipidemia  This is a chronic problem. The current episode started more than 1 year ago. The problem is controlled. Recent lipid tests were reviewed and are normal. Exacerbating diseases include diabetes. She has no history of hypothyroidism. Pertinent negatives include no chest pain or shortness of breath. Current antihyperlipidemic treatment includes statins. The current treatment provides significant improvement of lipids. Risk factors for coronary artery disease include dyslipidemia, diabetes mellitus and hypertension. Rt side pain - pt states pain started about 1wk ago. Pt had used Rt arm to turn off a lamp in the Rt shoulder. No other injury. NO rash. Pt states side hurts to sneeze or take a deep breath. Pt has done no treatments but states it is getting better. Pt has some slight pain with also raising Rt arm. Non healing skin lesion - Lt lower leg -- non healing red spot for 6mos. Pt had one of her dry skin lesion fall off and bleeds but never heals. Pt put antibx ointment and covers but not healing. Pt has h/o of basal or squamous skin cancers. Pt hasn't been to her dermatologist in over a year. Obesity - chronic but stable. Pt was losing wt but then COVID occurred. Pt is not very active.      Past Medical History:     Past Medical History:   Diagnosis Date    Cancer ([facility] [address]     basal cell skin cancer    Hyperlipidemia     Hypertension     Lymphedema of lower extremity     Obesity     Shingles     66's    Type II or unspecified type diabetes mellitus without mention of complication, not stated as uncontrolled     Venous stasis dermatitis       Reviewed all health maintenance requirements and ordered appropriate tests  Health Maintenance Due   Topic Date Due    COVID-19 Vaccine (1) Never done    DTaP/Tdap/Td vaccine (1 - Tdap) Never done    Shingles Vaccine (1 of 2) Never done   [employer] Visit (AWV)  Never done       Past Surgical History:     Past Surgical History:   Procedure Laterality Date    APPENDECTOMY      COLONOSCOPY      DILATION AND CURETTAGE OF UTERUS      JOINT REPLACEMENT      Rt knee    PA OFFICE/OUTPT VISIT,PROCEDURE ONLY Right 7/19/2018    RIGHT HIP MASS INCISION AND DRAINAGE performed by Caleb Astorga Silvio Tong MD at [id number] [facility] OFFICE/OUTPT VISIT,PROCEDURE ONLY Right 8/1/2018    HIP INCISION AND DRAINAGE (Necrotic Fat Right Hip Debridement) performed by Viki Aase, MD at [facility] OR        Medications:       Prior to Admission medications    Medication Sig Start Date End Date Taking? Authorizing Provider   metoprolol tartrate (LOPRESSOR) 50 MG tablet Take 1 tablet by mouth 2 times daily 4/28/21  Yes Matthew Gastelum MD   allopurinol (ZYLOPRIM) 100 MG tablet TAKE 1 TABLET EVERY DAY 4/28/21  Yes Matthew Gastelum MD   furosemide (LASIX) 80 MG tablet TAKE 1 TABLET EVERY DAY 4/28/21  Yes Matthew Gastelum MD   losartan (COZAAR) 100 MG tablet TAKE 1 TABLET EVERY DAY 4/28/21  Yes Matthew Gastelum MD   metFORMIN (GLUCOPHAGE) 500 MG tablet Take 1 tablet by mouth 2 times daily (with meals) 4/28/21  Yes Matthew Gastelum MD   simvastatin (ZOCOR) 20 MG tablet TAKE 1 TABLET EVERY NIGHT 4/28/21  Yes Matthew Gastelum MD   glipiZIDE (GLUCOTROL) 5 MG tablet Take 1 tablet by mouth 2 times daily (before meals) 4/28/21  Yes Matthew Gastelum MD   blood glucose test strips (ASCENSIA AUTODISC VI;ONE TOUCH ULTRA TEST VI) strip Testing blood sugar once daily and as needed 4/28/21  Yes Matthew Gastelum MD   raloxifene (EVISTA) 60 MG tablet TAKE 1 TABLET BY MOUTH EVERY DAY 9/8/20  Yes Deshaun Serrano MD   Calcium Carb-Cholecalciferol (CALCIUM 600 + D PO) Take by mouth daily   Yes Historical Provider, MD   aspirin 325 MG tablet Take 325 mg by mouth daily   Yes Historical Provider, MD   blood glucose test strips (ONE TOUCH ULTRA TEST) strip Test blood sugar once daily and as needed. 4/2/20   Matthew Gastelum MD   [facility] PLUS [id number]) MISC Testing blood sugar once daily and as needed, uses OneTouch UltraMini 4/2/20   Matthew Gastelum MD   Glucose Blood (CHOICE DM FORA G20 TEST STRIPS VI) by In Vitro route.     Historical Provider, MD        Allergies:       Adhesive tape and Sulfa antibiotics    Social History: wall: No mass, deformity, swelling or tenderness. Comments: A - Rt lateral rib tender to palpate but no masses  Abdominal:      General: Bowel sounds are normal.      Palpations: Abdomen is soft. Tenderness: There is no abdominal tenderness. Musculoskeletal:      Right lower leg: No edema. Left lower leg: No edema. Lymphadenopathy:      Cervical: No cervical adenopathy. Skin:     Findings: No erythema or rash. Comments: A - Lt lower leg -- 3cm circular raised erythematous nodule. Neurological:      General: No focal deficit present. Mental Status: She is alert and oriented to person, place, and time. Psychiatric:         Mood and Affect: Mood normal.         Behavior: Behavior normal.         Vitals:  /60   Pulse 104   Ht 5' (1.524 m)   SpO2 97%   BMI 53.51 kg/m²       Data:     Lab Results   Component Value Date     04/27/2021    K 3.8 04/27/2021     04/27/2021    CO2 26 04/27/2021    BUN 17 04/27/2021    CREATININE 0.96 04/27/2021    GLUCOSE 130 04/27/2021    GLUCOSE 177 04/21/2012    PROT 8.2 04/27/2021    LABALBU 3.1 04/27/2021    LABALBU 4.1 01/20/2012    BILITOT 0.41 04/27/2021    ALKPHOS 196 04/27/2021    AST 15 04/27/2021    ALT 12 04/27/2021     Lab Results   Component Value Date    WBC 5.3 07/16/2020    RBC 3.89 07/16/2020    HGB 11.5 07/16/2020    HCT 33.7 07/16/2020    MCV 86.8 07/16/2020    MCH 29.6 07/16/2020    MCHC 34.1 07/16/2020    RDW 14.9 07/16/2020     07/16/2020    MPV NOT REPORTED 07/16/2020     Lab Results   Component Value Date    TSH 2.59 10/19/2015     Lab Results   Component Value Date    CHOL 120 04/27/2021    HDL 56 04/27/2021    LABA1C 6.2 04/27/2021          Assessment/Plan:        1. Type 2 diabetes mellitus without complication, without long-term current use of insulin (HCC)  F/U 6mos, prior CMP, Lipids, HgbA1C.  Do daily foot checks and yearly eye exams  Doing well and stay on current medication and labs in 6mod  - Lipid Panel; Future  - Comprehensive Metabolic Panel; Future  - Hemoglobin A1C; Future    2. Essential hypertension, benign  Stable on cozaar   - losartan (COZAAR) 100 MG tablet; TAKE 1 TABLET EVERY DAY  Dispense: 90 tablet; Refill: 1    3. Mixed hyperlipidemia  Stable on zocor and labs in 6mos   - simvastatin (ZOCOR) 20 MG tablet; TAKE 1 TABLET EVERY NIGHT  Dispense: 90 tablet; Refill: 1    4. Rib pain on right side  D/w pt pulled/strain muscle -- getting better but needs to try heat and then stretches 3-4 times per day. 5. Non-healing skin lesion  Pt encouraged to call her dermatologist and have this lesion removed and just due for general skin exam with her h/o skin cancers     6. Morbid obesity with BMI of 50.0-59.9, adult ([address]  Pt to try to walk more at home since she [facility]'t walk [facility] the [facility] center    7. Routine general medical examination at a health care facility  completed        Justin Munoz received counseling on the following healthy behaviors: nutrition and exercise  Reviewed prior labs and health maintenance  Continue current medications, diet and exercise. Discussed use, benefit, and side effects of prescribed medications. Barriers to medication compliance addressed. Patient given educational materials - see patient instructions  Was a self-tracking handout given in paper form or via [employer]?  Yes    Requested Prescriptions     Signed Prescriptions Disp Refills    metoprolol tartrate (LOPRESSOR) 50 MG tablet 180 tablet 1     Sig: Take 1 tablet by mouth 2 times daily    allopurinol (ZYLOPRIM) 100 MG tablet 90 tablet 1     Sig: TAKE 1 TABLET EVERY DAY    furosemide (LASIX) 80 MG tablet 90 tablet 1     Sig: TAKE 1 TABLET EVERY DAY    losartan (COZAAR) 100 MG tablet 90 tablet 1     Sig: TAKE 1 TABLET EVERY DAY    metFORMIN (GLUCOPHAGE) 500 MG tablet 180 tablet 1     Sig: Take 1 tablet by mouth 2 times daily (with meals)    simvastatin (ZOCOR) 20 MG tablet 90 tablet 1     Sig: TAKE 1 TABLET EVERY NIGHT    glipiZIDE (GLUCOTROL) 5 MG tablet 180 tablet 1     Sig: Take 1 tablet by mouth 2 times daily (before meals)    blood glucose test strips (ASCENSIA AUTODISC VI;ONE TOUCH ULTRA TEST VI) strip 100 each 5     Sig: Testing blood sugar once daily and as needed       All patient questions answered. Patient voiced understanding. Quality Measures    Body mass index is 53.51 kg/m². Elevated. Weight control planned discussed Healthy diet and regular exercise. BP: 122/60. Blood pressure is normal. Treatment plan consists of No treatment change needed. Fall Risk 4/28/2021 10/29/2020 4/30/2019 4/25/2018 3/22/2017 1/20/2016 7/22/2015   2 or more falls in past year? no no no no no no no   Fall with injury in past year? no no no no no no no     The patient does not have a history of falls. I did not - not indicated , complete a risk assessment for falls. A plan of care for falls No Treatment plan indicated    Lab Results   Component Value Date    LDLCHOLESTEROL 46 04/27/2021    (goal LDL reduction with dx if diabetes is 50% LDL reduction)    PHQ Scores 4/28/2021 4/30/2020 4/30/2019 10/25/2018 10/25/2017 10/12/2016 7/22/2015   PHQ2 Score 2 0 0 0 0 0 0   PHQ9 Score 2 0 0 0 0 0 0     Interpretation of Total Score Depression Severity: 1-4 = Minimal depression, 5-9 = Mild depression, 10-14 = Moderate depression, 15-19 = Moderately severe depression, 20-27 = Severe depression        Return in 6 months (on 10/28/2021) for Medicare Annual Wellness Visit in 1 year, DM, HTN, Hyperlipidemia.       Electronically signed by hCyna Morales MD on 4/28/2021 at 8:09 AM

## 2021-04-28 NOTE — PROGRESS NOTES
Medicare Annual Wellness Visit  Name: Franco Durand Date: 2021   MRN: [id number] Sex: Female   Age: [de-identified] y.o. Ethnicity: Non-/Non    : 1940 Race: Curry Luque is here for Medicare AWV, Diabetes (Labs 21. ), Hypertension, Hyperlipidemia, and Osteoporosis    Screenings for behavioral, psychosocial and functional/safety risks, and cognitive dysfunction are all negative except as indicated below. These results, as well as other patient data from the [address] form, are documented in Flowsheets linked to this Encounter. Allergies   Allergen Reactions    Adhesive Tape      Takes her skin off uses paper tape.  Sulfa Antibiotics        Prior to Visit Medications    Medication Sig Taking?  Authorizing Provider   glipiZIDE (GLUCOTROL) 5 MG tablet Take 1 tablet by mouth 2 times daily (before meals) Yes Chris Iqbal MD   simvastatin (ZOCOR) 20 MG tablet TAKE 1 TABLET EVERY NIGHT Yes Chris Iqbal MD   metFORMIN (GLUCOPHAGE) 500 MG tablet Take 1 tablet by mouth 2 times daily (with meals) Yes Chris Iqbal MD   losartan (COZAAR) 100 MG tablet TAKE 1 TABLET EVERY DAY Yes Chris Iqbal MD   furosemide (LASIX) 80 MG tablet TAKE 1 TABLET EVERY DAY Yes Chris Iqbal MD   allopurinol (ZYLOPRIM) 100 MG tablet TAKE 1 TABLET EVERY DAY Yes Chris Iqbal MD   metoprolol tartrate (LOPRESSOR) 50 MG tablet Take 1 tablet by mouth 2 times daily Yes Chris Iqbal MD   raloxifene (EVISTA) 60 MG tablet TAKE 1 TABLET BY MOUTH EVERY DAY Yes Ana Laura Alvarez MD   Calcium Carb-Cholecalciferol (CALCIUM 600 + D PO) Take by mouth daily Yes Historical Provider, MD   aspirin 325 MG tablet Take 325 mg by mouth daily Yes Historical Provider, MD   blood glucose test strips (ASCENSIA AUTODISC VI;ONE TOUCH ULTRA TEST VI) strip Testing blood sugar once daily and as needed  Chris Iqbal MD   blood glucose test strips (ONE TOUCH ULTRA TEST) strip Test blood sugar once daily and as needed. Matthew Clark MD   Lancets [facility] DELICA PLUS [id number]) MISC Testing blood sugar once daily and as needed, uses Vaibhav Urrutia MD   Glucose Blood (CHOICE DM FORA G20 TEST STRIPS VI) by In Vitro route. Historical Provider, MD       Past Medical History:   Diagnosis Date    Cancer ([address]     basal cell skin cancer    Hyperlipidemia     Hypertension     Lymphedema of lower extremity     Obesity     Shingles     66's    Type II or unspecified type diabetes mellitus without mention of complication, not stated as uncontrolled     Venous stasis dermatitis        Past Surgical History:   Procedure Laterality Date    APPENDECTOMY      COLONOSCOPY      DILATION AND CURETTAGE OF UTERUS      JOINT REPLACEMENT      Rt knee    HI OFFICE/OUTPT VISIT,PROCEDURE ONLY Right 7/19/2018    RIGHT HIP MASS INCISION AND DRAINAGE performed by Naresh Lombardi MD at [address] [facility] OFFICE/OUTPT VISIT,PROCEDURE ONLY Right 8/1/2018    HIP INCISION AND DRAINAGE (Necrotic Fat Right Hip Debridement) performed by Naresh Lombardi MD at [facility] OR       Family History   Problem Relation Age of Onset    Heart Disease Mother     Cancer Sister     Mult Sclerosis Sister     Cancer Brother         lung       CareTeam (Including outside providers/suppliers regularly involved in providing care):   Patient Care Team:  Matthew Clark MD as PCP - General (Family Medicine)  Matthew Clark MD as PCP - [facility] Empaneled Provider    Wt Readings from Last 3 Encounters:   10/29/20 274 lb (124.3 kg)   07/16/20 260 lb (117.9 kg)   04/30/20 264 lb (119.7 kg)     Vitals:    04/28/21 0732   BP: 122/60   Pulse: 104   SpO2: 97%   Height: 5' (1.524 m)     Body mass index is 53.51 kg/m². Based upon direct observation of the patient, evaluation of cognition reveals recent and remote memory intact.  When I came back into the room pt remembered all 3 words for me         Patient's complete Health Risk daily activities because of your eyesight?: No  Have you had an eye exam within the past year?: (!) No  Hearing/Vision Interventions:  · Vision concerns:  patient encouraged to make appointment with his/her eye specialist    Safety:  Safety  Do you have working smoke detectors?: Yes  Have all throw rugs been removed or fastened?: (!) No  Do you have non-slip mats or surfaces in all bathtubs/showers?: Yes(bathroom is upstairs, wheelchair bound)  Do all of your stairways have a railing or banister?: Yes  Are your doorways, halls and stairs free of clutter?: Yes  Do you always fasten your seatbelt when you are in a car?: (!) No  Safety Interventions:  · pt considering living with her son    ADL:  ADLs  In the past 7 days, did you need help from others to perform any of the following everyday activities? Eating, dressing, grooming, bathing, toileting, or walking/balance?: (!) Bathing, Walking/Balance(baths in sink)  In the past 7 days, did you need help from others to take care of any of the following? Laundry, housekeeping, banking/finances, shopping, telephone use, food preparation, transportation, or taking medications?: (!) Transportation, Food Preparation, Laundry, Housekeeping, Shopping(spouse takes care of home)  ADL Interventions:  · pt lives with  but needs house one level with shower downstairs and can't bath unless at the sink. Pt is afraid of the steps.      Personalized Preventive Plan   Current Health Maintenance Status  Immunization History   Administered Date(s) Administered    Influenza 11/08/2012, 10/24/2013    Influenza Virus Vaccine 10/22/2014, 10/20/2015    Influenza, Quadv, IM, PF (6 mo and older Fluzone, Flulaval, Fluarix, and 3 yrs and older Afluria) 10/12/2016, 10/25/2017, 10/25/2018, 10/30/2019, 10/29/2020    Pneumococcal Conjugate 13-valent (Avgnlib13) 10/20/2015    Pneumococcal Polysaccharide (Acybdnhkj25) 04/25/2017        Health Maintenance   Topic Date Due    COVID-19 Vaccine (1) Never done    DTaP/Tdap/Td vaccine (1 - Tdap) Never done    Shingles Vaccine (1 of 2) Never done   [employer] Visit (AWV)  Never done    Lipid screen  04/27/2022    Potassium monitoring  04/27/2022    Creatinine monitoring  04/27/2022    DEXA (modify frequency per FRAX score)  Completed    Flu vaccine  Completed    Pneumococcal 65+ years Vaccine  Completed    Hepatitis A vaccine  Aged Out    Hib vaccine  Aged Out    Meningococcal (ACWY) vaccine  Aged Out     Recommendations for [employer] Due: see orders and patient instructions/AVS.  . Recommended screening schedule for the next 5-10 years is provided to the patient in written form: see Patient Instructions/AVS.    Keren Fishmanan was seen today for medicare awv, diabetes, hypertension, hyperlipidemia and osteoporosis.     Diagnoses and all orders for this visit:    Type 2 diabetes mellitus without complication, without long-term current use of insulin (HCC)    Essential hypertension, benign    Mixed hyperlipidemia    Rib pain on right side    Non-healing skin lesion    Morbid obesity with BMI of 50.0-59.9, adult ([address]    Routine general medical examination at a health care facility

## 2021-05-07 DIAGNOSIS — E11.9 TYPE 2 DIABETES MELLITUS WITHOUT COMPLICATIONS (HCC): ICD-10-CM

## 2021-05-07 RX ORDER — LANCETS 33 GAUGE
EACH MISCELLANEOUS
Qty: 100 EACH | Refills: 5 | Status: SHIPPED | OUTPATIENT
Start: 2021-05-07

## 2021-05-07 NOTE — TELEPHONE ENCOUNTER
One touch ultra mini test strips. Marivel Ear is out and needs this called in today for her. Please let Kang Ortiz or Lissa Fraga know. Health Maintenance   Topic Date Due    COVID-19 Vaccine (1) Never done    DTaP/Tdap/Td vaccine (1 - Tdap) Never done    Shingles Vaccine (1 of 2) Never done    Lipid screen  04/27/2022    Potassium monitoring  04/27/2022    Creatinine monitoring  04/27/2022    Annual Wellness Visit (AWV)  04/29/2022    DEXA (modify frequency per FRAX score)  Completed    Flu vaccine  Completed    Pneumococcal 65+ years Vaccine  Completed    Hepatitis A vaccine  Aged Out    Hib vaccine  Aged Out    Meningococcal (ACWY) vaccine  Aged Out             (applicable per patient's age: Cancer Screenings, Depression Screening, Fall Risk Screening, Immunizations)    Hemoglobin A1C (%)   Date Value   04/27/2021 6.2 (H)   10/28/2020 7.0 (H)   04/30/2020 5.9     Microalb/Crt.  Ratio (mcg/mg creat)   Date Value   04/24/2017 21     LDL Cholesterol (mg/dL)   Date Value   04/27/2021 46     AST (U/L)   Date Value   04/27/2021 15     ALT (U/L)   Date Value   04/27/2021 12     BUN (mg/dL)   Date Value   04/27/2021 17      (goal A1C is < 7)   (goal LDL is <100) need 30-50% reduction from baseline     BP Readings from Last 3 Encounters:   04/28/21 122/60   10/29/20 134/78   07/16/20 134/77    (goal /80)      All Future Testing planned in [facility]:  Lab Frequency Next Occurrence   Lipid Panel Once 10/28/2021   Comprehensive Metabolic Panel Once 55/97/9763   Hemoglobin A1C Once 10/28/2021       Next Visit Date:  Future Appointments   Date Time Provider Eriberto Baird   11/1/2021  7:00 AM [facility]            Patient Active Problem List:     Essential hypertension, benign     Type 2 diabetes mellitus without complication ([address]     Mixed hyperlipidemia     Wart     Benign skin lesion of forearm     Lymphedema     Acute drug-induced gout of left knee     Cutaneous abscess of buttock     Decubitus ulcer of right hip     PMB (postmenopausal bleeding)     Morbid obesity with BMI of 50.0-59.9, adult ([facility]

## 2021-05-07 NOTE — TELEPHONE ENCOUNTER
Last OV: 4/28/2021 Medicare wellness  Last RX:   Next scheduled apt: 11/1/2021        Sure scripts request      RX pending

## 2021-10-06 DIAGNOSIS — M81.0 HIGH RISK FOR FRACTURE DUE TO OSTEOPOROSIS BY DEXA SCAN: ICD-10-CM

## 2021-10-06 RX ORDER — RALOXIFENE HYDROCHLORIDE 60 MG/1
TABLET, FILM COATED ORAL
Qty: 30 TABLET | Refills: 12 | Status: SHIPPED | OUTPATIENT
Start: 2021-10-06 | End: 2021-11-01

## 2021-10-20 NOTE — TELEPHONE ENCOUNTER
Pt needs refill for Glipizide [facility] to [facility] Vascular Surgery Outpatient Follow Up    Chief Complaint   Patient presents with    Circulatory Problem     Discuss proceedure for varicose veins. HISTORY OF PRESENT ILLNESS:                The patient is a 46 y.o. male who returns for evaluation of varicose veins. He states the pain is persisting despite conservative care. He is wearing compression without significant relief. His pain is still rated to be a 8/10 by the end of the day. He stands on concrete for 12 hour shifts as a . He tripped off a curb two days ago and injured his knee. He went to the ED and plain films were obtained negative for acute findings. He is scheduled to see his orthopaedist this afternoon. Past Medical History:        Diagnosis Date    Anxiety     Depression     Hyperlipidemia     Hypertension     Varicose veins of bilateral lower extremities with other complications      Past Surgical History:        Procedure Laterality Date    BACK SURGERY      per pt, pins and plates in lower 9647, neck in 2006.  KNEE ARTHROSCOPY Right 12/2020    Meniscus    VARICOSE VEIN SURGERY Left      Current Medications:   Prior to Admission medications    Medication Sig Start Date End Date Taking? Authorizing Provider   ibuprofen (ADVIL;MOTRIN) 800 MG tablet Take 1 tablet by mouth every 6 hours as needed for Pain 10/18/21 10/23/21 Yes Cielo Olea PA-C   gabapentin (NEURONTIN) 100 MG capsule Take 1 capsule by mouth 4 times daily for 90 days. Intended supply: 90 days 7/21/21 10/20/21 Yes Consuelo Muhammad MD   irbesartan (AVAPRO) 150 MG tablet Take 150 mg by mouth nightly  Patient not taking: Reported on 10/20/2021    Historical Provider, MD   Elastic Bandages & Supports (JOBST KNEE HIGH COMPRESSION SM) MISC Thigh high compression stockings, 20-30 mm/Hg  Patient not taking: Reported on 10/20/2021 8/25/21   Judith Choi MD     Allergies:  Patient has no known allergies.     Social History     Socioeconomic History    Marital status:      Spouse name: Not on file    Number of children: Not on file    Years of education: Not on file    Highest education level: Not on file   Occupational History    Not on file   Tobacco Use    Smoking status: Current Every Day Smoker     Packs/day: 1.00     Years: 20.00     Pack years: 20.00     Types: Cigarettes    Smokeless tobacco: Never Used   Vaping Use    Vaping Use: Never used   Substance and Sexual Activity    Alcohol use: Yes     Comment: occasionally    Drug use: Never    Sexual activity: Not on file   Other Topics Concern    Not on file   Social History Narrative    Not on file     Social Determinants of Health     Financial Resource Strain: Low Risk     Difficulty of Paying Living Expenses: Not hard at all   Food Insecurity: No Food Insecurity    Worried About Running Out of Food in the Last Year: Never true    Patrice of Food in the Last Year: Never true   Transportation Needs:     Lack of Transportation (Medical):      Lack of Transportation (Non-Medical):    Physical Activity:     Days of Exercise per Week:     Minutes of Exercise per Session:    Stress:     Feeling of Stress :    Social Connections:     Frequency of Communication with Friends and Family:     Frequency of Social Gatherings with Friends and Family:     Attends Synagogue Services:     Active Member of Clubs or Organizations:     Attends Club or Organization Meetings:     Marital Status:    Intimate Partner Violence:     Fear of Current or Ex-Partner:     Emotionally Abused:     Physically Abused:     Sexually Abused:         Family History   Problem Relation Age of Onset    Hypertension Father     Diabetes type 2  Father      PHYSICAL EXAM:  Vitals:    10/20/21 0850   BP: 138/88     General Appearance: alert and oriented to person, place and time, well developed and well- nourished, in no acute distress  Skin: warm and dry, no rash or erythema  Head: normocephalic and atraumatic  Eyes: extraocular eye movements intact, conjunctivae normal  ENT: external ear and ear canal normal bilaterally, nose without deformity  Pulmonary/Chest: clear to auscultation bilaterally- no wheezes, rales or rhonchi, normal air movement, no respiratory distress  Cardiovascular: normal rate, regular rhythm, normal S1 and S2, no murmurs, no carotid bruits  Abdomen: soft, non-tender, non-distended  Musculoskeletal: normal range of motion, no joint swelling, deformity or tenderness  Neurologic: no cranial nerve deficit, gait, coordination and speech normal  Extremities: Left extremity is unremarkable. Right extremity demonstrates a knee brace and significant amount of varicose veins in the saphenous distribution- see media. He has easily palpable bilateral brachial radial pulses and palpable DP and PT pulses are symmetric at 2-3+. Problem List Items Addressed This Visit        Vascular Problems    Symptomatic varicose veins, right - Primary        I reviewed the imaging with the patient showing significant reflux through his R GSV. I discussed the options of RFA w stab phlebectomy vs conservative management and he would like to proceed with surgical intervention as his symptoms are still significant with conservative care. Risk benefits and alternatives were discussed with the patient include but not limited to bleeding, infection, arteriovenous nerve injury, myocardial infarction, respiratory failure, anesthetic reaction, DVT, pulmonary embolus chronic leg swelling wound complications and or death he understands and wishes to proceed. We discussed that he will still have to wear compression following surgery to prevent recurrence and he understands this. Will submit for insurance approval and call patient to schedule. Plan discussed with Dr Elver Morrow PA-C    No follow-ups on file.

## 2021-11-01 ENCOUNTER — HOSPITAL ENCOUNTER (EMERGENCY)
Age: 81
Discharge: HOME | End: 2021-11-01
Attending: FAMILY MEDICINE
Payer: MEDICARE

## 2021-11-01 VITALS
HEART RATE: 94 BPM | TEMPERATURE: 98.4 F | WEIGHT: 274 LBS | SYSTOLIC BLOOD PRESSURE: 119 MMHG | HEIGHT: 60 IN | OXYGEN SATURATION: 95 % | BODY MASS INDEX: 53.79 KG/M2 | DIASTOLIC BLOOD PRESSURE: 80 MMHG | RESPIRATION RATE: 20 BRPM

## 2021-11-01 DIAGNOSIS — N30.01 ACUTE CYSTITIS WITH HEMATURIA: Primary | ICD-10-CM

## 2021-11-01 DIAGNOSIS — R53.1 GENERAL WEAKNESS: ICD-10-CM

## 2021-11-01 DIAGNOSIS — E66.01 MORBID OBESITY WITH BMI OF 50.0-59.9, ADULT (HCC): ICD-10-CM

## 2021-11-01 DIAGNOSIS — I89.0 LYMPHEDEMA: ICD-10-CM

## 2021-11-01 DIAGNOSIS — L89.91 PRESSURE INJURY, STAGE 1, UNSPECIFIED LOCATION: ICD-10-CM

## 2021-11-01 DIAGNOSIS — L89.92 PRESSURE INJURY, STAGE 2, UNSPECIFIED LOCATION (HCC): ICD-10-CM

## 2021-11-01 DIAGNOSIS — T14.8XXA SKIN EXCORIATION: ICD-10-CM

## 2021-11-01 LAB
-: ABNORMAL
ABSOLUTE EOS #: 0 K/UL (ref 0–0.4)
ABSOLUTE IMMATURE GRANULOCYTE: ABNORMAL K/UL (ref 0–0.3)
ABSOLUTE LYMPH #: 2.2 K/UL (ref 1–4.8)
ABSOLUTE MONO #: 0.9 K/UL (ref 0–1)
ALBUMIN SERPL-MCNC: 2.6 G/DL (ref 3.5–5.2)
ALBUMIN/GLOBULIN RATIO: ABNORMAL (ref 1–2.5)
ALP BLD-CCNC: 133 U/L (ref 35–104)
ALT SERPL-CCNC: <5 U/L (ref 5–33)
AMORPHOUS: ABNORMAL
ANION GAP SERPL CALCULATED.3IONS-SCNC: 11 MMOL/L (ref 9–17)
AST SERPL-CCNC: 7 U/L
BACTERIA: ABNORMAL
BASOPHILS # BLD: 0 % (ref 0–2)
BASOPHILS ABSOLUTE: 0 K/UL (ref 0–0.2)
BILIRUB SERPL-MCNC: 0.35 MG/DL (ref 0.3–1.2)
BILIRUBIN URINE: NEGATIVE
BUN BLDV-MCNC: 9 MG/DL (ref 8–23)
BUN/CREAT BLD: 12 (ref 9–20)
CALCIUM SERPL-MCNC: 9 MG/DL (ref 8.6–10.4)
CASTS UA: ABNORMAL /LPF
CHLORIDE BLD-SCNC: 97 MMOL/L (ref 98–107)
CHOLESTEROL/HDL RATIO: 1.6
CHOLESTEROL: 119 MG/DL
CO2: 25 MMOL/L (ref 20–31)
COLOR: YELLOW
COMMENT UA: ABNORMAL
CREAT SERPL-MCNC: 0.76 MG/DL (ref 0.5–0.9)
CRYSTALS, UA: ABNORMAL /HPF
DIFFERENTIAL TYPE: YES
EOSINOPHILS RELATIVE PERCENT: 0 % (ref 0–5)
EPITHELIAL CELLS UA: ABNORMAL /HPF
GFR AFRICAN AMERICAN: >60 ML/MIN
GFR NON-AFRICAN AMERICAN: >60 ML/MIN
GFR SERPL CREATININE-BSD FRML MDRD: ABNORMAL ML/MIN/{1.73_M2}
GFR SERPL CREATININE-BSD FRML MDRD: ABNORMAL ML/MIN/{1.73_M2}
GLUCOSE BLD-MCNC: 109 MG/DL (ref 70–99)
GLUCOSE BLD-MCNC: 125 MG/DL
GLUCOSE BLD-MCNC: 125 MG/DL (ref 65–99)
GLUCOSE URINE: NEGATIVE
HCT VFR BLD CALC: 33.1 % (ref 36–46)
HDLC SERPL-MCNC: 74 MG/DL
HEMOGLOBIN: 11.1 G/DL (ref 12–16)
IMMATURE GRANULOCYTES: ABNORMAL %
KETONES, URINE: NEGATIVE
LDL CHOLESTEROL: 25 MG/DL (ref 0–130)
LEUKOCYTE ESTERASE, URINE: ABNORMAL
LYMPHOCYTES # BLD: 18 % (ref 15–40)
MCH RBC QN AUTO: 28.1 PG (ref 26–34)
MCHC RBC AUTO-ENTMCNC: 33.4 G/DL (ref 31–37)
MCV RBC AUTO: 84.2 FL (ref 80–100)
MONOCYTES # BLD: 7 % (ref 4–8)
MUCUS: ABNORMAL
NITRITE, URINE: POSITIVE
NRBC AUTOMATED: ABNORMAL PER 100 WBC
OTHER OBSERVATIONS UA: ABNORMAL
PDW BLD-RTO: 14.3 % (ref 12.1–15.2)
PH UA: 8 (ref 5–8)
PLATELET # BLD: 247 K/UL (ref 140–450)
PLATELET ESTIMATE: ABNORMAL
PMV BLD AUTO: ABNORMAL FL (ref 6–12)
POTASSIUM SERPL-SCNC: 3.9 MMOL/L (ref 3.7–5.3)
PROTEIN UA: ABNORMAL
RBC # BLD: 3.93 M/UL (ref 4–5.2)
RBC # BLD: ABNORMAL 10*6/UL
RBC UA: ABNORMAL /HPF (ref 0–2)
RENAL EPITHELIAL, UA: ABNORMAL /HPF
SEG NEUTROPHILS: 75 % (ref 47–75)
SEGMENTED NEUTROPHILS ABSOLUTE COUNT: 9.2 K/UL (ref 2.5–7)
SODIUM BLD-SCNC: 133 MMOL/L (ref 135–144)
SPECIFIC GRAVITY UA: 1.01 (ref 1–1.03)
TOTAL PROTEIN: 7 G/DL (ref 6.4–8.3)
TRICHOMONAS: ABNORMAL
TRIGL SERPL-MCNC: 98 MG/DL
TURBIDITY: ABNORMAL
URINE HGB: ABNORMAL
UROBILINOGEN, URINE: NORMAL
VLDLC SERPL CALC-MCNC: NORMAL MG/DL (ref 1–30)
WBC # BLD: 12.4 K/UL (ref 3.5–11)
WBC # BLD: ABNORMAL 10*3/UL
WBC UA: ABNORMAL /HPF
YEAST: ABNORMAL

## 2021-11-01 PROCEDURE — 87086 URINE CULTURE/COLONY COUNT: CPT

## 2021-11-01 PROCEDURE — 80053 COMPREHEN METABOLIC PANEL: CPT

## 2021-11-01 PROCEDURE — 6370000000 HC RX 637 (ALT 250 FOR IP): Performed by: FAMILY MEDICINE

## 2021-11-01 PROCEDURE — 87186 SC STD MICRODIL/AGAR DIL: CPT

## 2021-11-01 PROCEDURE — 87077 CULTURE AEROBIC IDENTIFY: CPT

## 2021-11-01 PROCEDURE — 99285 EMERGENCY DEPT VISIT HI MDM: CPT

## 2021-11-01 PROCEDURE — 81001 URINALYSIS AUTO W/SCOPE: CPT

## 2021-11-01 PROCEDURE — 2500000003 HC RX 250 WO HCPCS: Performed by: FAMILY MEDICINE

## 2021-11-01 PROCEDURE — 36415 COLL VENOUS BLD VENIPUNCTURE: CPT

## 2021-11-01 PROCEDURE — 83036 HEMOGLOBIN GLYCOSYLATED A1C: CPT

## 2021-11-01 PROCEDURE — 80061 LIPID PANEL: CPT

## 2021-11-01 PROCEDURE — 82947 ASSAY GLUCOSE BLOOD QUANT: CPT

## 2021-11-01 PROCEDURE — 86403 PARTICLE AGGLUT ANTBDY SCRN: CPT

## 2021-11-01 PROCEDURE — 85025 COMPLETE CBC W/AUTO DIFF WBC: CPT

## 2021-11-01 RX ORDER — LEVOFLOXACIN 500 MG/1
500 TABLET, FILM COATED ORAL DAILY
Qty: 9 TABLET | Refills: 0 | Status: ON HOLD | OUTPATIENT
Start: 2021-11-01 | End: 2021-11-05 | Stop reason: HOSPADM

## 2021-11-01 RX ORDER — NYSTATIN 100000 [USP'U]/G
POWDER TOPICAL
Qty: 30 G | Refills: 0 | Status: SHIPPED | OUTPATIENT
Start: 2021-11-01 | End: 2021-11-02

## 2021-11-01 RX ORDER — LEVOFLOXACIN 500 MG/1
500 TABLET, FILM COATED ORAL ONCE
Status: COMPLETED | OUTPATIENT
Start: 2021-11-01 | End: 2021-11-01

## 2021-11-01 RX ADMIN — LEVOFLOXACIN 500 MG: 500 TABLET, FILM COATED ORAL at 11:09

## 2021-11-01 RX ADMIN — MICONAZOLE NITRATE: 20 POWDER TOPICAL at 11:09

## 2021-11-01 ASSESSMENT — ENCOUNTER SYMPTOMS
DIARRHEA: 0
VOMITING: 0

## 2021-11-01 NOTE — ED NOTES
This nurse spoke with Dede Gunter from [facility] and she will be to talk to the patient about LTC.        Gloria Gomez RN  11/01/21 2329

## 2021-11-01 NOTE — PROGRESS NOTES
[facility] has agreed to accept pt for SNF care at their facility from the ER. ER staff notified. LILIAM Griselda Tracyoter to transport pt between 2 and 230 pm. HENS completed and faxed to facility with discharge instructions. Pt is being discharged to [facility] during the covid pandemic. Pt meets skilled criteria and allows for pt to receive appropriate care in correct level so hospital beds can remain open.  Mary DAVID 11/1/2021

## 2021-11-01 NOTE — DISCHARGE INSTR - COC
Continuity of Care Form    Patient Name: Heydi Jones   :  1940  MRN:  [id number]    Admit date:  2021  Discharge date:  ***    Code Status Order: Prior   Advance Directives:     Admitting Physician:  No admitting provider for patient encounter.   PCP: David Newsome MD    Discharging Nurse: [facility] Unit/Room#:   Discharging Unit Phone Number: ***    Emergency Contact:   Extended Emergency Contact Information  Primary Emergency Contact: Deshaun Clancy  Address: [address] [place] [address]           Cristo Tyson, A[address] [facility] [address] [facility] Phone: [phone]  Relation: Spouse  Secondary Emergency Contact: Dominick Clancy   [address] Phone: [phone]  Relation: Child    Past Surgical History:  Past Surgical History:   Procedure Laterality Date    APPENDECTOMY      COLONOSCOPY      DILATION AND CURETTAGE OF UTERUS      JOINT REPLACEMENT      Rt knee    CA OFFICE/OUTPT VISIT,PROCEDURE ONLY Right 2018    RIGHT HIP MASS INCISION AND DRAINAGE performed by Rajeev David MD at [address] [facility] OFFICE/OUTPT [address] [place] [address] Right 2018    HIP INCISION AND DRAINAGE (Necrotic Fat Right Hip Debridement) performed by Rajeev David MD at [facility]       Immunization History:   Immunization History   Administered Date(s) Administered    Influenza 2012, 10/24/2013    Influenza Virus Vaccine 10/22/2014, 10/20/2015    Influenza, Quadv, IM, PF (6 mo and older Fluzone, Flulaval, Fluarix, and 3 yrs and older Afluria) 10/12/2016, 10/25/2017, 10/25/2018, 10/30/2019, 10/29/2020    Pneumococcal Conjugate 13-valent (Xctrjkj00) 10/20/2015    Pneumococcal Polysaccharide (Ameyrfxyf99) 2017       Active Problems:  Patient Active Problem List   Diagnosis Code    Essential hypertension, benign I10    Type 2 diabetes mellitus without complication ([facility] S08.2    Mixed hyperlipidemia E78.2    Wart B07.9    Benign skin lesion of forearm L98.9    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    Cutaneous abscess of buttock L02.31    Decubitus ulcer of right hip L89.219    PMB (postmenopausal bleeding) N95.0    Morbid obesity with BMI of 50.0-59.9, adult ([facility]) E66.01, Z68.43       Isolation/Infection:   Isolation          No Isolation        Patient Infection Status     None to display          Nurse Assessment:  Last Vital Signs: /80   Pulse 94   Temp 98.4 °F (36.9 °C) (Oral)   Resp 20   Ht 5' (1.524 m)   Wt 274 lb (124.3 kg)   SpO2 95%   BMI 53.51 kg/m²     Last documented pain score (0-10 scale):    Last Weight:   Wt Readings from Last 1 Encounters:   11/01/21 274 lb (124.3 kg)     Mental Status:  {IP PT MENTAL STATUS:20030}    IV Access:  { LAILA IV ACCESS:451414663}    Nursing Mobility/ADLs:  Walking   {P DME WKYZ:572374693}  Transfer  {[facility] DME BUWH:415078313}  Bathing  {[facility] DME BGWT:907061589}  Dressing  {[facility] DME CSWC:822333365}  Toileting  {[facility] DME GHYJ:664901063}  Feeding  {[facility] DME VWPT:724650065}  Med Admin  {P DME EUWY:423284346}  Med Delivery   { LAILA MED Delivery:089128046}    Wound Care Documentation and Therapy:        Elimination:  Continence:   · Bowel: {YES / RL:89374}  · Bladder: {YES / BW:80029}  Urinary Catheter: {Urinary Catheter:206933627}   Colostomy/Ileostomy/Ileal Conduit: {YES / WQ:84128}       Date of Last BM: ***  No intake or output data in the 24 hours ending 11/01/21 1353  No intake/output data recorded.     Safety Concerns:     [address] Safety Concerns:133297920}    Impairments/Disabilities:      [address] Impairments/Disabilities:477716923}    Nutrition Therapy:  Current Nutrition Therapy:   [address] Diet List:049790418}    Routes of Feeding: {[facility] Other Feedings:166752382}  Liquids: {Slp liquid thickness:36090}  Daily Fluid Restriction: {CHP DME Yes amt example:495481354}  Last Modified Barium Swallow with Video (Video Swallowing Test): {Done Not Done SP:735237072}    Treatments at the Time of Hospital Discharge:   Respiratory Treatments: ***  Oxygen Therapy:  {Therapy; copd oxygen:79029}  Ventilator:    {MH CC Vent AWHW:879202372}    Rehab Therapies: {THERAPEUTIC INTERVENTION:2004538901}  Weight Bearing Status/Restrictions: 50Morales CRUZ Weight Bearin}  Other Medical Equipment (for information only, NOT a DME order):  {EQUIPMENT:959081872}  Other Treatments: ***    Patient's personal belongings (please select all that are sent with patient):  {CHP DME Belongings:460518512}    RN SIGNATURE:  {Esignature:738470973}    CASE MANAGEMENT/SOCIAL WORK SECTION    Inpatient Status Date: ER visit 2021    Readmission Risk Assessment Score:  Readmission Risk              Risk of Unplanned Readmission:  0           Discharging to Facility/ Agency   · Name: [facility] care  · Address: [place] [address]  · Phone: [phone]  · Fax: [phone]    Dialysis Facility (if applicable)   · Name:  · Address:  · Dialysis Schedule:  · Phone:  · Fax:    / signature: Electronically signed by JOANN Chanel on 21 at 1:54 PM EDT    PHYSICIAN SECTION    Prognosis: {Prognosis:7182158338}    Condition at Discharge: Odette Samayoa Patient Condition:829692309}    Rehab Potential (if transferring to Rehab): {Prognosis:2267620931}    Recommended Labs or Other Treatments After Discharge: ***    Physician Certification: I certify the above information and transfer of Markos Dextre  is necessary for the continuing treatment of the diagnosis listed and that she requires {Admit to Appropriate Level of Care:21094} for {GREATER/LESS:271921701} 30 days.      Update Admission H&P: {CHP DME Changes in NDWVW:495202541}    PHYSICIAN SIGNATURE:  {Esignature:511784240}

## 2021-11-01 NOTE — PROGRESS NOTES
PHILL met with pt in the ER for an extended time this morning discussing options for discharge. Pt refuses to be placed as her spouse she does not feel is able to take care of himself. Pt lives with her spouse in their home in [place]. They have been  for 62 years. Pt has a walker that she uses at home. Pt reports that she has not been upstairs in their home since 2003 but her spouse still goes up there. 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 Pt was not using any services at home. Pt reports that her medications are totally covered by insurance. Pt does not have advance directives on file. Pt follows with Dr Esme Bhatti as PCP. Pt reports that she only leaves the house to go to Dr aguilar and her spouse drives her but it is very difficult for her to get out the 6 steps at home and to get in the car. Pt also describes loss of appetite and chronic issue of not sleeping. Pt describes feeling overwhelmed by their home and not knowing where to start to get it cleaned up. Pt also expresses feelings of hopelessness and loneliness. SW and pt discussed these feelings and if she had a plan to harm or kill herself and pt states that she has no plan and she would never commit suicide because she is a \"coward. \"     Pt decides that she will accept [facility] services at discharge and spouse arrives and they choose [facility] for RN, PT and aide services. SW later received a call that pt is not feeling that she is not able to go home and she would like to go to a facility in [place]. PHILL faxed referral information to [facility] and to [facility]. Await response if either facility can accept her.  Rubén WUW 11/1/2021

## 2021-11-01 NOTE — ED PROVIDER NOTES
[facility]  eMERGENCY dEPARTMENT eNCOUnter          [address] [facility]       Chief Complaint   Patient presents with    Fatigue     pt has been getting weaker for the last month       Nurses Notes reviewed and I agree except as noted in the HPI. HISTORY OF PRESENT ILLNESS    Albina Antonio is a 80 y.o. female who presents to the emergency room via EMS from home, patient dates she is feeling that she is getting weaker over the past month, got up to go to the bathroom this morning was having difficulty, does on her bathroom is very small, she is been having difficulty utilizing it, has been trying to commence her  the need to move due to limitations in the current household. Patient denies any new pains, does note she is not been able to get up and move around very well, has noted some breakdown of skin underneath her breast, has been having difficulty with her ADLs such as cleaning her self due to habitus as well as just general weakness. Denies nausea vomiting fever chills denies trauma or falls. PCP: Eliel Camp    REVIEW OF SYSTEMS     Review of Systems   Constitutional: Negative for fever. Gastrointestinal: Negative for diarrhea and vomiting. Genitourinary: Negative for dysuria. Skin: Positive for wound. Neurological: Positive for weakness. All other systems reviewed and are negative. PAST MEDICAL HISTORY    has a past medical history of Cancer ([address] Hyperlipidemia, Hypertension, Lymphedema of lower extremity, Obesity, Shingles, Type II or unspecified type diabetes mellitus without mention of complication, not stated as uncontrolled, and Venous stasis dermatitis. SURGICAL HISTORY      has a past surgical history that includes joint replacement; Appendectomy; Colonoscopy; Dilation and curettage of uterus; pr office/outpt visit,procedure only (Right, 7/19/2018); and pr office/outpt visit,procedure only (Right, 8/1/2018).     CURRENT MEDICATIONS Previous Medications    ALLOPURINOL (ZYLOPRIM) 100 MG TABLET    TAKE 1 TABLET EVERY DAY    ASPIRIN 325 MG TABLET    Take 325 mg by mouth daily    BLOOD GLUCOSE TEST STRIPS (ASCENSIA AUTODISC VI;ONE TOUCH ULTRA TEST VI) STRIP    Testing blood sugar once daily and as needed    BLOOD GLUCOSE TEST STRIPS (ONE TOUCH ULTRA TEST) STRIP    Test blood sugar once daily and as needed. CALCIUM CARB-CHOLECALCIFEROL (CALCIUM 600 + D PO)    Take by mouth daily    FUROSEMIDE (LASIX) 80 MG TABLET    TAKE 1 TABLET EVERY DAY    GLUCOSE BLOOD (CHOICE DM FORA G20 TEST STRIPS VI)    by In Vitro route. LANCETS (ONETOUCH DELICA PLUS [id number]) MISC    Testing blood sugar once daily and as needed, uses OneTouch UltraMini    LOSARTAN (COZAAR) 100 MG TABLET    TAKE 1 TABLET EVERY DAY    METFORMIN (GLUCOPHAGE) 500 MG TABLET    Take 1 tablet by mouth 2 times daily (with meals)    METOPROLOL TARTRATE (LOPRESSOR) 50 MG TABLET    Take 1 tablet by mouth 2 times daily    SIMVASTATIN (ZOCOR) 20 MG TABLET    TAKE 1 TABLET EVERY NIGHT       ALLERGIES     is allergic to adhesive tape and sulfa antibiotics. FAMILY HISTORY     She indicated that the status of her mother is unknown. She indicated that the status of her sister is unknown. She indicated that the status of her brother is unknown.   family history includes Cancer in her brother and sister; Heart Disease in her mother; Mult Sclerosis in her sister. SOCIAL HISTORY      reports that she has never smoked. She has never used smokeless tobacco. She reports that she does not drink alcohol and does not use drugs. PHYSICAL EXAM     INITIAL VITALS:  height is 5' (1.524 m) and weight is 274 lb (124.3 kg). Her oral temperature is 98.4 °F (36.9 °C). Her blood pressure is 122/59 (abnormal) and her pulse is 94. Her respiration is 20 and oxygen saturation is 94%. Physical Exam   Constitutional: Patient is oriented to person, place, and time.  Patient appears well-developed and WITH AUTO DIFFERENTIAL - Abnormal; Notable for the following components:    WBC 12.4 (*)     RBC 3.93 (*)     Hemoglobin 11.1 (*)     Hematocrit 33.1 (*)     Segs Absolute 9.20 (*)     All other components within normal limits   COMPREHENSIVE METABOLIC PANEL W/ REFLEX TO MG FOR LOW K - Abnormal; Notable for the following components:    Glucose 109 (*)     Sodium 133 (*)     Chloride 97 (*)     Alkaline Phosphatase 133 (*)     ALT <5 (*)     Albumin 2.6 (*)     All other components within normal limits   MICROSCOPIC URINALYSIS - Abnormal; Notable for the following components:    Bacteria, UA 3+ (*)     All other components within normal limits   CULTURE, URINE   HEMOGLOBIN A1C   LIPID PANEL       EMERGENCY DEPARTMENT COURSE:   Vitals:    Vitals:    11/01/21 0838 11/01/21 0845 11/01/21 0900 11/01/21 0915   BP: (!) 116/51 126/62 (!) 111/56 (!) 122/59   Pulse:       Resp:       Temp:       TempSrc:       SpO2:  99% 99% 94%   Weight:       Height:         Patient history and physical exam taken at bedside, discussed patient symptoms and exam findings, discussed initial work-up to include blood and urine studies, will address patient's skin care while in the emergency room, patient sitting in bed semi-Fowlers, patient acknowledges. During nursing washing of patient, we did examine her skin, showing extensive areas of excoriation at the right breast and right popliteal area, consistent with likely fungal infection, not grossly pressure underneath the left breast or underneath the pannus, there is skin breakdown including stage II ulceration in the perineal and bilateral medial buttocks, surrounded by stage I skin breakdown throughout the buttocks and posterior thighs, areas were cleaned and Resinol applied to stage II decubitus areas and buttocks, we will apply Mycostatin powder to patient's skin fold area.     Patient was straight cathed to get urine sample by nursing    Lab work-up reviewed, noting nitrite positive urine with 3+ leukocytes, 1+ blood, WBC 12.4 with normal differential no bands, SCR 0.76 BUN 7, , K3.9    Review of EMR shows a positive urine culture dated from 2018, sensitivities reviewed    Patient's allergies reviewed including sulfa antibiotics and adhesive tapes    As patient was post have a PCP appointment today, was told to have lab drawn, patient is fasting, will draw her A1c and lipid panel, noting that these will not be posted while patient is emergency room, this led to be followed up by primary care. Addendum:  Patient has decided she would like to be placed in ECF at this time, we will go ahead and contact social work to work on placement and arrange for transfer. Social work was contacted, will discharge to skilled nursing facility. FINAL IMPRESSION      1. Acute cystitis with hematuria    2. General weakness    3. Skin excoriation    4. Pressure injury, stage 2, unspecified location ([facility] [address]    5. Pressure injury, stage 1, unspecified location    6. Morbid obesity with BMI of 50.0-59.9, adult ([facility] [address]    7. Lymphedema          DISPOSITION/PLAN   D/c  PATIENT REFERRED TO:  Joan Soliz MD  [address] [facility] [address]  [phone]    Call       HOSP [facility] ED  [address] [address]  [phone]    As needed, If symptoms worsen      DISCHARGE MEDICATIONS:  New Prescriptions    LEVOFLOXACIN (LEVAQUIN) 500 MG TABLET    Take 1 tablet by mouth daily for 9 days First home dose 11/2/2021    NYSTATIN (MYCOSTATIN) 540777 UNIT/GM POWDER    Apply topically 4 times daily.     Pharmacist: May substitute for another powder as needed for insurance reasons           Summation      Patient Course:  D/c    ED Medications administered this visit:    Medications   miconazole (MICOTIN) 2 % powder (has no administration in time range)   levoFLOXacin (LEVAQUIN) tablet 500 mg (has no administration in time range)       New Prescriptions from this visit:    New Prescriptions LEVOFLOXACIN (LEVAQUIN) 500 MG TABLET    Take 1 tablet by mouth daily for 9 days First home dose 11/2/2021    NYSTATIN (MYCOSTATIN) 131997 UNIT/GM POWDER    Apply topically 4 times daily. Pharmacist: May substitute for another powder as needed for insurance reasons       Follow-up:  Dayana Riddle MD  [address] [address]  [phone]    Call       HOSP [facility] ED  [address] [address]  [phone]    As needed, If symptoms worsen        Final Impression:   1. Acute cystitis with hematuria    2. General weakness    3. Skin excoriation    4. Pressure injury, stage 2, unspecified location ([address]    5. Pressure injury, stage 1, unspecified location    6.  Morbid obesity with BMI of 50.0-59.9, adult ([address]    7. Lymphedema               (Please note that portions of this note were completed with a voice recognition program.  Efforts were made to edit the dictations but occasionally words are mis-transcribed.)    MD Gunnar Maher Sa, Sa, MD  11/01/21 Isaac Aguilar MD  11/03/21 3384

## 2021-11-01 NOTE — ED NOTES
Pt has stage two wound on her buttocks and right popliteal stage one per Dr. Awilda Corado. Open area on outer calf of leg. Under right breast is red and sore. All wounds cleansed. Pt straight cathed for urine.         Claudy Smith RN  11/01/21 4119

## 2021-11-02 ENCOUNTER — HOSPITAL ENCOUNTER (INPATIENT)
Age: 81
LOS: 1 days | Discharge: NURSING, REHAB OR LONG-TERM CARE | DRG: 690 | End: 2021-11-05
Attending: EMERGENCY MEDICINE | Admitting: INTERNAL MEDICINE
Payer: MEDICARE

## 2021-11-02 ENCOUNTER — APPOINTMENT (OUTPATIENT)
Dept: GENERAL RADIOLOGY | Age: 81
DRG: 690 | End: 2021-11-02
Payer: MEDICARE

## 2021-11-02 DIAGNOSIS — N13.5 URETEROVESICAL JUNCTION (UVJ) OBSTRUCTION: ICD-10-CM

## 2021-11-02 DIAGNOSIS — R55 SYNCOPE AND COLLAPSE: Primary | ICD-10-CM

## 2021-11-02 DIAGNOSIS — I47.1 PAROXYSMAL SUPRAVENTRICULAR TACHYCARDIA (HCC): ICD-10-CM

## 2021-11-02 PROBLEM — I47.10 SVT (SUPRAVENTRICULAR TACHYCARDIA): Status: ACTIVE | Noted: 2021-11-02

## 2021-11-02 LAB
ABSOLUTE EOS #: 0 K/UL (ref 0–0.4)
ABSOLUTE IMMATURE GRANULOCYTE: ABNORMAL K/UL (ref 0–0.3)
ABSOLUTE LYMPH #: 3.2 K/UL (ref 1–4.8)
ABSOLUTE MONO #: 0.9 K/UL (ref 0–1)
ALBUMIN SERPL-MCNC: 3.1 G/DL (ref 3.5–5.2)
ALBUMIN/GLOBULIN RATIO: ABNORMAL (ref 1–2.5)
ALP BLD-CCNC: 146 U/L (ref 35–104)
ALT SERPL-CCNC: 7 U/L (ref 5–33)
ANION GAP SERPL CALCULATED.3IONS-SCNC: 17 MMOL/L (ref 9–17)
AST SERPL-CCNC: 8 U/L
BASOPHILS # BLD: 0 % (ref 0–2)
BASOPHILS ABSOLUTE: 0.1 K/UL (ref 0–0.2)
BILIRUB SERPL-MCNC: 0.4 MG/DL (ref 0.3–1.2)
BUN BLDV-MCNC: 10 MG/DL (ref 8–23)
BUN/CREAT BLD: ABNORMAL (ref 9–20)
CALCIUM SERPL-MCNC: 9.3 MG/DL (ref 8.6–10.4)
CHLORIDE BLD-SCNC: 94 MMOL/L (ref 98–107)
CO2: 22 MMOL/L (ref 20–31)
CREAT SERPL-MCNC: 1.02 MG/DL (ref 0.5–0.9)
DIFFERENTIAL TYPE: YES
EKG ATRIAL RATE: 107 BPM
EKG ATRIAL RATE: 122 BPM
EKG P AXIS: 50 DEGREES
EKG P-R INTERVAL: 174 MS
EKG Q-T INTERVAL: 368 MS
EKG Q-T INTERVAL: 376 MS
EKG QRS DURATION: 110 MS
EKG QRS DURATION: 134 MS
EKG QTC CALCULATION (BAZETT): 491 MS
EKG QTC CALCULATION (BAZETT): 535 MS
EKG R AXIS: -58 DEGREES
EKG R AXIS: -71 DEGREES
EKG T AXIS: 34 DEGREES
EKG T AXIS: 56 DEGREES
EKG VENTRICULAR RATE: 107 BPM
EKG VENTRICULAR RATE: 122 BPM
EOSINOPHILS RELATIVE PERCENT: 0 % (ref 0–5)
ESTIMATED AVERAGE GLUCOSE: 131 MG/DL
GFR AFRICAN AMERICAN: >60 ML/MIN
GFR NON-AFRICAN AMERICAN: 52 ML/MIN
GFR SERPL CREATININE-BSD FRML MDRD: ABNORMAL ML/MIN/{1.73_M2}
GFR SERPL CREATININE-BSD FRML MDRD: ABNORMAL ML/MIN/{1.73_M2}
GLUCOSE BLD-MCNC: 188 MG/DL (ref 70–99)
HBA1C MFR BLD: 6.2 % (ref 4–6)
HCT VFR BLD CALC: 38.4 % (ref 36–46)
HEMOGLOBIN: 12.8 G/DL (ref 12–16)
IMMATURE GRANULOCYTES: ABNORMAL %
LYMPHOCYTES # BLD: 21 % (ref 15–40)
MAGNESIUM: 1.6 MG/DL (ref 1.6–2.6)
MCH RBC QN AUTO: 28.3 PG (ref 26–34)
MCHC RBC AUTO-ENTMCNC: 33.2 G/DL (ref 31–37)
MCV RBC AUTO: 85.1 FL (ref 80–100)
MONOCYTES # BLD: 6 % (ref 4–8)
NRBC AUTOMATED: ABNORMAL PER 100 WBC
PDW BLD-RTO: 14.3 % (ref 12.1–15.2)
PLATELET # BLD: 300 K/UL (ref 140–450)
PLATELET ESTIMATE: ABNORMAL
PMV BLD AUTO: ABNORMAL FL (ref 6–12)
POTASSIUM SERPL-SCNC: 3.3 MMOL/L (ref 3.7–5.3)
RBC # BLD: 4.51 M/UL (ref 4–5.2)
RBC # BLD: ABNORMAL 10*6/UL
SARS-COV-2, RAPID: NOT DETECTED
SEG NEUTROPHILS: 73 % (ref 47–75)
SEGMENTED NEUTROPHILS ABSOLUTE COUNT: 11.3 K/UL (ref 2.5–7)
SODIUM BLD-SCNC: 133 MMOL/L (ref 135–144)
SPECIMEN DESCRIPTION: NORMAL
TOTAL PROTEIN: 7.9 G/DL (ref 6.4–8.3)
TROPONIN INTERP: ABNORMAL
TROPONIN INTERP: NORMAL
TROPONIN T: ABNORMAL NG/ML
TROPONIN T: NORMAL NG/ML
TROPONIN, HIGH SENSITIVITY: 13 NG/L (ref 0–14)
TROPONIN, HIGH SENSITIVITY: 15 NG/L (ref 0–14)
TSH SERPL DL<=0.05 MIU/L-ACNC: 2.57 MIU/L (ref 0.3–5)
WBC # BLD: 15.5 K/UL (ref 3.5–11)
WBC # BLD: ABNORMAL 10*3/UL

## 2021-11-02 PROCEDURE — G0378 HOSPITAL OBSERVATION PER HR: HCPCS

## 2021-11-02 PROCEDURE — 71045 X-RAY EXAM CHEST 1 VIEW: CPT

## 2021-11-02 PROCEDURE — 99285 EMERGENCY DEPT VISIT HI MDM: CPT

## 2021-11-02 PROCEDURE — 6360000002 HC RX W HCPCS: Performed by: EMERGENCY MEDICINE

## 2021-11-02 PROCEDURE — 96375 TX/PRO/DX INJ NEW DRUG ADDON: CPT

## 2021-11-02 PROCEDURE — 93005 ELECTROCARDIOGRAM TRACING: CPT | Performed by: INTERNAL MEDICINE

## 2021-11-02 PROCEDURE — C9803 HOPD COVID-19 SPEC COLLECT: HCPCS

## 2021-11-02 PROCEDURE — 84443 ASSAY THYROID STIM HORMONE: CPT

## 2021-11-02 PROCEDURE — 83735 ASSAY OF MAGNESIUM: CPT

## 2021-11-02 PROCEDURE — 2500000003 HC RX 250 WO HCPCS: Performed by: EMERGENCY MEDICINE

## 2021-11-02 PROCEDURE — 96365 THER/PROPH/DIAG IV INF INIT: CPT

## 2021-11-02 PROCEDURE — 80053 COMPREHEN METABOLIC PANEL: CPT

## 2021-11-02 PROCEDURE — 6370000000 HC RX 637 (ALT 250 FOR IP): Performed by: INTERNAL MEDICINE

## 2021-11-02 PROCEDURE — 85025 COMPLETE CBC W/AUTO DIFF WBC: CPT

## 2021-11-02 PROCEDURE — 36415 COLL VENOUS BLD VENIPUNCTURE: CPT

## 2021-11-02 PROCEDURE — 6370000000 HC RX 637 (ALT 250 FOR IP): Performed by: EMERGENCY MEDICINE

## 2021-11-02 PROCEDURE — 87635 SARS-COV-2 COVID-19 AMP PRB: CPT

## 2021-11-02 PROCEDURE — 84484 ASSAY OF TROPONIN QUANT: CPT

## 2021-11-02 PROCEDURE — 2580000003 HC RX 258: Performed by: EMERGENCY MEDICINE

## 2021-11-02 PROCEDURE — 2580000003 HC RX 258: Performed by: INTERNAL MEDICINE

## 2021-11-02 PROCEDURE — 93010 ELECTROCARDIOGRAM REPORT: CPT | Performed by: INTERNAL MEDICINE

## 2021-11-02 PROCEDURE — 93005 ELECTROCARDIOGRAM TRACING: CPT | Performed by: EMERGENCY MEDICINE

## 2021-11-02 RX ORDER — ASPIRIN 325 MG
325 TABLET ORAL DAILY
Status: DISCONTINUED | OUTPATIENT
Start: 2021-11-03 | End: 2021-11-05 | Stop reason: HOSPADM

## 2021-11-02 RX ORDER — ONDANSETRON 2 MG/ML
4 INJECTION INTRAMUSCULAR; INTRAVENOUS ONCE
Status: COMPLETED | OUTPATIENT
Start: 2021-11-02 | End: 2021-11-02

## 2021-11-02 RX ORDER — POLYETHYLENE GLYCOL 3350 17 G/17G
17 POWDER, FOR SOLUTION ORAL DAILY PRN
Status: DISCONTINUED | OUTPATIENT
Start: 2021-11-02 | End: 2021-11-05 | Stop reason: HOSPADM

## 2021-11-02 RX ORDER — CLOTRIMAZOLE 1 %
CREAM (GRAM) TOPICAL 2 TIMES DAILY
Status: DISCONTINUED | OUTPATIENT
Start: 2021-11-02 | End: 2021-11-05 | Stop reason: HOSPADM

## 2021-11-02 RX ORDER — ATORVASTATIN CALCIUM 20 MG/1
10 TABLET, FILM COATED ORAL DAILY
Status: DISCONTINUED | OUTPATIENT
Start: 2021-11-03 | End: 2021-11-05 | Stop reason: HOSPADM

## 2021-11-02 RX ORDER — ONDANSETRON 2 MG/ML
4 INJECTION INTRAMUSCULAR; INTRAVENOUS EVERY 6 HOURS PRN
Status: DISCONTINUED | OUTPATIENT
Start: 2021-11-02 | End: 2021-11-05 | Stop reason: HOSPADM

## 2021-11-02 RX ORDER — ONDANSETRON 4 MG/1
4 TABLET, ORALLY DISINTEGRATING ORAL EVERY 8 HOURS PRN
Status: DISCONTINUED | OUTPATIENT
Start: 2021-11-02 | End: 2021-11-05 | Stop reason: HOSPADM

## 2021-11-02 RX ORDER — METOPROLOL TARTRATE 5 MG/5ML
5 INJECTION INTRAVENOUS ONCE
Status: COMPLETED | OUTPATIENT
Start: 2021-11-02 | End: 2021-11-02

## 2021-11-02 RX ORDER — SODIUM CHLORIDE 0.9 % (FLUSH) 0.9 %
5-40 SYRINGE (ML) INJECTION EVERY 12 HOURS SCHEDULED
Status: DISCONTINUED | OUTPATIENT
Start: 2021-11-02 | End: 2021-11-05 | Stop reason: HOSPADM

## 2021-11-02 RX ORDER — SODIUM CHLORIDE 9 MG/ML
INJECTION, SOLUTION INTRAVENOUS CONTINUOUS
Status: DISCONTINUED | OUTPATIENT
Start: 2021-11-02 | End: 2021-11-04

## 2021-11-02 RX ORDER — POTASSIUM CHLORIDE 750 MG/1
20 TABLET, FILM COATED, EXTENDED RELEASE ORAL ONCE
Status: DISCONTINUED | OUTPATIENT
Start: 2021-11-02 | End: 2021-11-05 | Stop reason: HOSPADM

## 2021-11-02 RX ORDER — METOPROLOL TARTRATE 50 MG/1
50 TABLET, FILM COATED ORAL 2 TIMES DAILY
Status: DISCONTINUED | OUTPATIENT
Start: 2021-11-02 | End: 2021-11-04

## 2021-11-02 RX ORDER — FUROSEMIDE 40 MG/1
40 TABLET ORAL DAILY
Status: DISCONTINUED | OUTPATIENT
Start: 2021-11-03 | End: 2021-11-05 | Stop reason: HOSPADM

## 2021-11-02 RX ORDER — ACETAMINOPHEN 650 MG/1
650 SUPPOSITORY RECTAL EVERY 6 HOURS PRN
Status: DISCONTINUED | OUTPATIENT
Start: 2021-11-02 | End: 2021-11-05 | Stop reason: HOSPADM

## 2021-11-02 RX ORDER — ALLOPURINOL 100 MG/1
100 TABLET ORAL DAILY
Status: DISCONTINUED | OUTPATIENT
Start: 2021-11-03 | End: 2021-11-05 | Stop reason: HOSPADM

## 2021-11-02 RX ORDER — METOPROLOL TARTRATE 5 MG/5ML
5 INJECTION INTRAVENOUS EVERY 6 HOURS PRN
Status: DISCONTINUED | OUTPATIENT
Start: 2021-11-02 | End: 2021-11-05 | Stop reason: HOSPADM

## 2021-11-02 RX ORDER — 0.9 % SODIUM CHLORIDE 0.9 %
1000 INTRAVENOUS SOLUTION INTRAVENOUS ONCE
Status: COMPLETED | OUTPATIENT
Start: 2021-11-02 | End: 2021-11-02

## 2021-11-02 RX ORDER — SODIUM CHLORIDE 9 MG/ML
25 INJECTION, SOLUTION INTRAVENOUS PRN
Status: DISCONTINUED | OUTPATIENT
Start: 2021-11-02 | End: 2021-11-05 | Stop reason: HOSPADM

## 2021-11-02 RX ORDER — ACETAMINOPHEN 325 MG/1
650 TABLET ORAL EVERY 6 HOURS PRN
Status: DISCONTINUED | OUTPATIENT
Start: 2021-11-02 | End: 2021-11-05 | Stop reason: HOSPADM

## 2021-11-02 RX ORDER — SODIUM CHLORIDE 0.9 % (FLUSH) 0.9 %
5-40 SYRINGE (ML) INJECTION PRN
Status: DISCONTINUED | OUTPATIENT
Start: 2021-11-02 | End: 2021-11-05 | Stop reason: HOSPADM

## 2021-11-02 RX ORDER — METOPROLOL TARTRATE 50 MG/1
50 TABLET, FILM COATED ORAL ONCE
Status: COMPLETED | OUTPATIENT
Start: 2021-11-02 | End: 2021-11-02

## 2021-11-02 RX ADMIN — METOROPROLOL TARTRATE 5 MG: 5 INJECTION, SOLUTION INTRAVENOUS at 18:17

## 2021-11-02 RX ADMIN — SODIUM CHLORIDE 1000 ML: 9 INJECTION, SOLUTION INTRAVENOUS at 17:05

## 2021-11-02 RX ADMIN — POTASSIUM BICARBONATE 40 MEQ: 782 TABLET, EFFERVESCENT ORAL at 19:39

## 2021-11-02 RX ADMIN — ONDANSETRON 4 MG: 2 INJECTION INTRAMUSCULAR; INTRAVENOUS at 17:14

## 2021-11-02 RX ADMIN — METOPROLOL TARTRATE 50 MG: 50 TABLET, FILM COATED ORAL at 19:39

## 2021-11-02 RX ADMIN — CEFTRIAXONE SODIUM 1000 MG: 1 INJECTION, POWDER, FOR SOLUTION INTRAMUSCULAR; INTRAVENOUS at 18:19

## 2021-11-02 RX ADMIN — SODIUM CHLORIDE: 9 INJECTION, SOLUTION INTRAVENOUS at 21:31

## 2021-11-02 ASSESSMENT — PAIN SCALES - GENERAL
PAINLEVEL_OUTOF10: 0
PAINLEVEL_OUTOF10: 0

## 2021-11-02 NOTE — ED NOTES
Pt ask this nurse to call her . This nurse attempted to call patients spouse with no answer.        Helena Desai, RN  11/02/21 3646

## 2021-11-02 NOTE — ED PROVIDER NOTES
[address] COMPLAINT    Chief Complaint   Patient presents with    Loss of Consciousness       HPI    Jami Norwood is a 80 y.o. female who presentsto ED from nursing home. By EMS. With complaint of episodes of loss of consciousness. Onset tonight. Patient was seen in the emergency room yesterday patient was evaluated for syncopal episodes. Patient was sent home on Levaquin for UTI. Patient was brought to ED patient is awake and alert. She denies chest pain denies abdominal pain denies shortness of breath more than usual.  Patient is morbidly obese female with history of lower extremity lymphedema      PAST MEDICAL HISTORY    Past Medical History:   Diagnosis Date    Cancer ([facility] [address]     basal cell skin cancer    Hyperlipidemia     Hypertension     Lymphedema of lower extremity     Obesity     Shingles     70's    Type II or unspecified type diabetes mellitus without mention of complication, not stated as uncontrolled     Venous stasis dermatitis        SURGICAL HISTORY    Past Surgical History:   Procedure Laterality Date    APPENDECTOMY      COLONOSCOPY      DILATION AND CURETTAGE OF UTERUS      JOINT REPLACEMENT      Rt knee    SD OFFICE/OUTPT VISIT,PROCEDURE ONLY Right 7/19/2018    RIGHT HIP MASS INCISION AND DRAINAGE performed by Checo Vargas MD at [address] [facility] OFFICE/OUTPT VISIT,PROCEDURE ONLY Right 8/1/2018    HIP INCISION AND DRAINAGE (Necrotic Fat Right Hip Debridement) performed by Checo Vargas MD at [address] [facility]    Current Outpatient Rx   Medication Sig Dispense Refill    levoFLOXacin (LEVAQUIN) 500 MG tablet Take 1 tablet by mouth daily for 9 days First home dose 11/2/2021 9 tablet 0    nystatin (MYCOSTATIN) 102007 UNIT/GM powder Apply topically 4 times daily.     Pharmacist: May substitute for another powder as needed for insurance reasons 30 g 0    Lancets ([address]) MISC Testing blood sugar once daily and as needed, uses OneTouch UltraMini 100 each 5    blood glucose test strips (ASCENSIA AUTODISC VI;ONE TOUCH ULTRA TEST VI) strip Testing blood sugar once daily and as needed 100 each 5    metoprolol tartrate (LOPRESSOR) 50 MG tablet Take 1 tablet by mouth 2 times daily 180 tablet 1    allopurinol (ZYLOPRIM) 100 MG tablet TAKE 1 TABLET EVERY DAY 90 tablet 1    furosemide (LASIX) 80 MG tablet TAKE 1 TABLET EVERY DAY 90 tablet 1    losartan (COZAAR) 100 MG tablet TAKE 1 TABLET EVERY DAY 90 tablet 1    metFORMIN (GLUCOPHAGE) 500 MG tablet Take 1 tablet by mouth 2 times daily (with meals) 180 tablet 1    simvastatin (ZOCOR) 20 MG tablet TAKE 1 TABLET EVERY NIGHT 90 tablet 1    blood glucose test strips (ONE TOUCH ULTRA TEST) strip Test blood sugar once daily and as needed. 100 strip 3    Calcium Carb-Cholecalciferol (CALCIUM 600 + D PO) Take by mouth daily      aspirin 325 MG tablet Take 325 mg by mouth daily      Glucose Blood (CHOICE DM FORA G20 TEST STRIPS VI) by In Vitro route. ALLERGIES    Allergies   Allergen Reactions    Adhesive Tape      Takes her skin off uses paper tape.     Sulfa Antibiotics        FAMILY HISTORY    Family History   Problem Relation Age of Onset    Heart Disease Mother     Cancer Sister     Mult Sclerosis Sister     Cancer Brother         lung       SOCIAL HISTORY    Social History     Socioeconomic History    Marital status:      Spouse name: None    Number of children: None    Years of education: None    Highest education level: None   Occupational History    None   Tobacco Use    Smoking status: Never Smoker    Smokeless tobacco: Never Used   Vaping Use    Vaping Use: Never used   Substance and Sexual Activity    Alcohol use: No    Drug use: No    Sexual activity: None   Other Topics Concern    None   Social History Narrative    None     Social Determinants of Health     Financial Resource Strain:     Difficulty of Paying Living Expenses:    Food Insecurity:     Worried About Running Out of Food in the Last Year:     [address] in the Last Year:    Transportation Needs:     Lack of Transportation (Medical):  Lack of Transportation (Non-Medical):    Physical Activity:     Days of Exercise per Week:     Minutes of Exercise per Session:    Stress:     Feeling of Stress :    Social Connections:     Frequency of Communication with Friends and Family:     Frequency of Social Gatherings with Friends and Family:     Attends [employer] Services:     Active Member of Clubs or Organizations:     Attends Club or Organization Meetings:     Marital Status:    Intimate Partner Violence:     Fear of Current or Ex-Partner:     Emotionally Abused:     Physically Abused:     Sexually Abused:            Review of Systems:  Constitutional: Positive for syncopal episodes eyes:  Denies photophobia or discharge   HENT:  Denies sore throat or ear pain   Respiratory:  Denies cough or shortness of breath   Cardiovascular:  Denies chest pain, palpitations or swelling   GI:  Denies abdominal pain, nausea, vomiting, or diarrhea   Musculoskeletal:  Denies back pain   Skin:  Denies rash   Neurologic:  Denies headache, focal weakness or sensory changes   Endocrine:  Denies polyuria or polydypsia   Lymphatic:  Denies swollen glands   Psychiatric:  Denies depression, suicidal ideation or homicidal ideation   All systems negative except as marked. PHYSICAL EXAM    VITAL SIGNS: BP (!) 87/46   Pulse 82   Temp 97.6 °F (36.4 °C)   Resp 17   Ht 5' 3\" (1.6 m)   Wt 274 lb (124.3 kg)   SpO2 98%   BMI 48.54 kg/m²    Constitutional: Elderly female HENT:  Normocephalic, Atraumatic, Bilateral external ears normal, Oropharynx moist, No oral exudates, Nose normal. Neck- Normal range of motion, No tenderness, Supple, No stridor. Eyes:  PERRL, EOMI, Conjunctiva normal, No discharge.    Respiratory:  Normal breath sounds, No respiratory distress, No wheezing, No chest tenderness. Cardiovascular:  Normal heart rate, Normal rhythm, No murmurs, No rubs, No gallops. GI: Morbidly obese abdomen : External genitalia appear normal, No masses or lesions. No discharge. No CVA tenderness. Musculoskeletal: Positive bilateral lower extremity lymphedema   integument:  1st and 2nd degree decubiti   Lymphatic:  No lymphadenopathy noted. Neurologic:  Alert & oriented x 3, Normal motor function, Normal sensory function, No focal deficits noted. Psychiatric:  Affect normal, Judgment normal, Mood normal.     EKG    Sinus rhythm. Patient has episodes of paroxysmal supraventricular tachycardia    RADIOLOGY    XR CHEST PORTABLE   Final Result   No apparent infiltrate or failure. Very limited evaluation of the abdomen with regards to possibility of free air,    and x-ray of the abdomen could be considered noting that the chest x-ray with    no obvious etiology of the stated high white count. PROCEDURES        Labs  Labs Reviewed   CBC WITH AUTO DIFFERENTIAL - Abnormal; Notable for the following components:       Result Value    WBC 15.5 (*)     Segs Absolute 11.30 (*)     All other components within normal limits   COMPREHENSIVE METABOLIC PANEL - Abnormal; Notable for the following components:    Glucose 188 (*)     CREATININE 1.02 (*)     Sodium 133 (*)     Potassium 3.3 (*)     Chloride 94 (*)     Alkaline Phosphatase 146 (*)     Albumin 3.1 (*)     GFR Non- 52 (*)     All other components within normal limits   TROPONIN - Abnormal; Notable for the following components:    Troponin, High Sensitivity 15 (*)     All other components within normal limits             Summation      Patient Course: Patient was found to have 5 episodes of SVTs in ED. Patient was given metoprolol 5 mg IV. Patient is given metoprolol 50 mg p.o. Observation on telemetry will be discussed with .   Prior to admission patient is in stable condition. ED Medications administered this visit:    Medications   potassium bicarb-citric acid (EFFER-K) effervescent tablet 40 mEq (has no administration in time range)   metoprolol tartrate (LOPRESSOR) tablet 50 mg (has no administration in time range)   0.9 % sodium chloride bolus (0 mLs IntraVENous Stopped 11/2/21 1814)   ondansetron (ZOFRAN) injection 4 mg (4 mg IntraVENous Given 11/2/21 1714)   cefTRIAXone (ROCEPHIN) 1000 mg IVPB in 50 mL D5W minibag ( IntraVENous Stopped 11/2/21 1846)   metoprolol (LOPRESSOR) injection 5 mg (5 mg IntraVENous Given 11/2/21 1817)       New Prescriptions from this visit:    New Prescriptions    No medications on file       Follow-up:  No follow-up provider specified. Final Impression:   1. Syncope and collapse    2.  Paroxysmal supraventricular tachycardia ([facility] [address]               (Please note that portions of this note were completed with a voice recognition program.  Efforts were made to edit the dictations but occasionally words are mis-transcribed.)         Teresa Young MD  11/02/21 1930       Teresa Young MD  11/02/21 2000

## 2021-11-03 ENCOUNTER — APPOINTMENT (OUTPATIENT)
Dept: CT IMAGING | Age: 81
DRG: 690 | End: 2021-11-03
Payer: MEDICARE

## 2021-11-03 LAB
ANION GAP SERPL CALCULATED.3IONS-SCNC: 8 MMOL/L (ref 9–17)
BUN BLDV-MCNC: 10 MG/DL (ref 8–23)
BUN/CREAT BLD: 11 (ref 9–20)
CALCIUM SERPL-MCNC: 8.4 MG/DL (ref 8.6–10.4)
CHLORIDE BLD-SCNC: 101 MMOL/L (ref 98–107)
CO2: 28 MMOL/L (ref 20–31)
CREAT SERPL-MCNC: 0.88 MG/DL (ref 0.5–0.9)
CULTURE: ABNORMAL
CULTURE: ABNORMAL
EKG ATRIAL RATE: 94 BPM
EKG P-R INTERVAL: 184 MS
EKG Q-T INTERVAL: 422 MS
EKG QRS DURATION: 130 MS
EKG QTC CALCULATION (BAZETT): 527 MS
EKG R AXIS: -62 DEGREES
EKG T AXIS: 38 DEGREES
EKG VENTRICULAR RATE: 94 BPM
GFR AFRICAN AMERICAN: >60 ML/MIN
GFR NON-AFRICAN AMERICAN: >60 ML/MIN
GFR SERPL CREATININE-BSD FRML MDRD: ABNORMAL ML/MIN/{1.73_M2}
GFR SERPL CREATININE-BSD FRML MDRD: ABNORMAL ML/MIN/{1.73_M2}
GLUCOSE BLD-MCNC: 82 MG/DL (ref 70–99)
LV EF: 55 %
LVEF MODALITY: NORMAL
Lab: ABNORMAL
POTASSIUM SERPL-SCNC: 4.1 MMOL/L (ref 3.7–5.3)
SODIUM BLD-SCNC: 137 MMOL/L (ref 135–144)
SPECIMEN DESCRIPTION: ABNORMAL

## 2021-11-03 PROCEDURE — 93010 ELECTROCARDIOGRAM REPORT: CPT | Performed by: INTERNAL MEDICINE

## 2021-11-03 PROCEDURE — 36415 COLL VENOUS BLD VENIPUNCTURE: CPT

## 2021-11-03 PROCEDURE — G0378 HOSPITAL OBSERVATION PER HR: HCPCS

## 2021-11-03 PROCEDURE — 93306 TTE W/DOPPLER COMPLETE: CPT

## 2021-11-03 PROCEDURE — 6360000004 HC RX CONTRAST MEDICATION: Performed by: INTERNAL MEDICINE

## 2021-11-03 PROCEDURE — 99999 PR OFFICE/OUTPT VISIT,PROCEDURE ONLY: CPT | Performed by: INTERNAL MEDICINE

## 2021-11-03 PROCEDURE — 2580000003 HC RX 258: Performed by: INTERNAL MEDICINE

## 2021-11-03 PROCEDURE — 6370000000 HC RX 637 (ALT 250 FOR IP): Performed by: INTERNAL MEDICINE

## 2021-11-03 PROCEDURE — 80048 BASIC METABOLIC PNL TOTAL CA: CPT

## 2021-11-03 PROCEDURE — 94761 N-INVAS EAR/PLS OXIMETRY MLT: CPT

## 2021-11-03 PROCEDURE — G1010 CDSM STANSON: HCPCS

## 2021-11-03 PROCEDURE — 6360000002 HC RX W HCPCS: Performed by: INTERNAL MEDICINE

## 2021-11-03 RX ORDER — MULTIVITAMIN WITH IRON
1 TABLET ORAL DAILY
Status: DISCONTINUED | OUTPATIENT
Start: 2021-11-03 | End: 2021-11-05 | Stop reason: HOSPADM

## 2021-11-03 RX ORDER — FLUCONAZOLE 100 MG/1
150 TABLET ORAL ONCE
Status: COMPLETED | OUTPATIENT
Start: 2021-11-03 | End: 2021-11-03

## 2021-11-03 RX ADMIN — METOPROLOL TARTRATE 50 MG: 50 TABLET, FILM COATED ORAL at 21:13

## 2021-11-03 RX ADMIN — IOPAMIDOL 75 ML: 755 INJECTION, SOLUTION INTRAVENOUS at 06:57

## 2021-11-03 RX ADMIN — FLUCONAZOLE 150 MG: 100 TABLET ORAL at 10:11

## 2021-11-03 RX ADMIN — METOPROLOL TARTRATE 50 MG: 50 TABLET, FILM COATED ORAL at 08:26

## 2021-11-03 ASSESSMENT — PAIN SCALES - GENERAL
PAINLEVEL_OUTOF10: 0

## 2021-11-03 ASSESSMENT — ENCOUNTER SYMPTOMS
DIARRHEA: 0
VOMITING: 0

## 2021-11-03 NOTE — CONSULTS
[place] [address]                                  CONSULTATION    PATIENT NAME: Elli Amaral                   :        1940  MED REC NO:   [id number]                              ROOM:       [id number]  ACCOUNT NO:   [de-identified]                           ADMIT DATE: 2021  PROVIDER:     Lorin Benjamin    CONSULT DATE:  2021    REASON FOR CONSULT:  1. Syncope. 2.  SVT. HISTORY OF PRESENT ILLNESS:  The patient is a pleasant 80-year-old  female who I saw on 2018, for atypical chest pain. At that time,  she was doing well and I did not feel her chest pain was cardiac. I did  not do any testing. I have not seen her since that time. Her bedside  echocardiogram at the time of that exam showed an EF of 55% to 60%. She lives at home with her . She has, over the last one to two  months, had increasing weakness and loss of energy. She has also had  approximately 40-pound weight loss over the last several months. She developed rather severe diarrhea over the last three to four weeks  with minimal abdominal pain. Her appetite has been poor. On , she was unable to get out of her rocking chair and EMS was  called and took her to the hospital.  She was found to have a UTI. She  made a decision to go to [facility] following the ER visit for \"therapy. \"   Of note, on exam in the emergency room, she had an extensive area of  excoriation of the right breast and right popliteal region consistent  with fungal infection. She was placed on Mycostatin powder. Yesterday, she was in the bathroom at [facility] and had a syncopal  episode. I did not talk to the witness and so I am not sure if she was  bearing down at the time that she had her syncopal episode. She said  that she could hear people talking but could not respond. She has no history of palpitations. She had no unusual shortness of  breath or any unusual loss of energy. In the emergency room, she had short runs of SVT at approximately 140  beats per minute. One is captured on a 12-lead EKG. She is in a  regular rhythm with her SVT and there is no evidence of atrial  fibrillation. She was started on Rocephin and transferred to our unit. She has had no  further arrhythmias in our unit. When I see her, she is awake and alert. She appears weak. She is able  to answer questions appropriately and is in no pain. Again, she denies  any chest pain or chest discomfort at home. She states she is becoming  weaker and has difficulty standing and doing her daily activities. She  does use a walker and has used a walker for several years. She has never had a myocardial infarction, never had a cardiac  catheterization. She has never had a cardiac stress test.    CARDIAC RISK FACTORS:  Other Family Members:  Negative. Hypertension:  Positive. Hyperlipidemia:  Positive. Non-Insulin-Dependent Diabetes:  Positive. Peripheral Vascular Disease:  Negative. Smoking:  Negative. MEDICATIONS PRIOR TO ADMISSION:  She was on levofloxacin 500 mg daily  since , Lopressor 50 mg b.i.d., Zyloprim 100 mg daily, Cozaar 100  mg daily, Glucophage 500 mg b.i.d., Zocor 20 mg daily, Lasix 80 mg  daily. PAST MEDICAL AND SURGICAL HISTORY:  1. She had right knee replacement. 2.  Non-insulin-dependent diabetes for many years. 3.  D and C years ago. 4.  Appendectomy. 5.  Lymphedema in lower extremities. 6.  Hypertension. 7.  Hyperlipidemia. 8.  Right knee replacement. FAMILY HISTORY:  Mother passed away of CHF. Father unknown. Two  brothers and one sister  of cancer. No heart disease. SOCIAL HISTORY:  She is 80years old, . Had three children, one  girl  in crib death. Two boys, Allegra Gamino, 64, and another son, 47,  _____, works in the railroad.  worked in the railroad for 40  years. She walks with a walker, although she has had difficulty doing  any activity at home over the last three to four weeks because of  weakness. REVIEW OF SYSTEMS:  Cardiac as above. Other systems reviewed including  constitutional, eyes, ears, nose and throat, cardiovascular,  respiratory, GI, , musculoskeletal, integumentary, neurologic,  endocrine, hematologic and allergic/immunologic, which are negative  except for what is described above. She has had approximately a  30-pound weight loss over the last three to four months. She has had  running diarrhea. Appetite has been poor. PHYSICAL EXAMINATION:  VITAL SIGNS:  Her blood pressure was 122/58 with a heart rate of 100 and  regular. Respiratory rate 18. O2 sat 97%. She weighed 288 pounds. GENERAL:  She is a pleasant 25-year-old female. Denied pain. She was  oriented to person, place and time. Answered questions appropriately. SKIN:  No unusual skin changes. HEENT:  The pupils are equally round and intact. Mucous membranes were  dry. NECK:  No JVD. Good carotid pulses. No carotid bruits. No  lymphadenopathy or thyromegaly. CARDIOVASCULAR EXAM:  S1 and S2 were normal.  No S3 or S4. Soft  systolic blowing type murmur. No diastolic murmur. LUNGS:  Quite clear to auscultation and percussion. ABDOMEN:  Soft and nontender. Could not feel any masses. Could not  feel liver, spleen or aorta. EXTREMITIES:  She has a history of lymphedema but her calves were soft  and there was no edema present. NEUROLOGIC EXAM:  Unremarkable. PSYCHIATRIC EXAM:  Unremarkable. LABORATORY DATA:  Her sodium was 133, potassium 3.3, BUN 10, creatinine  1.02, GFR was 52. ALT was 7, AST was 8. White count was 15.5,  hemoglobin 12.8 with a platelet count of 690,496. Her cholesterol was  119 with an HDL of 74, LDL 25, triglyceride 98. Hemoglobin A1c was 6.2. Urinalysis showed 3+ bacteria. EKG showed sinus rhythm with a right bundle-branch block. Yesterday,  she had a short burst of SVT at a relatively slow rate of 130 to 140  beats per minute, and that was asymptomatic. Chest x-ray was unremarkable with no CHF present. IMPRESSION:  1. Syncope at the [facility] while going to the bathroom, which may be  vasovagal syncope. 2.  Bradycardia on 11/01, when she was taken to the emergency room with  a heart rate of 40, with her cool and clammy, which resolved in the  emergency room. 3.  Five short episodes of SVT in the ER yesterday between 130 and 140  beats per minute, which were asymptomatic. 4.  UTI, on Rocephin. 5.  Weakness, progressive over the last several months. 6.  A 40-pound weight loss in the last three to four months. 7.  Running diarrhea for the last several weeks. 8.  Chronic right bundle-branch block. PLAN:  1. No change in medications. 2.  Continue to treat her UTI and see if she has any more episodes of  SVT as she improves from her UTI. 3.  If she does have more episodes of SVT, then would place her on  Cardizem  mg daily. 4.  We might need to decrease her Lopressor, if we had Cardizem, with  Lopressor decreased to 25 mg b.i.d.  5.  Echocardiogram.  6.  CT scan of the abdomen and pelvis because of her running diarrhea  and her 40-pound weight loss. DISCUSSION:  The patient has had progressive weakness over the last  several months. She has used a walker for several years but now has  difficulty getting out of a chair. In fact on 11/01, she was unable to  get out of the chair and was taken to the ER. On her EMS report of that  admission on 11/01, she had an episode of 40 beats per minute, with  being cool and clammy. Again, this may be an autonomic response. She  had no further episodes of bradycardia in the ER. She was treated with Levaquin and she went to [facility] for physical  therapy. In [facility], she was in the bathroom and then had a syncopal episode.    This again may have been vasovagal.    She is weak secondary to her UTI. She did have SVT in the emergency  room and had none on the floor. I would not specifically change her medications at this time since she  is being treated for urinary tract infection. If she would have more  episodes of SVT in our unit, then I would add Cardizem  mg daily. We might need to decrease her Lopressor to 25 mg b.i.d. if Cardizem is  added. Her weight loss and running diarrhea is concerning, and for  completeness, I ordered a CT scan of abdomen and pelvis. From a cardiac standpoint, she is stable and she has no evidence of  congestive heart failure or acute coronary syndrome. She gives no  history of chest pain or chest discomfort. We will do an echocardiogram  to reevaluate her LV function. Thank you very much for allowing me the privilege of seeing the patient. If you have any questions on my thoughts, please do not hesitate to  contact me.         Reyes Fischer    D: 11/03/2021 6:39:25       T: 11/03/2021 6:45:28     [id number]  Job#: [id number]     Doc#: [id number]    CC:  Karen Gan

## 2021-11-03 NOTE — PROGRESS NOTES
Comprehensive Nutrition Assessment    Type and Reason for Visit:  Initial, Positive Nutrition Screen, Wound    Nutrition Recommendations/Plan:  Encourage dietary protein and less sodium    Nutrition Assessment:  Increased nutrient needs r/t acute injury or trauma, AEB buttocks wound. Acute weight gains per historical data to current weight, with 4+ BLE / lymphedema noted. There is no recent weight losses of 40# noted (260-286# fluctuations over past 4 years). Has chewing difficulty for which a modified texture diet may aid, especially for protein foods which may aid healing. Furthermore, reducing dietary sodium recommended to reduce fluid retention. Upon visit, declines use of modified protein foods for ease of comsumption, but agreeable to try some Glucerna. Also recommend use of mvi w/minerals to aid wound healing. Malnutrition Assessment:  Malnutrition Status: At risk for malnutrition (Comment)    Context:  Acute Illness     Findings of the 6 clinical characteristics of malnutrition:  Energy Intake:  Mild decrease in energy intake (Comment) (lower protein food intakes)  Weight Loss:  No significant weight loss     Body Fat Loss:  No significant body fat loss     Muscle Mass Loss:  No significant muscle mass loss    Fluid Accumulation:  7 - Moderate to Severe Extremities   Strength:  Not Performed    Estimated Daily Nutrient Needs:  Energy (kcal):  4360-4927 (11-15); Weight Used for Energy Requirements:  Current     Protein (g):  71-82 (1.3-1.5); Weight Used for Protein Requirements:  Ideal        Fluid (ml/day):  2000; Method Used for Fluid Requirements:  1 ml/kcal      Nutrition Related Findings:  4+ BLE edema, obese      Wounds:  Stage II (buttocks)       Current Nutrition Therapies:    ADULT ORAL NUTRITION SUPPLEMENT; Breakfast, Lunch, Dinner; Diabetic Oral Supplement  ADULT DIET; Regular; 4 carb choices (60 gm/meal);  No Added Salt (3-4 gm)    Anthropometric Measures:  · Height: 5' 4\" (162.6

## 2021-11-03 NOTE — DISCHARGE INSTR - COC
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    Cutaneous abscess of buttock L02.31    Decubitus ulcer of right hip L89.219    PMB (postmenopausal bleeding) N95.0    Morbid obesity with BMI of 50.0-59.9, adult ([facility]) E66.01, Z68.43    SVT (supraventricular tachycardia) ([facility]) I47.1       Isolation/Infection:   Isolation            No Isolation          Patient Infection Status       None to display            Nurse Assessment:  Last Vital Signs: BP (!) 122/58   Pulse 108   Temp 97.7 °F (36.5 °C) (Oral)   Resp 18   Ht 5' 4\" (1.626 m)   Wt 288 lb (130.6 kg)   SpO2 97%   BMI 49.44 kg/m²     Last documented pain score (0-10 scale): Pain Level: 0  Last Weight:   Wt Readings from Last 1 Encounters:   11/02/21 288 lb (130.6 kg)     Mental Status:  oriented and alert    IV Access:  - None    Nursing Mobility/ADLs:  Walking   Assisted  Transfer  Assisted  Bathing  Dependent  Dressing  Assisted  Toileting  Dependent  Feeding  Independent  Med Admin  Assisted  Med Delivery   whole    Wound Care Documentation and Therapy:  Wound 11/02/21 Buttocks Medial;Inner pressure stage 2 inner butts, excoriation extends down to thighs (Active)   Number of days: 0        Elimination:  Continence:   · Bowel: Yes  · Bladder: occasional incontinence  Urinary Catheter: None   Colostomy/Ileostomy/Ileal Conduit: No       Date of Last BM: PTA    Intake/Output Summary (Last 24 hours) at 11/3/2021 0637  Last data filed at 11/2/2021 1857  Gross per 24 hour   Intake 40.7 ml   Output --   Net 40.7 ml     I/O last 3 completed shifts: In: 40.7 [IV Piggyback:40.7]  Out: -     Safety Concerns:      At Risk for Falls    Impairments/Disabilities:      Vision    Nutrition Therapy:  Current Nutrition Therapy: 4 Carb Diet      Routes of Feeding: Oral  Liquids: No Restrictions  Daily Fluid Restriction: no  Last Modified Barium Swallow with Video (Video Swallowing Test): not done    Treatments at the Time of Hospital Discharge: Respiratory Treatments: none  Oxygen Therapy:  is not on home oxygen therapy. Ventilator:    - No ventilator support    Rehab Therapies: Physical Therapy and Occupational Therapy  Weight Bearing Status/Restrictions: No weight bearing restirctions  Other Medical Equipment (for information only, NOT a DME order):  wheelchair, walker and hospital bed  Other Treatments: wound care-medicated lotion to excoriated areas    Patient's personal belongings (please select all that are sent with patient):  Glasses, Dentures upper and lower    RN SIGNATURE:  Electronically signed by Camryn Rubio RN on 11/5/21 at 10:09 AM EDT    CASE MANAGEMENT/SOCIAL WORK SECTION    Inpatient Status [facility] Campa 11/2/2021    Readmission Risk Assessment Score:  Readmission Risk              Risk of Unplanned Readmission:  0           Discharging to Facility/ Agency   · Name: [facility]  · Address: [place] [address]  · Phone: [phone]  · Fax: [phone]    Dialysis Facility (if applicable)   · Name:  · Address:  · Dialysis Schedule:  · Phone:  · Fax:    / signature: Electronically signed by Jose Landry on 11/3/21 at 6:38 AM EDT    PHYSICIAN SECTION    Prognosis: Fair    Condition at Discharge: Stable    Rehab Potential (if transferring to Rehab): Fair    Recommended Labs or Other Treatments After Discharge:     Physician Certification: I certify the above information and transfer of Hans Iverson  is necessary for the continuing treatment of the diagnosis listed and that she requires [place] for less 30 days.      Update Admission H&P: No change in H&P    PHYSICIAN SIGNATURE:  Electronically signed by Nima Young MD on 11/5/21 at 7:37 AM EDT

## 2021-11-03 NOTE — PROGRESS NOTES
Cardiology    1. Syncope at [facility] by bathroom   2. Bradycardia in EMS with HR 40 bpm, cool and clammy  3.  5 short episodes of SVT in ER at 130-140 bpm  4. UTI on antibiotics  5. Weakness  6. Recent diarrhea  7.  40 lb weight loss in past 3-4 months  8. Chronic RBBB    EKG: NSR with RBBB with PAC's, and short run of SVT at 130-140 bpm.    She has become progressively weaker over past month. States her appetite is down, and complains of runny diarrhea. No chest pain or discomfort. No particular sob. Could not get out of chair on 11-1-21 and came to ER and went to [facility] for therapy. On 11-2 she had syncopal episode while in bathroom which was witnessed. She said she could hear but not respond. Brought to ER. Pressure good in ER, with short runs of SVT. She has had no arrhythmia's since being on medical floor. No evidence of CAD or ACS. With her UTI, I would not change therapy as of yet for her SVT. As she is being monitored on unit, if she would have more SVT, would add Cardizem  mg daily, although may have to decrease Lopressor to prevent bradycardia or hypotension. Echo is pending. Because of wt loss and diarrhea, loss of appetite, will do CT of abdomen and pelvis with IV contrast for completeness.     Thanks, Gilmer Cesar MD

## 2021-11-03 NOTE — ACP (ADVANCE CARE PLANNING)
Advance Care Planning     Advance Care Planning Activator (Inpatient)  Conversation Note      Date of ACP Conversation: 11/3/2021     Conversation Conducted with: Patient with Decision Making Capacity    ACP Activator: Landy Gil, [address] [facility] Decision Maker:     Current Designated Health Care Decision Maker:     Primary Decision Maker: Deshaun Clancy - Spouse - [phone]    Secondary Decision Maker: Valencia Calderon - Child - [phone]  Click here to complete Healthcare Decision Makers including section of the Healthcare Decision Maker Relationship (ie \"Primary\")  Today we documented Decision Maker(s) consistent with Legal Next of Kin hierarchy. Care Preferences    Ventilation: \"If you were in your present state of health and suddenly became very ill and were unable to breathe on your own, what would your preference be about the use of a ventilator (breathing machine) if it were available to you? \"      Would the patient desire the use of ventilator (breathing machine)?: yes    \"If your health worsens and it becomes clear that your chance of recovery is unlikely, what would your preference be about the use of a ventilator (breathing machine) if it were available to you? \"     Would the patient desire the use of ventilator (breathing machine)?: No      Resuscitation  \"CPR works best to restart the heart when there is a sudden event, like a heart attack, in someone who is otherwise healthy. Unfortunately, CPR does not typically restart the heart for people who have serious health conditions or who are very sick. \"    \"In the event your heart stopped as a result of an underlying serious health condition, would you want attempts to be made to restart your heart (answer \"yes\" for attempt to resuscitate) or would you prefer a natural death (answer \"no\" for do not attempt to resuscitate)? \" yes, for now, pt considering DNR order       [x] Yes   [] No   Educated Patient / Decision Maker regarding differences between Advance Directives and portable DNR orders. Length of ACP Conversation in minutes:  10  Conversation Outcomes:  [x] ACP discussion completed  [] Existing advance directive reviewed with patient; no changes to patient's previously recorded wishes  [] New Advance Directive completed  [] Portable Do Not Rescitate prepared for Provider review and signature  [] POLST/POST/MOLST/MOST prepared for Provider review and signature      Follow-up plan:    [] Schedule follow-up conversation to continue planning  [] Referred individual to Provider for additional questions/concerns   [] Advised patient/agent/surrogate to review completed ACP document and update if needed with changes in condition, patient preferences or care setting    [x] This note routed to one or more involved healthcare providers    1/24/2022 ACP note reviewed with spouse and he reports that this remains accurate to pt wishes.

## 2021-11-03 NOTE — PROGRESS NOTES
SW met with pt to complete assessment this morning. SW just met with pt and spouse on Monday in ER when arrangements were made for her transfer to [facility]. Pt lives with her spouse int heir home in [place]. Pt and spouse have been  for 62 years. Pt uses a walker at home to ambulate with but was only going from her chair to the bathroom. Pt describes her house as not being conducive to being able to move around much and the small bathroom she has is not easy for her to maneuver. Pt is currently receiving staff assistance at [facility] while she is there for a skilled stay to get stronger and hopefully return home. Pt's spouse was transporting her to appointments. Pt is a full code and is thinking about changing this. SW and pt discussed different DNR codes and SW provided paper to pt so that she could read about them and then decide how she would like to proceed. Pt follows with Dr Tatum Marie as PCP. Pt states that she has advance directives but they are not on file here. Copy requested when she is able. ACP note completed with pt. Pt's medications are covered well by her insurance. Pt plans to return to [facility] at discharge. No further needs or concerns identified at this time. SW will follow and remain available.  Thea WUW 11/3/2021

## 2021-11-03 NOTE — PLAN OF CARE
Problem: Falls - Risk of:  Goal: Will remain free from falls  Description: Will remain free from falls  Outcome: Ongoing  Goal: Absence of physical injury  Description: Absence of physical injury  Outcome: Ongoing     Problem: Skin Integrity:  Goal: Will show no infection signs and symptoms  Description: Will show no infection signs and symptoms  Outcome: Ongoing  Goal: Absence of new skin breakdown  Description: Absence of new skin breakdown  Outcome: Ongoing  Goal: Risk for impaired skin integrity will decrease  Description: Risk for impaired skin integrity will decrease  Outcome: Ongoing     Problem: Cardiac:  Goal: Ability to maintain vital signs within normal range will improve  Description: Ability to maintain vital signs within normal range will improve  Outcome: Ongoing  Goal: Cardiovascular alteration will improve  Description: Cardiovascular alteration will improve  Outcome: Ongoing  Goal: Ability to maintain an adequate cardiac output will improve  Description: Ability to maintain an adequate cardiac output will improve  Outcome: Ongoing  Goal: Hemodynamic stability will improve  Outcome: Ongoing     Problem: Health Behavior:  Goal: Will modify at least one risk factor affecting health status  Description: Will modify at least one risk factor affecting health status  Outcome: Ongoing  Goal: Identification of resources available to assist in meeting health care needs will improve  Description: Identification of resources available to assist in meeting health care needs will improve  Outcome: Ongoing  Goal: Ability to identify and utilize available resources and services will improve  Description: Ability to identify and utilize available resources and services will improve  Outcome: Ongoing  Goal: Ability to manage health-related needs will improve  Outcome: Ongoing     Problem: Physical Regulation:  Goal: Complications related to the disease process, condition or treatment will be avoided or minimized  Description: Complications related to the disease process, condition or treatment will be avoided or minimized  Outcome: Ongoing  Goal: Diagnostic test results will improve  Description: Diagnostic test results will improve  Outcome: Ongoing     Problem: Sensory:  Goal: General experience of comfort will improve  Description: General experience of comfort will improve  Outcome: Ongoing     Problem: Urinary Elimination:  Goal: Complications related to the disease process, condition or treatment will be avoided or minimized  Description: Complications related to the disease process, condition or treatment will be avoided or minimized  Outcome: Ongoing  Goal: Signs and symptoms of infection will decrease  Description: Signs and symptoms of infection will decrease  Outcome: Ongoing  Goal: Ability to reestablish a normal urinary elimination pattern will improve - after catheter removal  Description: Ability to reestablish a normal urinary elimination pattern will improve  Outcome: Ongoing     Problem: Fluid Volume:  Goal: Ability to achieve and maintain adequate urine output will improve  Description: Ability to achieve and maintain adequate urine output will improve  Outcome: Ongoing  Goal: Ability to maintain a balanced intake and output will improve  Description: Ability to maintain a balanced intake and output will improve  Outcome: Ongoing     Problem: Respiratory:  Goal: Respiratory status will improve  Description: Respiratory status will improve  Outcome: Ongoing     Problem:  Activity:  Goal: Risk for activity intolerance will decrease  Description: Risk for activity intolerance will decrease  Outcome: Ongoing     Problem: Coping:  Goal: Ability to adjust to condition or change in health will improve  Description: Ability to adjust to condition or change in health will improve  Outcome: Ongoing     Problem: Metabolic:  Goal: Ability to maintain appropriate glucose levels will improve  Description: Ability to maintain appropriate glucose levels will improve  Outcome: Ongoing     Problem: Nutritional:  Goal: Maintenance of adequate nutrition will improve  Description: Maintenance of adequate nutrition will improve  Outcome: Ongoing  Goal: Progress toward achieving an optimal weight will improve  Description: Progress toward achieving an optimal weight will improve  Outcome: Ongoing     Problem: Tissue Perfusion:  Goal: Adequacy of tissue perfusion will improve  Description: Adequacy of tissue perfusion will improve  Outcome: Ongoing

## 2021-11-03 NOTE — H&P
History & Physical    Patient:  Adelso Miller  YOB: 1940  Date of Service: 11/3/2021  MRN: [id number]   Acct:   [de-identified]   Primary Care Physician: Shruthi Bailon MD    Chief Complaint:   Chief Complaint   Patient presents with    Loss of Consciousness       History of Present Illness: The patient is a 80 y.o. female presented to the emergency room from [address] [place] [address] for evaluation of syncopal episode at [facility] yesterday. Patient states that she was in the bathroom and felt weak and passed out once. She reports no preceding symptoms such as chest pain, dizziness, headache, palpitations. She is not sure how long she was unconscious. She was brought to the emergency room for evaluation. She was noted to have episodes of SVTs with heart rate in 140s. Patient was asymptomatic. She has no history of cardiac problems in the past.  The patient states that over the last several months she has been progressively becoming weak. She lives at home with her  and became very inactive. States her appetite has been poor and she did not feel like eating. She lost about 40 pounds in the last couple of months. She reports no pain, no nausea or vomiting. She has been having episodes of diarrhea but attributes to certain food that she eats. The patient reports that her hygiene has been extremely poor because the bathroom is upstairs and she was not able to go there. They have no shower downstairs. She reports that her house is very cluttered and it is hard to get around. The patient was in the emergency room on 11/01/2021 complaining of generalized weakness, inability to ambulate.   Her work-up in the emergency room during that visit revealed evidence of UTI, extensive areas of excoriations and rash on the bilateral breast areas, right popliteal area consistent with candidiasis, she was also noted to have skin breakdown with stage II ulceration in the perineal and bilateral medial buttocks and posterior thighs. The patient was discharged to [facility] on 11/1 from ER with Levaquin for UTI and also PT and OT. Due to syncopal episode on 11/2 she was brought back to the emergency room. Her work-up revealed no fever, heart rate was between 127-140s, respirations 16, blood pressure 105/65. EKG did have evidence of SVTs, right bundle branch block. Chest x-ray revealed no acute cardiac pulmonary process. BMP revealed sodium 133, potassium 3.3, BUN 10, creatinine 1.02. She had elevated WBC count 15.5, hemoglobin was 12.8, platelets 133,415. Urinalysis 11/1 revealed positive nitrates, 3+ leukocyte esterase, loaded WBCs and 3+ bacteria. The patient was treated in the emergency room with IV Lopressor and her heart rate stabilized. Due to persisting episodes of SVTs in ER the patient was admitted for observation. This morning she has no complaints. States feels better            Past Medical History:        Diagnosis Date    Cancer ([facility] [place] [address]     basal cell skin cancer    Hyperlipidemia     Hypertension     Lymphedema of lower extremity     Obesity     Shingles     66's    Type II or unspecified type diabetes mellitus without mention of complication, not stated as uncontrolled     Venous stasis dermatitis        Past Surgical History:        Procedure Laterality Date    APPENDECTOMY      COLONOSCOPY      DILATION AND CURETTAGE OF UTERUS      JOINT REPLACEMENT      Rt knee    SC OFFICE/OUTPT VISIT,PROCEDURE ONLY Right 7/19/2018    RIGHT HIP MASS INCISION AND DRAINAGE performed by Seng Sanders MD at [address] [facility] OFFICE/OUTPT VISIT,PROCEDURE ONLY Right 8/1/2018    HIP INCISION AND DRAINAGE (Necrotic Fat Right Hip Debridement) performed by Seng Sanders MD at [facility] Medications:   No current facility-administered medications on file prior to encounter.      Current Outpatient Medications on File Prior to Encounter   Medication Sig Dispense Refill    levoFLOXacin (LEVAQUIN) 500 MG tablet Take 1 tablet by mouth daily for 9 days First home dose 11/2/2021 9 tablet 0    metoprolol tartrate (LOPRESSOR) 50 MG tablet Take 1 tablet by mouth 2 times daily 180 tablet 1    allopurinol (ZYLOPRIM) 100 MG tablet TAKE 1 TABLET EVERY DAY 90 tablet 1    losartan (COZAAR) 100 MG tablet TAKE 1 TABLET EVERY DAY 90 tablet 1    metFORMIN (GLUCOPHAGE) 500 MG tablet Take 1 tablet by mouth 2 times daily (with meals) 180 tablet 1    simvastatin (ZOCOR) 20 MG tablet TAKE 1 TABLET EVERY NIGHT 90 tablet 1    Calcium Carb-Cholecalciferol (CALCIUM 600 + D PO) Take by mouth daily      aspirin 325 MG tablet Take 325 mg by mouth daily      Lancets (ONETOUCH DELICA PLUS [id number]) MISC Testing blood sugar once daily and as needed, uses OneTouch UltraMini 100 each 5    blood glucose test strips (ASCENSIA AUTODISC VI;ONE TOUCH ULTRA TEST VI) strip Testing blood sugar once daily and as needed 100 each 5    furosemide (LASIX) 80 MG tablet TAKE 1 TABLET EVERY DAY 90 tablet 1    blood glucose test strips (ONE TOUCH ULTRA TEST) strip Test blood sugar once daily and as needed. 100 strip 3    Glucose Blood (CHOICE DM FORA G20 TEST STRIPS VI) by In Vitro route. Allergies:  Adhesive tape and Sulfa antibiotics    Social History:    reports that she has never smoked. She has never used smokeless tobacco. She reports that she does not drink alcohol and does not use drugs. Family History:       Problem Relation Age of Onset    Heart Disease Mother     Cancer Sister     Mult Sclerosis Sister     Cancer Brother         lung       Review of systems:  Constitutional: no fever, no night sweats, positive for generalized weakness, poor appetite, positive for unexplained weight loss  Head: no headache, no head injury  Eye: no blurring of vision, no double vision.   Ears: no hearing difficulty, no earache  Mouth/throat: no dysphagia   Lungs: no cough, no shortness of breath, no wheeze  CVS: no palpitation, no chest pain, no shortness of breath  GI: no abdominal pain, no nausea , no vomiting, positive for diarrhea  VALERI: no dysuria, positive for frequency and urgency, no hematuria  Musculoskeletal: Positive for chronic left knee pain, positive for severe lymphedema lower extremities  Endocrine: no polyuria, polydypsia, no cold or heat intolerence  Hematology: no anemia, no easy brusing or bleeding  Dermatology: Positive for multiple areas of candidiasis and superficial ulcerations  Psychiatry: no depression, no anxiety  Neurology: Positive for syncope, no seizures, no numbness or tingling of hands, no numbness or tingling of feet, no paresis      Vitals:   Vitals:    11/03/21 0824   BP: (!) 112/43   Pulse: 98   Resp: 18   Temp: 97.8 °F (36.6 °C)   SpO2: 95%      BMI: Body mass index is 49.44 kg/m².     Physical Exam:  General Appearance: alert and oriented to person, place and time, in no acute distress  Cardiovascular: normal rate, regular rhythm, normal S1 and S2, no murmurs  Pulmonary/Chest: clear to auscultation bilaterally, no wheezes, rales or rhonchi, normal air movement, no respiratory distress  Abdomen: soft, non-tender, non-distended, normal bowel sounds, no masses   Extremities: Legs with severe lymphedema, dry scaly skin  Skin: Extensive areas of intertrigo under bilateral breast areas, right popliteal area, underneath the pannus  Head: normocephalic and atraumatic, oral mucosa moist  Eyes: pupils equal, round, and reactive to light  Neck: supple and non-tender without mass, no thyromegaly   Musculoskeletal: normal range of motion, no joint swelling  Neurological: alert, oriented, normal speech, no focal findings or movement disorder noted    Review of Labs and Diagnostic Testing:    Recent Results (from the past 24 hour(s))   EKG 12 Lead    Collection Time: 11/02/21  4:50 PM   Result Value Ref Range    Ventricular Rate 107 BPM    Atrial Rate 107 BPM    P-R Interval 174 ms    QRS Duration 134 ms    Q-T Interval 368 ms    QTc Calculation (Bazett) 491 ms    P Axis 50 degrees    R Axis -58 degrees    T Axis 56 degrees   CBC Auto Differential    Collection Time: 11/02/21  5:00 PM   Result Value Ref Range    WBC 15.5 (H) 3.5 - 11.0 k/uL    RBC 4.51 4.0 - 5.2 m/uL    Hemoglobin 12.8 12.0 - 16.0 g/dL    Hematocrit 38.4 36 - 46 %    MCV 85.1 80 - 100 fL    MCH 28.3 26 - 34 pg    MCHC 33.2 31 - 37 g/dL    RDW 14.3 12.1 - 15.2 %    Platelets 576 305 - 053 k/uL    MPV NOT REPORTED 6.0 - 12.0 fL    NRBC Automated NOT REPORTED per 100 WBC    Differential Type YES     Seg Neutrophils 73 47 - 75 %    Lymphocytes 21 15 - 40 %    Monocytes 6 4 - 8 %    Eosinophils % 0 0 - 5 %    Basophils 0 0 - 2 %    Immature Granulocytes NOT REPORTED 0 %    Segs Absolute 11.30 (H) 2.5 - 7.0 k/uL    Absolute Lymph # 3.20 1.0 - 4.8 k/uL    Absolute Mono # 0.90 0.0 - 1.0 k/uL    Absolute Eos # 0.00 0.0 - 0.4 k/uL    Basophils Absolute 0.10 0.0 - 0.2 k/uL    Absolute Immature Granulocyte NOT REPORTED 0.00 - 0.30 k/uL    WBC Morphology NOT REPORTED     RBC Morphology NOT REPORTED     Platelet Estimate NOT REPORTED    Comprehensive Metabolic Panel    Collection Time: 11/02/21  5:00 PM   Result Value Ref Range    Glucose 188 (H) 70 - 99 mg/dL    BUN 10 8 - 23 mg/dL    CREATININE 1.02 (H) 0.50 - 0.90 mg/dL    Bun/Cre Ratio NOT REPORTED 9 - 20    Calcium 9.3 8.6 - 10.4 mg/dL    Sodium 133 (L) 135 - 144 mmol/L    Potassium 3.3 (L) 3.7 - 5.3 mmol/L    Chloride 94 (L) 98 - 107 mmol/L    CO2 22 20 - 31 mmol/L    Anion Gap 17 9 - 17 mmol/L    Alkaline Phosphatase 146 (H) 35 - 104 U/L    ALT 7 5 - 33 U/L    AST 8 <32 U/L    Total Bilirubin 0.40 0.30 - 1.20 mg/dL    Total Protein 7.9 6.4 - 8.3 g/dL    Albumin 3.1 (L) 3.5 - 5.2 g/dL    Albumin/Globulin Ratio NOT REPORTED 1.0 - 2.5    GFR Non-African American 52 (L) >60 mL/min    GFR African American >60 >60 mL/min    GFR Comment          GFR Staging NOT REPORTED    Troponin    Collection Time: 11/02/21  5:00 PM Result Value Ref Range    Troponin, High Sensitivity 15 (H) 0 - 14 ng/L    Troponin T NOT REPORTED <0.03 ng/mL    Troponin Interp NOT REPORTED    EKG 12 Lead    Collection Time: 11/02/21  6:04 PM   Result Value Ref Range    Ventricular Rate 122 BPM    Atrial Rate 122 BPM    QRS Duration 110 ms    Q-T Interval 376 ms    QTc Calculation (Bazett) 535 ms    R Axis -71 degrees    T Axis 34 degrees   EKG 12 Lead    Collection Time: 11/02/21  6:07 PM   Result Value Ref Range    Ventricular Rate 94 BPM    Atrial Rate 94 BPM    P-R Interval 184 ms    QRS Duration 130 ms    Q-T Interval 422 ms    QTc Calculation (Bazett) 527 ms    R Axis -62 degrees    T Axis 38 degrees   Troponin    Collection Time: 11/02/21  7:43 PM   Result Value Ref Range    Troponin, High Sensitivity 13 0 - 14 ng/L    Troponin T NOT REPORTED <0.03 ng/mL    Troponin Interp NOT REPORTED    COVID-19, Rapid    Collection Time: 11/02/21  8:21 PM    Specimen: Nasopharyngeal Swab   Result Value Ref Range    Specimen Description . NASOPHARYNGEAL SWAB     SARS-CoV-2, Rapid Not Detected Not Detected   TSH with Reflex    Collection Time: 11/02/21  9:33 PM   Result Value Ref Range    TSH 2.57 0.30 - 5.00 mIU/L   Magnesium    Collection Time: 11/02/21  9:33 PM   Result Value Ref Range    Magnesium 1.6 1.6 - 2.6 mg/dL   Basic Metabolic Panel w/ Reflex to MG    Collection Time: 11/03/21  5:00 AM   Result Value Ref Range    Glucose 82 70 - 99 mg/dL    BUN 10 8 - 23 mg/dL    CREATININE 0.88 0.50 - 0.90 mg/dL    Bun/Cre Ratio 11 9 - 20    Calcium 8.4 (L) 8.6 - 10.4 mg/dL    Sodium 137 135 - 144 mmol/L    Potassium 4.1 3.7 - 5.3 mmol/L    Chloride 101 98 - 107 mmol/L    CO2 28 20 - 31 mmol/L    Anion Gap 8 (L) 9 - 17 mmol/L    GFR Non-African American >60 >60 mL/min    GFR African American >60 >60 mL/min    GFR Comment          GFR Staging NOT REPORTED        Radiology:     XR CHEST PORTABLE    Result Date: 11/2/2021  EXAM: XR CHEST PORTABLE HISTORY: Reason for exam:->Syncope, elevated white blood cell count COMPARISON: May 8, 2018 TECHNIQUE: Frontal chest FINDINGS: the lungs appear clear with no obvious infiltrate or failure. The heart is not enlarged. Aorta calcified. Bony structures with no apparent fractures. There is limited evaluation of the upper abdomen and the study is not definitive with regard to the possibility of free air, consider at minimum x-ray for that evaluation. No apparent infiltrate or failure. Very limited evaluation of the abdomen with regards to possibility of free air, and x-ray of the abdomen could be considered noting that the chest x-ray with no obvious etiology of the stated high white count. Assessment/ Plan:    1. SVT - patient was asymptomatic. Admitted for observation, monitor on telemetry, echo pending. Heart rate is stable on Lopressor. Appreciate Dr. Soledad Avitia consultation. Plan is to start her on Cardizem  mg daily with decreased dose of Lopressor if she develops more episodes of SVTs. 2. Syncope - possibly vasovagal  3. UTI - on IV Rocephin, urine cultures pending  4. Unexplained weight loss - CT abdomen and pelvis ordered by Dr. Terrell Ríos and pending  5. Diarrhea - according to patient she had diarrhea off and on and triggered with certain food  6. Hypokalemia - replaced, on supplement  7. Generalized weakness - discharge to [facility] for PT and OT when medically stable  8. Extensive intertrigo/Candidiasis of the skin -on clotrimazole cream, will treat with Diflucan 150 mg 1 dose  9. Diabetes mellitus type 2, non-insulin-dependent, controlled - on Metformin, last hemoglobin A1c was 6.2% on 11/1/2021  10. Hyperlipidemia  11. Chronic lymphedema  12. Morbid obesity with BMI 50.0-59.9          Advanced Care Plan   ( x)  I confirmed that the patient's Advanced Care Plan is present, code status documented, or surrogate decision maker is listed in the patient's medical record.   ( )  The patient's advanced care plan is not present because:  (select)   ( ) I confirmed today that the patient does not wish or was not able to name a surrogate decision maker or provide an [address] ( ) Hospice care is currently being provided or has been provided this calender year. ( )  I did not confirm today the presence of an [address] or surrogate decision maker documented within the patient's medical record. (Does not satisfy MIPS performance). Documentation of Current Medications in the Medical Record   ( x)  I have utilized all available immediate resources to obtain, update, or review the patient's current medications. If Yes, Stop Here  ( ) The patient is not eligible for medications reconciliation; the patient is in an emergent medical situation where delaying treatment would jeopardize the patient's health. ( ) I did not confirm, update or review the patient's current list of medications today. (does not satisfy MIPS performance)        Medical Necessity: Inpatient admission is appropriate for this patient secondary to the need of      Estimated length of stay: days. The beneficiary may reasonably be expected to be discharged or transferred to a hospital within 96 hours after admission.     DVT prophylaxis:   [x] Lovenox   [] SCDs   [] SQ Heparin   [] Encourage ambulation, low risk for DVT, no chemical or mechanical    prophylaxis necessary      [] Already on Anticoagulation    Anticipated Disposition upon discharge:   [] Home   [] Home with Home Health   [x] Ernie Pako   [] [address]      Electronically signed by Allegra Maier MD on 11/3/2021 at 9:15 AM

## 2021-11-04 ENCOUNTER — APPOINTMENT (OUTPATIENT)
Dept: MRI IMAGING | Age: 81
DRG: 690 | End: 2021-11-04
Payer: MEDICARE

## 2021-11-04 LAB
ABSOLUTE EOS #: 0 K/UL (ref 0–0.4)
ABSOLUTE IMMATURE GRANULOCYTE: ABNORMAL K/UL (ref 0–0.3)
ABSOLUTE LYMPH #: 1.7 K/UL (ref 1–4.8)
ABSOLUTE MONO #: 0.5 K/UL (ref 0–1)
ANION GAP SERPL CALCULATED.3IONS-SCNC: 5 MMOL/L (ref 9–17)
BASOPHILS # BLD: 0 % (ref 0–2)
BASOPHILS ABSOLUTE: 0 K/UL (ref 0–0.2)
BUN BLDV-MCNC: 9 MG/DL (ref 8–23)
BUN/CREAT BLD: 10 (ref 9–20)
CALCIUM SERPL-MCNC: 8.4 MG/DL (ref 8.6–10.4)
CHLORIDE BLD-SCNC: 101 MMOL/L (ref 98–107)
CO2: 30 MMOL/L (ref 20–31)
CREAT SERPL-MCNC: 0.91 MG/DL (ref 0.5–0.9)
DIFFERENTIAL TYPE: YES
EKG ATRIAL RATE: 99 BPM
EKG P AXIS: 54 DEGREES
EKG P-R INTERVAL: 176 MS
EKG Q-T INTERVAL: 372 MS
EKG QRS DURATION: 74 MS
EKG QTC CALCULATION (BAZETT): 477 MS
EKG R AXIS: -26 DEGREES
EKG T AXIS: 16 DEGREES
EKG VENTRICULAR RATE: 99 BPM
EOSINOPHILS RELATIVE PERCENT: 0 % (ref 0–5)
GFR AFRICAN AMERICAN: >60 ML/MIN
GFR NON-AFRICAN AMERICAN: 59 ML/MIN
GFR SERPL CREATININE-BSD FRML MDRD: ABNORMAL ML/MIN/{1.73_M2}
GFR SERPL CREATININE-BSD FRML MDRD: ABNORMAL ML/MIN/{1.73_M2}
GLUCOSE BLD-MCNC: 99 MG/DL (ref 70–99)
HCT VFR BLD CALC: 28 % (ref 36–46)
HEMOGLOBIN: 9.3 G/DL (ref 12–16)
IMMATURE GRANULOCYTES: ABNORMAL %
LYMPHOCYTES # BLD: 28 % (ref 15–40)
MCH RBC QN AUTO: 27.9 PG (ref 26–34)
MCHC RBC AUTO-ENTMCNC: 33 G/DL (ref 31–37)
MCV RBC AUTO: 84.4 FL (ref 80–100)
MONOCYTES # BLD: 8 % (ref 4–8)
NRBC AUTOMATED: ABNORMAL PER 100 WBC
PDW BLD-RTO: 14.2 % (ref 12.1–15.2)
PLATELET # BLD: 197 K/UL (ref 140–450)
PLATELET ESTIMATE: ABNORMAL
PMV BLD AUTO: ABNORMAL FL (ref 6–12)
POTASSIUM SERPL-SCNC: 3.1 MMOL/L (ref 3.7–5.3)
RBC # BLD: 3.32 M/UL (ref 4–5.2)
RBC # BLD: ABNORMAL 10*6/UL
SEG NEUTROPHILS: 64 % (ref 47–75)
SEGMENTED NEUTROPHILS ABSOLUTE COUNT: 3.9 K/UL (ref 2.5–7)
SODIUM BLD-SCNC: 136 MMOL/L (ref 135–144)
WBC # BLD: 6.1 K/UL (ref 3.5–11)
WBC # BLD: ABNORMAL 10*3/UL

## 2021-11-04 PROCEDURE — 99231 SBSQ HOSP IP/OBS SF/LOW 25: CPT | Performed by: INTERNAL MEDICINE

## 2021-11-04 PROCEDURE — 74183 MRI ABD W/O CNTR FLWD CNTR: CPT

## 2021-11-04 PROCEDURE — 1200000000 HC SEMI PRIVATE

## 2021-11-04 PROCEDURE — 2580000003 HC RX 258: Performed by: INTERNAL MEDICINE

## 2021-11-04 PROCEDURE — A9577 INJ MULTIHANCE: HCPCS | Performed by: PHYSICIAN ASSISTANT

## 2021-11-04 PROCEDURE — 36415 COLL VENOUS BLD VENIPUNCTURE: CPT

## 2021-11-04 PROCEDURE — 6370000000 HC RX 637 (ALT 250 FOR IP): Performed by: INTERNAL MEDICINE

## 2021-11-04 PROCEDURE — 94761 N-INVAS EAR/PLS OXIMETRY MLT: CPT

## 2021-11-04 PROCEDURE — 6360000002 HC RX W HCPCS: Performed by: INTERNAL MEDICINE

## 2021-11-04 PROCEDURE — 80048 BASIC METABOLIC PNL TOTAL CA: CPT

## 2021-11-04 PROCEDURE — 6360000004 HC RX CONTRAST MEDICATION: Performed by: PHYSICIAN ASSISTANT

## 2021-11-04 PROCEDURE — 93010 ELECTROCARDIOGRAM REPORT: CPT | Performed by: INTERNAL MEDICINE

## 2021-11-04 PROCEDURE — 85025 COMPLETE CBC W/AUTO DIFF WBC: CPT

## 2021-11-04 PROCEDURE — 72197 MRI PELVIS W/O & W/DYE: CPT

## 2021-11-04 PROCEDURE — 93005 ELECTROCARDIOGRAM TRACING: CPT | Performed by: INTERNAL MEDICINE

## 2021-11-04 RX ORDER — POTASSIUM CHLORIDE 750 MG/1
40 TABLET, FILM COATED, EXTENDED RELEASE ORAL ONCE
Status: COMPLETED | OUTPATIENT
Start: 2021-11-04 | End: 2021-11-04

## 2021-11-04 RX ADMIN — POTASSIUM CHLORIDE 40 MEQ: 750 TABLET, FILM COATED, EXTENDED RELEASE ORAL at 07:53

## 2021-11-04 RX ADMIN — GADOBENATE DIMEGLUMINE 20 ML: 529 INJECTION, SOLUTION INTRAVENOUS at 15:47

## 2021-11-04 ASSESSMENT — PAIN SCALES - GENERAL
PAINLEVEL_OUTOF10: 0

## 2021-11-04 NOTE — CONSULTS
UROLOGY CONSULT    Patient:  Tarah Ulloa  MRN: [id number]    Chief Complaint:   The patient is a 80 y.o. female who presents with possible ureteral mass    HISTORY OF PRESENT ILLNESS:   Patient presented to the emergency room on 11/1/2021 with progressive fatigue. Patient had a white blood cell count of 12.4. She was straight cathed for urine. She did have a urinalysis indicative of urinary tract infection. She was started on antibiotics -Levaquin. She did have wounds on her buttocks and right popliteal.  Decision was made for her to go to [place] [address]. Patient returned to the emergency room 11/2/2021 after a loss of consciousness. In the emergency room it was discovered that she had SVT with heart rates in the 140s. Patient was ultimately admitted to the hospital.  Patient's urine culture did grow beta-hemolytic strep as well as E. coli. Patient was started on IV Rocephin. Patient did have a CT abdomen pelvis with IV contrast which was independently reviewed showing no ureteral calculi. There is calcification within a possible mass of the right kidney. This is unable to be completely determined with current imaging. 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 Patient has had a 40 pound weight loss and loss of appetite over the past couple months. Patient's BUN is 9, creatinine of 0.91. Patient's white blood cell count is down to 6.1.     Past Medical History:    Past Medical History:   Diagnosis Date    Cancer ([facility] [address]     basal cell skin cancer    Hyperlipidemia     Hypertension     Lymphedema of lower extremity     Obesity     Shingles     66's    Type II or unspecified type diabetes mellitus without mention of complication, not stated as uncontrolled     Venous stasis dermatitis        Past Surgical History:    Past Surgical History:   Procedure Laterality Date    APPENDECTOMY      COLONOSCOPY      DILATION AND CURETTAGE OF UTERUS  JOINT REPLACEMENT      Rt knee    AR OFFICE/OUTPT VISIT,PROCEDURE ONLY Right 7/19/2018    RIGHT HIP MASS INCISION AND DRAINAGE performed by Debra Alexis MD at [address] [facility] OFFICE/OUTPT VISIT,PROCEDURE ONLY Right 8/1/2018    HIP INCISION AND DRAINAGE (Necrotic Fat Right Hip Debridement) performed by Debra Alexis MD at [facility] OR        Medications:    Scheduled Meds:   metoprolol tartrate  25 mg Oral BID    multivitamin  1 tablet Oral Daily    dilTIAZem  30 mg Oral TID    allopurinol  100 mg Oral Daily    aspirin  325 mg Oral Daily    oyster shell calcium w/D  1 tablet Oral Daily    furosemide  40 mg Oral Daily    [Held by provider] metFORMIN  500 mg Oral BID WC    atorvastatin  10 mg Oral Daily    clotrimazole   Topical BID    potassium chloride  20 mEq Oral Once    cefTRIAXone (ROCEPHIN) IV  1,000 mg IntraVENous Q24H    sodium chloride flush  5-40 mL IntraVENous 2 times per day    enoxaparin  40 mg SubCUTAneous Daily    influenza virus vaccine  0.5 mL IntraMUSCular Prior to discharge     Continuous Infusions:   sodium chloride       PRN Meds:.metoprolol, sodium chloride flush, sodium chloride, ondansetron **OR** ondansetron, polyethylene glycol, acetaminophen **OR** acetaminophen    Allergies:    Adhesive tape and Sulfa antibiotics    Social History:    Social History     Socioeconomic History    Marital status:      Spouse name: Not on file    Number of children: Not on file    Years of education: Not on file    Highest education level: Not on file   Occupational History    Not on file   Tobacco Use    Smoking status: Never Smoker    Smokeless tobacco: Never Used   Vaping Use    Vaping Use: Never used   Substance and Sexual Activity    Alcohol use: No    Drug use: No    Sexual activity: Not on file   Other Topics Concern    Not on file   Social History Narrative    Not on file     Social Determinants of Health     Financial Resource Strain:     Difficulty of Paying Living Expenses:    Food Insecurity:     Worried About [address] [facility] [employer] in the Last Year:     [address] in the Last Year:    Transportation Needs:     Lack of Transportation (Medical):  Lack of Transportation (Non-Medical):    Physical Activity:     Days of Exercise per Week:     Minutes of Exercise per Session:    Stress:     Feeling of Stress :    Social Connections:     Frequency of Communication with Friends and Family:     Frequency of Social Gatherings with Friends and Family:     Attends [employer] Services:     Active Member of Clubs or Organizations:     Attends Club or Organization Meetings:     Marital Status:    Intimate Partner Violence:     Fear of Current or Ex-Partner:     Emotionally Abused:     Physically Abused:     Sexually Abused:        Family History:    Family History   Problem Relation Age of Onset    Heart Disease Mother     Cancer Sister     Mult Sclerosis Sister     Cancer Brother         lung       REVIEW OF SYSTEMS:  Constitutional: Negative for fever, chills. Positive for unintentional weight loss  Respiratory: Negative for shortness of breath and wheezing. Cardiovascular: Negative for chest pain and palpitations. Gastrointestinal: Negative for nausea, vomiting, diarrhea, constipation  Endocrine: Negative for polydipsia and polyuria. Genitourinary: Negative for flank pain, negative for hematuria  Musculoskeletal: Negative for myalgias and joint swelling. Skin: Negative for rash and wound. Neurological: Negative for dizziness and headaches.    Hematological: Bruises easily    PHYSICAL EXAM:  Patient Vitals for the past 24 hrs:   BP Temp Temp src Pulse Resp SpO2   11/04/21 1130 (!) 108/53 97.8 °F (36.6 °C) Oral 89 18 96 %   11/04/21 0730 (!) 108/45 98.4 °F (36.9 °C) Oral 66 16 96 %   11/04/21 0619 -- -- -- -- -- 99 %   11/04/21 0253 (!) 131/52 98 °F (36.7 °C) Oral 70 16 99 %   11/03/21 2207 -- -- -- -- -- 98 %   11/03/21 1922 (!) 108/59 98.1 °F (36.7 °C) Oral 99 18 100 %   11/03/21 1800 (!) 107/53 97.6 °F (36.4 °C) Oral 92 16 96 %   11/03/21 1730 (!) 115/56 -- -- -- -- --     Constitutional: Patient resting comfortably, in no acute distress. Neuro: Alert and oriented to person place and time. Psych: Mood and affect normal.  HEENT: normocephalic, atraumatic  Lungs: Respiratory effort normal, unlabored  Abdomen: Soft, non-tender, non-distended  : No CVA tenderness bilat. Pelvic: Deferred  Extremities: Calves are non-tender    LABS:  Recent Labs     11/02/21  1700 11/04/21  0445   WBC 15.5* 6.1   HGB 12.8 9.3*   HCT 38.4 28.0*   MCV 85.1 84.4    197     Recent Labs     11/02/21  1700 11/03/21  0500 11/04/21  0445   * 137 136   K 3.3* 4.1 3.1*   CL 94* 101 101   CO2 22 28 30   BUN 10 10 9   CREATININE 1.02* 0.88 0.91*     No results found for: PSA    Urinalysis: No results for input(s): COLORU, PHUR, LABCAST, WBCUA, RBCUA, MUCUS, TRICHOMONAS, YEAST, BACTERIA, CLARITYU, SPECGRAV, LEUKOCYTESUR, UROBILINOGEN, BILIRUBINUR, BLOODU in the last 72 hours. Invalid input(s): NITRATE, GLUCOSEUKETONESUAMORPHOUS     Additional Lab/culture results:  11/3/2021 10:06 AM - Ken Sargent Incoming Lab Results From [employer]    Specimen Information: Urine, clean catch        Component Collected Lab   Specimen Description 11/01/2021  8:30 AM - [facility]   . URINE, MIDSTREAM    Special Requests 11/01/2021  8:30 AM [facility] [address] CATH    Culture Abnormal  11/01/2021  8:30 AM [address] >[id number] CFU/ML    Culture Abnormal  11/01/2021  8:30  [address]   STREPTOCOCCI, BETA HEMOLYTIC GROUP B >[id number] CFU/ML    Testing Performed By    Lab - [facility]. Name Director Address Valid Date Range   [facility] Moustapha Strong M.D. [address] [place] [address] 12/17/20 0000-Present   [address] Kem Aldridge MD [address] [facility] [address] 08/30/17 0801-Present   Susceptibility    Escherichia coli (1)    Antibiotic Interpretation ANASTACIA Status    amikacin  NOT REPORTED Final    ampicillin Resistant >=32 Final    ampicillin-sulbactam  NOT REPORTED Final    aztreonam Sensitive <=1 Final    ceFAZolin Sensitive 8 Final    ceFAZolin Sensitive Cefazolin sensitivity results can be used to predict the effectiveness of oral cephalosporins (eg. Cephalexin) in uncomplicated Urinary Tract Infections due to E. coli, K. pneumoniae, and P. mirabilis Final    cefepime  NOT REPORTED Final    cefTRIAXone Sensitive <=1 Final    ciprofloxacin Sensitive <=0.25 Final    ertapenem  NOT REPORTED Final    Confirmatory Extended Spectrum Beta-Lactamase Negative NEGATIVE Final    gentamicin Sensitive <=1 Final    meropenem  NOT REPORTED Final    nitrofurantoin Sensitive <=16 Final    tigecycline  NOT REPORTED Final    tobramycin Sensitive <=1 Final    trimethoprim-sulfamethoxazole Resistant >=320 Final    piperacillin-tazobactam Resistant >=128 Final          Imaging Results:  See above    ASSESSMENT AND PLAN:  Impression:    Patient Active Problem List   Diagnosis    Essential hypertension, benign    Type 2 diabetes mellitus without complication (HCC)    Mixed hyperlipidemia    Wart    Benign skin lesion of forearm    Lymphedema    Acute drug-induced gout of left knee    Cutaneous abscess of buttock    Decubitus ulcer of right hip    PMB (postmenopausal bleeding)    Morbid obesity with BMI of 50.0-59.9, adult (HCC)    SVT [facility]ra[facility]ricular tachycardia) ([facility])       Plan:   Continue IV Rocephin, changed to appropriate oral antibiotic on discharge    We are concerned about patient's 40+ pound weight loss.     Will obtain a MRI abdomen pelvis with and without contrast to better characterize findings on CT    We will follow up with patient in the office in 2 weeks    ------------------------------------------------  Thank you for the consultation.   I have discussed the care of this patient including pertinent history and exam findings, lab and imaging results, assessment, orders and plan as documented above with Blanquita Rossi MD.    Electronically signed by María Fry PA-C on 11/4/2021 at 1:17 PM

## 2021-11-04 NOTE — PROGRESS NOTES
Dr. Lan Taylor called regarding inability to complete MRI today and need for more contrast and MRI tomorrow; Dr. Lan Taylor would like MRI held. Radiology made aware.

## 2021-11-04 NOTE — PROGRESS NOTES
Cardiology    She had episode of SVT last night, which was asymptomatic. Started Cardizem 30 mg bid, with no further SVT, however, developed transient bradycardia at midnight. Will decrease Lopressor to 25 mg bid and observe.     Thanks, Kwabena Allison MD

## 2021-11-04 NOTE — PROGRESS NOTES
Hospitalist Progress Note  11/4/2021 7:34 AM  Subjective:   Admit Date: 11/2/2021  PCP: Joanna Anthony MD    Interval History:   Power Clayton states that she is doing better. She has no complaints. She had more runs of asymptomatic SVTs last night and was started on Cardizem by Dr. Toya Downs. She did develop bradycardia at midnight. Lopressor was decreased to 25 mg twice daily. She was also found to have abnormal CT scan abdomen and pelvis showing UVJ junction obstruction with right hydronephrosis. Patient reports no abdominal pain or flank pain. Diet: ADULT ORAL NUTRITION SUPPLEMENT; Breakfast, Lunch, Dinner; Diabetic Oral Supplement  ADULT DIET; Regular; 4 carb choices (60 gm/meal); No Added Salt (3-4 gm)  Medications:   Scheduled Meds:   metoprolol tartrate  25 mg Oral BID    potassium chloride  40 mEq Oral Once    multivitamin  1 tablet Oral Daily    dilTIAZem  30 mg Oral TID    allopurinol  100 mg Oral Daily    aspirin  325 mg Oral Daily    oyster shell calcium w/D  1 tablet Oral Daily    furosemide  40 mg Oral Daily    [Held by provider] metFORMIN  500 mg Oral BID WC    atorvastatin  10 mg Oral Daily    clotrimazole   Topical BID    potassium chloride  20 mEq Oral Once    cefTRIAXone (ROCEPHIN) IV  1,000 mg IntraVENous Q24H    sodium chloride flush  5-40 mL IntraVENous 2 times per day    enoxaparin  40 mg SubCUTAneous Daily    influenza virus vaccine  0.5 mL IntraMUSCular Prior to discharge     Continuous Infusions:   sodium chloride      sodium chloride 50 mL/hr at 11/02/21 2131     PRN Medications: metoprolol, sodium chloride flush, sodium chloride, ondansetron **OR** ondansetron, polyethylene glycol, acetaminophen **OR** acetaminophen    Objective:   Vitals: BP (!) 131/52   Pulse 70   Temp 98 °F (36.7 °C) (Oral)   Resp 16   Ht 5' 4\" (1.626 m)   Wt 288 lb (130.6 kg)   SpO2 99%   BMI 49.44 kg/m²   BMI: Body mass index is 49.44 kg/m².     CBC:   Recent Labs     11/01/21  0830 11/02/21  1700 11/04/21  0445   WBC 12.4* 15.5* 6.1   HGB 11.1* 12.8 9.3*    300 197     BMP:    Recent Labs     11/02/21  1700 11/03/21  0500 11/04/21  0445   * 137 136   K 3.3* 4.1 3.1*   CL 94* 101 101   CO2 22 28 30   BUN 10 10 9   CREATININE 1.02* 0.88 0.91*   GLUCOSE 188* 82 99     Hepatic:   Recent Labs     11/01/21  0830 11/02/21  1700   AST 7 8   ALT <5* 7   BILITOT 0.35 0.40   ALKPHOS 133* 146*     Troponin: No results for input(s): TROPONINI in the last 72 hours. BNP: No results for input(s): BNP in the last 72 hours. Lipids:   Recent Labs     11/01/21  0830   CHOL 119   HDL 74       Physical Exam:      General Appearance: alert and oriented to person, place and time, in no acute distress  Cardiovascular: normal rate, regular rhythm, normal S1 and S2, no murmurs  Pulmonary/Chest: clear to auscultation bilaterally, no wheezes  Abdomen: soft, non-tender, non-distended, normal bowel sounds   Extremities: Legs with severe lymphedema, dry scaly skin  Skin: Extensive areas of intertrigo under bilateral breast areas, right popliteal area, underneath the pannus  Neurological: alert, oriented, normal speech, no focal findings or movement disorder noted       Assessment and Plan:     1. SVTs - patient continued to have episodes of SVTs. Started on Cardizem 30 mg twice daily, Lopressor 25 mg twice daily. Developed bradycardia. Continue monitoring. Appreciate Dr. Saldana Confer help. Echo report pending  2. UTI secondary to E. Coli -continue on IV Rocephin  3. Hypokalemia -on oral replacement, ordered 40 meq po x 1 today  4. Abnormal CT scan of abdomen and pelvis revealing obstructed right ureter with hydronephrosis of undetermined etiology. Stone cannot be identified, could be a  small mass at the UVJ -consult urology for recommendations  5. Diabetes mellitus type 2, non-insulin-dependent, controlled -on Metformin, hemoglobin A1c 6.2% on 11/1/2021  6.   Extensive intertrigo/candidiasis of the skin -continue on clotrimazole cream, treated with Diflucan 150 mg x 1   7. Acute on chronic anemia -patient's hemoglobin dropped today. Possibly due to dilution from IV fluids. Will stop and follow-up with CBC. 8.  Generalized weakness -PT/OT. DC to ECF for rehab when medically stable  9. Unexplained weight loss  10. Hyperlipidemia -on Lipitor  11.   Chronic lymphedema lower extremities            Patient continues to require inpatient admission related to persisting SVTs with need for further management      Electronically signed by Ana Laura Jean MD on 11/4/2021 at 7:34 AM    Rounding Hospitalist

## 2021-11-05 VITALS
HEART RATE: 102 BPM | HEIGHT: 64 IN | DIASTOLIC BLOOD PRESSURE: 58 MMHG | RESPIRATION RATE: 18 BRPM | SYSTOLIC BLOOD PRESSURE: 126 MMHG | OXYGEN SATURATION: 99 % | WEIGHT: 226.2 LBS | BODY MASS INDEX: 38.62 KG/M2 | TEMPERATURE: 97.4 F

## 2021-11-05 LAB
ABSOLUTE EOS #: 0 K/UL (ref 0–0.4)
ABSOLUTE IMMATURE GRANULOCYTE: ABNORMAL K/UL (ref 0–0.3)
ABSOLUTE LYMPH #: 1.2 K/UL (ref 1–4.8)
ABSOLUTE MONO #: 0.5 K/UL (ref 0–1)
ANION GAP SERPL CALCULATED.3IONS-SCNC: 5 MMOL/L (ref 9–17)
BASOPHILS # BLD: 0 % (ref 0–2)
BASOPHILS ABSOLUTE: 0 K/UL (ref 0–0.2)
BUN BLDV-MCNC: 10 MG/DL (ref 8–23)
BUN/CREAT BLD: 12 (ref 9–20)
CALCIUM SERPL-MCNC: 8.4 MG/DL (ref 8.6–10.4)
CHLORIDE BLD-SCNC: 101 MMOL/L (ref 98–107)
CO2: 30 MMOL/L (ref 20–31)
CREAT SERPL-MCNC: 0.84 MG/DL (ref 0.5–0.9)
DIFFERENTIAL TYPE: YES
EOSINOPHILS RELATIVE PERCENT: 0 % (ref 0–5)
GFR AFRICAN AMERICAN: >60 ML/MIN
GFR NON-AFRICAN AMERICAN: >60 ML/MIN
GFR SERPL CREATININE-BSD FRML MDRD: ABNORMAL ML/MIN/{1.73_M2}
GFR SERPL CREATININE-BSD FRML MDRD: ABNORMAL ML/MIN/{1.73_M2}
GLUCOSE BLD-MCNC: 97 MG/DL (ref 70–99)
HCT VFR BLD CALC: 27.6 % (ref 36–46)
HEMOGLOBIN: 9.1 G/DL (ref 12–16)
IMMATURE GRANULOCYTES: ABNORMAL %
LYMPHOCYTES # BLD: 21 % (ref 15–40)
MCH RBC QN AUTO: 27.8 PG (ref 26–34)
MCHC RBC AUTO-ENTMCNC: 33 G/DL (ref 31–37)
MCV RBC AUTO: 84.2 FL (ref 80–100)
MONOCYTES # BLD: 9 % (ref 4–8)
NRBC AUTOMATED: ABNORMAL PER 100 WBC
PDW BLD-RTO: 14 % (ref 12.1–15.2)
PLATELET # BLD: 191 K/UL (ref 140–450)
PLATELET ESTIMATE: ABNORMAL
PMV BLD AUTO: ABNORMAL FL (ref 6–12)
POTASSIUM SERPL-SCNC: 3.6 MMOL/L (ref 3.7–5.3)
RBC # BLD: 3.28 M/UL (ref 4–5.2)
RBC # BLD: ABNORMAL 10*6/UL
SEG NEUTROPHILS: 70 % (ref 47–75)
SEGMENTED NEUTROPHILS ABSOLUTE COUNT: 4.2 K/UL (ref 2.5–7)
SODIUM BLD-SCNC: 136 MMOL/L (ref 135–144)
WBC # BLD: 6 K/UL (ref 3.5–11)
WBC # BLD: ABNORMAL 10*3/UL

## 2021-11-05 PROCEDURE — 90686 IIV4 VACC NO PRSV 0.5 ML IM: CPT | Performed by: INTERNAL MEDICINE

## 2021-11-05 PROCEDURE — G0008 ADMIN INFLUENZA VIRUS VAC: HCPCS | Performed by: INTERNAL MEDICINE

## 2021-11-05 PROCEDURE — 2580000003 HC RX 258: Performed by: INTERNAL MEDICINE

## 2021-11-05 PROCEDURE — 36415 COLL VENOUS BLD VENIPUNCTURE: CPT

## 2021-11-05 PROCEDURE — 6360000002 HC RX W HCPCS: Performed by: INTERNAL MEDICINE

## 2021-11-05 PROCEDURE — 85025 COMPLETE CBC W/AUTO DIFF WBC: CPT

## 2021-11-05 PROCEDURE — 80048 BASIC METABOLIC PNL TOTAL CA: CPT

## 2021-11-05 PROCEDURE — 6370000000 HC RX 637 (ALT 250 FOR IP): Performed by: INTERNAL MEDICINE

## 2021-11-05 RX ORDER — POTASSIUM CHLORIDE 750 MG/1
40 TABLET, FILM COATED, EXTENDED RELEASE ORAL ONCE
Status: COMPLETED | OUTPATIENT
Start: 2021-11-05 | End: 2021-11-05

## 2021-11-05 RX ORDER — MULTIVITAMIN WITH IRON
1 TABLET ORAL DAILY
Refills: 0 | DISCHARGE
Start: 2021-11-05

## 2021-11-05 RX ORDER — CEPHALEXIN 500 MG/1
500 CAPSULE ORAL 3 TIMES DAILY
Qty: 15 CAPSULE | Refills: 0 | DISCHARGE
Start: 2021-11-05 | End: 2021-11-10

## 2021-11-05 RX ORDER — POTASSIUM CHLORIDE 1500 MG/1
20 TABLET, FILM COATED, EXTENDED RELEASE ORAL DAILY
Qty: 30 TABLET | Refills: 3 | DISCHARGE
Start: 2021-11-05

## 2021-11-05 RX ORDER — CLOTRIMAZOLE 1 %
CREAM (GRAM) TOPICAL
Refills: 1 | DISCHARGE
Start: 2021-11-05 | End: 2021-11-12

## 2021-11-05 RX ORDER — POLYETHYLENE GLYCOL 3350 17 G/17G
17 POWDER, FOR SOLUTION ORAL DAILY PRN
Qty: 527 G | Refills: 1 | DISCHARGE
Start: 2021-11-05 | End: 2021-12-05

## 2021-11-05 RX ORDER — FUROSEMIDE 40 MG/1
40 TABLET ORAL DAILY
Qty: 60 TABLET | Refills: 3 | DISCHARGE
Start: 2021-11-05

## 2021-11-05 ASSESSMENT — PAIN SCALES - GENERAL
PAINLEVEL_OUTOF10: 0
PAINLEVEL_OUTOF10: 0

## 2021-11-05 NOTE — DISCHARGE SUMMARY
Hospitalist Discharge Summary    Patient:  Kaleb Candelaria  YOB: 1940    MRN: [id number]   Acct: [de-identified]    Primary Care Physician: Benita Ordonez MD    Admit date:  11/2/2021    Discharge date:  11/5/2021       Discharge Diagnoses:     1. SVTs  2. UTI secondary to E. Coli  3. 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 Hypokalemia  5. Diabetes mellitus type 2, non-insulin-dependent, controlled with hemoglobin A1c 6.2% on 11/1/2021  6. Extensive intertrigo/candidiasis of the skin  7. Acute on chronic anemia  8. Generalized weakness  9. Unexplained weight loss of about 40 pounds  10. Hyperlipidemia  11. Chronic lymphedema lower extremities  12. Hypertension  13. Morbid obesity    Discharge Medications:       Yue Hoover   Home Medication Instructions OZX:[id number]    Printed on:11/05/21 9252   Medication Information                      allopurinol (ZYLOPRIM) 100 MG tablet  TAKE 1 TABLET EVERY DAY             aspirin 325 MG tablet  Take 325 mg by mouth daily             blood glucose test strips (ASCENSIA AUTODISC VI;ONE TOUCH ULTRA TEST VI) strip  Testing blood sugar once daily and as needed             blood glucose test strips (ONE TOUCH ULTRA TEST) strip  Test blood sugar once daily and as needed. Calcium Carb-Cholecalciferol (CALCIUM 600 + D PO)  Take by mouth daily             cephALEXin (KEFLEX) 500 MG capsule  Take 1 capsule by mouth 3 times daily for 5 days             clotrimazole (LOTRIMIN) 1 % cream  Apply topically 2 times daily. dilTIAZem (CARDIZEM) 30 MG tablet  Take 1 tablet by mouth 3 times daily             furosemide (LASIX) 40 MG tablet  Take 1 tablet by mouth daily             Glucose Blood (CHOICE DM FORA G20 TEST STRIPS VI)  by In Vitro route.              influenza quadrivalent split vaccine (FLUZONE;FLUARIX;FLULAVAL;AFLURIA) 0.5 ML injection  Inject 0.5 mLs into the muscle once for 1 dose Lancets (ONETOUCH DELICA PLUS [id number]) MISC  Testing blood sugar once daily and as needed, uses OneTouch UltraMini             metFORMIN (GLUCOPHAGE) 500 MG tablet  Take 1 tablet by mouth 2 times daily (with meals)             metoprolol tartrate (LOPRESSOR) 25 MG tablet  Take 1 tablet by mouth 2 times daily             Multiple Vitamin (MULTIVITAMIN) TABS tablet  Take 1 tablet by mouth daily             polyethylene glycol (GLYCOLAX) 17 g packet  Take 17 g by mouth daily as needed for Constipation             potassium chloride (KLOR-CON M) 20 MEQ TBCR extended release tablet  Take 1 tablet by mouth daily             simvastatin (ZOCOR) 20 MG tablet  TAKE 1 TABLET EVERY NIGHT                 Diet:  ADULT ORAL NUTRITION SUPPLEMENT; Breakfast, Lunch, Dinner; Diabetic Oral Supplement  ADULT DIET; Regular; 4 carb choices (60 gm/meal)    Activity: Up with assistance and a walker    Follow-up:  in 1-2 weeks with Sushila Lopez MD, follow-up with urologist Alanis Martin in about 2 weeks    Consultants: 1. Cardiology Dr. Pablo Beck                          2. Urology Alanis Martin         CBC:   Recent Labs     11/05/21  0505   WBC 6.0   HGB 9.1*        BMP:    Recent Labs     11/05/21  0505      K 3.6*      CO2 30   BUN 10   CREATININE 0.84   GLUCOSE 97     Calcium:  Recent Labs     11/05/21  0505   CALCIUM 8.4*       Physical Exam:    Vitals:  Patient Vitals for the past 24 hrs:   BP Temp Temp src Pulse Resp SpO2 Weight   11/05/21 0613 -- -- -- -- -- 97 % --   11/05/21 0233 (!) 128/53 98.4 °F (36.9 °C) Oral 91 18 97 % --   11/05/21 0215 -- -- -- -- -- -- 226 lb 3.2 oz (102.6 kg)   11/05/21 0030 -- 97.9 °F (36.6 °C) -- -- -- -- --   11/04/21 2200 -- -- -- 148 -- -- --   11/04/21 2104 -- -- -- -- -- 98 % --   11/04/21 2023 (!) 141/45 99 °F (37.2 °C) Oral 129 18 98 % --   11/04/21 1537 (!) 106/58 98 °F (36.7 °C) Oral 90 18 98 % --   11/04/21 1406 -- -- -- -- 18 99 % --   11/04/21 1130 (!) 108/53 97.8 °F patient did not have it done    1. SVTs . Was evaluated by cardiology Dr. Jojo Mauricio. Heart rate stabilized on Cardizem 30 mg twice daily,  Lopressor 25 mg twice daily. Follow-up with Dr. Jojo Mauricio  2. UTI secondary to E. Coli. Treated with IV Rocephin. Will discharge on Keflex 500 mg 3 times daily for 5 days  3. Hypokalemia . Continue on oral replacement  4. Abnormal CT scan of abdomen and pelvis revealing obstructed right ureter with hydronephrosis of undetermined etiology. Stone cannot be identified, could be a  small mass at the UVJ . Was evaluated by urologist and recommended MRI abdomen/pelvis with and without contrast. Will order outpatient next week. Follow-up with urology in about 2 weeks  5. Diabetes mellitus type 2, non-insulin-dependent, controlled , on Metformin, hemoglobin A1c 6.2% on 11/1/2021  6. Extensive intertrigo/candidiasis of the skin -continue on clotrimazole cream, treated with Diflucan 150 mg x 1   7. Acute on chronic anemia . Possibly due to dilution from IV fluids. She had no sources of bleeding. Follow-up as an outpatient   8. Generalized weakness. Discharged to [facility] for PT and OT  9. Unexplained weight loss  10. Hyperlipidemia -on Lipitor  11. Chronic lymphedema lower extremities  12. Hypertension -her blood pressure was borderline low and losartan discontinued. Follow-up with PCP    Patient medically stable today for discharge to ECF.     Disposition: SNF    Condition: Stable    Time Spent: 34 minutes      Electronically signed by Alicia Sifuentes MD on 11/5/2021 at 7:37 AM  Discharging Hospitalist

## 2021-11-05 NOTE — PROGRESS NOTES
uGcci De La Torre from Dr Mayte Quarles office calls back. appt scheduled for Thurs 11/18 @ 2:45pm in the [facility].  Info added to discharge AVS.

## 2021-11-05 NOTE — PROGRESS NOTES
RN phones Dr Minnie Mcclendon office and speaks with Lucretia regarding f/u appt. Per Lucretia she will need to call writer back due to no available appts for pt in [facility]. Will await to hear back from office.

## 2021-11-05 NOTE — PROGRESS NOTES
Discussed medication changes with pt. Explained why losartan was stopped and why lopressor, cardizem, keflex, potassium and cream were started. Pt had no additional questions and was instructed to ask the nurse to call pharmacy if any questions arise.    Chris Jennings, PharmJACKI 11/5/2021 9:40 AM

## 2021-11-05 NOTE — PROGRESS NOTES
Acknowledge pt discharge back to [facility] this date. Elena contacted and can pick pt up around 1030. No further concerns noted at this time. Discharge summary faxed to facility.  Armando DAVID 11/5/2021

## 2021-11-16 ENCOUNTER — HOSPITAL ENCOUNTER (OUTPATIENT)
Dept: MRI IMAGING | Age: 81
Discharge: HOME | End: 2021-11-18
Payer: MEDICARE

## 2021-11-16 DIAGNOSIS — N28.89 RENAL MASS: ICD-10-CM

## 2021-11-16 DIAGNOSIS — N13.5 URETEROVESICAL JUNCTION (UVJ) OBSTRUCTION: ICD-10-CM

## 2021-11-16 PROCEDURE — 72197 MRI PELVIS W/O & W/DYE: CPT

## 2021-11-16 PROCEDURE — 74183 MRI ABD W/O CNTR FLWD CNTR: CPT

## 2021-11-16 PROCEDURE — 6360000004 HC RX CONTRAST MEDICATION: Performed by: PHYSICIAN ASSISTANT

## 2021-11-16 PROCEDURE — A9577 INJ MULTIHANCE: HCPCS | Performed by: PHYSICIAN ASSISTANT

## 2021-11-16 RX ADMIN — GADOBENATE DIMEGLUMINE 20 ML: 529 INJECTION, SOLUTION INTRAVENOUS at 15:07

## 2021-11-18 ENCOUNTER — OFFICE VISIT (OUTPATIENT)
Dept: UROLOGY | Age: 81
End: 2021-11-18
Payer: MEDICARE

## 2021-11-18 VITALS
HEIGHT: 60 IN | SYSTOLIC BLOOD PRESSURE: 138 MMHG | WEIGHT: 224 LBS | DIASTOLIC BLOOD PRESSURE: 92 MMHG | BODY MASS INDEX: 43.98 KG/M2

## 2021-11-18 DIAGNOSIS — N13.5 URETEROVESICAL JUNCTION (UVJ) OBSTRUCTION: Primary | ICD-10-CM

## 2021-11-18 PROCEDURE — 4040F PNEUMOC VAC/ADMIN/RCVD: CPT | Performed by: PHYSICIAN ASSISTANT

## 2021-11-18 PROCEDURE — 1123F ACP DISCUSS/DSCN MKR DOCD: CPT | Performed by: PHYSICIAN ASSISTANT

## 2021-11-18 PROCEDURE — G8399 PT W/DXA RESULTS DOCUMENT: HCPCS | Performed by: PHYSICIAN ASSISTANT

## 2021-11-18 PROCEDURE — G8427 DOCREV CUR MEDS BY ELIG CLIN: HCPCS | Performed by: PHYSICIAN ASSISTANT

## 2021-11-18 PROCEDURE — G8482 FLU IMMUNIZE ORDER/ADMIN: HCPCS | Performed by: PHYSICIAN ASSISTANT

## 2021-11-18 PROCEDURE — G8417 CALC BMI ABV UP PARAM F/U: HCPCS | Performed by: PHYSICIAN ASSISTANT

## 2021-11-18 PROCEDURE — 1111F DSCHRG MED/CURRENT MED MERGE: CPT | Performed by: PHYSICIAN ASSISTANT

## 2021-11-18 PROCEDURE — 1090F PRES/ABSN URINE INCON ASSESS: CPT | Performed by: PHYSICIAN ASSISTANT

## 2021-11-18 PROCEDURE — 1036F TOBACCO NON-USER: CPT | Performed by: PHYSICIAN ASSISTANT

## 2021-11-18 PROCEDURE — 99214 OFFICE O/P EST MOD 30 MIN: CPT | Performed by: PHYSICIAN ASSISTANT

## 2021-11-18 ASSESSMENT — ENCOUNTER SYMPTOMS
SHORTNESS OF BREATH: 0
NAUSEA: 0
EYE REDNESS: 0
COLOR CHANGE: 0
ABDOMINAL PAIN: 0
WHEEZING: 0
VOMITING: 0
APNEA: 0
CONSTIPATION: 0
COUGH: 0
BACK PAIN: 0

## 2021-11-18 NOTE — PROGRESS NOTES
PI:    Patient is a 80 y.o. female in no acute distress. She is alert and oriented to person, place, and time. 11/4/2021 - Patient presented to the emergency room on 11/1/2021 with progressive fatigue. Patient had a white blood cell count of 12.4. She was straight cathed for urine. She did have a urinalysis indicative of urinary tract infection. She was started on antibiotics -Levaquin. She did have wounds on her buttocks and right popliteal.  Decision was made for her to go to [place] [address]. Patient returned to the emergency room 11/2/2021 after a loss of consciousness. In the emergency room it was discovered that she had SVT with heart rates in the 140s. Patient was ultimately admitted to the hospital.  Patient's urine culture did grow beta-hemolytic strep as well as E. coli. Patient was started on IV Rocephin. Patient did have a CT abdomen pelvis with IV contrast which was independently reviewed showing no ureteral calculi. There is calcification within a possible mass of the right kidney. This is unable to be completely determined with current imaging. 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 Patient has had a 40 pound weight loss and loss of appetite over the past couple months. Patient's BUN is 9, creatinine of 0.91. Patient's white blood cell count is down to 6.1. Today:  Patient does present today for hospital follow-up secondary to right UVJ obstruction with possible ureteral lesion versus stone. We did order a MRI abdomen and pelvis which was independently reviewed. There continues to be moderate right hydroureteronephrosis to the level of the right UVJ. On radiology read they noted a 0.6 cm filling defect possibly due to right ureteral calculus. Again is seen multiple stone seen in the right renal pelvis. There is a 1.4 septated cyst on the left kidney and a 0.8 cm simple cyst left renal cortex.   She denies having any discomfort whatsoever. She has completed a course of antibiotics. She is currently at [facility].     Past Medical History:   Diagnosis Date    Cancer ([address]     basal cell skin cancer    Hyperlipidemia     Hypertension     Lymphedema of lower extremity     Obesity     Shingles     70's    Type II or unspecified type diabetes mellitus without mention of complication, not stated as uncontrolled     Venous stasis dermatitis      Past Surgical History:   Procedure Laterality Date    APPENDECTOMY      COLONOSCOPY      DILATION AND CURETTAGE OF UTERUS      JOINT REPLACEMENT      Rt knee    DC OFFICE/OUTPT VISIT,PROCEDURE ONLY Right 7/19/2018    RIGHT HIP MASS INCISION AND DRAINAGE performed by Alex Ramey MD at [address] [facility] OFFICE/OUTPT VISIT,PROCEDURE ONLY Right 8/1/2018    HIP INCISION AND DRAINAGE (Necrotic Fat Right Hip Debridement) performed by Alex Ramey MD at [facility] OR     Outpatient Encounter Medications as of 11/18/2021   Medication Sig Dispense Refill    metoprolol tartrate (LOPRESSOR) 25 MG tablet Take 1 tablet by mouth 2 times daily 60 tablet 3    dilTIAZem (CARDIZEM) 30 MG tablet Take 1 tablet by mouth 3 times daily 90 tablet 3    furosemide (LASIX) 40 MG tablet Take 1 tablet by mouth daily 60 tablet 3    polyethylene glycol (GLYCOLAX) 17 g packet Take 17 g by mouth daily as needed for Constipation 527 g 1    potassium chloride (KLOR-CON M) 20 MEQ TBCR extended release tablet Take 1 tablet by mouth daily 30 tablet 3    Multiple Vitamin (MULTIVITAMIN) TABS tablet Take 1 tablet by mouth daily  0    Lancets (ONETOUCH DELICA PLUS [id number]) MISC Testing blood sugar once daily and as needed, uses OneTouch UltraMini 100 each 5    blood glucose test strips (ASCENSIA AUTODISC VI;ONE TOUCH ULTRA TEST VI) strip Testing blood sugar once daily and as needed 100 each 5    allopurinol (ZYLOPRIM) 100 MG tablet TAKE 1 TABLET EVERY DAY 90 tablet 1    metFORMIN (GLUCOPHAGE) 500 MG tablet Take 1 tablet by mouth 2 times daily (with meals) 180 tablet 1    simvastatin (ZOCOR) 20 MG tablet TAKE 1 TABLET EVERY NIGHT 90 tablet 1    blood glucose test strips (ONE TOUCH ULTRA TEST) strip Test blood sugar once daily and as needed. 100 strip 3    Calcium Carb-Cholecalciferol (CALCIUM 600 + D PO) Take by mouth daily      aspirin 325 MG tablet Take 325 mg by mouth daily      Glucose Blood (CHOICE DM FORA G20 TEST STRIPS VI) by In Vitro route. No facility-administered encounter medications on file as of 11/18/2021. Current Outpatient Medications on File Prior to Visit   Medication Sig Dispense Refill    metoprolol tartrate (LOPRESSOR) 25 MG tablet Take 1 tablet by mouth 2 times daily 60 tablet 3    dilTIAZem (CARDIZEM) 30 MG tablet Take 1 tablet by mouth 3 times daily 90 tablet 3    furosemide (LASIX) 40 MG tablet Take 1 tablet by mouth daily 60 tablet 3    polyethylene glycol (GLYCOLAX) 17 g packet Take 17 g by mouth daily as needed for Constipation 527 g 1    potassium chloride (KLOR-CON M) 20 MEQ TBCR extended release tablet Take 1 tablet by mouth daily 30 tablet 3    Multiple Vitamin (MULTIVITAMIN) TABS tablet Take 1 tablet by mouth daily  0    Lancets (ONETOUCH DELICA PLUS [id number]) MISC Testing blood sugar once daily and as needed, uses OneTouch UltraMini 100 each 5    blood glucose test strips (ASCENSIA AUTODISC VI;ONE TOUCH ULTRA TEST VI) strip Testing blood sugar once daily and as needed 100 each 5    allopurinol (ZYLOPRIM) 100 MG tablet TAKE 1 TABLET EVERY DAY 90 tablet 1    metFORMIN (GLUCOPHAGE) 500 MG tablet Take 1 tablet by mouth 2 times daily (with meals) 180 tablet 1    simvastatin (ZOCOR) 20 MG tablet TAKE 1 TABLET EVERY NIGHT 90 tablet 1    blood glucose test strips (ONE TOUCH ULTRA TEST) strip Test blood sugar once daily and as needed.  100 strip 3    Calcium Carb-Cholecalciferol (CALCIUM 600 + D PO) Take by mouth daily      aspirin 325 MG tablet Take 325 mg by CREATININE 0.84 11/05/2021       ASSESSMENT:   Diagnosis Orders   1. Ureterovesical junction (UVJ) obstruction  Urinalysis    Culture, Urine     PLAN:  We will check a urinalysis and culture for preoperative clearance    We do feel the findings of the MRI do warrant further investigation. If indeed her right hydroureteronephrosis is secondary to a distal ureteral calculus we do need to take care of this with a right HL L. If there is a mass in that location instead we will take biopsies. Discussed risks and benefits of HLL and stent placement (including anesthesia, bleeding, infection, injury to  tract, need for multiple procedures, and the risk of major complications such as ureteral perforation). We discussed the details of the procedure. We discussed what to expect post-operatively including: hematuria for up to two weeks, stent pain/frequency/urgency/leaking, and pain/discomfort after stent removal. Patient voiced understanding and is amenable to schedule on right HLL possible ureteral biopsy.      We will want to obtain clearance from her PCP as well as cardiology

## 2021-11-18 NOTE — PATIENT INSTRUCTIONS
SURVEY:    You may be receiving a survey from [employer] regarding your visit today. Please complete the survey to enable us to provide the highest quality of care to you and your family. If you cannot score us a very good on any question, please call the office to discuss how we could have made your experience a very good one. Thank you.

## 2021-11-23 DIAGNOSIS — N13.5 URETEROVESICAL JUNCTION (UVJ) OBSTRUCTION: ICD-10-CM

## 2021-11-23 LAB
BILIRUBIN, URINE: NEGATIVE
BLOOD, URINE: POSITIVE
CLARITY: ABNORMAL
COLOR: YELLOW
GLUCOSE URINE: NEGATIVE
KETONES, URINE: NEGATIVE
LEUKOCYTE ESTERASE, URINE: ABNORMAL
NITRITE, URINE: POSITIVE
PH UA: 7 (ref 4.5–8)
PROTEIN UA: NEGATIVE
SPECIFIC GRAVITY, URINE: 1.01
UROBILINOGEN, URINE: NORMAL

## 2021-11-29 NOTE — RESULT ENCOUNTER NOTE
Was a urine culture ever obtained for this patient?   She is currently at [facility] in [facility]

## 2021-11-30 ENCOUNTER — TELEPHONE (OUTPATIENT)
Dept: UROLOGY | Age: 81
End: 2021-11-30

## 2021-11-30 DIAGNOSIS — N39.0 PSEUDOMONAS URINARY TRACT INFECTION: ICD-10-CM

## 2021-11-30 DIAGNOSIS — N13.5 URETEROVESICAL JUNCTION (UVJ) OBSTRUCTION: Primary | ICD-10-CM

## 2021-11-30 DIAGNOSIS — N39.0 UTI (URINARY TRACT INFECTION) DUE TO ENTEROCOCCUS: ICD-10-CM

## 2021-11-30 DIAGNOSIS — B95.2 UTI (URINARY TRACT INFECTION) DUE TO ENTEROCOCCUS: ICD-10-CM

## 2021-11-30 DIAGNOSIS — B96.5 PSEUDOMONAS URINARY TRACT INFECTION: ICD-10-CM

## 2021-11-30 NOTE — TELEPHONE ENCOUNTER
Received urine culture for preoperative clearance results from [facility] facility. Patient grew both Pseudomonas and Enterococcus faecalis. Based on sensitivities we do need to treat her with 2 antibiotics. We are awaiting PCP and cardiology clearance for upcoming surgery which still needs to be scheduled. We do want to treat her with ampicillin 500 mg every 6 hours p.o. x16 days as well as fosfomycin 3 g p.o. every 3 days x 7 doses. (this will be through surgery)    As she is going to be on long-term antibiotics we do want her on a probiotic daily    Please fax these orders to [facility]    Please check on PCP clearance.     Cardiology has cleared patient and we will hold aspirin 5 days prior to surgery    Tentative surgery date 12/16/2021

## 2021-12-09 NOTE — PROGRESS NOTES
[facility]   Preadmission Testing    Name: Merari Fitzgerald  :   Patient Phone: [phone] (home)     Procedure: RIGHT URETEROSCOPY WITH HOLMIUM LASER LITHOTRIPSY POSSIBLE STENT PLACEMENT POSSIBLE RIGHT URETERAL BIOPSY - Right      Date of Procedure: 21  Surgeon: Enrique Louis MD    Ht:  5' (152.4 cm)  Wt: 224 lb (101.6 kg)  Wt method: Allergies: Allergies   Allergen Reactions    Adhesive Tape      Takes her skin off uses paper tape.  Sulfa Antibiotics            Latex Allergy Screening Tool  Have you ever had a reaction to or been told by a physician that you have an allergy to latex or natural rubber?: No    There were no vitals filed for this visit. No LMP recorded (lmp unknown). Patient is postmenopausal.    Do you take blood thinners? [x] Yes    [] No         Instructed to stop blood thinners prior to procedure? [x] Yes    [] No      [] N/A   Do you have sleep apnea? [] Yes    [x] No     Do you have acid reflux ? [] Yes    [x] No     Do you have  hiatal hernia? [] Yes    [x] No    Do you ever experience motion sickness? [] Yes    [x] No     Have you had a respiratory infection or sore throat in last 4 weeks before surgery? [] Yes    [x] No     Do you have poorly controlled asthma or COPD? Difficulty with intubation in past? [] Yes    [x] No      [] Yes    [x] No       Do you have a history of angina in the last month or symptomatic arrhythmia? [] Yes    [x] No     Do you have significant central nervous system disease? [] Yes    [x] No     Have you had an EKG, labs, or chest xray in last 12 months? If yes provide copies to anesthesia   [x] Yes    [] No       [x] Lab    [] EKG    [] CXR     Have you had a stress test?     [] Yes    [x] No    When/where:    Was it normal?    [] Yes    [] No     Do you or your family have a history of Malignant Hyperthermia? [] Yes    [x] No           Do you smoke?  [] Yes    [x] No      Please refrain from smoking on the day of surgery. Patient instructed on: [x] NPO Status   [x] Meds to Take  [x] Ride Home  [x]No Jewelry/Contact Lenses/Nail [place]  [] Prep/Lax/Clear Liquids    [] Chlorhexidene     DOS Patient Needs [] HCG   [x] Blood Sugar  [] PT/INR    [] T&S       COVID Vaccinated? [] Yes    [] No                     Patient instructed on the pre-operative, intra-operative, and post-operative process? Yes  Medication instructions reviewed with patient?   Yes

## 2021-12-13 DIAGNOSIS — I10 ESSENTIAL HYPERTENSION, BENIGN: Primary | ICD-10-CM

## 2021-12-13 DIAGNOSIS — E78.2 MIXED HYPERLIPIDEMIA: ICD-10-CM

## 2021-12-13 DIAGNOSIS — R53.83 FATIGUE, UNSPECIFIED TYPE: ICD-10-CM

## 2021-12-13 DIAGNOSIS — I47.1 SVT (SUPRAVENTRICULAR TACHYCARDIA) (HCC): ICD-10-CM

## 2021-12-13 DIAGNOSIS — E55.9 VITAMIN D DEFICIENCY: ICD-10-CM

## 2021-12-14 ENCOUNTER — ANESTHESIA EVENT (OUTPATIENT)
Dept: OPERATING ROOM | Age: 81
End: 2021-12-14
Payer: MEDICARE

## 2021-12-14 PROBLEM — N20.0 RENAL CALCULUS: Status: ACTIVE | Noted: 2021-12-14

## 2021-12-14 NOTE — H&P
History and Physical    Patient:  Tushar Lopez  MRN: [id number]    CHIEF COMPLAINT:  Right flank pain    HISTORY OF PRESENT ILLNESS:   The patient is a 80 y.o. female who presents with right flank pain. 11/4/2021 - Patient presented to the emergency room on 11/1/2021 with progressive fatigue.  Patient had a white blood cell count of 12.4.  She was straight cathed for urine.  She did have a urinalysis indicative of urinary tract infection.  She was started on antibiotics -Levaquin.  She did have wounds on her buttocks and right popliteal.  Decision was made for her to go to [address] [place] [address] Road returned to the emergency room 11/2/2021 after a loss of consciousness.  In the emergency room it was discovered that she had SVT with heart rates in the 140s.  Patient was ultimately admitted to the hospital.  Patient's urine culture did grow beta-hemolytic strep as well as E. coli.  Patient was started on IV Rocephin.  Patient did have a CT abdomen pelvis with IV contrast which was independently reviewed showing no ureteral calculi.  There is calcification within a possible mass of the right kidney.  This is unable to be completely determined with current imaging. 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 Patient has had a 40 pound weight loss and loss of appetite over the past couple months.  Patient's BUN is 9, creatinine of 0.91.  Patient's white blood cell count is down to 6. 1.     Today:  Patient does present today for hospital follow-up secondary to right UVJ obstruction with possible ureteral lesion versus stone. We did order a MRI abdomen and pelvis which was independently reviewed. There continues to be moderate right hydroureteronephrosis to the level of the right UVJ. On radiology read they noted a 0.6 cm filling defect possibly due to right ureteral calculus. Again is seen multiple stone seen in the right renal pelvis.   There is a 1.4 septated cyst on the left kidney and a 0.8 cm simple cyst left renal cortex. She denies having any discomfort whatsoever. She has completed a course of antibiotics. She is currently at [facility]. Past Medical History:    Past Medical History:   Diagnosis Date    Cancer ([address]     basal cell skin cancer    Hyperlipidemia     Hypertension     Lymphedema of lower extremity     Obesity     Shingles     66's    Type II or unspecified type diabetes mellitus without mention of complication, not stated as uncontrolled     Venous stasis dermatitis        Past Surgical History:    Past Surgical History:   Procedure Laterality Date    APPENDECTOMY      COLONOSCOPY      DILATION AND CURETTAGE OF UTERUS      JOINT REPLACEMENT      Rt knee    NV OFFICE/OUTPT VISIT,PROCEDURE ONLY Right 7/19/2018    RIGHT HIP MASS INCISION AND DRAINAGE performed by Costa Kirby MD at [address] [facility] OFFICE/OUTPT VISIT,PROCEDURE ONLY Right 8/1/2018    HIP INCISION AND DRAINAGE (Necrotic Fat Right Hip Debridement) performed by Costa Kirby MD at [facility] OR       Medications Prior to Admission:    Prior to Admission medications    Medication Sig Start Date End Date Taking?  Authorizing Provider   metoprolol tartrate (LOPRESSOR) 25 MG tablet Take 1 tablet by mouth 2 times daily 11/5/21   Melquiades Elkins MD   dilTIAZem (CARDIZEM) 30 MG tablet Take 1 tablet by mouth 3 times daily 11/5/21   Melquiades Elkins MD   furosemide (LASIX) 40 MG tablet Take 1 tablet by mouth daily 11/5/21   Melquiades Elkins MD   potassium chloride (KLOR-CON M) 20 MEQ TBCR extended release tablet Take 1 tablet by mouth daily 11/5/21   Melquiades Elkins MD   Multiple Vitamin (MULTIVITAMIN) TABS tablet Take 1 tablet by mouth daily 11/5/21   Melquiades Elkins MD   Lancets (Jose Antonio Ace) MISC Testing blood sugar once daily and as needed, uses OneTouch UltraMini 5/7/21   Emiliano Rice MD   blood glucose test strips (ASCENSIA AUTODISC VI;ONE TOUCH ULTRA TEST VI) Feeling of Stress : Not on file   Social Connections:     Frequency of Communication with Friends and Family: Not on file    Frequency of Social Gatherings with Friends and Family: Not on file    Attends Synagogue Services: Not on file    Active Member of Clubs or Organizations: Not on file    Attends Club or Organization Meetings: Not on file    Marital Status: Not on file   Intimate Partner Violence:     Fear of Current or Ex-Partner: Not on file    Emotionally Abused: Not on file    Physically Abused: Not on file    Sexually Abused: Not on file   Housing Stability:     Unable to Pay for Housing in the Last Year: Not on file    Number of [place] in the Last Year: Not on file    Unstable Housing in the Last Year: Not on file       Family History:    Family History   Problem Relation Age of Onset    Heart Disease Mother     Cancer Sister     Mult Sclerosis Sister     Cancer Brother         lung       REVIEW OF SYSTEMS:  All systems reviewed and negative except for that already noted in the HPI. Physical Exam:      This a 80 y.o. female   No data found. Constitutional: Patient in no acute distress. Neuro: Alert and oriented to person, place and time. Psych: mood and affect normal  HEENT negative  Lungs: Respiratory effort is normal  Cardiovascular: Normal peripheral pulses  Abdomen: Soft, non-tender, non-distended with no CVA, flank pain or hepatosplenomegaly. No hernias. Kidneys normal.  Lymphatics: No palpable lymphadenopathy. Bladder non-tender and not distended. Pelvic exam:  External genitalia normal  Urethral and urethral meatus normal  Vagina normal with no evidence of pelvic prolapse  Uterus normal  Adnexa normal  Anus and perineum normal  Rectal exam not indicated    LABS:   No results for input(s): WBC, HGB, HCT, MCV, PLT in the last 72 hours. No results for input(s): NA, K, CL, CO2, PHOS, BUN, CREATININE in the last 72 hours.     Invalid input(s): CA    Additional

## 2021-12-14 NOTE — ANESTHESIA PRE-OP
Preoperative chart review done, patient is suitable for anesthesia for proposed surgery / procedure.     Yury MUNOZ, CRNA

## 2021-12-16 ENCOUNTER — HOSPITAL ENCOUNTER (OUTPATIENT)
Age: 81
Setting detail: OUTPATIENT SURGERY
Discharge: HOME | End: 2021-12-16
Attending: UROLOGY | Admitting: UROLOGY
Payer: MEDICARE

## 2021-12-16 ENCOUNTER — APPOINTMENT (OUTPATIENT)
Dept: GENERAL RADIOLOGY | Age: 81
End: 2021-12-16
Attending: UROLOGY
Payer: MEDICARE

## 2021-12-16 ENCOUNTER — ANESTHESIA (OUTPATIENT)
Dept: OPERATING ROOM | Age: 81
End: 2021-12-16
Payer: MEDICARE

## 2021-12-16 ENCOUNTER — HOSPITAL ENCOUNTER (OUTPATIENT)
Dept: PREADMISSION TESTING | Age: 81
Setting detail: SPECIMEN
Discharge: HOME | End: 2021-12-16

## 2021-12-16 VITALS — OXYGEN SATURATION: 99 % | SYSTOLIC BLOOD PRESSURE: 107 MMHG | DIASTOLIC BLOOD PRESSURE: 62 MMHG | TEMPERATURE: 98.6 F

## 2021-12-16 VITALS
HEART RATE: 75 BPM | HEIGHT: 60 IN | WEIGHT: 219.4 LBS | BODY MASS INDEX: 43.07 KG/M2 | SYSTOLIC BLOOD PRESSURE: 132 MMHG | RESPIRATION RATE: 15 BRPM | DIASTOLIC BLOOD PRESSURE: 72 MMHG | OXYGEN SATURATION: 94 % | TEMPERATURE: 97.1 F

## 2021-12-16 LAB — GLUCOSE BLD-MCNC: 142 MG/DL (ref 65–99)

## 2021-12-16 PROCEDURE — 2720000010 HC SURG SUPPLY STERILE: Performed by: UROLOGY

## 2021-12-16 PROCEDURE — 2580000003 HC RX 258: Performed by: UROLOGY

## 2021-12-16 PROCEDURE — 3600000015 HC SURGERY LEVEL 5 ADDTL 15MIN: Performed by: UROLOGY

## 2021-12-16 PROCEDURE — 7100000011 HC PHASE II RECOVERY - ADDTL 15 MIN: Performed by: UROLOGY

## 2021-12-16 PROCEDURE — 2500000003 HC RX 250 WO HCPCS: Performed by: NURSE ANESTHETIST, CERTIFIED REGISTERED

## 2021-12-16 PROCEDURE — 82947 ASSAY GLUCOSE BLOOD QUANT: CPT

## 2021-12-16 PROCEDURE — C1769 GUIDE WIRE: HCPCS | Performed by: UROLOGY

## 2021-12-16 PROCEDURE — 3700000000 HC ANESTHESIA ATTENDED CARE: Performed by: UROLOGY

## 2021-12-16 PROCEDURE — 6370000000 HC RX 637 (ALT 250 FOR IP): Performed by: UROLOGY

## 2021-12-16 PROCEDURE — C2617 STENT, NON-COR, TEM W/O DEL: HCPCS | Performed by: UROLOGY

## 2021-12-16 PROCEDURE — C1758 CATHETER, URETERAL: HCPCS | Performed by: UROLOGY

## 2021-12-16 PROCEDURE — 6360000002 HC RX W HCPCS: Performed by: NURSE ANESTHETIST, CERTIFIED REGISTERED

## 2021-12-16 PROCEDURE — 2709999900 HC NON-CHARGEABLE SUPPLY: Performed by: UROLOGY

## 2021-12-16 PROCEDURE — 3700000001 HC ADD 15 MINUTES (ANESTHESIA): Performed by: UROLOGY

## 2021-12-16 PROCEDURE — 3600000005 HC SURGERY LEVEL 5 BASE: Performed by: UROLOGY

## 2021-12-16 PROCEDURE — 6370000000 HC RX 637 (ALT 250 FOR IP): Performed by: NURSE ANESTHETIST, CERTIFIED REGISTERED

## 2021-12-16 PROCEDURE — 6360000002 HC RX W HCPCS: Performed by: UROLOGY

## 2021-12-16 PROCEDURE — 76000 FLUOROSCOPY <1 HR PHYS/QHP: CPT

## 2021-12-16 PROCEDURE — 7100000010 HC PHASE II RECOVERY - FIRST 15 MIN: Performed by: UROLOGY

## 2021-12-16 DEVICE — URETERAL STENT
Type: IMPLANTABLE DEVICE | Status: FUNCTIONAL
Brand: PERCUFLEX™ PLUS

## 2021-12-16 RX ORDER — DEXAMETHASONE SODIUM PHOSPHATE 10 MG/ML
INJECTION, SOLUTION INTRAMUSCULAR; INTRAVENOUS PRN
Status: DISCONTINUED | OUTPATIENT
Start: 2021-12-16 | End: 2021-12-16 | Stop reason: SDUPTHER

## 2021-12-16 RX ORDER — LIDOCAINE HYDROCHLORIDE 20 MG/ML
JELLY TOPICAL PRN
Status: DISCONTINUED | OUTPATIENT
Start: 2021-12-16 | End: 2021-12-16 | Stop reason: ALTCHOICE

## 2021-12-16 RX ORDER — SODIUM CHLORIDE, SODIUM LACTATE, POTASSIUM CHLORIDE, CALCIUM CHLORIDE 600; 310; 30; 20 MG/100ML; MG/100ML; MG/100ML; MG/100ML
INJECTION, SOLUTION INTRAVENOUS CONTINUOUS
Status: CANCELLED | OUTPATIENT
Start: 2021-12-16

## 2021-12-16 RX ORDER — CIPROFLOXACIN 2 MG/ML
400 INJECTION, SOLUTION INTRAVENOUS
Status: COMPLETED | OUTPATIENT
Start: 2021-12-16 | End: 2021-12-16

## 2021-12-16 RX ORDER — SODIUM CHLORIDE, SODIUM LACTATE, POTASSIUM CHLORIDE, CALCIUM CHLORIDE 600; 310; 30; 20 MG/100ML; MG/100ML; MG/100ML; MG/100ML
INJECTION, SOLUTION INTRAVENOUS CONTINUOUS
Status: DISCONTINUED | OUTPATIENT
Start: 2021-12-16 | End: 2021-12-16 | Stop reason: HOSPADM

## 2021-12-16 RX ORDER — ACETAMINOPHEN 500 MG
TABLET ORAL PRN
Status: DISCONTINUED | OUTPATIENT
Start: 2021-12-16 | End: 2021-12-16 | Stop reason: SDUPTHER

## 2021-12-16 RX ORDER — PROPOFOL 10 MG/ML
INJECTION, EMULSION INTRAVENOUS PRN
Status: DISCONTINUED | OUTPATIENT
Start: 2021-12-16 | End: 2021-12-16 | Stop reason: SDUPTHER

## 2021-12-16 RX ORDER — LIDOCAINE HYDROCHLORIDE 10 MG/ML
INJECTION, SOLUTION EPIDURAL; INFILTRATION; INTRACAUDAL; PERINEURAL PRN
Status: DISCONTINUED | OUTPATIENT
Start: 2021-12-16 | End: 2021-12-16 | Stop reason: SDUPTHER

## 2021-12-16 RX ORDER — FENTANYL CITRATE 50 UG/ML
INJECTION, SOLUTION INTRAMUSCULAR; INTRAVENOUS PRN
Status: DISCONTINUED | OUTPATIENT
Start: 2021-12-16 | End: 2021-12-16 | Stop reason: SDUPTHER

## 2021-12-16 RX ORDER — ONDANSETRON 2 MG/ML
INJECTION INTRAMUSCULAR; INTRAVENOUS PRN
Status: DISCONTINUED | OUTPATIENT
Start: 2021-12-16 | End: 2021-12-16 | Stop reason: SDUPTHER

## 2021-12-16 RX ORDER — SODIUM CHLORIDE, SODIUM LACTATE, POTASSIUM CHLORIDE, CALCIUM CHLORIDE 600; 310; 30; 20 MG/100ML; MG/100ML; MG/100ML; MG/100ML
INJECTION, SOLUTION INTRAVENOUS CONTINUOUS
Status: DISCONTINUED | OUTPATIENT
Start: 2021-12-16 | End: 2021-12-16

## 2021-12-16 RX ORDER — PHENYLEPHRINE HYDROCHLORIDE 10 MG/ML
INJECTION INTRAVENOUS PRN
Status: DISCONTINUED | OUTPATIENT
Start: 2021-12-16 | End: 2021-12-16 | Stop reason: SDUPTHER

## 2021-12-16 RX ADMIN — PHENYLEPHRINE HYDROCHLORIDE 100 MCG: 10 INJECTION INTRAVENOUS at 09:43

## 2021-12-16 RX ADMIN — PROPOFOL 150 MG: 10 INJECTION, EMULSION INTRAVENOUS at 09:17

## 2021-12-16 RX ADMIN — LIDOCAINE HYDROCHLORIDE 50 MG: 10 INJECTION, SOLUTION EPIDURAL; INFILTRATION; INTRACAUDAL; PERINEURAL at 09:17

## 2021-12-16 RX ADMIN — PHENYLEPHRINE HYDROCHLORIDE 100 MCG: 10 INJECTION INTRAVENOUS at 09:36

## 2021-12-16 RX ADMIN — PHENYLEPHRINE HYDROCHLORIDE 200 MCG: 10 INJECTION INTRAVENOUS at 09:22

## 2021-12-16 RX ADMIN — SODIUM CHLORIDE, POTASSIUM CHLORIDE, SODIUM LACTATE AND CALCIUM CHLORIDE: 600; 310; 30; 20 INJECTION, SOLUTION INTRAVENOUS at 09:08

## 2021-12-16 RX ADMIN — ACETAMINOPHEN 1000 MG: 500 TABLET, FILM COATED ORAL at 08:08

## 2021-12-16 RX ADMIN — FENTANYL CITRATE 25 MCG: 50 INJECTION, SOLUTION INTRAMUSCULAR; INTRAVENOUS at 09:32

## 2021-12-16 RX ADMIN — FENTANYL CITRATE 25 MCG: 50 INJECTION, SOLUTION INTRAMUSCULAR; INTRAVENOUS at 09:21

## 2021-12-16 RX ADMIN — ONDANSETRON 4 MG: 2 INJECTION INTRAMUSCULAR; INTRAVENOUS at 09:25

## 2021-12-16 RX ADMIN — FENTANYL CITRATE 25 MCG: 50 INJECTION, SOLUTION INTRAMUSCULAR; INTRAVENOUS at 09:40

## 2021-12-16 RX ADMIN — DEXAMETHASONE SODIUM PHOSPHATE 10 MG: 10 INJECTION, SOLUTION INTRAMUSCULAR; INTRAVENOUS at 09:25

## 2021-12-16 RX ADMIN — SODIUM CHLORIDE, POTASSIUM CHLORIDE, SODIUM LACTATE AND CALCIUM CHLORIDE: 600; 310; 30; 20 INJECTION, SOLUTION INTRAVENOUS at 07:57

## 2021-12-16 RX ADMIN — CIPROFLOXACIN 400 MG: 2 INJECTION, SOLUTION INTRAVENOUS at 08:14

## 2021-12-16 RX ADMIN — PHENYLEPHRINE HYDROCHLORIDE 100 MCG: 10 INJECTION INTRAVENOUS at 09:31

## 2021-12-16 RX ADMIN — FENTANYL CITRATE 25 MCG: 50 INJECTION, SOLUTION INTRAMUSCULAR; INTRAVENOUS at 09:52

## 2021-12-16 ASSESSMENT — PAIN SCALES - GENERAL: PAINLEVEL_OUTOF10: 0

## 2021-12-16 NOTE — PROGRESS NOTES

## 2021-12-16 NOTE — BRIEF OP NOTE
Brief Postoperative Note      Patient: Juwan Boston  YOB: 1940  MRN: [id number]    Date of Procedure: 12/16/2021    Pre-Op Diagnosis: URETEROVESICAL JUNCTION OBSTRUCTION    Post-Op Diagnosis: Same       Procedure(s):  RIGHT URETEROSCOPY WITH HOLMIUM LASER LITHOTRIPSY AND RIGHT STENT PLACEMENT    Surgeon(s):  Marco Jauregui MD    Assistant:  * No surgical staff found *    Anesthesia: General    Estimated Blood Loss (mL): Minimal    Complications: None    Specimens:   * No specimens in log *    Implants:  Implant Name Type Inv.  Item Serial No.  Lot No. LRB No. Used Action   STENT URET 6FR L24CM HYDR+ GRAD CIRCUMFERENTIAL MRK LO PROF  STENT URET 6FR L24CM HYDR+ GRAD CIRCUMFERENTIAL MRK LO PROF  [employer] [facility] UROLOGY- [id number] Right 1 Implanted         Drains: * No LDAs found *    Findings: right ureteral and renal calculus    Electronically signed by Marco Jauregui MD on 12/16/2021 at 10:06 AM

## 2021-12-16 NOTE — ANESTHESIA PRE PROCEDURE
Historical Provider, MD   Glucose Blood (CHOICE DM FORA G20 TEST STRIPS VI) by In Vitro route. Historical Provider, MD       Current medications:    Current Facility-Administered Medications   Medication Dose Route Frequency Provider Last Rate Last Admin    ciprofloxacin (CIPRO) IVPB 400 mg  400 mg IntraVENous On Call to Nadir Pride  mL/hr at 12/16/21 0814 400 mg at 12/16/21 0814    lactated ringers infusion   IntraVENous Continuous Kierra Huff MD           Allergies: Allergies   Allergen Reactions    Adhesive Tape      Takes her skin off uses paper tape.    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) E66.01, Z68.43    SVT (supraventricular tachycardia) ([facility]) I47.1    Renal calculus N20.0       Past Medical History:        Diagnosis Date    Cancer ([facility] [address] [address]     basal cell skin cancer    Hyperlipidemia     Hypertension     Lymphedema of lower extremity     Obesity     Shingles     66's    Type II or unspecified type diabetes mellitus without mention of complication, not stated as uncontrolled     Venous stasis dermatitis        Past Surgical History:        Procedure Laterality Date    APPENDECTOMY      COLONOSCOPY      DILATION AND CURETTAGE OF UTERUS      JOINT REPLACEMENT      Rt knee    NV OFFICE/OUTPT VISIT,PROCEDURE ONLY Right 7/19/2018    RIGHT HIP MASS INCISION AND DRAINAGE performed by Alex Ramey MD at [address] [facility] OFFICE/OUTPT VISIT,PROCEDURE ONLY Right 8/1/2018    HIP INCISION AND DRAINAGE (Necrotic Fat Right Hip Debridement) performed by Krupa Dykes MD at [address] [facility] [address] OR       Social History:    Social History     Tobacco Use    Smoking status: Never Smoker    Smokeless tobacco: Never Used   Substance Use Topics    Alcohol use: No                                Counseling given: Not Answered      Vital Signs (Current):   Vitals:    12/09/21 1542 12/16/21 0706 12/16/21 0710   BP:   126/74   Pulse:   109   Resp:   20   SpO2:   96%   Weight: 224 lb (101.6 kg) 219 lb 6.4 oz (99.5 kg)    Height: 5' (1.524 m) 5' (1.524 m)                                               BP Readings from Last 3 Encounters:   12/16/21 126/74   11/18/21 (!) 138/92   11/05/21 (!) 126/58       NPO Status: Time of last liquid consumption: 2100                        Time of last solid consumption: 2100                        Date of last liquid consumption: 12/15/21                        Date of last solid food consumption: 12/15/21    BMI:   Wt Readings from Last 3 Encounters:   12/16/21 219 lb 6.4 oz (99.5 kg)   11/18/21 224 lb (101.6 kg)   11/05/21 226 lb 3.2 oz (102.6 kg)     Body mass index is 42.85 kg/m². CBC:   Lab Results   Component Value Date    WBC 6.0 11/05/2021    RBC 3.28 11/05/2021    HGB 9.1 11/05/2021    HCT 27.6 11/05/2021    MCV 84.2 11/05/2021    RDW 14.0 11/05/2021     11/05/2021       CMP:   Lab Results   Component Value Date     11/05/2021    K 3.6 11/05/2021     11/05/2021    CO2 30 11/05/2021    BUN 10 11/05/2021    CREATININE 0.84 11/05/2021    GFRAA >60 11/05/2021    LABGLOM >60 11/05/2021    GLUCOSE 97 11/05/2021    GLUCOSE 177 04/21/2012    PROT 7.9 11/02/2021    CALCIUM 8.4 11/05/2021    BILITOT 0.40 11/02/2021    ALKPHOS 146 11/02/2021    AST 8 11/02/2021    ALT 7 11/02/2021       POC Tests: No results for input(s): POCGLU, POCNA, POCK, POCCL, POCBUN, POCHEMO, POCHCT in the last 72 hours.     Coags:   Lab Results   Component Value Date    PROTIME 10.0 05/08/2018    INR 1.0 05/08/2018    APTT 27.1 05/08/2018       HCG (If Applicable): No results found for: PREGTESTUR, PREGSERUM, HCG, HCGQUANT     ABGs: No results found for: PHART, PO2ART, TVA9VJF, BZX5XUX, BEART, [id number]     Type & Screen (If Applicable):  No results found for: LABABO, LABRH    Drug/Infectious Status (If Applicable):  No results found for: HIV, HEPCAB    COVID-19 Screening (If Applicable):   Lab Results   Component Value Date    COVID19 Not Detected 11/02/2021           Anesthesia Evaluation  Patient summary reviewed and Nursing notes reviewed  Airway: Mallampati: II  TM distance: >3 FB   Neck ROM: limited  Mouth opening: > = 3 FB Dental:    (+) upper dentures and lower dentures      Pulmonary:Negative Pulmonary ROS and normal exam  breath sounds clear to auscultation                             Cardiovascular:  Exercise tolerance: no interval change,   (+) hypertension:, dysrhythmias (history of SVT, not current): SVT, hyperlipidemia      ECG reviewed  Rhythm: regular  Rate: normal                    Neuro/Psych:   Negative Neuro/Psych ROS              GI/Hepatic/Renal:   (+) renal disease: kidney stones, morbid obesity          Endo/Other:    (+) DiabetesType II DM, well controlled, , . Arthritis: history of gout. Abdominal:   (+) obese,     Abdomen: soft. Vascular: negative vascular ROS. ROS comment: History of decubitus ulcer. Other Findings:             Anesthesia Plan      general     ASA 3       Induction: intravenous. MIPS: Postoperative opioids intended and Prophylactic antiemetics administered. Anesthetic plan and risks discussed with patient. Plan discussed with attending.                   ALEXANDER Ruvalcaba - EVARISTO   12/16/2021

## 2021-12-16 NOTE — ANESTHESIA PRE PROCEDURE
Department of Anesthesiology  Preprocedure Note       Name:  Amanda Verdugo   Age:  80 y.o.  :  1940                                          MRN:  [id number]         Date:  2021      Surgeon: Adalberto Aquino):  Demetrius Arredondo MD    Procedure: Procedure(s):  RIGHT URETEROSCOPY WITH HOLMIUM LASER LITHOTRIPSY POSSIBLE STENT PLACEMENT POSSIBLE RIGHT URETERAL BIOPSY    Medications prior to admission:   Prior to Admission medications    Medication Sig Start Date End Date Taking? Authorizing Provider   metoprolol tartrate (LOPRESSOR) 25 MG tablet Take 1 tablet by mouth 2 times daily 21   Syed Brock MD   dilTIAZem (CARDIZEM) 30 MG tablet Take 1 tablet by mouth 3 times daily 21   Syed Brock MD   furosemide (LASIX) 40 MG tablet Take 1 tablet by mouth daily 21   Syed Brock MD   potassium chloride (KLOR-CON M) 20 MEQ TBCR extended release tablet Take 1 tablet by mouth daily 21   Syed Brock MD   Multiple Vitamin (MULTIVITAMIN) TABS tablet Take 1 tablet by mouth daily 21   Syed Brock MD   Lancets (Arty Lennox PLUS LHHJES90L) MISC Testing blood sugar once daily and as needed, uses OneTouch UltraMini 21   Jeff Grover MD   blood glucose test strips (ASCENSIA AUTODISC VI;ONE TOUCH ULTRA TEST VI) strip Testing blood sugar once daily and as needed 21   Hilary Holiday, APRN - CNP   allopurinol (ZYLOPRIM) 100 MG tablet TAKE 1 TABLET EVERY DAY 21   Jeff Grover MD   metFORMIN (GLUCOPHAGE) 500 MG tablet Take 1 tablet by mouth 2 times daily (with meals) 21   Jeff Grover MD   simvastatin (ZOCOR) 20 MG tablet TAKE 1 TABLET EVERY NIGHT 21   Jeff Grover MD   blood glucose test strips (ONE TOUCH ULTRA TEST) strip Test blood sugar once daily and as needed.  20   Jeff Grover MD   Calcium Carb-Cholecalciferol (CALCIUM 600 + D PO) Take by mouth daily    Historical Provider, MD   aspirin 325 MG tablet Take 325 mg by mouth daily Historical Provider, MD   Glucose Blood (CHOICE DM FORA G20 TEST STRIPS VI) by In Vitro route. Historical Provider, MD       Current medications:    Current Facility-Administered Medications   Medication Dose Route Frequency Provider Last Rate Last Admin    ciprofloxacin (CIPRO) IVPB 400 mg  400 mg IntraVENous On Call to Nadir Pride MD           Allergies: Allergies   Allergen Reactions    Adhesive Tape      Takes her skin off uses paper tape.     Sulfa Antibiotics        Problem List:    Patient Active Problem List   Diagnosis Code    Essential hypertension, benign I10    Type 2 diabetes mellitus without complication ([facility] S60.6    Mixed hyperlipidemia E78.2    Wart B07.9    Benign skin lesion of forearm L98.9    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    Cutaneous abscess of buttock L02.31    Decubitus ulcer of right hip L89.219    PMB (postmenopausal bleeding) N95.0    Morbid obesity with BMI of 50.0-59.9, adult ([facility]) E66.01, Z68.43    SVT (supraventricular tachycardia) ([facility]) I47.1    Renal calculus N20.0       Past Medical History:        Diagnosis Date    Cancer ([facility]     basal cell skin cancer    Hyperlipidemia     Hypertension     Lymphedema of lower extremity     Obesity     Shingles     70's    Type II or unspecified type diabetes mellitus without mention of complication, not stated as uncontrolled     Venous stasis dermatitis        Past Surgical History:        Procedure Laterality Date    APPENDECTOMY      COLONOSCOPY      DILATION AND CURETTAGE OF UTERUS      JOINT REPLACEMENT      Rt knee    FL OFFICE/OUTPT VISIT,PROCEDURE ONLY Right 7/19/2018    RIGHT HIP MASS INCISION AND DRAINAGE performed by Denny Currie MD at [address] [facility] OFFICE/OUTPT VISIT,PROCEDURE ONLY Right 8/1/2018    HIP INCISION AND DRAINAGE (Necrotic Fat Right Hip Debridement) performed by Denny Currie MD at [facility] OR       Social History:    Social History ABGs: No results found for: PHART, PO2ART, SGM1PBA, QYS2RFK, BEART, C0GBXWCU     Type & Screen (If Applicable):  No results found for: LABABO, LABRH    Drug/Infectious Status (If Applicable):  No results found for: HIV, HEPCAB    COVID-19 Screening (If Applicable):   Lab Results   Component Value Date    COVID19 Not Detected 11/02/2021           Anesthesia Evaluation  Patient summary reviewed and Nursing notes reviewed no history of anesthetic complications:   Airway: Mallampati: II        Dental: normal exam         Pulmonary:Negative Pulmonary ROS and normal exam  breath sounds clear to auscultation                             Cardiovascular:    (+) hypertension:,       ECG reviewed  Rhythm: regular  Rate: normal                    Neuro/Psych:   Negative Neuro/Psych ROS              GI/Hepatic/Renal: Neg GI/Hepatic/Renal ROS            Endo/Other:    (+) DiabetesType II DM, , .                 Abdominal:   (+) obese,           Vascular: negative vascular ROS. Other Findings:             Anesthesia Plan      general     ASA 3       Induction: intravenous. MIPS: Postoperative opioids intended. Anesthetic plan and risks discussed with patient. Use of blood products discussed with patient whom. Plan discussed with attending.                   ALEXANDER Stone - CRNA   12/16/2021

## 2021-12-17 NOTE — OP NOTE
[place] [address]                                OPERATIVE REPORT    PATIENT NAME: Lauri Batista                   :        1940  MED REC NO:   [id number]                              ROOM:  ACCOUNT NO:   [de-identified]                           ADMIT DATE: 2021  PROVIDER:     Gladys Egan    DATE OF PROCEDURE:  2021    SURGEON:  Dr. Gladys Egan. ASSISTANT:  None. PREOPERATIVE DIAGNOSES:  1. Right renal calculus. 2.  Right ureteral calculus. POSTOPERATIVE DIAGNOSES:  1. Right renal calculus. 2.  Right ureteral calculus. PROCEDURES:  Cystoscopy, right ureteroscopy, right holmium laser  lithotripsy, and right ureteral stent placement. ANESTHESIA:  General.    COMPLICATIONS:  None. EBL:  Minimal.    SPECIMENS:  None. PROSTHESIS:  A 6-Malay x 24 cm double-J ureteral stent. DISPOSITION:  Stable. FINDINGS:  1. Right ureteral calculus. 2.  Right renal calculus. INDICATIONS:  The patient is an 19-year-old female with right-sided  calculus identified on CT scan, here now for definitive therapy. OPERATIVE PROCEDURE:  The patient was taken back to the operating room  after informed consent including all risks, benefits, and alternatives  were obtained. The patient was transferred from the [facility] onto the  operating table where she was induced under general anesthesia, given IV  Cipro for preoperative antibiotic prophylaxis. To begin the case, she  was prepped and draped in the normal sterile fashion, placed in dorsal  lithotomy. She had 22-Malay sheath with 30-degree lens passed through  the urethra into the bladder. Once in the bladder, we identified the  right-sided ureteral orifice. A 0.035-inch wire was passed up. We then  used dual-lumen catheter and placed additional Glidewire up.   We then  placed the flexible ureteroscope over the [address] [place] [address] Avenue. We did identify  stones in the mid ureter. We used 270 micron laser fiber and ablated  the stones adequately. Once we did this, we were able to continue up  into the kidney. We identified stones in the midpole. We ablated these  stones adequately. We came back down the ureter and did not identify  any additional calculi. We then removed the scope, leaving the  Glidewire in place, placed cystoscope over the Glidewire and placed a 6  Hebrew x 24 cm double-J ureteral stent over the Glidewire up into the  kidney. Glidewire was removed. Proximal curl was confirmed by  fluoroscopy, distal curl was confirmed by visualization. At this point  in time, the stent string was attached to the thigh with Steri-Strips. She was awoken from general anesthesia and transferred to the [facility] and  taken to the PACU in satisfactory condition by nursing and anesthesia  teams. PLAN:  The patient will be discharged home per PACU criterion and follow  up with us in 1 week for stent removal via string.         Neris Bingham    D: 12/16/2021 10:11:45       T: 12/16/2021 10:34:42     [id number]  Job#: [id number]     Doc#: [id number]    CC:

## 2021-12-23 ENCOUNTER — OFFICE VISIT (OUTPATIENT)
Dept: UROLOGY | Age: 81
End: 2021-12-23
Payer: MEDICARE

## 2021-12-23 VITALS
BODY MASS INDEX: 43.98 KG/M2 | DIASTOLIC BLOOD PRESSURE: 74 MMHG | OXYGEN SATURATION: 97 % | HEART RATE: 130 BPM | WEIGHT: 224 LBS | HEIGHT: 60 IN | SYSTOLIC BLOOD PRESSURE: 122 MMHG

## 2021-12-23 DIAGNOSIS — N20.1 URETERAL STONE: Primary | ICD-10-CM

## 2021-12-23 PROCEDURE — 99213 OFFICE O/P EST LOW 20 MIN: CPT | Performed by: PHYSICIAN ASSISTANT

## 2021-12-23 PROCEDURE — 1036F TOBACCO NON-USER: CPT | Performed by: PHYSICIAN ASSISTANT

## 2021-12-23 PROCEDURE — G8417 CALC BMI ABV UP PARAM F/U: HCPCS | Performed by: PHYSICIAN ASSISTANT

## 2021-12-23 PROCEDURE — G8399 PT W/DXA RESULTS DOCUMENT: HCPCS | Performed by: PHYSICIAN ASSISTANT

## 2021-12-23 PROCEDURE — G8482 FLU IMMUNIZE ORDER/ADMIN: HCPCS | Performed by: PHYSICIAN ASSISTANT

## 2021-12-23 PROCEDURE — 1123F ACP DISCUSS/DSCN MKR DOCD: CPT | Performed by: PHYSICIAN ASSISTANT

## 2021-12-23 PROCEDURE — 4040F PNEUMOC VAC/ADMIN/RCVD: CPT | Performed by: PHYSICIAN ASSISTANT

## 2021-12-23 PROCEDURE — 1090F PRES/ABSN URINE INCON ASSESS: CPT | Performed by: PHYSICIAN ASSISTANT

## 2021-12-23 PROCEDURE — G8427 DOCREV CUR MEDS BY ELIG CLIN: HCPCS | Performed by: PHYSICIAN ASSISTANT

## 2021-12-23 ASSESSMENT — ENCOUNTER SYMPTOMS
NAUSEA: 0
COUGH: 0
WHEEZING: 0
APNEA: 0
VOMITING: 0
CONSTIPATION: 0
SHORTNESS OF BREATH: 0
BACK PAIN: 0
COLOR CHANGE: 0
EYE REDNESS: 0
ABDOMINAL PAIN: 0

## 2021-12-23 NOTE — PROGRESS NOTES
HPI:    Patient is a 80 y.o. female in no acute distress. She is alert and oriented to person, place, and time. 11/4/2021 - Patient presented to the emergency room on 11/1/2021 with progressive fatigue. Patient had a white blood cell count of 12.4. She was straight cathed for urine. She did have a urinalysis indicative of urinary tract infection. She was started on antibiotics -Levaquin. She did have wounds on her buttocks and right popliteal.  Decision was made for her to go to [place] [address]. Patient returned to the emergency room 11/2/2021 after a loss of consciousness. In the emergency room it was discovered that she had SVT with heart rates in the 140s. Patient was ultimately admitted to the hospital.  Patient's urine culture did grow beta-hemolytic strep as well as E. coli. Patient was started on IV Rocephin. Patient did have a CT abdomen pelvis with IV contrast which was independently reviewed showing no ureteral calculi. There is calcification within a possible mass of the right kidney. This is unable to be completely determined with current imaging. 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 Patient has had a 40 pound weight loss and loss of appetite over the past couple months. Patient's BUN is 9, creatinine of 0.91. Patient's white blood cell count is down to 6.1.    12/16/2021 -right HL L    Today:  Patient is here today status post right HL L. Patient has stent removed today without any difficulty. Patient denies any fever, chills, gross hematuria, flank pain, dysuria.       Past Medical History:   Diagnosis Date    Cancer ([facility]     basal cell skin cancer    Hyperlipidemia     Hypertension     Lymphedema of lower extremity     Obesity     Shingles     70's    Type II or unspecified type diabetes mellitus without mention of complication, not stated as uncontrolled     Venous stasis dermatitis      Past Surgical History: G20 TEST STRIPS VI) by In Vitro route. No facility-administered encounter medications on file as of 12/23/2021. Current Outpatient Medications on File Prior to Visit   Medication Sig Dispense Refill    metoprolol tartrate (LOPRESSOR) 25 MG tablet Take 1 tablet by mouth 2 times daily 60 tablet 3    dilTIAZem (CARDIZEM) 30 MG tablet Take 1 tablet by mouth 3 times daily 90 tablet 3    furosemide (LASIX) 40 MG tablet Take 1 tablet by mouth daily 60 tablet 3    potassium chloride (KLOR-CON M) 20 MEQ TBCR extended release tablet Take 1 tablet by mouth daily 30 tablet 3    Multiple Vitamin (MULTIVITAMIN) TABS tablet Take 1 tablet by mouth daily  0    Lancets (ONETOUCH DELICA PLUS [id number]) MISC Testing blood sugar once daily and as needed, uses OneTouch UltraMini 100 each 5    blood glucose test strips (ASCENSIA AUTODISC VI;ONE TOUCH ULTRA TEST VI) strip Testing blood sugar once daily and as needed 100 each 5    allopurinol (ZYLOPRIM) 100 MG tablet TAKE 1 TABLET EVERY DAY 90 tablet 1    metFORMIN (GLUCOPHAGE) 500 MG tablet Take 1 tablet by mouth 2 times daily (with meals) 180 tablet 1    simvastatin (ZOCOR) 20 MG tablet TAKE 1 TABLET EVERY NIGHT 90 tablet 1    blood glucose test strips (ONE TOUCH ULTRA TEST) strip Test blood sugar once daily and as needed. 100 strip 3    Calcium Carb-Cholecalciferol (CALCIUM 600 + D PO) Take by mouth daily      aspirin 325 MG tablet Take 325 mg by mouth daily      Glucose Blood (CHOICE DM FORA G20 TEST STRIPS VI) by In Vitro route. No current facility-administered medications on file prior to visit.      Adhesive tape and Sulfa antibiotics  Family History   Problem Relation Age of Onset    Heart Disease Mother     Cancer Sister     Mult Sclerosis Sister     Cancer Brother         lung     Social History     Tobacco Use   Smoking Status Never Smoker   Smokeless Tobacco Never Used       Social History     Substance and Sexual Activity   Alcohol Use No Review of Systems   Constitutional: Negative for appetite change, chills and fever. Eyes: Negative for redness and visual disturbance. Respiratory: Negative for apnea, cough, shortness of breath and wheezing. Cardiovascular: Positive for leg swelling. Negative for chest pain. Gastrointestinal: Negative for abdominal pain, constipation, nausea and vomiting. Genitourinary: Negative for difficulty urinating, dyspareunia, dysuria, enuresis, flank pain, frequency, hematuria, pelvic pain, urgency, vaginal bleeding and vaginal discharge. Musculoskeletal: Negative for back pain, joint swelling and myalgias. Positive for bilateral lower extremity weakness patient currently at [facility] nursing [facility] for physical therapy   Skin: Negative for color change, rash and wound. Neurological: Positive for weakness. Negative for dizziness, tremors and numbness. Hematological: Negative for adenopathy. Does not bruise/bleed easily. Psychiatric/Behavioral: Negative for sleep disturbance. /74   Pulse 130   Ht 5' (1.524 m)   Wt 224 lb (101.6 kg)   LMP  (LMP Unknown)   SpO2 97%   Breastfeeding No   BMI 43.75 kg/m²       PHYSICAL EXAM:  Constitutional: Patient resting comfortably, in no acute distress. Neuro: Alert and oriented to person place and time. Psych: Mood and affect normal.  HEENT: normocephalic, atraumatic  Lungs: Respiratory effort normal, unlabored  Cardiovascular:  Normal peripheral pulses  Abdomen: obese, soft, non-tender, non-distended  : No CVA tenderness. Bilateral lower extremity edema, chronic venous stasis  Pelvic: Deferred    Lab Results   Component Value Date    BUN 10 11/05/2021     Lab Results   Component Value Date    CREATININE 0.84 11/05/2021       ASSESSMENT:   Diagnosis Orders   1. Ureteral stone  XR ABDOMEN (KUB) (SINGLE AP VIEW)     PLAN:  You may experience waves of pain and/or nausea for the next 24-72 hrs.   You may also experience burning with urination, frequency, urgency, bladder spasms, and blood in the urine. All of this should continue to improve over the next several days. The blood in the urine can last up to two weeks. take ibuprofen (motrin) 600 mg (3 of the 200mg tabs) every 6 hours WITH FOOD for the next 72 hours. drink at least 80 oz fluid (water, juice, Gatorade - NOT tea, coffee, soda pop) daily    Call our office [phone] or go to ER (if after normal office hours) if you develop fever, intractable vomiting, severe/intolerable pain.        Follow-up in 6 weeks with KUB

## 2021-12-23 NOTE — PROGRESS NOTES
Pt had ureteral stent placed on 12/16 following right HLL. Stent removed via string in office today without difficulty. Pt tolerated removal well.

## 2022-01-19 ENCOUNTER — APPOINTMENT (OUTPATIENT)
Dept: GENERAL RADIOLOGY | Age: 82
End: 2022-01-19
Payer: MEDICARE

## 2022-01-19 ENCOUNTER — APPOINTMENT (OUTPATIENT)
Dept: CT IMAGING | Age: 82
End: 2022-01-19
Payer: MEDICARE

## 2022-01-19 ENCOUNTER — HOSPITAL ENCOUNTER (EMERGENCY)
Age: 82
Discharge: HOME | End: 2022-01-19
Attending: FAMILY MEDICINE
Payer: MEDICARE

## 2022-01-19 VITALS
TEMPERATURE: 98.6 F | HEIGHT: 60 IN | SYSTOLIC BLOOD PRESSURE: 140 MMHG | DIASTOLIC BLOOD PRESSURE: 96 MMHG | OXYGEN SATURATION: 93 % | BODY MASS INDEX: 43.98 KG/M2 | RESPIRATION RATE: 24 BRPM | HEART RATE: 79 BPM | WEIGHT: 224 LBS

## 2022-01-19 DIAGNOSIS — Z86.16 HISTORY OF COVID-19: ICD-10-CM

## 2022-01-19 DIAGNOSIS — N30.01 ACUTE CYSTITIS WITH HEMATURIA: Primary | ICD-10-CM

## 2022-01-19 LAB
-: ABNORMAL
ABSOLUTE EOS #: ABNORMAL K/UL (ref 0–0.4)
ABSOLUTE IMMATURE GRANULOCYTE: ABNORMAL K/UL (ref 0–0.3)
ABSOLUTE LYMPH #: 0.59 K/UL (ref 1–4.8)
ABSOLUTE MONO #: 0.07 K/UL (ref 0–1)
ALBUMIN SERPL-MCNC: 2.7 G/DL (ref 3.5–5.2)
ALBUMIN/GLOBULIN RATIO: ABNORMAL (ref 1–2.5)
ALP BLD-CCNC: 262 U/L (ref 35–104)
ALT SERPL-CCNC: 31 U/L (ref 5–33)
AMORPHOUS: ABNORMAL
ANION GAP SERPL CALCULATED.3IONS-SCNC: 11 MMOL/L (ref 9–17)
AST SERPL-CCNC: 28 U/L
BACTERIA: ABNORMAL
BASOPHILS # BLD: ABNORMAL % (ref 0–2)
BASOPHILS ABSOLUTE: ABNORMAL K/UL (ref 0–0.2)
BILIRUB SERPL-MCNC: 0.47 MG/DL (ref 0.3–1.2)
BILIRUBIN URINE: NEGATIVE
BUN BLDV-MCNC: 26 MG/DL (ref 8–23)
BUN/CREAT BLD: 28 (ref 9–20)
CALCIUM SERPL-MCNC: 9.1 MG/DL (ref 8.6–10.4)
CASTS UA: ABNORMAL /LPF
CASTS UA: ABNORMAL /LPF
CHLORIDE BLD-SCNC: 92 MMOL/L (ref 98–107)
CO2: 24 MMOL/L (ref 20–31)
COLOR: YELLOW
COMMENT UA: ABNORMAL
CREAT SERPL-MCNC: 0.93 MG/DL (ref 0.5–0.9)
CRYSTALS, UA: ABNORMAL /HPF
DIFFERENTIAL TYPE: ABNORMAL
EOSINOPHILS RELATIVE PERCENT: ABNORMAL % (ref 0–5)
EPITHELIAL CELLS UA: ABNORMAL /HPF
GFR AFRICAN AMERICAN: >60 ML/MIN
GFR NON-AFRICAN AMERICAN: 58 ML/MIN
GFR SERPL CREATININE-BSD FRML MDRD: ABNORMAL ML/MIN/{1.73_M2}
GFR SERPL CREATININE-BSD FRML MDRD: ABNORMAL ML/MIN/{1.73_M2}
GLUCOSE BLD-MCNC: 233 MG/DL (ref 70–99)
GLUCOSE BLD-MCNC: 253 MG/DL
GLUCOSE BLD-MCNC: 253 MG/DL (ref 65–99)
GLUCOSE URINE: NEGATIVE
HCT VFR BLD CALC: 34.9 % (ref 36–46)
HEMOGLOBIN: 12 G/DL (ref 12–16)
IMMATURE GRANULOCYTES: ABNORMAL %
KETONES, URINE: NEGATIVE
LACTIC ACID: 1.4 MMOL/L (ref 0.5–2.2)
LEUKOCYTE ESTERASE, URINE: ABNORMAL
LYMPHOCYTES # BLD: 8 % (ref 15–40)
MCH RBC QN AUTO: 29.8 PG (ref 26–34)
MCHC RBC AUTO-ENTMCNC: 34.5 G/DL (ref 31–37)
MCV RBC AUTO: 86.2 FL (ref 80–100)
MONOCYTES # BLD: 1 % (ref 4–8)
MORPHOLOGY: ABNORMAL
MUCUS: ABNORMAL
NITRITE, URINE: POSITIVE
NRBC AUTOMATED: ABNORMAL PER 100 WBC
OTHER OBSERVATIONS UA: ABNORMAL
PDW BLD-RTO: 13.7 % (ref 12.1–15.2)
PH UA: 6 (ref 5–8)
PLATELET # BLD: 213 K/UL (ref 140–450)
PLATELET ESTIMATE: ABNORMAL
PMV BLD AUTO: ABNORMAL FL (ref 6–12)
POTASSIUM SERPL-SCNC: 4.4 MMOL/L (ref 3.7–5.3)
PROTEIN UA: ABNORMAL
RBC # BLD: 4.04 M/UL (ref 4–5.2)
RBC # BLD: ABNORMAL 10*6/UL
RBC UA: ABNORMAL /HPF (ref 0–2)
RENAL EPITHELIAL, UA: ABNORMAL /HPF
SEG NEUTROPHILS: 91 % (ref 47–75)
SEGMENTED NEUTROPHILS ABSOLUTE COUNT: 6.74 K/UL (ref 2.5–7)
SODIUM BLD-SCNC: 127 MMOL/L (ref 135–144)
SPECIFIC GRAVITY UA: 1.01 (ref 1–1.03)
TOTAL PROTEIN: 7.3 G/DL (ref 6.4–8.3)
TRICHOMONAS: ABNORMAL
TROPONIN INTERP: NORMAL
TROPONIN T: NORMAL NG/ML
TROPONIN, HIGH SENSITIVITY: 13 NG/L (ref 0–14)
TURBIDITY: CLEAR
URINE HGB: ABNORMAL
UROBILINOGEN, URINE: NORMAL
WBC # BLD: 7.4 K/UL (ref 3.5–11)
WBC # BLD: ABNORMAL 10*3/UL
WBC UA: ABNORMAL /HPF
YEAST: ABNORMAL

## 2022-01-19 PROCEDURE — 87186 SC STD MICRODIL/AGAR DIL: CPT

## 2022-01-19 PROCEDURE — 83605 ASSAY OF LACTIC ACID: CPT

## 2022-01-19 PROCEDURE — 87077 CULTURE AEROBIC IDENTIFY: CPT

## 2022-01-19 PROCEDURE — 80053 COMPREHEN METABOLIC PANEL: CPT

## 2022-01-19 PROCEDURE — 84484 ASSAY OF TROPONIN QUANT: CPT

## 2022-01-19 PROCEDURE — 71045 X-RAY EXAM CHEST 1 VIEW: CPT

## 2022-01-19 PROCEDURE — 82947 ASSAY GLUCOSE BLOOD QUANT: CPT

## 2022-01-19 PROCEDURE — 93005 ELECTROCARDIOGRAM TRACING: CPT | Performed by: FAMILY MEDICINE

## 2022-01-19 PROCEDURE — 2580000003 HC RX 258: Performed by: FAMILY MEDICINE

## 2022-01-19 PROCEDURE — 99284 EMERGENCY DEPT VISIT MOD MDM: CPT

## 2022-01-19 PROCEDURE — 81001 URINALYSIS AUTO W/SCOPE: CPT

## 2022-01-19 PROCEDURE — 87086 URINE CULTURE/COLONY COUNT: CPT

## 2022-01-19 PROCEDURE — 85025 COMPLETE CBC W/AUTO DIFF WBC: CPT

## 2022-01-19 PROCEDURE — 6360000002 HC RX W HCPCS: Performed by: FAMILY MEDICINE

## 2022-01-19 PROCEDURE — 70450 CT HEAD/BRAIN W/O DYE: CPT

## 2022-01-19 PROCEDURE — 96374 THER/PROPH/DIAG INJ IV PUSH: CPT

## 2022-01-19 RX ORDER — PREDNISONE 20 MG/1
40 TABLET ORAL DAILY
Status: ON HOLD | COMMUNITY
End: 2022-01-28 | Stop reason: HOSPADM

## 2022-01-19 RX ORDER — POLYETHYLENE GLYCOL 1450
POWDER (GRAM) MISCELLANEOUS
COMMUNITY

## 2022-01-19 RX ORDER — ONDANSETRON 2 MG/ML
4 INJECTION INTRAMUSCULAR; INTRAVENOUS ONCE
Status: COMPLETED | OUTPATIENT
Start: 2022-01-19 | End: 2022-01-19

## 2022-01-19 RX ORDER — ACETAMINOPHEN 325 MG/1
650 TABLET ORAL EVERY 4 HOURS PRN
COMMUNITY

## 2022-01-19 RX ORDER — ERTAPENEM 1 G/1
1000 INJECTION, POWDER, LYOPHILIZED, FOR SOLUTION INTRAMUSCULAR; INTRAVENOUS EVERY 24 HOURS
Status: ON HOLD | COMMUNITY
End: 2022-01-28 | Stop reason: HOSPADM

## 2022-01-19 RX ORDER — ALBUTEROL SULFATE 90 UG/1
2 AEROSOL, METERED RESPIRATORY (INHALATION) EVERY 6 HOURS PRN
COMMUNITY

## 2022-01-19 RX ORDER — ONDANSETRON 4 MG/1
4 TABLET, FILM COATED ORAL EVERY 6 HOURS PRN
COMMUNITY
End: 2022-01-21

## 2022-01-19 RX ORDER — 0.9 % SODIUM CHLORIDE 0.9 %
500 INTRAVENOUS SOLUTION INTRAVENOUS ONCE
Status: COMPLETED | OUTPATIENT
Start: 2022-01-19 | End: 2022-01-19

## 2022-01-19 RX ORDER — GUAIFENESIN 100 MG/5ML
400 SYRUP ORAL 4 TIMES DAILY PRN
COMMUNITY

## 2022-01-19 RX ADMIN — ONDANSETRON 4 MG: 2 INJECTION INTRAMUSCULAR; INTRAVENOUS at 14:19

## 2022-01-19 RX ADMIN — SODIUM CHLORIDE 500 ML: 9 INJECTION, SOLUTION INTRAVENOUS at 14:22

## 2022-01-19 ASSESSMENT — PAIN SCALES - GENERAL: PAINLEVEL_OUTOF10: 2

## 2022-01-19 NOTE — ED PROVIDER NOTES
[address]  eMERGENCY dEPARTMENT eNCOUnter          [facility]       Chief Complaint   Patient presents with    Urinary Tract Infection     pt is being treated for UTI       Nurses Notes reviewed and I agree except as noted in the HPI. HISTORY OF PRESENT ILLNESS    Nita Guillen is a 80 y.o. female who presents to room via EMS from [facility], initial EMS call was for unresponsive patient, EMS advised that patient is responsive to pain, is otherwise hemodynamically stable with good pulse oximetry, patient is being treated for UTI and they state patient got an injection just prior to their arrival, and muscles unable to get IV access, and brought patient to the emergency room for further evaluation. States that ECF stated no reported falls, patient did test positive for COVID 1 week prior, onset of the symptoms this morning per ECF staff. PCP: Vishal Gupta  Cards: Dandy Nowak    REVIEW OF SYSTEMS     Review of Systems   Unable to perform ROS: Mental status change          PAST MEDICAL HISTORY    has a past medical history of Cancer ([address] COVID-19, Gout, Hyperlipidemia, Hypertension, Hypokalemia, Lymphedema of lower extremity, Obesity, Shingles, SVT (supraventricular tachycardia) ([address] Type II or unspecified type diabetes mellitus without mention of complication, not stated as uncontrolled, and Venous stasis dermatitis. SURGICAL HISTORY      has a past surgical history that includes Appendectomy; Colonoscopy; Dilation and curettage of uterus; pr office/outpt visit,procedure only (Right, 7/19/2018); pr office/outpt visit,procedure only (Right, 8/1/2018); Cystoscopy (Right, 12/16/2021); and joint replacement.     CURRENT MEDICATIONS       Discharge Medication List as of 1/19/2022  4:16 PM      CONTINUE these medications which have NOT CHANGED    Details   Probiotic Product (ACIDOPHILUS HIGH-POTENCY PO) Take 1 tablet by mouth dailyHistorical Med      predniSONE (DELTASONE) 20 MG tablet Take 40 mg by mouth dailyHistorical Med      guaiFENesin (ROBITUSSIN) 100 MG/5ML syrup Take 400 mg by mouth 4 times daily as needed for Cough 10MLHistorical Med      acetaminophen (TYLENOL) 325 MG tablet Take 650 mg by mouth every 4 hours as needed for PainHistorical Med      ertapenem (INVANZ) 1 GM injection Inject 1,000 mg into the muscle every 24 hoursHistorical Med      ondansetron (ZOFRAN) 4 MG tablet Take 4 mg by mouth every 6 hours as needed for Nausea or VomitingHistorical Med      albuterol sulfate HFA (VENTOLIN HFA) 108 (90 Base) MCG/ACT inhaler Inhale 2 puffs into the lungs every 6 hours as needed for WheezingHistorical Med      metoprolol tartrate (LOPRESSOR) 25 MG tablet Take 1 tablet by mouth 2 times daily, Disp-60 tablet, R-3DC to SNF      dilTIAZem (CARDIZEM) 30 MG tablet Take 1 tablet by mouth 3 times daily, Disp-90 tablet, R-3DC to SNF      furosemide (LASIX) 40 MG tablet Take 1 tablet by mouth daily, Disp-60 tablet, R-3DC to SNF      potassium chloride (KLOR-CON M) 20 MEQ TBCR extended release tablet Take 1 tablet by mouth daily, Disp-30 tablet, R-3DC to SNF      Multiple Vitamin (MULTIVITAMIN) TABS tablet Take 1 tablet by mouth daily, R-0DC to SNF      allopurinol (ZYLOPRIM) 100 MG tablet TAKE 1 TABLET EVERY DAY, Disp-90 tablet, R-1Normal      metFORMIN (GLUCOPHAGE) 500 MG tablet Take 1 tablet by mouth 2 times daily (with meals), Disp-180 tablet, R-1Normal      simvastatin (ZOCOR) 20 MG tablet TAKE 1 TABLET EVERY NIGHT, Disp-90 tablet, R-1Normal      Calcium Carb-Cholecalciferol (CALCIUM 600 + D PO) Take by mouth dailyHistorical Med      aspirin 325 MG tablet Take 81 mg by mouth daily Historical Med      Polyethylene Glycol 1450 POWD Historical Med      Lancets (ONETOUCH DELICA PLUS [id number]) MISC Testing blood sugar once daily and as needed, uses OneTouch UltraMini, Disp-100 each, R-5Normal      !! blood glucose test strips (ASCENSIA AUTODISC VI;ONE TOUCH ULTRA TEST VI) strip Disp-100 each, R-5, NormalTesting blood sugar once daily and as needed      !! blood glucose test strips (ONE TOUCH ULTRA TEST) strip Test blood sugar once daily and as needed. , Disp-100 strip, R-3Normal      !! Glucose Blood (CHOICE DM FORA G20 TEST STRIPS VI) by In Vitro route. !! - Potential duplicate medications found. Please discuss with provider. ALLERGIES     is allergic to adhesive tape and sulfa antibiotics. FAMILY HISTORY     She indicated that her mother is . She indicated that her father is . She indicated that the status of her sister is unknown. She indicated that the status of her brother is unknown.   family history includes Cancer in her brother and sister; Heart Disease in her mother; Mult Sclerosis in her sister. SOCIAL HISTORY      reports that she has never smoked. She has never used smokeless tobacco. She reports that she does not drink alcohol and does not use drugs. PHYSICAL EXAM     INITIAL VITALS:  height is 5' (1.524 m) and weight is 224 lb (101.6 kg). Her axillary temperature is 98.6 °F (37 °C). Her blood pressure is 140/96 (abnormal) and her pulse is 79. Her respiration is 24 and oxygen saturation is 93%. Physical Exam   Constitutional: Patient appears awake, though not responding verbally, patient is moaning, however with some questions patient with purposeful eye movement and slight turns her head towards speaker. Patient appears well-developed and well-nourished. HENT:   Head: Normocephalic and atraumatic. Head is without contusion. Right Ear:  external ear normal. No drainage. Left Ear:  external ear normal. No drainage. Nose: Nose normal. No nasal deformity. No epistaxis. Mouth/Throat: Mucous membranes are not dry. Eyes: EOMI. Conjunctivae, sclera, and lids are normal. Right eye exhibits no discharge. Left eye exhibits no discharge.    Neck: No tracheal deviation, negative JVD  Cardiovascular:  Normal rate, regular rhythm and intact distal pulses. Bilateral lower extremity lymph edema  Pulses: Right radial pulse  2+   Pulmonary/Chest: Effort normal. No tachypnea and no bradypnea. Coarseness bilateral bases, no gross stridor rhonchi or wheezing  Abdominal: BMI 43.7, Soft. Patient without distension, patient moans with any palpation of any portion of her body unable to determine if true abdominal discomfort, no rigidity rebound or guarding  Musculoskeletal:   Negative acute trauma or deformity   Neurological: Patient is awake, seem to turn her head towards verbal questions, difficult to determine if moan could be affirmative or negative. patient displays no tremor. Patient displays no seizure activity. .  Skin: Skin is warm and dry. Patient is not diaphoretic. Psychiatric: Patient is occasionally moaning in bed, eyes are open, she is responsive to pain and when spoken to but, patient's eyes move purposefully and seem to slightly turn her head, did seem to be able to moan in the affirmative or negative but difficult to determine    DIFFERENTIAL DIAGNOSIS:   Pneumonia, effusion, COVID-19 sequelae, UTI, CVA/TIA/ICH, dehydration, ANNIE, psychiatric    DIAGNOSTIC RESULTS     EKG: All EKG's are interpreted by the Emergency Department Physician who either signs or Co-signs this chart in the absence of a cardiologist.  EKG    The patient had an EKG which is interpreted by me in the absence of a Cardiologist.   [] Without comparison to previous. [x] With comparison to a previous EKG Dated 11/4/2021    EKG @ 1336 hrs -atrial fibrillation, rate 76, QRS QTC normal, left axis, noted RBBB      RADIOLOGY: non-plain film images(s) such as CT, Ultrasound and MRI are read by the radiologist.  CT HEAD WO CONTRAST   Final Result      No acute intracranial process. Age-related changes in the brain. Sinus disease. If clinical concern remains, consider further imaging with MRI.           XR CHEST PORTABLE   Final Result      Mild patchy bilateral infiltrates suspicious for early pneumonia. LABS:   Labs Reviewed   CBC WITH AUTO DIFFERENTIAL - Abnormal; Notable for the following components:       Result Value    Hematocrit 34.9 (*)     Seg Neutrophils 91 (*)     Lymphocytes 8 (*)     Monocytes 1 (*)     Absolute Lymph # 0.59 (*)     All other components within normal limits   COMPREHENSIVE METABOLIC PANEL W/ REFLEX TO MG FOR LOW K - Abnormal; Notable for the following components:    Glucose 233 (*)     BUN 26 (*)     CREATININE 0.93 (*)     Bun/Cre Ratio 28 (*)     Sodium 127 (*)     Chloride 92 (*)     Alkaline Phosphatase 262 (*)     Albumin 2.7 (*)     GFR Non- 58 (*)     All other components within normal limits   URINALYSIS - Abnormal; Notable for the following components:    Urine Hgb TRACE (*)     Protein, UA TRACE (*)     Nitrite, Urine POSITIVE (*)     Leukocyte Esterase, Urine 2+ (*)     All other components within normal limits   GLUCOSE, WHOLE BLOOD - Abnormal; Notable for the following components:    POC Glucose 253 (*)     All other components within normal limits   MICROSCOPIC URINALYSIS - Abnormal; Notable for the following components:    Bacteria, UA 2+ (*)     All other components within normal limits   POCT GLUCOSE - Normal   CULTURE, URINE   LACTIC ACID   TROPONIN       EMERGENCY DEPARTMENT COURSE:   Vitals:    Vitals:    01/19/22 1323 01/19/22 1408 01/19/22 1423 01/19/22 1538   BP: (!) 140/96      Pulse: 86 75 79 79   Resp: 24 20 23 24   Temp: 98.6 °F (37 °C)      TempSrc: Axillary      SpO2: 94% 92% 93% 93%   Weight:   224 lb (101.6 kg)    Height:   5' (1.524 m)      Patient presents via EMS brought to ER room #2, initial primary and secondary survey performed, ischial orders placed including POC glucose, EKG, chest x-ray, blood and urine studies IV access. Patient placed on cardiac monitor, pulse ox, sitting low semi-Fowlers in bed.     EMR reviewed, noting patient ED visit 11/2 for UTI, and again you 11/3 were patient was admitted due to SVT and UVJ obstruction, it appears patient had urology procedure 12/16. Medical history including HTN, DM, HLD, lymphedema lower extremity, SVT, kidney stones, venous stasis dermatitis, skin breakdown. EKG as above    POC glucose 253    Chest x-ray reviewed    Lab work reviewed, noting WBC 7.4 with 91% PMNs no bands, normal H&H, SCR 0.93 BUN 26, , K4.4, hsTnT 13, urine shows nitrate positive urine with 2+ bacteria and 2+ leukocytes 10-20 white blood cells    [facility] MAR reviewed, noting patient did receive ceftriaxone just prior to arrival as EMS had stated    Patient's  had arrived in the emergency room, he had stated that he had fed feel more comfortable patient was admitted to the hospital for UTI treatment, I will discussed the case with the hospitalist, though note patient is currently residing at an [facility] facility with skilled nursing, case was discussed with Dr. Jean-Claude Bhatti, hospitalist, regarding patient's presentation and current work-up, also notes patient coming from a skilled nursing facility and that patient did receive IM ceftriaxone at that facility, would advise transfer back to [facility]    Discussed with patient's  my conversation with hospitalist at this facility, and that patient would have to be transferred back to [facility] as she would not meet admission criteria at this time,  did seem little upset by this though acknowledges the reasoning    EMS to transfer patient back to [facility]    FINAL IMPRESSION      1. Acute cystitis with hematuria    2.  History of COVID-19          DISPOSITION/PLAN   D/c    PATIENT REFERRED TO:  Follow up with [facility] medical provider          [facility] ED  [address] [address]  [phone]    As needed, If symptoms worsen      DISCHARGE MEDICATIONS:  Discharge Medication List as of 1/19/2022  4:16 PM              Summation      Patient Course: d/c    ED Medications administered this visit: Medications   0.9 % sodium chloride bolus (0 mLs IntraVENous Stopped 1/19/22 1459)   ondansetron (ZOFRAN) injection 4 mg (4 mg IntraVENous Given 1/19/22 1419)       New Prescriptions from this visit:    Discharge Medication List as of 1/19/2022  4:16 PM          Follow-up:  Follow up with [facility] provider          [facility] ED  [address] [facility] [address] [phone]    As needed, If symptoms worsen        Final Impression:   1. Acute cystitis with hematuria    2.  History of COVID-19               (Please note that portions of this note were completed with a voice recognition program.  Efforts were made to edit the dictations but occasionally words are mis-transcribed.)    MD Concha Elaine MD  01/20/22 5994

## 2022-01-20 ENCOUNTER — HOSPITAL ENCOUNTER (EMERGENCY)
Age: 82
Discharge: HOME | End: 2022-01-20
Attending: EMERGENCY MEDICINE
Payer: MEDICARE

## 2022-01-20 ENCOUNTER — APPOINTMENT (OUTPATIENT)
Dept: CT IMAGING | Age: 82
End: 2022-01-20
Payer: MEDICARE

## 2022-01-20 VITALS
RESPIRATION RATE: 20 BRPM | OXYGEN SATURATION: 93 % | DIASTOLIC BLOOD PRESSURE: 77 MMHG | TEMPERATURE: 98.1 F | BODY MASS INDEX: 43.98 KG/M2 | HEART RATE: 106 BPM | SYSTOLIC BLOOD PRESSURE: 150 MMHG | WEIGHT: 224 LBS | HEIGHT: 60 IN

## 2022-01-20 DIAGNOSIS — N10 ACUTE PYELONEPHRITIS: ICD-10-CM

## 2022-01-20 DIAGNOSIS — N30.00 ACUTE CYSTITIS WITHOUT HEMATURIA: Primary | ICD-10-CM

## 2022-01-20 LAB
ABSOLUTE EOS #: 0 K/UL (ref 0–0.4)
ABSOLUTE IMMATURE GRANULOCYTE: ABNORMAL K/UL (ref 0–0.3)
ABSOLUTE LYMPH #: 0.52 K/UL (ref 1–4.8)
ABSOLUTE MONO #: 0.07 K/UL (ref 0–1)
ALBUMIN SERPL-MCNC: 2.7 G/DL (ref 3.5–5.2)
ALBUMIN/GLOBULIN RATIO: ABNORMAL (ref 1–2.5)
ALP BLD-CCNC: 256 U/L (ref 35–104)
ALT SERPL-CCNC: 29 U/L (ref 5–33)
ANION GAP SERPL CALCULATED.3IONS-SCNC: 11 MMOL/L (ref 9–17)
AST SERPL-CCNC: 30 U/L
BASOPHILS # BLD: 0 % (ref 0–2)
BASOPHILS ABSOLUTE: 0 K/UL (ref 0–0.2)
BILIRUB SERPL-MCNC: 0.38 MG/DL (ref 0.3–1.2)
BUN BLDV-MCNC: 23 MG/DL (ref 8–23)
BUN/CREAT BLD: 28 (ref 9–20)
CALCIUM SERPL-MCNC: 9 MG/DL (ref 8.6–10.4)
CHLORIDE BLD-SCNC: 102 MMOL/L (ref 98–107)
CO2: 25 MMOL/L (ref 20–31)
CREAT SERPL-MCNC: 0.82 MG/DL (ref 0.5–0.9)
DIFFERENTIAL TYPE: ABNORMAL
EKG ATRIAL RATE: 214 BPM
EKG Q-T INTERVAL: 432 MS
EKG QRS DURATION: 136 MS
EKG QTC CALCULATION (BAZETT): 486 MS
EKG R AXIS: -41 DEGREES
EKG T AXIS: 5 DEGREES
EKG VENTRICULAR RATE: 76 BPM
EOSINOPHILS RELATIVE PERCENT: 0 % (ref 0–5)
GFR AFRICAN AMERICAN: >60 ML/MIN
GFR NON-AFRICAN AMERICAN: >60 ML/MIN
GFR SERPL CREATININE-BSD FRML MDRD: ABNORMAL ML/MIN/{1.73_M2}
GFR SERPL CREATININE-BSD FRML MDRD: ABNORMAL ML/MIN/{1.73_M2}
GLUCOSE BLD-MCNC: 204 MG/DL (ref 70–99)
HCT VFR BLD CALC: 35.6 % (ref 36–46)
HEMOGLOBIN: 12.4 G/DL (ref 12–16)
IMMATURE GRANULOCYTES: ABNORMAL %
LACTIC ACID: 1.1 MMOL/L (ref 0.5–2.2)
LYMPHOCYTES # BLD: 8 % (ref 15–40)
MCH RBC QN AUTO: 30.5 PG (ref 26–34)
MCHC RBC AUTO-ENTMCNC: 34.8 G/DL (ref 31–37)
MCV RBC AUTO: 87.6 FL (ref 80–100)
MONOCYTES # BLD: 1 % (ref 4–8)
MORPHOLOGY: ABNORMAL
NRBC AUTOMATED: ABNORMAL PER 100 WBC
PDW BLD-RTO: 13.9 % (ref 12.1–15.2)
PLATELET # BLD: 271 K/UL (ref 140–450)
PLATELET ESTIMATE: ABNORMAL
PMV BLD AUTO: ABNORMAL FL (ref 6–12)
POTASSIUM SERPL-SCNC: 4.1 MMOL/L (ref 3.7–5.3)
RBC # BLD: 4.06 M/UL (ref 4–5.2)
RBC # BLD: ABNORMAL 10*6/UL
SEG NEUTROPHILS: 91 % (ref 47–75)
SEGMENTED NEUTROPHILS ABSOLUTE COUNT: 5.91 K/UL (ref 2.5–7)
SODIUM BLD-SCNC: 138 MMOL/L (ref 135–144)
TOTAL PROTEIN: 7.4 G/DL (ref 6.4–8.3)
WBC # BLD: 6.5 K/UL (ref 3.5–11)
WBC # BLD: ABNORMAL 10*3/UL

## 2022-01-20 PROCEDURE — 87040 BLOOD CULTURE FOR BACTERIA: CPT

## 2022-01-20 PROCEDURE — 96374 THER/PROPH/DIAG INJ IV PUSH: CPT

## 2022-01-20 PROCEDURE — 93010 ELECTROCARDIOGRAM REPORT: CPT | Performed by: INTERNAL MEDICINE

## 2022-01-20 PROCEDURE — 87150 DNA/RNA AMPLIFIED PROBE: CPT

## 2022-01-20 PROCEDURE — 36415 COLL VENOUS BLD VENIPUNCTURE: CPT

## 2022-01-20 PROCEDURE — 83605 ASSAY OF LACTIC ACID: CPT

## 2022-01-20 PROCEDURE — 74176 CT ABD & PELVIS W/O CONTRAST: CPT

## 2022-01-20 PROCEDURE — 87205 SMEAR GRAM STAIN: CPT

## 2022-01-20 PROCEDURE — 6360000002 HC RX W HCPCS: Performed by: EMERGENCY MEDICINE

## 2022-01-20 PROCEDURE — 80053 COMPREHEN METABOLIC PANEL: CPT

## 2022-01-20 PROCEDURE — 99283 EMERGENCY DEPT VISIT LOW MDM: CPT

## 2022-01-20 PROCEDURE — 85025 COMPLETE CBC W/AUTO DIFF WBC: CPT

## 2022-01-20 RX ORDER — HYDROCODONE BITARTRATE AND ACETAMINOPHEN 5; 325 MG/1; MG/1
1 TABLET ORAL EVERY 8 HOURS PRN
Qty: 10 TABLET | Refills: 0 | Status: SHIPPED | OUTPATIENT
Start: 2022-01-20 | End: 2022-01-21

## 2022-01-20 RX ORDER — KETOROLAC TROMETHAMINE 15 MG/ML
15 INJECTION, SOLUTION INTRAMUSCULAR; INTRAVENOUS ONCE
Status: COMPLETED | OUTPATIENT
Start: 2022-01-20 | End: 2022-01-20

## 2022-01-20 RX ORDER — SODIUM CHLORIDE 1000 MG
1 TABLET, SOLUBLE MISCELLANEOUS 2 TIMES DAILY
Status: ON HOLD | COMMUNITY
End: 2022-01-28 | Stop reason: SDUPTHER

## 2022-01-20 RX ADMIN — KETOROLAC TROMETHAMINE 15 MG: 15 INJECTION, SOLUTION INTRAMUSCULAR; INTRAVENOUS at 21:51

## 2022-01-20 ASSESSMENT — PAIN SCALES - GENERAL: PAINLEVEL_OUTOF10: 10

## 2022-01-21 ENCOUNTER — HOSPITAL ENCOUNTER (INPATIENT)
Age: 82
LOS: 7 days | Discharge: NURSING, REHAB OR LONG-TERM CARE | DRG: 177 | End: 2022-01-28
Attending: FAMILY MEDICINE | Admitting: INTERNAL MEDICINE
Payer: MEDICARE

## 2022-01-21 DIAGNOSIS — U07.1 COVID-19: ICD-10-CM

## 2022-01-21 DIAGNOSIS — I10 ESSENTIAL HYPERTENSION, BENIGN: ICD-10-CM

## 2022-01-21 DIAGNOSIS — R53.83 FATIGUE, UNSPECIFIED TYPE: ICD-10-CM

## 2022-01-21 DIAGNOSIS — R13.19 OTHER DYSPHAGIA: ICD-10-CM

## 2022-01-21 DIAGNOSIS — R53.1 GENERAL WEAKNESS: ICD-10-CM

## 2022-01-21 DIAGNOSIS — N30.01 ACUTE CYSTITIS WITH HEMATURIA: Primary | ICD-10-CM

## 2022-01-21 DIAGNOSIS — R63.8 SYMPTOMS OF DEHYDRATION: ICD-10-CM

## 2022-01-21 DIAGNOSIS — I47.1 SVT (SUPRAVENTRICULAR TACHYCARDIA) (HCC): ICD-10-CM

## 2022-01-21 DIAGNOSIS — R41.82 ALTERED MENTAL STATUS, UNSPECIFIED ALTERED MENTAL STATUS TYPE: ICD-10-CM

## 2022-01-21 PROBLEM — N12 PYELONEPHRITIS: Status: ACTIVE | Noted: 2022-01-21

## 2022-01-21 LAB
-: ABNORMAL
ABSOLUTE EOS #: 0 K/UL (ref 0–0.4)
ABSOLUTE IMMATURE GRANULOCYTE: ABNORMAL K/UL (ref 0–0.3)
ABSOLUTE LYMPH #: 1 K/UL (ref 1–4.8)
ABSOLUTE MONO #: 0.5 K/UL (ref 0–1)
ALBUMIN SERPL-MCNC: 2.8 G/DL (ref 3.5–5.2)
ALBUMIN/GLOBULIN RATIO: ABNORMAL (ref 1–2.5)
ALP BLD-CCNC: 239 U/L (ref 35–104)
ALT SERPL-CCNC: 19 U/L (ref 5–33)
AMORPHOUS: ABNORMAL
ANION GAP SERPL CALCULATED.3IONS-SCNC: 13 MMOL/L (ref 9–17)
AST SERPL-CCNC: 23 U/L
BACTERIA: ABNORMAL
BASOPHILS # BLD: 0 % (ref 0–2)
BASOPHILS ABSOLUTE: 0 K/UL (ref 0–0.2)
BILIRUB SERPL-MCNC: 0.38 MG/DL (ref 0.3–1.2)
BILIRUBIN URINE: NEGATIVE
BUN BLDV-MCNC: 25 MG/DL (ref 8–23)
BUN/CREAT BLD: 29 (ref 9–20)
CALCIUM SERPL-MCNC: 9.2 MG/DL (ref 8.6–10.4)
CASTS UA: ABNORMAL /LPF
CHLORIDE BLD-SCNC: 101 MMOL/L (ref 98–107)
CO2: 26 MMOL/L (ref 20–31)
COLOR: YELLOW
COMMENT UA: ABNORMAL
CREAT SERPL-MCNC: 0.86 MG/DL (ref 0.5–0.9)
CRYSTALS, UA: ABNORMAL /HPF
DIFFERENTIAL TYPE: YES
EOSINOPHILS RELATIVE PERCENT: 0 % (ref 0–5)
EPITHELIAL CELLS UA: ABNORMAL /HPF
GFR AFRICAN AMERICAN: >60 ML/MIN
GFR NON-AFRICAN AMERICAN: >60 ML/MIN
GFR SERPL CREATININE-BSD FRML MDRD: ABNORMAL ML/MIN/{1.73_M2}
GFR SERPL CREATININE-BSD FRML MDRD: ABNORMAL ML/MIN/{1.73_M2}
GLUCOSE BLD-MCNC: 185 MG/DL (ref 70–99)
GLUCOSE URINE: NEGATIVE
HCT VFR BLD CALC: 35.5 % (ref 36–46)
HEMOGLOBIN: 12.2 G/DL (ref 12–16)
IMMATURE GRANULOCYTES: ABNORMAL %
KETONES, URINE: NEGATIVE
LEUKOCYTE ESTERASE, URINE: ABNORMAL
LYMPHOCYTES # BLD: 12 % (ref 15–40)
MAGNESIUM: 1.7 MG/DL (ref 1.6–2.6)
MCH RBC QN AUTO: 30.1 PG (ref 26–34)
MCHC RBC AUTO-ENTMCNC: 34.5 G/DL (ref 31–37)
MCV RBC AUTO: 87.2 FL (ref 80–100)
MONOCYTES # BLD: 6 % (ref 4–8)
MUCUS: ABNORMAL
NITRITE, URINE: NEGATIVE
NRBC AUTOMATED: ABNORMAL PER 100 WBC
OTHER OBSERVATIONS UA: ABNORMAL
PDW BLD-RTO: 14.1 % (ref 12.1–15.2)
PH UA: 5 (ref 5–8)
PLATELET # BLD: 350 K/UL (ref 140–450)
PLATELET ESTIMATE: ABNORMAL
PMV BLD AUTO: ABNORMAL FL (ref 6–12)
POTASSIUM SERPL-SCNC: 3.4 MMOL/L (ref 3.7–5.3)
PROTEIN UA: ABNORMAL
RBC # BLD: 4.07 M/UL (ref 4–5.2)
RBC # BLD: ABNORMAL 10*6/UL
RBC UA: ABNORMAL /HPF (ref 0–2)
RENAL EPITHELIAL, UA: ABNORMAL /HPF
SARS-COV-2, RAPID: DETECTED
SEG NEUTROPHILS: 82 % (ref 47–75)
SEGMENTED NEUTROPHILS ABSOLUTE COUNT: 6.9 K/UL (ref 2.5–7)
SODIUM BLD-SCNC: 140 MMOL/L (ref 135–144)
SPECIFIC GRAVITY UA: 1.01 (ref 1–1.03)
SPECIMEN DESCRIPTION: ABNORMAL
TOTAL PROTEIN: 7.2 G/DL (ref 6.4–8.3)
TRICHOMONAS: ABNORMAL
TURBIDITY: CLEAR
URINE HGB: NEGATIVE
UROBILINOGEN, URINE: NORMAL
WBC # BLD: 8.4 K/UL (ref 3.5–11)
WBC # BLD: ABNORMAL 10*3/UL
WBC UA: ABNORMAL /HPF
YEAST: ABNORMAL

## 2022-01-21 PROCEDURE — 93005 ELECTROCARDIOGRAM TRACING: CPT | Performed by: FAMILY MEDICINE

## 2022-01-21 PROCEDURE — 87086 URINE CULTURE/COLONY COUNT: CPT

## 2022-01-21 PROCEDURE — 2580000003 HC RX 258: Performed by: FAMILY MEDICINE

## 2022-01-21 PROCEDURE — 80053 COMPREHEN METABOLIC PANEL: CPT

## 2022-01-21 PROCEDURE — C9803 HOPD COVID-19 SPEC COLLECT: HCPCS

## 2022-01-21 PROCEDURE — 85025 COMPLETE CBC W/AUTO DIFF WBC: CPT

## 2022-01-21 PROCEDURE — 87635 SARS-COV-2 COVID-19 AMP PRB: CPT

## 2022-01-21 PROCEDURE — 96375 TX/PRO/DX INJ NEW DRUG ADDON: CPT

## 2022-01-21 PROCEDURE — 81001 URINALYSIS AUTO W/SCOPE: CPT

## 2022-01-21 PROCEDURE — 3E0333Z INTRODUCTION OF ANTI-INFLAMMATORY INTO PERIPHERAL VEIN, PERCUTANEOUS APPROACH: ICD-10-PCS | Performed by: INTERNAL MEDICINE

## 2022-01-21 PROCEDURE — 83735 ASSAY OF MAGNESIUM: CPT

## 2022-01-21 PROCEDURE — 99284 EMERGENCY DEPT VISIT MOD MDM: CPT

## 2022-01-21 PROCEDURE — 96374 THER/PROPH/DIAG INJ IV PUSH: CPT

## 2022-01-21 PROCEDURE — 1200000000 HC SEMI PRIVATE

## 2022-01-21 PROCEDURE — 2500000003 HC RX 250 WO HCPCS: Performed by: FAMILY MEDICINE

## 2022-01-21 PROCEDURE — 8E0ZXY6 ISOLATION: ICD-10-PCS | Performed by: INTERNAL MEDICINE

## 2022-01-21 RX ORDER — 0.9 % SODIUM CHLORIDE 0.9 %
500 INTRAVENOUS SOLUTION INTRAVENOUS ONCE
Status: COMPLETED | OUTPATIENT
Start: 2022-01-21 | End: 2022-01-21

## 2022-01-21 RX ORDER — METOPROLOL TARTRATE 5 MG/5ML
5 INJECTION INTRAVENOUS ONCE
Status: COMPLETED | OUTPATIENT
Start: 2022-01-21 | End: 2022-01-21

## 2022-01-21 RX ORDER — DILTIAZEM HYDROCHLORIDE 5 MG/ML
10 INJECTION INTRAVENOUS ONCE
Status: COMPLETED | OUTPATIENT
Start: 2022-01-21 | End: 2022-01-21

## 2022-01-21 RX ORDER — 0.9 % SODIUM CHLORIDE 0.9 %
500 INTRAVENOUS SOLUTION INTRAVENOUS ONCE
Status: COMPLETED | OUTPATIENT
Start: 2022-01-21 | End: 2022-01-22

## 2022-01-21 RX ADMIN — SODIUM CHLORIDE 500 ML: 9 INJECTION, SOLUTION INTRAVENOUS at 21:32

## 2022-01-21 RX ADMIN — DILTIAZEM HYDROCHLORIDE 10 MG: 5 INJECTION INTRAVENOUS at 20:32

## 2022-01-21 RX ADMIN — METOROPROLOL TARTRATE 5 MG: 5 INJECTION, SOLUTION INTRAVENOUS at 21:31

## 2022-01-21 RX ADMIN — SODIUM CHLORIDE 500 ML: 9 INJECTION, SOLUTION INTRAVENOUS at 20:33

## 2022-01-21 ASSESSMENT — PAIN SCALES - GENERAL: PAINLEVEL_OUTOF10: 7

## 2022-01-21 ASSESSMENT — PAIN SCALES - PAIN ASSESSMENT IN ADVANCED DEMENTIA (PAINAD)
BODYLANGUAGE: 0
NEGVOCALIZATION: 2
TOTALSCORE: 7
BREATHING: 1
CONSOLABILITY: 2
NEGVOCALIZATION: 2
CONSOLABILITY: 2
TOTALSCORE: 5
FACIALEXPRESSION: 1
FACIALEXPRESSION: 1
BREATHING: 0
BODYLANGUAGE: 1

## 2022-01-21 NOTE — ED PROVIDER NOTES
[facility] TAWNYA PAN [facility] [id number]  Phone: [phone] COMPLAINT    Chief Complaint   Patient presents with    Other     Per EMS and nursing home staff, pt was here yesterday and sent back with a PICC line and IV atb. Nursing staff stated they didnt have the \"right set up\" for the PICC and that she needed fluids. Pt arrives with IV to left [facility] with IVF infusing and PICC to right upper arm       HPI    Kristen Gerardo is a 80 y.o. female who presents noted complaint. Treated evaluated yesterday diagnosed with a urinary tract infection. She has a PICC line in place and they have been given her some IV antibiotics IV dose of Invanz today. She was tested for COVID although was reported positive over a week ago. Patient cannot give much history of present illness due to chronic conditions. She has generalized weakness.     PAST MEDICAL HISTORY    Past Medical History:   Diagnosis Date    Cancer ([address]     basal cell skin cancer    COVID-19     Gout     Hyperlipidemia     Hypertension     Hypokalemia     Lymphedema of lower extremity     Obesity     Shingles     70's    SVT (supraventricular tachycardia) (HCC)     Type II or unspecified type diabetes mellitus without mention of complication, not stated as uncontrolled     Venous stasis dermatitis        SURGICAL HISTORY    Past Surgical History:   Procedure Laterality Date    APPENDECTOMY      COLONOSCOPY      CYSTOSCOPY Right 12/16/2021    RIGHT URETEROSCOPY WITH HOLMIUM LASER LITHOTRIPSY AND RIGHT STENT PLACEMENT performed by Lucy Marc MD at [address] [facility] OFFICE/OUTPT VISIT,PROCEDURE ONLY Right 7/19/2018    RIGHT HIP MASS INCISION AND DRAINAGE performed by Martin Dacosta MD at [id number] [facility] OFFICE/OUTPT VISIT,PROCEDURE ONLY Right 8/1/2018    HIP INCISION AND DRAINAGE (Necrotic Fat Right Hip Debridement) performed by Durga Carlson MD at [place] [id number]    Current Outpatient Rx   Medication Sig Dispense Refill    sodium chloride 1 g tablet Take 1 g by mouth 2 times daily      HYDROcodone-acetaminophen (NORCO) 5-325 MG per tablet Take 1 tablet by mouth every 8 hours as needed for Pain for up to 3 days.  10 tablet 0    Probiotic Product (ACIDOPHILUS HIGH-POTENCY PO) Take 1 tablet by mouth daily      predniSONE (DELTASONE) 20 MG tablet Take 40 mg by mouth daily      guaiFENesin (ROBITUSSIN) 100 MG/5ML syrup Take 400 mg by mouth 4 times daily as needed for Cough 10ML      ertapenem (INVANZ) 1 GM injection Inject 1,000 mg into the muscle every 24 hours      albuterol sulfate HFA (VENTOLIN HFA) 108 (90 Base) MCG/ACT inhaler Inhale 2 puffs into the lungs every 6 hours as needed for Wheezing      metoprolol tartrate (LOPRESSOR) 25 MG tablet Take 1 tablet by mouth 2 times daily 60 tablet 3    dilTIAZem (CARDIZEM) 30 MG tablet Take 1 tablet by mouth 3 times daily 90 tablet 3    furosemide (LASIX) 40 MG tablet Take 1 tablet by mouth daily 60 tablet 3    potassium chloride (KLOR-CON M) 20 MEQ TBCR extended release tablet Take 1 tablet by mouth daily 30 tablet 3    Multiple Vitamin (MULTIVITAMIN) TABS tablet Take 1 tablet by mouth daily  0    allopurinol (ZYLOPRIM) 100 MG tablet TAKE 1 TABLET EVERY DAY 90 tablet 1    metFORMIN (GLUCOPHAGE) 500 MG tablet Take 1 tablet by mouth 2 times daily (with meals) 180 tablet 1    simvastatin (ZOCOR) 20 MG tablet TAKE 1 TABLET EVERY NIGHT 90 tablet 1    Calcium Carb-Cholecalciferol (CALCIUM 600 + D PO) Take by mouth daily      aspirin 325 MG tablet Take 81 mg by mouth daily       acetaminophen (TYLENOL) 325 MG tablet Take 650 mg by mouth every 4 hours as needed for Pain      ondansetron (ZOFRAN) 4 MG tablet Take 4 mg by mouth every 6 hours as needed for Nausea or Vomiting      Polyethylene Glycol 1450 POWD by Does not apply route      Lancets (ONETOUCH DELICA PLUS [id number]) MISC Testing blood sugar once daily and as needed, uses OneTouch UltraMini 100 each 5    blood glucose test strips (ASCENSIA AUTODISC VI;ONE TOUCH ULTRA TEST VI) strip Testing blood sugar once daily and as needed 100 each 5    blood glucose test strips (ONE TOUCH ULTRA TEST) strip Test blood sugar once daily and as needed. 100 strip 3    Glucose Blood (CHOICE DM FORA G20 TEST STRIPS VI) by In Vitro route. ALLERGIES    Allergies   Allergen Reactions    Adhesive Tape      Takes her skin off uses paper tape.  Sulfa Antibiotics        FAMILY HISTORY    Family History   Problem Relation Age of Onset    Heart Disease Mother     Cancer Sister     Mult Sclerosis Sister     Cancer Brother         lung       SOCIAL HISTORY    Social History     Socioeconomic History    Marital status:      Spouse name: None    Number of children: None    Years of education: None    Highest education level: None   Occupational History    None   Tobacco Use    Smoking status: Never Smoker    Smokeless tobacco: Never Used   Vaping Use    Vaping Use: Never used   Substance and Sexual Activity    Alcohol use: No    Drug use: No    Sexual activity: None   Other Topics Concern    None   Social History Narrative    None     Social Determinants of Health     Financial Resource Strain:     Difficulty of Paying Living Expenses: Not on file   Food Insecurity:     Worried About Running Out of Food in the Last Year: Not on file    Irineo of Food in the Last Year: Not on file   Transportation Needs:     Lack of Transportation (Medical): Not on file    Lack of Transportation (Non-Medical):  Not on file   Physical Activity:     Days of Exercise per Week: Not on file    Minutes of Exercise per Session: Not on file   Stress:     Feeling of Stress : Not on file   Social Connections:     Frequency of Communication with Friends and Family: Not on file    Frequency of Social Gatherings with Friends and Family: Not on file    Attends [employer] Services: Not on file    Active Member of Clubs or Organizations: Not on file    Attends Club or Organization Meetings: Not on file    Marital Status: Not on file   Intimate Partner Violence:     Fear of Current or Ex-Partner: Not on file    Emotionally Abused: Not on file    Physically Abused: Not on file    Sexually Abused: Not on file   Housing Stability:     Unable to Pay for Housing in the Last Year: Not on file    Number of [place] in the Last Year: Not on file    Unstable Housing in the Last Year: Not on file       REVIEW OF SYSTEMS    Reported fatigue and malaise. Possible shortness of breath. Some cough  All systems negative except as marked. PHYSICAL EXAM    VITAL SIGNS: BP (!) 150/77   Pulse 106   Temp 98.1 °F (36.7 °C) (Axillary)   Resp 20   Ht 5' (1.524 m)   Wt 224 lb (101.6 kg)   LMP  (LMP Unknown)   SpO2 94%   BMI 43.75 kg/m²    Constitutional: Elderly generally weak  HENT:  normocephalic, Atraumatic,  Bilateral external ears normal, Oropharynx slightly dry, no oral exudates, Nose normal.  Cervical Spine: Normal range of motion,  No stridor. No tenderness, Supple,  Eyes:  No discharge or  Swelling,Conjunctiva normal, PERRL, EOMI,  Respiratory: No respiratory distress, Normal breath sounds,  No wheezing, No chest tenderness. Cardiovascular:  Normal heart rate, Normal rhythm, No murmurs, No rubs, No gallops. GI:  No reproducible pain, no peritonitis,   musculoskeletal:  chronic bilateral edema, No tenderness, No cyanosis, No clubbing. Good range of motion in all major joints. No tenderness to palpation or major deformities noted. Back:No tenderness. Integument:  Warm, Dry, No erythema, No rash (on exposed areas)   Lymphatic:  No lymphadenopathy noted. Neurologic: Responds to painful stimuli, generally weak. Appears to move all fours.                   RADIOLOGY    CT ABDOMEN PELVIS WO CONTRAST Additional Contrast? None   Final Result      1. Mild right hydronephrosis and proximal hydroureter with uroepithelial    thickening and asymmetric perinephric inflammatory stranding. Although possibly    associated with a recently placed ureteral stent, this is concerning for    infection (acute right pyelonephritis, pyelitis and ureteritis). No obstructing    ureteral stone identified. 2. Small bilateral pleural effusions with adjacent atelectasis at the lung    bases. Peripheral patchy consolidation within the left lower lobe and lingula    could be pneumonia. 3. Cardiomegaly with coronary atherosclerotic vascular disease. 4. Small hiatal hernia. 5. Colonic diverticulosis. 6. Cholelithiasis within a borderline hydropic gallbladder. 7. Splenomegaly. 8. Adenomatous hyperplasia of the adrenal glands. PROCEDURES    none      CONSULTS:  None      CRITICAL CARE:  None    BP (!) 150/77   Pulse 106   Temp 98.1 °F (36.7 °C) (Axillary)   Resp 20   Ht 5' (1.524 m)   Wt 224 lb (101.6 kg)   LMP  (LMP Unknown)   SpO2 94%   BMI 43.75 kg/m²        SCREENINGS:    Screening For Hypertension and Follow-up (#317)   previously diagnosed with hypertension and not applicable for screen      Screening For Tobacco Use and Cessation Intervention (#226):   reports that she has never smoked. She has never used smokeless tobacco.  Non-smoker not applicable for screen    ED COURSE & MEDICAL DECISION MAKING    Pertinent Labs & Imaging studies reviewed. (See chart for details)  Treated and evaluated had a cath urine specimen positive for infection. Otherwise unremarkable work-up although cannot rule out some possible pneumonias. She is evidently on IV antibiotics at the nursing facility. Her pulse oximetry is normal.  We are retesting some labs including blood cultures lactic acid.   Given reported UTI for getting a CAT scan abdomen pelvis to rule out pyelonephritis or other more worrisome infectious etiology although as mentioned she is already on IV antibiotics at the facility. REASSESSMENT  9:00 PM  Patient rechecked and updated on lab/xray status, progress and results. Patient was reassessed and condition was unchanged after no treatment. .. Needs nothing else at this time. White count is reassuring at this time. Awaiting final labs. 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 Checking CT to make sure this is not reoccurred causing chronic recurrent UTIs. In review of the chart she was on IM doses of Invanz and now on IV with a PICC line. 9:34 PM EST  Renal panel was otherwise negative. Discussed with the family in regards to care monitoring. If CAT scan is unremarkable and will not require any other intervention she will be discharged back to the facility after nursing staff talks to the nurse at the facility. There is no other obvious criteria for admission. Her oxygen levels 90 to 93% on room air, labs are reassuring. 9:47 PM EST  CAT scan reveals possible pyelonephritis on the right. She is on adequate antibiotics to cover such. There is no obstructive process to suggest that she would need emergent stent or other interventions at this time. I am given her dose of some Toradol. She may be suffering from some pain and discomfort. Her white count is normal and labs are normal she should continue her IV antibiotics as scheduled. She may benefit from some oral pain medications as needed at the facility. Does have a little increase fluids and markings on her lung although she once again is not hypoxic and is receiving stronger IV antibiotics of Invanz. Without significant change in labs, sepsis markers etc. felt she still can be managed at the nursing facility. Nursing staff areas notified the nursing facility. FINAL IMPRESSION    1. Acute cystitis without hematuria    2.  Acute pyelonephritis PATIENT REFERRED TO:  [facility] [id number] [phone]    Call   For evaluation      DISCHARGE MEDICATIONS:  New Prescriptions    HYDROCODONE-ACETAMINOPHEN (NORCO) 5-325 MG PER TABLET    Take 1 tablet by mouth every 8 hours as needed for Pain for up to 3 days.            Meño Bowden MD  01/20/22 [id number]

## 2022-01-22 ENCOUNTER — APPOINTMENT (OUTPATIENT)
Dept: GENERAL RADIOLOGY | Age: 82
DRG: 177 | End: 2022-01-22
Payer: MEDICARE

## 2022-01-22 ENCOUNTER — APPOINTMENT (OUTPATIENT)
Dept: CT IMAGING | Age: 82
DRG: 177 | End: 2022-01-22
Payer: MEDICARE

## 2022-01-22 LAB
ABSOLUTE EOS #: 0 K/UL (ref 0–0.4)
ABSOLUTE IMMATURE GRANULOCYTE: ABNORMAL K/UL (ref 0–0.3)
ABSOLUTE LYMPH #: 1.3 K/UL (ref 1–4.8)
ABSOLUTE MONO #: 0.5 K/UL (ref 0–1)
ALLEN TEST: POSITIVE
ANION GAP SERPL CALCULATED.3IONS-SCNC: 10 MMOL/L (ref 9–17)
BASOPHILS # BLD: 0 % (ref 0–2)
BASOPHILS ABSOLUTE: 0 K/UL (ref 0–0.2)
BUN BLDV-MCNC: 26 MG/DL (ref 8–23)
BUN/CREAT BLD: 30 (ref 9–20)
C-REACTIVE PROTEIN: 12.4 MG/L (ref 0–5)
CALCIUM SERPL-MCNC: 9.1 MG/DL (ref 8.6–10.4)
CHLORIDE BLD-SCNC: 107 MMOL/L (ref 98–107)
CO2: 28 MMOL/L (ref 20–31)
CREAT SERPL-MCNC: 0.86 MG/DL (ref 0.5–0.9)
CULTURE: ABNORMAL
D-DIMER QUANTITATIVE: 3.18 MG/L FEU (ref 0–0.59)
DIFFERENTIAL TYPE: ABNORMAL
EKG ATRIAL RATE: 122 BPM
EKG Q-T INTERVAL: 362 MS
EKG QRS DURATION: 132 MS
EKG QTC CALCULATION (BAZETT): 554 MS
EKG R AXIS: -65 DEGREES
EKG T AXIS: -5 DEGREES
EKG VENTRICULAR RATE: 141 BPM
EOSINOPHILS RELATIVE PERCENT: 0 % (ref 0–5)
FERRITIN: 384 UG/L (ref 13–150)
FIO2: NORMAL
GFR AFRICAN AMERICAN: >60 ML/MIN
GFR NON-AFRICAN AMERICAN: >60 ML/MIN
GFR SERPL CREATININE-BSD FRML MDRD: ABNORMAL ML/MIN/{1.73_M2}
GFR SERPL CREATININE-BSD FRML MDRD: ABNORMAL ML/MIN/{1.73_M2}
GLUCOSE BLD-MCNC: 118 MG/DL (ref 65–99)
GLUCOSE BLD-MCNC: 131 MG/DL (ref 70–99)
GLUCOSE BLD-MCNC: 136 MG/DL (ref 65–99)
GLUCOSE BLD-MCNC: 166 MG/DL (ref 65–99)
GLUCOSE BLD-MCNC: 185 MG/DL (ref 65–99)
GLUCOSE BLD-MCNC: 190 MG/DL (ref 65–99)
HCT VFR BLD CALC: 31.9 % (ref 36–46)
HEMOGLOBIN: 12 G/DL (ref 12–16)
IMMATURE GRANULOCYTES: ABNORMAL %
LACTATE DEHYDROGENASE: 175 U/L (ref 135–214)
LYMPHOCYTES # BLD: 17 % (ref 15–40)
Lab: ABNORMAL
MAGNESIUM: 1.8 MG/DL (ref 1.6–2.6)
MCH RBC QN AUTO: 33.8 PG (ref 26–34)
MCHC RBC AUTO-ENTMCNC: 37.6 G/DL (ref 31–37)
MCV RBC AUTO: 90 FL (ref 80–100)
MODE: NORMAL
MONOCYTES # BLD: 7 % (ref 4–8)
NEGATIVE BASE EXCESS, ART: NORMAL (ref 0–2)
NRBC AUTOMATED: ABNORMAL PER 100 WBC
O2 DEVICE/FLOW/%: NORMAL
PATIENT TEMP: NORMAL
PDW BLD-RTO: 14 % (ref 12.1–15.2)
PLATELET # BLD: 306 K/UL (ref 140–450)
PLATELET ESTIMATE: ABNORMAL
PMV BLD AUTO: ABNORMAL FL (ref 6–12)
POC HCO3: 27.4 MMOL/L (ref 21–28)
POC O2 SATURATION: 97 % (ref 94–98)
POC PCO2 TEMP: NORMAL MM HG
POC PCO2: 42.2 MM HG (ref 35–48)
POC PH TEMP: NORMAL
POC PH: 7.42 (ref 7.35–7.45)
POC PO2 TEMP: NORMAL MM HG
POC PO2: 86 MM HG (ref 83–108)
POSITIVE BASE EXCESS, ART: 3 (ref 0–3)
POTASSIUM SERPL-SCNC: 3.4 MMOL/L (ref 3.7–5.3)
RBC # BLD: 3.55 M/UL (ref 4–5.2)
RBC # BLD: ABNORMAL 10*6/UL
SAMPLE SITE: NORMAL
SEG NEUTROPHILS: 76 % (ref 47–75)
SEGMENTED NEUTROPHILS ABSOLUTE COUNT: 5.6 K/UL (ref 2.5–7)
SODIUM BLD-SCNC: 145 MMOL/L (ref 135–144)
SPECIMEN DESCRIPTION: ABNORMAL
TCO2 (CALC), ART: NORMAL MMOL/L (ref 22–29)
TROPONIN INTERP: ABNORMAL
TROPONIN T: ABNORMAL NG/ML
TROPONIN, HIGH SENSITIVITY: 18 NG/L (ref 0–14)
WBC # BLD: 7.5 K/UL (ref 3.5–11)
WBC # BLD: ABNORMAL 10*3/UL

## 2022-01-22 PROCEDURE — 99222 1ST HOSP IP/OBS MODERATE 55: CPT | Performed by: INTERNAL MEDICINE

## 2022-01-22 PROCEDURE — XW033N5 INTRODUCTION OF MEROPENEM-VABORBACTAM ANTI-INFECTIVE INTO PERIPHERAL VEIN, PERCUTANEOUS APPROACH, NEW TECHNOLOGY GROUP 5: ICD-10-PCS | Performed by: INTERNAL MEDICINE

## 2022-01-22 PROCEDURE — 2700000000 HC OXYGEN THERAPY PER DAY

## 2022-01-22 PROCEDURE — 70450 CT HEAD/BRAIN W/O DYE: CPT

## 2022-01-22 PROCEDURE — 83615 LACTATE (LD) (LDH) ENZYME: CPT

## 2022-01-22 PROCEDURE — 93010 ELECTROCARDIOGRAM REPORT: CPT | Performed by: INTERNAL MEDICINE

## 2022-01-22 PROCEDURE — 36600 WITHDRAWAL OF ARTERIAL BLOOD: CPT

## 2022-01-22 PROCEDURE — 82728 ASSAY OF FERRITIN: CPT

## 2022-01-22 PROCEDURE — 94761 N-INVAS EAR/PLS OXIMETRY MLT: CPT

## 2022-01-22 PROCEDURE — 36415 COLL VENOUS BLD VENIPUNCTURE: CPT

## 2022-01-22 PROCEDURE — 1200000000 HC SEMI PRIVATE

## 2022-01-22 PROCEDURE — 83735 ASSAY OF MAGNESIUM: CPT

## 2022-01-22 PROCEDURE — 84484 ASSAY OF TROPONIN QUANT: CPT

## 2022-01-22 PROCEDURE — 82947 ASSAY GLUCOSE BLOOD QUANT: CPT

## 2022-01-22 PROCEDURE — 6370000000 HC RX 637 (ALT 250 FOR IP): Performed by: INTERNAL MEDICINE

## 2022-01-22 PROCEDURE — 85025 COMPLETE CBC W/AUTO DIFF WBC: CPT

## 2022-01-22 PROCEDURE — 85379 FIBRIN DEGRADATION QUANT: CPT

## 2022-01-22 PROCEDURE — 2580000003 HC RX 258: Performed by: INTERNAL MEDICINE

## 2022-01-22 PROCEDURE — 2500000003 HC RX 250 WO HCPCS: Performed by: INTERNAL MEDICINE

## 2022-01-22 PROCEDURE — 80048 BASIC METABOLIC PNL TOTAL CA: CPT

## 2022-01-22 PROCEDURE — 94664 DEMO&/EVAL PT USE INHALER: CPT

## 2022-01-22 PROCEDURE — 82803 BLOOD GASES ANY COMBINATION: CPT

## 2022-01-22 PROCEDURE — 6360000002 HC RX W HCPCS: Performed by: INTERNAL MEDICINE

## 2022-01-22 PROCEDURE — 71045 X-RAY EXAM CHEST 1 VIEW: CPT

## 2022-01-22 PROCEDURE — 86140 C-REACTIVE PROTEIN: CPT

## 2022-01-22 RX ORDER — ONDANSETRON 4 MG/1
4 TABLET, ORALLY DISINTEGRATING ORAL EVERY 8 HOURS PRN
Status: DISCONTINUED | OUTPATIENT
Start: 2022-01-22 | End: 2022-01-28 | Stop reason: HOSPADM

## 2022-01-22 RX ORDER — SODIUM CHLORIDE 9 MG/ML
25 INJECTION, SOLUTION INTRAVENOUS PRN
Status: DISCONTINUED | OUTPATIENT
Start: 2022-01-22 | End: 2022-01-28 | Stop reason: HOSPADM

## 2022-01-22 RX ORDER — ALBUTEROL SULFATE 90 UG/1
2 AEROSOL, METERED RESPIRATORY (INHALATION) EVERY 4 HOURS PRN
Status: DISCONTINUED | OUTPATIENT
Start: 2022-01-22 | End: 2022-01-28 | Stop reason: HOSPADM

## 2022-01-22 RX ORDER — SODIUM CHLORIDE 0.9 % (FLUSH) 0.9 %
5-40 SYRINGE (ML) INJECTION EVERY 12 HOURS SCHEDULED
Status: DISCONTINUED | OUTPATIENT
Start: 2022-01-22 | End: 2022-01-28 | Stop reason: HOSPADM

## 2022-01-22 RX ORDER — ASCORBIC ACID 500 MG
1000 TABLET ORAL DAILY
Status: DISCONTINUED | OUTPATIENT
Start: 2022-01-22 | End: 2022-01-28 | Stop reason: HOSPADM

## 2022-01-22 RX ORDER — POTASSIUM CHLORIDE 750 MG/1
40 TABLET, FILM COATED, EXTENDED RELEASE ORAL ONCE
Status: DISCONTINUED | OUTPATIENT
Start: 2022-01-22 | End: 2022-01-22

## 2022-01-22 RX ORDER — MULTIVITAMIN WITH IRON
1 TABLET ORAL DAILY
Status: DISCONTINUED | OUTPATIENT
Start: 2022-01-22 | End: 2022-01-28 | Stop reason: HOSPADM

## 2022-01-22 RX ORDER — GUAIFENESIN 100 MG/5ML
400 SOLUTION ORAL 4 TIMES DAILY PRN
Status: DISCONTINUED | OUTPATIENT
Start: 2022-01-22 | End: 2022-01-28 | Stop reason: HOSPADM

## 2022-01-22 RX ORDER — ACETAMINOPHEN 325 MG/1
650 TABLET ORAL EVERY 6 HOURS PRN
Status: DISCONTINUED | OUTPATIENT
Start: 2022-01-22 | End: 2022-01-28 | Stop reason: HOSPADM

## 2022-01-22 RX ORDER — ATORVASTATIN CALCIUM 20 MG/1
10 TABLET, FILM COATED ORAL DAILY
Status: DISCONTINUED | OUTPATIENT
Start: 2022-01-22 | End: 2022-01-28 | Stop reason: HOSPADM

## 2022-01-22 RX ORDER — VITAMIN B COMPLEX
1000 TABLET ORAL DAILY
Status: DISCONTINUED | OUTPATIENT
Start: 2022-01-22 | End: 2022-01-28 | Stop reason: HOSPADM

## 2022-01-22 RX ORDER — GUAIFENESIN 600 MG/1
600 TABLET, EXTENDED RELEASE ORAL 2 TIMES DAILY
Status: DISCONTINUED | OUTPATIENT
Start: 2022-01-22 | End: 2022-01-27

## 2022-01-22 RX ORDER — POTASSIUM CHLORIDE 750 MG/1
20 TABLET, FILM COATED, EXTENDED RELEASE ORAL DAILY
Status: DISCONTINUED | OUTPATIENT
Start: 2022-01-22 | End: 2022-01-28 | Stop reason: HOSPADM

## 2022-01-22 RX ORDER — ALLOPURINOL 100 MG/1
100 TABLET ORAL DAILY
Status: DISCONTINUED | OUTPATIENT
Start: 2022-01-22 | End: 2022-01-28 | Stop reason: HOSPADM

## 2022-01-22 RX ORDER — METOPROLOL TARTRATE 5 MG/5ML
5 INJECTION INTRAVENOUS EVERY 6 HOURS PRN
Status: DISCONTINUED | OUTPATIENT
Start: 2022-01-22 | End: 2022-01-28 | Stop reason: HOSPADM

## 2022-01-22 RX ORDER — DEXTROSE MONOHYDRATE 50 MG/ML
100 INJECTION, SOLUTION INTRAVENOUS PRN
Status: DISCONTINUED | OUTPATIENT
Start: 2022-01-22 | End: 2022-01-28 | Stop reason: HOSPADM

## 2022-01-22 RX ORDER — PREDNISONE 20 MG/1
40 TABLET ORAL DAILY
Status: DISCONTINUED | OUTPATIENT
Start: 2022-01-22 | End: 2022-01-22

## 2022-01-22 RX ORDER — ACETAMINOPHEN 650 MG/1
650 SUPPOSITORY RECTAL EVERY 6 HOURS PRN
Status: DISCONTINUED | OUTPATIENT
Start: 2022-01-22 | End: 2022-01-28 | Stop reason: HOSPADM

## 2022-01-22 RX ORDER — SODIUM CHLORIDE 1000 MG
1 TABLET, SOLUBLE MISCELLANEOUS 2 TIMES DAILY
Status: DISCONTINUED | OUTPATIENT
Start: 2022-01-22 | End: 2022-01-27

## 2022-01-22 RX ORDER — ZINC SULFATE 50(220)MG
50 CAPSULE ORAL DAILY
Status: DISCONTINUED | OUTPATIENT
Start: 2022-01-22 | End: 2022-01-28 | Stop reason: HOSPADM

## 2022-01-22 RX ORDER — DILTIAZEM HYDROCHLORIDE 5 MG/ML
5 INJECTION INTRAVENOUS EVERY 6 HOURS
Status: DISCONTINUED | OUTPATIENT
Start: 2022-01-22 | End: 2022-01-27

## 2022-01-22 RX ORDER — SODIUM CHLORIDE 0.9 % (FLUSH) 0.9 %
5-40 SYRINGE (ML) INJECTION PRN
Status: DISCONTINUED | OUTPATIENT
Start: 2022-01-22 | End: 2022-01-28 | Stop reason: HOSPADM

## 2022-01-22 RX ORDER — SODIUM CHLORIDE 450 MG/100ML
INJECTION, SOLUTION INTRAVENOUS CONTINUOUS
Status: DISCONTINUED | OUTPATIENT
Start: 2022-01-22 | End: 2022-01-27

## 2022-01-22 RX ORDER — DEXTROSE MONOHYDRATE 25 G/50ML
12.5 INJECTION, SOLUTION INTRAVENOUS PRN
Status: DISCONTINUED | OUTPATIENT
Start: 2022-01-22 | End: 2022-01-28 | Stop reason: HOSPADM

## 2022-01-22 RX ORDER — NICOTINE POLACRILEX 4 MG
15 LOZENGE BUCCAL PRN
Status: DISCONTINUED | OUTPATIENT
Start: 2022-01-22 | End: 2022-01-28 | Stop reason: HOSPADM

## 2022-01-22 RX ORDER — DEXAMETHASONE SODIUM PHOSPHATE 10 MG/ML
6 INJECTION, SOLUTION INTRAMUSCULAR; INTRAVENOUS EVERY 24 HOURS
Status: DISCONTINUED | OUTPATIENT
Start: 2022-01-22 | End: 2022-01-28 | Stop reason: HOSPADM

## 2022-01-22 RX ORDER — POLYETHYLENE GLYCOL 3350 17 G/17G
17 POWDER, FOR SOLUTION ORAL DAILY PRN
Status: DISCONTINUED | OUTPATIENT
Start: 2022-01-22 | End: 2022-01-28 | Stop reason: HOSPADM

## 2022-01-22 RX ORDER — FUROSEMIDE 40 MG/1
40 TABLET ORAL DAILY
Status: DISCONTINUED | OUTPATIENT
Start: 2022-01-22 | End: 2022-01-28 | Stop reason: HOSPADM

## 2022-01-22 RX ORDER — ASPIRIN 81 MG/1
81 TABLET ORAL DAILY
Status: DISCONTINUED | OUTPATIENT
Start: 2022-01-22 | End: 2022-01-28 | Stop reason: HOSPADM

## 2022-01-22 RX ORDER — ONDANSETRON 2 MG/ML
4 INJECTION INTRAMUSCULAR; INTRAVENOUS EVERY 6 HOURS PRN
Status: DISCONTINUED | OUTPATIENT
Start: 2022-01-22 | End: 2022-01-28 | Stop reason: HOSPADM

## 2022-01-22 RX ORDER — LACTOBACILLUS RHAMNOSUS GG 10B CELL
1 CAPSULE ORAL DAILY
Status: DISCONTINUED | OUTPATIENT
Start: 2022-01-22 | End: 2022-01-28 | Stop reason: HOSPADM

## 2022-01-22 RX ORDER — SODIUM CHLORIDE 9 MG/ML
INJECTION, SOLUTION INTRAVENOUS CONTINUOUS
Status: DISCONTINUED | OUTPATIENT
Start: 2022-01-22 | End: 2022-01-22

## 2022-01-22 RX ORDER — POTASSIUM CHLORIDE 7.45 MG/ML
10 INJECTION INTRAVENOUS
Status: COMPLETED | OUTPATIENT
Start: 2022-01-22 | End: 2022-01-22

## 2022-01-22 RX ADMIN — SODIUM CHLORIDE: 4.5 INJECTION, SOLUTION INTRAVENOUS at 21:54

## 2022-01-22 RX ADMIN — POTASSIUM CHLORIDE 10 MEQ: 10 INJECTION, SOLUTION INTRAVENOUS at 05:24

## 2022-01-22 RX ADMIN — SODIUM CHLORIDE: 9 INJECTION, SOLUTION INTRAVENOUS at 00:50

## 2022-01-22 RX ADMIN — DILTIAZEM HYDROCHLORIDE 5 MG: 5 INJECTION INTRAVENOUS at 16:57

## 2022-01-22 RX ADMIN — MEROPENEM 1000 MG: 1 INJECTION, POWDER, FOR SOLUTION INTRAVENOUS at 16:07

## 2022-01-22 RX ADMIN — DEXAMETHASONE SODIUM PHOSPHATE 6 MG: 10 INJECTION INTRAMUSCULAR; INTRAVENOUS at 06:10

## 2022-01-22 RX ADMIN — SODIUM CHLORIDE: 4.5 INJECTION, SOLUTION INTRAVENOUS at 07:31

## 2022-01-22 RX ADMIN — CEFTRIAXONE SODIUM 1000 MG: 1 INJECTION, POWDER, FOR SOLUTION INTRAMUSCULAR; INTRAVENOUS at 00:54

## 2022-01-22 RX ADMIN — POTASSIUM CHLORIDE 10 MEQ: 10 INJECTION, SOLUTION INTRAVENOUS at 08:37

## 2022-01-22 RX ADMIN — POTASSIUM CHLORIDE 10 MEQ: 10 INJECTION, SOLUTION INTRAVENOUS at 07:30

## 2022-01-22 RX ADMIN — ALBUTEROL SULFATE 2 PUFF: 90 AEROSOL, METERED RESPIRATORY (INHALATION) at 01:49

## 2022-01-22 RX ADMIN — METOPROLOL TARTRATE 5 MG: 5 INJECTION INTRAVENOUS at 12:44

## 2022-01-22 RX ADMIN — DILTIAZEM HYDROCHLORIDE 5 MG: 5 INJECTION INTRAVENOUS at 11:38

## 2022-01-22 RX ADMIN — MEROPENEM 1000 MG: 1 INJECTION, POWDER, FOR SOLUTION INTRAVENOUS at 23:50

## 2022-01-22 RX ADMIN — ENOXAPARIN SODIUM 40 MG: 100 INJECTION SUBCUTANEOUS at 08:03

## 2022-01-22 RX ADMIN — POTASSIUM CHLORIDE 10 MEQ: 10 INJECTION, SOLUTION INTRAVENOUS at 06:17

## 2022-01-22 RX ADMIN — DILTIAZEM HYDROCHLORIDE 5 MG: 5 INJECTION INTRAVENOUS at 23:50

## 2022-01-22 ASSESSMENT — PAIN SCALES - PAIN ASSESSMENT IN ADVANCED DEMENTIA (PAINAD)
FACIALEXPRESSION: 1
BODYLANGUAGE: 0
BREATHING: 0
FACIALEXPRESSION: 0
BODYLANGUAGE: 0
FACIALEXPRESSION: 1
BREATHING: 0
CONSOLABILITY: 1
TOTALSCORE: 3
FACIALEXPRESSION: 1
TOTALSCORE: 3
NEGVOCALIZATION: 1
NEGVOCALIZATION: 1
TOTALSCORE: 0
FACIALEXPRESSION: 1
CONSOLABILITY: 1
NEGVOCALIZATION: 1
BREATHING: 0
TOTALSCORE: 3
CONSOLABILITY: 1
CONSOLABILITY: 1
BODYLANGUAGE: 0
BODYLANGUAGE: 0
CONSOLABILITY: 0
BREATHING: 0
NEGVOCALIZATION: 1
CONSOLABILITY: 1
BODYLANGUAGE: 0
BODYLANGUAGE: 0
NEGVOCALIZATION: 0
NEGVOCALIZATION: 2
FACIALEXPRESSION: 1
BREATHING: 0
TOTALSCORE: 3
TOTALSCORE: 3
NEGVOCALIZATION: 1
BREATHING: 0
TOTALSCORE: 4
BODYLANGUAGE: 0
BREATHING: 0
FACIALEXPRESSION: 1
CONSOLABILITY: 1

## 2022-01-22 ASSESSMENT — PAIN SCALES - GENERAL: PAINLEVEL_OUTOF10: 0

## 2022-01-22 NOTE — PROGRESS NOTES
, Renzo De La Cruz, calls in and is updated on pt status; all questions answered at this time and states he will be calling in to check on patient later.

## 2022-01-22 NOTE — PROGRESS NOTES
This nurse tried to call [facility] to help answer discharge questions since the patient is unable to herself.   No answer- unable to leaveVM

## 2022-01-22 NOTE — PLAN OF CARE
Problem: Airway Clearance - Ineffective  Goal: Achieve or maintain patent airway  Outcome: Ongoing     Problem: Gas Exchange - Impaired  Goal: Absence of hypoxia  Outcome: Ongoing  Goal: Promote optimal lung function  Outcome: Ongoing     Problem: Breathing Pattern - Ineffective  Goal: Ability to achieve and maintain a regular respiratory rate  Outcome: Ongoing     Problem:  Body Temperature -  Risk of, Imbalanced  Goal: Ability to maintain a body temperature within defined limits  Outcome: Ongoing  Goal: Will regain or maintain usual level of consciousness  Outcome: Ongoing  Goal: Complications related to the disease process, condition or treatment will be avoided or minimized  Outcome: Ongoing     Problem: Isolation Precautions - Risk of Spread of Infection  Goal: Prevent transmission of infection  Outcome: Ongoing     Problem: Nutrition Deficits  Goal: Optimize nutritional status  Outcome: Ongoing     Problem: Risk for Fluid Volume Deficit  Goal: Maintain normal heart rhythm  Outcome: Ongoing  Goal: Maintain absence of muscle cramping  Outcome: Ongoing  Goal: Maintain normal serum potassium, sodium, calcium, phosphorus, and pH  Outcome: Ongoing     Problem: Loneliness or Risk for Loneliness  Goal: Demonstrate positive use of time alone when socialization is not possible  Outcome: Ongoing     Problem: Fatigue  Goal: Verbalize increase energy and improved vitality  Outcome: Ongoing     Problem: Patient Education: Go to Patient Education Activity  Goal: Patient/Family Education  Outcome: Ongoing     Problem: Skin Integrity:  Goal: Will show no infection signs and symptoms  Description: Will show no infection signs and symptoms  Outcome: Ongoing  Goal: Absence of new skin breakdown  Description: Absence of new skin breakdown  Outcome: Ongoing     Problem: Falls - Risk of:  Goal: Will remain free from falls  Description: Will remain free from falls  Outcome: Ongoing  Goal: Absence of physical injury  Description: Absence of physical injury  Outcome: Ongoing     Problem: Confusion - Acute:  Goal: Absence of continued neurological deterioration signs and symptoms  Description: Absence of continued neurological deterioration signs and symptoms  Outcome: Ongoing  Goal: Mental status will be restored to baseline  Description: Mental status will be restored to baseline  Outcome: Ongoing     Problem: Discharge Planning:  Goal: Ability to perform activities of daily living will improve  Description: Ability to perform activities of daily living will improve  Outcome: Ongoing  Goal: Participates in care planning  Description: Participates in care planning  Outcome: Ongoing     Problem: Injury - Risk of, Physical Injury:  Goal: Will remain free from falls  Description: Will remain free from falls  Outcome: Ongoing  Goal: Absence of physical injury  Description: Absence of physical injury  Outcome: Ongoing     Problem: Mood - Altered:  Goal: Mood stable  Description: Mood stable  Outcome: Ongoing  Goal: Absence of abusive behavior  Description: Absence of abusive behavior  Outcome: Ongoing  Goal: Verbalizations of feeling emotionally comfortable while being cared for will increase  Description: Verbalizations of feeling emotionally comfortable while being cared for will increase  Outcome: Ongoing     Problem: Psychomotor Activity - Altered:  Goal: Absence of psychomotor disturbance signs and symptoms  Description: Absence of psychomotor disturbance signs and symptoms  Outcome: Ongoing     Problem: Sensory Perception - Impaired:  Goal: Demonstrations of improved sensory functioning will increase  Description: Demonstrations of improved sensory functioning will increase  Outcome: Ongoing  Goal: Decrease in sensory misperception frequency  Description: Decrease in sensory misperception frequency  Outcome: Ongoing  Goal: Able to refrain from responding to false sensory perceptions  Description: Able to refrain from responding to false sensory perceptions  Outcome: Ongoing  Goal: Demonstrates accurate environmental perceptions  Description: Demonstrates accurate environmental perceptions  Outcome: Ongoing  Goal: Able to distinguish between reality-based and nonreality-based thinking  Description: Able to distinguish between reality-based and nonreality-based thinking  Outcome: Ongoing  Goal: Able to interrupt nonreality-based thinking  Description: Able to interrupt nonreality-based thinking  Outcome: Ongoing     Problem: Sleep Pattern Disturbance:  Goal: Appears well-rested  Description: Appears well-rested  Outcome: Ongoing     Problem: SAFETY  Goal: Free from accidental physical injury  Outcome: Ongoing  Goal: Free from intentional harm  Outcome: Ongoing     Problem: DAILY CARE  Goal: Daily care needs are met  Outcome: Ongoing     Problem: PAIN  Goal: Patient's pain/discomfort is manageable  Outcome: Ongoing     Problem: SKIN INTEGRITY  Goal: Skin integrity is maintained or improved  Outcome: Ongoing     Problem: KNOWLEDGE DEFICIT  Goal: Patient/S.O. demonstrates understanding of disease process, treatment plan, medications, and discharge instructions.   Outcome: Ongoing     Problem: DISCHARGE BARRIERS  Goal: Patient's continuum of care needs are met  Outcome: Ongoing

## 2022-01-22 NOTE — ED PROVIDER NOTES
[facility]  eMERGENCY dEPARTMENT eNCOUnter          [facility]       Chief Complaint   Patient presents with    Illness     Mershon called EMS for possible afib with rvr, pt is not verbalizing any concerns at this time       Nurses Notes reviewed and I agree except as noted in the HPI. HISTORY OF PRESENT ILLNESS    Tiffanie Denson is a 80 y.o. female who presents to the emergency room via EMS from [facility], with ECF nurse feeling the patient is in AF RVR though over phone medication that checked the patient's pulse rate and reports that patient is having altered mentation, has not been taking her oral meds or fluids. She is unable to give additional information herself though does seem to nod her shake her head at some questions, verbalize only through moaning. Patient was seen in the emergency room twice in the past 2 days    Patient's  present shortly after patient, does indicate he is unsure what exactly is going on with his wife, does know that he was told she had COVID, and that she is being treated for possible infection. REVIEW OF SYSTEMS     Review of Systems   Unable to perform ROS: Mental status change          PAST MEDICAL HISTORY    has a past medical history of Cancer ([address] COVID-19, Gout, Hyperlipidemia, Hypertension, Hypokalemia, Lymphedema of lower extremity, Obesity, Shingles, SVT (supraventricular tachycardia) ([address] Type II or unspecified type diabetes mellitus without mention of complication, not stated as uncontrolled, and Venous stasis dermatitis. SURGICAL HISTORY      has a past surgical history that includes Appendectomy; Colonoscopy; Dilation and curettage of uterus; pr office/outpt visit,procedure only (Right, 7/19/2018); pr office/outpt visit,procedure only (Right, 8/1/2018); Cystoscopy (Right, 12/16/2021); and joint replacement.     CURRENT MEDICATIONS       Current Discharge Medication List      CONTINUE these medications which have NOT CHANGED    Details   sodium chloride 1 g tablet Take 1 g by mouth 2 times daily      Probiotic Product (ACIDOPHILUS HIGH-POTENCY PO) Take 1 tablet by mouth daily      predniSONE (DELTASONE) 20 MG tablet Take 40 mg by mouth daily      guaiFENesin (ROBITUSSIN) 100 MG/5ML syrup Take 400 mg by mouth 4 times daily as needed for Cough 10ML      acetaminophen (TYLENOL) 325 MG tablet Take 650 mg by mouth every 4 hours as needed for Pain      ertapenem (INVANZ) 1 GM injection Inject 1,000 mg into the muscle every 24 hours      Polyethylene Glycol 1450 POWD by Does not apply route      albuterol sulfate HFA (VENTOLIN HFA) 108 (90 Base) MCG/ACT inhaler Inhale 2 puffs into the lungs every 6 hours as needed for Wheezing      metoprolol tartrate (LOPRESSOR) 25 MG tablet Take 1 tablet by mouth 2 times daily  Qty: 60 tablet, Refills: 3      dilTIAZem (CARDIZEM) 30 MG tablet Take 1 tablet by mouth 3 times daily  Qty: 90 tablet, Refills: 3      furosemide (LASIX) 40 MG tablet Take 1 tablet by mouth daily  Qty: 60 tablet, Refills: 3      potassium chloride (KLOR-CON M) 20 MEQ TBCR extended release tablet Take 1 tablet by mouth daily  Qty: 30 tablet, Refills: 3      Multiple Vitamin (MULTIVITAMIN) TABS tablet Take 1 tablet by mouth daily  Refills: 0      allopurinol (ZYLOPRIM) 100 MG tablet TAKE 1 TABLET EVERY DAY  Qty: 90 tablet, Refills: 1      metFORMIN (GLUCOPHAGE) 500 MG tablet Take 1 tablet by mouth 2 times daily (with meals)  Qty: 180 tablet, Refills: 1      simvastatin (ZOCOR) 20 MG tablet TAKE 1 TABLET EVERY NIGHT  Qty: 90 tablet, Refills: 1    Associated Diagnoses: Mixed hyperlipidemia      Calcium Carb-Cholecalciferol (CALCIUM 600 + D PO) Take by mouth daily      aspirin 325 MG tablet Take 81 mg by mouth daily       Lancets (ONETOUCH DELICA PLUS [id number]) MISC Testing blood sugar once daily and as needed, uses OneTouch UltraMini  Qty: 100 each, Refills: 5    Associated Diagnoses: Type 2 diabetes mellitus without complications [facility])      ! ! blood glucose test strips (ASCENSIA AUTODISC VI;ONE TOUCH ULTRA TEST VI) strip Testing blood sugar once daily and as needed  Qty: 100 each, Refills: 5      !! blood glucose test strips (ONE TOUCH ULTRA TEST) strip Test blood sugar once daily and as needed. Qty: 100 strip, Refills: 3      !! Glucose Blood (CHOICE DM FORA G20 TEST STRIPS VI) by In Vitro route. !! - Potential duplicate medications found. Please discuss with provider. ALLERGIES     is allergic to adhesive tape and sulfa antibiotics. FAMILY HISTORY     She indicated that her mother is . She indicated that her father is . She indicated that the status of her sister is unknown. She indicated that the status of her brother is unknown.   family history includes Cancer in her brother and sister; Heart Disease in her mother; Mult Sclerosis in her sister. SOCIAL HISTORY      reports that she has never smoked. She has never used smokeless tobacco. She reports that she does not drink alcohol and does not use drugs. PHYSICAL EXAM     INITIAL VITALS:  height is 5' (1.524 m) and weight is 218 lb 3.2 oz (99 kg). Her axillary temperature is 97.8 °F (36.6 °C). Her blood pressure is 137/93 (abnormal) and her pulse is 117. Her respiration is 22 and oxygen saturation is 96%. Physical Exam   Constitutional: Patient is currently moaning though with loud voice she does make some eye contact or turn her head towards the voice. Patient appears well-developed and well-nourished. HENT:   Head: Normocephalic and atraumatic. Head is without contusion. Right Ear: Hearing and external ear normal. No drainage. Left Ear: Hearing and external ear normal. No drainage. Nose: Nose normal. No nasal deformity. No epistaxis. Mouth/Throat: Mucous membranes are not dry. Eyes: EOMI. Conjunctivae, sclera, and lids are normal. Right eye exhibits no discharge. Left eye exhibits no discharge.    Neck: Full passive range of motion without pain and phonation normal.   Cardiovascular: Increased rate, regular rhythm and intact distal pulses. No edema. Noted PICC line right upper extremity. Pulses: Right radial pulse  2+   Pulmonary/Chest: Effort normal. Noted tachypnea and no bradypnea. No wheezes, rhonchi, or rales. Abdominal: BMI 42.6, Soft. Patient without distension or reproducible tenderness to palpation  Musculoskeletal:   Negative acute trauma or deformity,  apparent full range of motion and normal strength all extremities appropriate to age. Neurological: . patient displays no tremor. Patient displays no seizure activity. .    Skin: Skin is warm and dry. Patient is not diaphoretic. Psychiatric: Patient primarily moaning, there is purposeful eye movement when talking with patient at times    DIFFERENTIAL DIAGNOSIS:   Dysrhythmia, infectious NOS, dehydration, ANNIE, electrolyte abnormality, COVID,    DIAGNOSTIC RESULTS     EKG: All EKG's are interpreted by the Emergency Department Physician who either signs or Co-signs this chart in the absence of a cardiologist.  EKG    The patient had an EKG which is interpreted by me in the absence of a Cardiologist.   [] Without comparison to previous.    [x] With comparison to a previous EKG Dated 1/19/2022, 11/4/2021    EKG @ 1821 hrs -sinus tachycardia rate 141, left axis, noted RBBB/LAFB prior EKG showing patient atrial fibrillation      RADIOLOGY: non-plain film images(s) such as CT, Ultrasound and MRI are read by the radiologist.  No orders to display       LABS:   Labs Reviewed   COVID-19, RAPID - Abnormal; Notable for the following components:       Result Value    SARS-CoV-2, Rapid DETECTED (*)     All other components within normal limits   CBC WITH AUTO DIFFERENTIAL - Abnormal; Notable for the following components:    Hematocrit 35.5 (*)     Seg Neutrophils 82 (*)     Lymphocytes 12 (*)     All other components within normal limits   COMPREHENSIVE METABOLIC PANEL W/ REFLEX TO MG FOR LOW K - Abnormal; Notable for the following components:    Glucose 185 (*)     BUN 25 (*)     Bun/Cre Ratio 29 (*)     Potassium 3.4 (*)     Alkaline Phosphatase 239 (*)     Albumin 2.8 (*)     All other components within normal limits   URINALYSIS - Abnormal; Notable for the following components:    Protein, UA TRACE (*)     Leukocyte Esterase, Urine 2+ (*)     All other components within normal limits   MICROSCOPIC URINALYSIS - Abnormal; Notable for the following components:    Bacteria, UA RARE (*)     Mucus, UA 1+ (*)     All other components within normal limits   CULTURE, URINE   MAGNESIUM       EMERGENCY DEPARTMENT COURSE:   Vitals:    Vitals:    01/21/22 2104 01/21/22 2109 01/21/22 2114 01/21/22 2208   BP:    (!) 137/93   Pulse: 122 137 115 117   Resp: 26 30 23 22   Temp:    97.8 °F (36.6 °C)   TempSrc:    Axillary   SpO2: 94% 94% 94% 96%   Weight:    218 lb 3.2 oz (99 kg)   Height:    5' (1.524 m)     Patient history and physical exam taken at bedside, initial orders placed including COVID swab, EKG, blood and urine studies, patient placed on cardiac monitor, pulse ox, resting low semi-Fowlers in bed. EMR reviewed, noting ED visits 1/19 and 1/20, I do note CT abdomen pelvis without contrast performed day prior and results reviewed, I do note a urine culture 2 days prior that showing gram-negative fermenting rods, culture and sensitivity pending    Lab work-up reviewed noting normal WBC with 80% PMNs no bands, H&H 12.2/35.5, SCR 0.6, BUN 25, , K3.4, normal liver enzymes and bilirubins    Patient received straight catheterization, urine showing 2+ leukocytes and 5-10 white blood cells, rare bacteria    500 cc normal saline bolus ordered, diltiazem 10 mg IV ordered as patient not receiving her rate limiting medications, will start with diltiazem first and get some time and give patient metoprolol as she normally takes both orally.     ECF paperwork reviewed, noting patient is receiving Invanz through the ECF, was noted on yesterday's ER note that ECF is having difficulty with operating a PICC line at that time    Rapid COVID positive    Patient has been had arrived, I discussed patient's initial work-up, I will call hospitalist for possible admission, acknowledged    Case was discussed with Dr. Tish Bray, hospitalist, regarding patient's presentation and current work-up, accepted for admission    I updated patient's  regarding plan admission, acknowledges          FINAL IMPRESSION      1. Acute cystitis with hematuria    2. Altered mental status, unspecified altered mental status type    3. General weakness    4. COVID-19    5. Symptoms of dehydration          DISPOSITION/PLAN   admit    PATIENT REFERRED TO:  No follow-up provider specified. DISCHARGE MEDICATIONS:  Current Discharge Medication List              Summation      Patient Course: admit    ED Medications administered this visit:    Medications   dilTIAZem injection 10 mg (10 mg IntraVENous Given 1/21/22 2032)   0.9 % sodium chloride bolus (0 mLs IntraVENous Stopped 1/21/22 2102)   metoprolol (LOPRESSOR) injection 5 mg (5 mg IntraVENous Given 1/21/22 2131)   0.9 % sodium chloride bolus (500 mLs IntraVENous New Bag 1/21/22 2132)       New Prescriptions from this visit:    Current Discharge Medication List          Follow-up:  No follow-up provider specified. Final Impression:   1. Acute cystitis with hematuria    2. Altered mental status, unspecified altered mental status type    3. General weakness    4. COVID-19    5.  Symptoms of dehydration               (Please note that portions of this note were completed with a voice recognition program.  Efforts were made to edit the dictations but occasionally words are mis-transcribed.)    Marylee Kappa, MD Marylee Kappa, MD  01/22/22 0001

## 2022-01-22 NOTE — ED NOTES
Pt placed on 2 LPM via NC for O2 sat of 89%.   O2 sat increased to 93% with O2     Susana Phillips RN  01/21/22 2047

## 2022-01-22 NOTE — PROGRESS NOTES
Pt arrives to floor via cart. Pt vitals and assessment completed. Pt has scattered bruising and DTI on coccyx. No open areas noted on coccyx. Photos taken of wounds. Call light in reach. Will continue to monitor.

## 2022-01-22 NOTE — CONSULTS
[facility] - Initial Telemedicine Consult Note COVID 19 Patient  Today's Date and Time: 1/22/2022, 7:53 AM    Impression :     COVID 19 Confirmed Infection  Covid tests:  11-2-21: negative  1-12-22: Positive at [facility]  1-21-22: Positive  Altered mentation  Possible Rt side UTI vs residual X ray changes from prior Rt renal pelvis and ureteral stones with proximal hydronephrosis from November 2021      Recommendations:   Antibiotic treatment:  Ceftriaxone   Covid Rx:    Remdesivir. Out of the window  Decadron 6 mg q day IV. Start date 1-22-22  Actemra. Not indicated  Monoclonal antibodies. Out of the window      Medical Decision Making/Summary/Discussion:1/22/2022     Patient admitted with COVID 19 infection initially diagnosed at [facility] around 1-12-21  Initial presentation with altered mentation in the setting of hyponatremia, which has since been corrected  Altered mentation has persisted. She has hypoxia but is only requiring only small amounts of nasal 02. There is concern for possible Rt side hydronephrosis and UTI. Patient on antibiotics since 1-19-22. Changes were more severe on MRI of 11-16-21 when she had stones. These may be residual changes but it is reasonable to treat for possible residual UTI.     Infection Control Recommendations   [place] Precautions  Airborne isolation  Droplet Isolation  Isolate until 2-2-22    Antimicrobial Stewardship Recommendations     Simplification of therapy  Targeted therapy    Coordination of Outpatient Care:   Estimated Length of IV antimicrobials:TBD  Patient will need Midline Catheter Insertion: TBD  Patient will need PICC line Insertion: No  Patient will need: Home IV , [place],  SNF,  LTAC:TBD  Patient will need outpatient wound care:No    Chief complaint/reason for consultation:   Concern for COVID infection      History of Present Illness:   Leidy Merritt is a 80y.o.-year-old  female who was initially admitted on 1/21/2022. Patient seen at the request of . INITIAL HISTORY:    Patient presented through ER on 1-19-22 with complaints of mentation changes at her ECF, having developed unresponsiveness. She had tested positive for Covid around 1-12-22 at the [facility]. She was found to have sinusitis by CT, hyponatremia (Na 127) and possibly a UTI. Patient received hydration and returned to ECF. She returned on 1-20-22 with an elevated glucose level. CT abdomen suggested possible pyelonephritis and changes at the LLL suggestive of possible pneumonia. She received Ertapenem and was returned to [facility] to continue antibiotics. She returned on 1-21-22 with altered mentation, tachycardia, hypoxia requiring nasal 02. Patient admitted because of concerns with COVID 19 and altered mentation. CURRENT EVALUATION : 1/22/2022    Afebrile  VS stable    Patient exhibiting respiratory distress. yes  Respiratory secretions: No    Patient receiving supplemental oxygen. Nasl 02 @ 2 L/min  RR 22  02 sat 98      NEWS Score: 0-4 Low risk group; 5-6: Medium risk group; 7 or above: High risk group  Parameters 3 2 1 0 1 2 3   Age    < 65   = 65   RR = 8  9-11 12-20  21-24 = 25   O2 Sats = 91 92-93 94-95 = 96      Suppl O2  Yes  No      SBP = 90  101-110 111-219   = 220   HR = 40  41-50 51-90  111-130 = 131   Consciousness    Alert   Drowsiness, lethargy, or confusion   Temperature = 35.0 C (95.0 F)  35.1-36.0 C 95.1-96.9 F 36.1-38.0 C 97.0-100.4 F 38.1-39.0 C 100.5-102.3 F = 39.1 C = 102.4 F      NEWS Score:  1-22-22: 14 High Risk    Overall Daily Picture:      Worsening    Presence of secondary bacterial Infection:  Yes  No   Additional antibiotics:     Labs, X rays reviewed: 1/22/2022    BUN: 26  Cr: 0.86    WBC: 7.5  Hb: 12.0  Plat: 306    Absolute Neutrophils: 5.6  Absolute Lymphocytes: 1.30  Neutrophil/Lymphocyte Ratio: 4.3 High Risk    CRP: 12.4  Ferritin:  LDH: 175    Pro Calcitonin:      Cultures:  Urine:  1-19-22: Gram negative bacilli 10-50,000 cfu/mL  Blood:  1-20-22: No growth   Sputum :    Wound:      CXR:   1-19-22: Cardiomegaly. Patchy bibasilar infiltrates  CAT:  1-20-22: Bilateral small effusions with compressive atelectasis. No air bronchograms in areas of atelectasis. Rt kidney with residual distension of pelvis and upper ureter. GB at upper limits of normal.    Discussed with RN, JESUS. I have personally reviewed the past medical history, past surgical history, medications, social history, and family history, and I have updated the database accordingly.   Past Medical History:     Past Medical History:   Diagnosis Date    Cancer ([address]     basal cell skin cancer    COVID-19     Gout     Hyperlipidemia     Hypertension     Hypokalemia     Lymphedema of lower extremity     Obesity     Shingles     70's    SVT (supraventricular tachycardia) (HCC)     Type II or unspecified type diabetes mellitus without mention of complication, not stated as uncontrolled     Venous stasis dermatitis        Past Surgical  History:     Past Surgical History:   Procedure Laterality Date    APPENDECTOMY      COLONOSCOPY      CYSTOSCOPY Right 12/16/2021    RIGHT URETEROSCOPY WITH HOLMIUM LASER LITHOTRIPSY AND RIGHT STENT PLACEMENT performed by Fredi Mcmanus MD at [facility] OFFICE/OUTPT VISIT,PROCEDURE ONLY Right 7/19/2018    RIGHT HIP MASS INCISION AND DRAINAGE performed by Mary Hall MD at [address] [facility] OFFICE/OUTPT VISIT,PROCEDURE ONLY Right 8/1/2018    HIP INCISION AND DRAINAGE (Necrotic Fat Right Hip Debridement) performed by Mary Hall MD at [facility] OR       Medications:      [Held by provider] allopurinol  100 mg Oral Daily    [Held by provider] aspirin  81 mg Oral Daily    [Held by provider] oyster shell calcium w/D  1 tablet Oral Daily    [Held by provider] dilTIAZem  30 mg Oral TID    [Held by provider] furosemide  40 mg Oral Daily    [Held by provider] metFORMIN  500 mg Oral BID WC    [Held by provider] metoprolol tartrate  25 mg Oral BID    [Held by provider] multivitamin  1 tablet Oral Daily    [Held by provider] potassium chloride  20 mEq Oral Daily    [Held by provider] lactobacillus  1 capsule Oral Daily    [Held by provider] atorvastatin  10 mg Oral Daily    [Held by provider] sodium chloride  1 g Oral BID    sodium chloride flush  5-40 mL IntraVENous 2 times per day    enoxaparin  40 mg SubCUTAneous Daily    cefTRIAXone (ROCEPHIN) IV  1,000 mg IntraVENous Q24H    [Held by provider] Vitamin D  1,000 Units Oral Daily    [Held by provider] ascorbic acid  1,000 mg Oral Daily    [Held by provider] zinc sulfate  50 mg Oral Daily    dexamethasone  6 mg IntraVENous Q24H    potassium chloride  10 mEq IntraVENous Q1H    [Held by provider] guaiFENesin  600 mg Oral BID    dilTIAZem  5 mg IntraVENous Q6H    insulin lispro  0-6 Units SubCUTAneous Q6H       Social History:     Social History     Socioeconomic History    Marital status:      Spouse name: Not on file    Number of children: Not on file    Years of education: Not on file    Highest education level: Not on file   Occupational History    Not on file   Tobacco Use    Smoking status: Never Smoker    Smokeless tobacco: Never Used   Vaping Use    Vaping Use: Never used   Substance and Sexual Activity    Alcohol use: No    Drug use: No    Sexual activity: Not on file   Other Topics Concern    Not on file   Social History Narrative    Not on file     Social Determinants of Health     Financial Resource Strain:     Difficulty of Paying Living Expenses: Not on file   Food Insecurity:     Worried About Running Out of Food in the Last Year: Not on file    Irineo of Food in the Last Year: Not on file   Transportation Needs:     Lack of Transportation (Medical): Not on file    Lack of Transportation (Non-Medical):  Not on file   Physical Activity:  Days of Exercise per Week: Not on file    Minutes of Exercise per Session: Not on file   Stress:     Feeling of Stress : Not on file   Social Connections:     Frequency of Communication with Friends and Family: Not on file    Frequency of Social Gatherings with Friends and Family: Not on file    Attends Taoist Services: Not on file    Active Member of Clubs or Organizations: Not on file    Attends Club or Organization Meetings: Not on file    Marital Status: Not on file   Intimate Partner Violence:     Fear of Current or Ex-Partner: Not on file    Emotionally Abused: Not on file    Physically Abused: Not on file    Sexually Abused: Not on file   Housing Stability:     Unable to Pay for Housing in the Last Year: Not on file    Number of [place] in the Last Year: Not on file    Unstable Housing in the Last Year: Not on file       Family History:     Family History   Problem Relation Age of Onset    Heart Disease Mother     Cancer Sister     Mult Sclerosis Sister     Cancer Brother         lung        Allergies:   Adhesive tape and Sulfa antibiotics     Review of Systems:     Unable to provide. Altered mentation    Constitutional: No fevers or chills. No systemic complaints  Head: No headaches  Eyes: No double vision or blurry vision. No conjunctival inflammation. ENT: No sore throat or runny nose. . No hearing loss, tinnitus or vertigo. Cardiovascular: No chest pain or palpitations. No Shortness of breath. No M[place]ONOUGH  Lung: No Shortness of breath or cough. No sputum production  Abdomen: No nausea, vomiting, diarrhea, or abdominal pain. Ajith Skates No cramps. Genitourinary: No increased urinary frequency, or dysuria. No hematuria. No suprapubic or CVA pain  Musculoskeletal: No muscle aches or pains. No joint effusions, swelling or deformities  Hematologic: No bleeding or bruising. Neurologic: No headache, weakness, numbness, or tingling. Integument: No rash, no ulcers. Psychiatric: No depression. Endocrine: No polyuria, no polydipsia, no polyphagia. Physical Examination :     Patient Vitals for the past 8 hrs:   BP Pulse Resp SpO2   01/22/22 0650 -- -- -- 98 %   01/22/22 0149 -- -- -- 100 %   01/22/22 0108 (!) 133/95 126 22 --     General Appearance: Somnolent, difficult to arouse  Head:  Normocephalic, no trauma  Eyes: Pupils equal, round, reactive to light; sclera anicteric; conjunctivae pink. No embolic phenomena. ENT: Oropharynx clear, without erythema, exudate, or thrush. No tenderness of sinuses. Mouth/throat: mucosa pink and moist. No lesions. On nasal 02  Neck:Supple, without lymphadenopathy. Thyroid normal, No bruits. Pulmonary/Chest: Clear to auscultation, without wheezes, rales, or rhonchi. No dullness to percussion. Cardiovascular: Regular rate and rhythm without murmurs, rubs, or gallops. Abdomen: Soft, non tender. Bowel sounds normal. No organomegaly  All four Extremities: No cyanosis, clubbing. Has bilateral leg lymphedema. .  Neurologic: No gross sensory or motor deficits. Difficult to arouse. Moaning. Skin: Warm and dry with good turgor. Signs of peripheral arterial and venous insufficiency. No ulcerations. No open wounds. Medical Decision Making -Laboratory:   I have independently reviewed/ordered the following labs:    CBC with Differential:   Recent Labs     01/21/22 1838 01/22/22  0525   WBC 8.4 7.5   HGB 12.2 12.0   HCT 35.5* 31.9*    306   LYMPHOPCT 12* 17   MONOPCT 6 7     BMP:   Recent Labs     01/21/22 1838 01/22/22  0525    145*   K 3.4* 3.4*    107   CO2 26 28   BUN 25* 26*   CREATININE 0.86 0.86   MG 1.7 1.8     Hepatic Function Panel:   Recent Labs     01/20/22 2014 01/21/22  1838   PROT 7.4 7.2   LABALBU 2.7* 2.8*   BILITOT 0.38 0.38   ALKPHOS 256* 239*   ALT 29 19   AST 30 23     No results for input(s): RPR in the last 72 hours. No results for input(s): HIV in the last 72 hours. No results for input(s): BC in the last 72 hours.   Lab Results   Component Value Date    MUCUS 1+ 01/21/2022    RBC 3.55 01/22/2022    TRICHOMONAS NOT REPORTED 01/21/2022    WBC 7.5 01/22/2022    YEAST NOT REPORTED 01/21/2022    TURBIDITY Clear 01/21/2022     Lab Results   Component Value Date    CREATININE 0.86 01/22/2022    GLUCOSE 131 01/22/2022    GLUCOSE 177 04/21/2012       Medical Decision Making-Imaging:     EXAMINATION: CT ABDOMEN PELVIS WO CONTRAST, 1/20/2022 8:40 PM EST       HISTORY: Abdominal pain. Urinary tract infection.       COMPARISON: November 3, 2021            TECHNIQUE: CT scan of the abdomen and pelvis was performed without IV contrast.    CT dose reduction technique was used, including Automated Exposure Control.            FINDINGS:        CT ABDOMEN: Included lower thorax demonstrates small bilateral pleural    effusions with adjacent atelectasis involving the lower lobes. There is also    patchy peripheral consolidation within the left lower lobe and lingula. The    heart is enlarged, with coronary arthroscopic vascular calcifications in    addition to thick calcification at the mitral annulus. Small hiatal hernia.       The liver, left kidney, and pancreas have an unremarkable noncontrast    appearance. The spleen measures 16.9 cm in length. Nodular thickening to the    adrenal glands suggestive of adenomatous hyperplasia. The gallbladder is    distended to 10 cm with intrinsic dependent density noted.       There is slight asymmetric perinephric inflammatory stranding that is greater    on the right with mild dilatation of right renal collecting system and proximal    right ureter where there is uroepithelial thickening. Within the pelvis, the    ureters not dilated. Borderline enlarged portacaval lymph node measuring 1.8 x    1.3 cm. No retroperitoneal adenopathy.       CT PELVIS: The bladder is nondistended. The uterus and adnexal regions are    unremarkable.  There are multiple colonic diverticula with most of the colon    mildly distended with stool. No dilated loops of small bowel. Multilevel    moderate to severe degenerative changes of the spine with minimal    anterolisthesis at L4 on L5.               Impression       1. Mild right hydronephrosis and proximal hydroureter with uroepithelial    thickening and asymmetric perinephric inflammatory stranding. Although possibly    associated with a recently placed ureteral stent, this is concerning for    infection (acute right pyelonephritis, pyelitis and ureteritis). No obstructing    ureteral stone identified.       2. Small bilateral pleural effusions with adjacent atelectasis at the lung    bases. Peripheral patchy consolidation within the left lower lobe and lingula    could be pneumonia.       3. Cardiomegaly with coronary atherosclerotic vascular disease.       4. Small hiatal hernia.       5. Colonic diverticulosis.       6. Cholelithiasis within a borderline hydropic gallbladder.       7. Splenomegaly.       8. Adenomatous hyperplasia of the adrenal glands.             EXAM: XR CHEST PORTABLE        HISTORY: Reason for exam:->AMS, dx COVID(+) ~1 week ago       COMPARISON: Portable chest 11/2/2021.            TECHNIQUE: AP portable chest 1326 hours.           FINDINGS: Nonspecific patchy infiltrates are present bilaterally,    mild-to-moderate, left greater than right. Mild cardiomegaly.               Impression       Mild patchy bilateral infiltrates suspicious for early pneumonia. Medical Decision [id number]:       Medical Decision Making-Other:     Note:  Labs, medications, radiologic studies were reviewed with personal review of films  Large amounts of data were reviewed  Discussed with nursing Staff, Discharge planner  Infection Control and Prevention measures reviewed  All prior entries were reviewed  Administer medications as ordered  Prognosis: Guarded  Discharge planning reviewed  Follow up as outpatient.     Thank you for allowing us to participate in the care of this patient. Please call with questions.     Charo Solis MD  Pager: [phone] - Office: [phone]

## 2022-01-22 NOTE — H&P
Hospital Medicine  History and Physical    Patient:  Smitha Traore  MRN: [id number]    CHIEF COMPLAINT:  Decrease in responsiveness at the nursing home with tachycardia    History Obtained From:  electronic medical record  PCP: Aramis Munguia MD    HISTORY OF PRESENT ILLNESS:   The patient is a 80 y.o. female who presented to the ER on 1/19 via EMS, with nursing home staff stating that La Jones was unresponsive. She was found to be responsive in the ER with stable vital signs. She was noted to have tested for COVID 1 week prior. Labs showed a sodium of 127 and she was given IV hydration. CBC was normal.  UA showed a UTI with the culture currently growing gram negative rods. CT of the head showed no acute changes but did show sinusitis. She was sent back to the [facility] but was sent back to the ER on 1/20 secondary to the nursing staff at her ECF feeling she needed IV fluids. Apparently, she had a PICC line placed at the [facility] but nursing had a difficult time accessing it. She was given a dose of Invanz. CT scan of the abdomen and pelvis showed probable pyelonephritis. A patchy consolidation was seen in the LLL with the radiologist stating \"could be pneumonia\". Labs were normal at that time other than a glucose of 204. It was felt she could be discharged back to the ECF to continue on IV Invanz. Last night she was sent back to the ER, from the ECF, with patient having less responsiveness and being tachycardic. She was found to have sinus tachycardia in the ER. Potassium was found to be 3.4 with a glucose of 185. CBC remained normal.  Vital signs appeared stable but La Jones was very difficult to arouse and did require nasal oxygen to keep SPO2 > 90%. La Jones is admitted on cardiac monitoring with IV fluid hydration. IV antibiotics will be continued. With her current condition, she will be made NPO.       Past Medical History:        Diagnosis Date    Cancer ([facility] [address]     basal cell skin cancer    COVID-19     Gout     Hyperlipidemia     Hypertension     Hypokalemia     Lymphedema of lower extremity     Obesity     Shingles     70's    SVT (supraventricular tachycardia) (HCC)     Type II or unspecified type diabetes mellitus without mention of complication, not stated as uncontrolled     Venous stasis dermatitis        Past Surgical History:        Procedure Laterality Date    APPENDECTOMY      COLONOSCOPY      CYSTOSCOPY Right 12/16/2021    RIGHT URETEROSCOPY WITH HOLMIUM LASER LITHOTRIPSY AND RIGHT STENT PLACEMENT performed by Ivanna Greco MD at [facility] knee    HI OFFICE/OUTPT VISIT,PROCEDURE ONLY Right 7/19/2018    RIGHT HIP MASS INCISION AND DRAINAGE performed by Gilbert Davidson MD at [address] OFFICE/OUTPT VISIT,PROCEDURE ONLY Right 8/1/2018    HIP INCISION AND DRAINAGE (Necrotic Fat Right Hip Debridement) performed by Gilbert Davidson MD at [facility] OR       Medications Prior to Admission:  I obtained, documented, reviewed, and updated the patient's current medications. Prior to Admission medications    Medication Sig Start Date End Date Taking?  Authorizing Provider   sodium chloride 1 g tablet Take 1 g by mouth 2 times daily   Yes Historical Provider, MD   Probiotic Product (ACIDOPHILUS HIGH-POTENCY PO) Take 1 tablet by mouth daily   Yes Historical Provider, MD   predniSONE (DELTASONE) 20 MG tablet Take 40 mg by mouth daily   Yes Historical Provider, MD   guaiFENesin (ROBITUSSIN) 100 MG/5ML syrup Take 400 mg by mouth 4 times daily as needed for Cough 10ML   Yes Historical Provider, MD   acetaminophen (TYLENOL) 325 MG tablet Take 650 mg by mouth every 4 hours as needed for Pain   Yes Historical Provider, MD   ertapenem (INVANZ) 1 GM injection Inject 1,000 mg into the muscle every 24 hours   Yes Historical Provider, MD   Polyethylene Glycol 1450 POWD by Does not apply route   Yes Historical Provider, MD   albuterol sulfate HFA (VENTOLIN HFA) 108 (90 Base) MCG/ACT inhaler Inhale 2 puffs into the lungs every 6 hours as needed for Wheezing   Yes Historical Provider, MD   metoprolol tartrate (LOPRESSOR) 25 MG tablet Take 1 tablet by mouth 2 times daily 11/5/21  Yes Jane Montgomery MD   dilTIAZem (CARDIZEM) 30 MG tablet Take 1 tablet by mouth 3 times daily 11/5/21  Yes Jane Montgomery MD   furosemide (LASIX) 40 MG tablet Take 1 tablet by mouth daily 11/5/21  Yes Jane Montgomery MD   potassium chloride (KLOR-CON M) 20 MEQ TBCR extended release tablet Take 1 tablet by mouth daily 11/5/21  Yes Jane Montgomery MD   Multiple Vitamin (MULTIVITAMIN) TABS tablet Take 1 tablet by mouth daily 11/5/21  Yes Jane Montgomery MD   allopurinol (ZYLOPRIM) 100 MG tablet TAKE 1 TABLET EVERY DAY 4/28/21  Yes Matthew Gastelum MD   metFORMIN (GLUCOPHAGE) 500 MG tablet Take 1 tablet by mouth 2 times daily (with meals) 4/28/21  Yes Matthew Gastelum MD   simvastatin (ZOCOR) 20 MG tablet TAKE 1 TABLET EVERY NIGHT 4/28/21  Yes Matthew Gastelum MD   Calcium Carb-Cholecalciferol (CALCIUM 600 + D PO) Take by mouth daily   Yes Historical Provider, MD   aspirin 325 MG tablet Take 81 mg by mouth daily    Yes Historical Provider, MD   Lancets ([address]) [address] blood sugar once daily and as needed, uses OneTouch UltraMini 5/7/21   Matthew Gastelum MD   blood glucose test strips (ASCENSIA AUTODISC VI;ONE TOUCH ULTRA TEST VI) strip Testing blood sugar once daily and as needed 5/7/21   ALEXANDER Negro CNP   blood glucose test strips (ONE TOUCH ULTRA TEST) strip Test blood sugar once daily and as needed. 4/2/20   Matthew Gastelum MD   Glucose Blood (CHOICE DM FORA G20 TEST STRIPS VI) by In Vitro route. Historical Provider, MD       Allergies:  Adhesive tape and Sulfa antibiotics    Social History:   TOBACCO:   reports that she has never smoked.  She has never used smokeless tobacco.  ETOH:   reports no history of alcohol use.  OCCUPATION:      Family History:       Problem Relation Age of Onset    Heart Disease Mother     Cancer Sister     Mult Sclerosis Sister     Cancer Brother         lung       REVIEW OF SYSTEMS:  Brigitte Mcintosh will open her eyes and moan but will not answer questions. Physical Exam:    Vitals: BP (!) 133/95   Pulse 126   Temp 97.8 °F (36.6 °C) (Axillary)   Resp 22   Ht 5' (1.524 m)   Wt 218 lb 3.2 oz (99 kg)   LMP  (LMP Unknown)   SpO2 100% Comment: decreased to 2 lpm  BMI 42.61 kg/m²   General appearance: Very difficult to arouse. Skin: Skin color, texture, turgor normal. No rashes or lesions  HEENT: Head: Normocephalic, no lesions, without obvious abnormality. Eye: Normal external eye, conjunctiva, lids cornea, KRISTIN. Nose: Normal external nose, mucus membranes and septum. Nose: Nasal oxygen in place. Pharynx: Oral mucosa is dry, no oral lesions, posterior pharynx without erythema. Neck: no adenopathy and supple, symmetrical, trachea midline  Lungs: clear to auscultation bilaterally  Heart: regular rate and rhythm, S1, S2 normal and II/VI systolic murmur  Abdomen: +BS, soft, non distended. Patient does not appear to be uncomfortable to palpation. Extremities: Bilateral lymph edema  Neurologic: Mental status: Very difficult to arouse, moans. CBC:   Recent Labs     01/20/22 2014 01/21/22  1838 01/22/22  0525   WBC 6.5 8.4 7.5   HGB 12.4 12.2 12.0    350 306     BMP:    Recent Labs     01/20/22 2014 01/21/22  1838 01/22/22  0525    140 145*   K 4.1 3.4* 3.4*    101 107   CO2 25 26 28   BUN 23 25* 26*   CREATININE 0.82 0.86 0.86   GLUCOSE 204* 185* 131*     Hepatic:   Recent Labs     01/19/22  1333 01/20/22 2014 01/21/22  1838   AST 28 30 23   ALT 31 29 19   BILITOT 0.47 0.38 0.38   ALKPHOS 262* 256* 239*     Troponin: No results for input(s): TROPONINI in the last 72 hours. BNP: No results for input(s): BNP in the last 72 hours.   Lipids: No results for input(s): CHOL, HDL in the last 72 hours. Invalid input(s): LDLCALCU  ABGs: No results found for: PHART, PO2ART, SGW5DBX  INR: No results for input(s): INR in the last 72 hours. -----------------------------------------------------------------      Assessment and Plan   1. Unresponsive condition - Likely from current infection / dehydration. CT head on 1/19 with no acute changes. 2.  Pyelonephritis - On IV Rocephin. Urine culture growing Lactose fermenting gram negative rods. Did receive a dose of IV Invanz at the [facility] on 1/20. 3.  Possible LLL Pneumonia seen on CT scan. 4.  Dehydrated - started on IV 1/2 normal saline. 5.  COVID 19 infection - placed in isolation. Diagnosed 1 week ago. Was on Prednisone at the [facility]. Started on IV Decadron 1/21. 6.  Hypoxia - controlled on nasal oxygen. 7.  H/O SVT - sinus tachycardia upon admission. Sinus rhythm this am.  8. HTN - stable on Cardizem / Lopressor. 9.  H/O DM II - placed on Humalog sliding scale. 10.  Hyperlipidemia - on Zocor. 11.  Hypokalemia - replacement ordered. Plan:  1. Admit on cardiac telemetry / isolation. 2.  IV 1/2 normal saline. 3.  NPO secondary to her current condition. 4.  Continue on IV Decadron. FSBS Q 6 with Humalog sliding scale. 5.  ABG / CXR this am.  6.  Nasal oxygen to keep SPO2 > 90%. 7.  Continue on IV Rocephin. Follow up on sensitivity when available. 8.  Monitor inflammatory markers. 9.  Replace potassium. 10.  IV Cardizem for heart rate / BP control. 11.  Speech consult for Monday. 12.  Lovenox for DVT prophylaxis. 13.  Full code. 14.  ID consult. Medical Necessity: In patient is appropriate for this patient secondary to the need for IV hydration, IV Antibiotics, further evaluation of condition with close monitoring of condition, IV steroids. Estimated length of stay 3 days.       The beneficiary may reasonably be expected to be discharged or transferred to a hospital within 96 hours after admission. Patient Active Problem List   Diagnosis Code    Essential hypertension, benign I10    Type 2 diabetes mellitus without complication ([facility] [address] B21.3    Mixed hyperlipidemia E78.2    Wart B07.9    Benign skin lesion of forearm L98.9    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    Cutaneous abscess of buttock L02.31    Decubitus ulcer of right hip L89.219    PMB (postmenopausal bleeding) N95.0    Morbid obesity with BMI of 50.0-59.9, adult (HCC) E66.01, Z68.43    SVT (supraventricular tachycardia) ([facility]) I47.1    Renal calculus N20.0    Pyelonephritis N12       DVT prophylaxis:   [x] Lovenox   [] SCDs   [] SQ Heparin   [] Encourage ambulation, low risk for DVT, no chemical or mechanical    prophylaxis necessary      [] Already on Anticoagulation      Documentation of the Current Medications in the Medical Record    (x)  I have utilized all available immediate resources to obtain, update, or review the patient's current medications. (Satisfies MIPS Performance)  If Yes, Stop Here  ( )  The patient is not eligible for medication reconciliation; the patient is in an emergent medical situation where delaying treatment would jeopardize the patient's health. (MIPS Performance exception / exclusion)  ( )  I did not confirm, update or review the patient's current list of medications today. (Does not satisfy MIPS Performance)        Advanced Care Plan    (x)  I confirmed that the patient's Advanced Care Plan is present, code status documented, or surrogate decision maker is listed in the patient's medical record. ( )  The patient's advanced care plan is not present because:  (select)   ( ) I confirmed today that the patient does not wish or was not able to name a surrogate decision maker or provide an [address] [place] [address] Avenue. ( ) Hospice care is currently being provided or has been provided this calender year.   ( )  I did not confirm today the presence of an Advance Care Plan or surrogate decision maker documented within the patient's medical record. (Does not satisfy MIPS performance).             Rosa Maria Patton MD, MD  Admitting Hospitalist

## 2022-01-23 LAB
ALBUMIN SERPL-MCNC: 2.6 G/DL (ref 3.5–5.2)
ALBUMIN/GLOBULIN RATIO: ABNORMAL (ref 1–2.5)
ALP BLD-CCNC: 212 U/L (ref 35–104)
ALT SERPL-CCNC: 22 U/L (ref 5–33)
ANION GAP SERPL CALCULATED.3IONS-SCNC: 13 MMOL/L (ref 9–17)
AST SERPL-CCNC: 27 U/L
BILIRUB SERPL-MCNC: 0.39 MG/DL (ref 0.3–1.2)
BUN BLDV-MCNC: 26 MG/DL (ref 8–23)
BUN/CREAT BLD: 39 (ref 9–20)
C-REACTIVE PROTEIN: 6.9 MG/L (ref 0–5)
CALCIUM SERPL-MCNC: 9.1 MG/DL (ref 8.6–10.4)
CHLORIDE BLD-SCNC: 107 MMOL/L (ref 98–107)
CO2: 23 MMOL/L (ref 20–31)
CREAT SERPL-MCNC: 0.66 MG/DL (ref 0.5–0.9)
CULTURE: ABNORMAL
CULTURE: NORMAL
D-DIMER QUANTITATIVE: 2.71 MG/L FEU (ref 0–0.59)
FERRITIN: 383 UG/L (ref 13–150)
GFR AFRICAN AMERICAN: >60 ML/MIN
GFR NON-AFRICAN AMERICAN: >60 ML/MIN
GFR SERPL CREATININE-BSD FRML MDRD: ABNORMAL ML/MIN/{1.73_M2}
GFR SERPL CREATININE-BSD FRML MDRD: ABNORMAL ML/MIN/{1.73_M2}
GLUCOSE BLD-MCNC: 119 MG/DL (ref 65–99)
GLUCOSE BLD-MCNC: 134 MG/DL (ref 70–99)
GLUCOSE BLD-MCNC: 172 MG/DL (ref 65–99)
GLUCOSE BLD-MCNC: 181 MG/DL (ref 65–99)
LACTATE DEHYDROGENASE: 199 U/L (ref 135–214)
Lab: 10
Lab: NORMAL
POTASSIUM SERPL-SCNC: 4 MMOL/L (ref 3.7–5.3)
SODIUM BLD-SCNC: 143 MMOL/L (ref 135–144)
SPECIMEN DESCRIPTION: ABNORMAL
SPECIMEN DESCRIPTION: NORMAL
TOTAL PROTEIN: 6.5 G/DL (ref 6.4–8.3)

## 2022-01-23 PROCEDURE — 82728 ASSAY OF FERRITIN: CPT

## 2022-01-23 PROCEDURE — 2700000000 HC OXYGEN THERAPY PER DAY

## 2022-01-23 PROCEDURE — 80053 COMPREHEN METABOLIC PANEL: CPT

## 2022-01-23 PROCEDURE — 86140 C-REACTIVE PROTEIN: CPT

## 2022-01-23 PROCEDURE — 82947 ASSAY GLUCOSE BLOOD QUANT: CPT

## 2022-01-23 PROCEDURE — 93005 ELECTROCARDIOGRAM TRACING: CPT | Performed by: INTERNAL MEDICINE

## 2022-01-23 PROCEDURE — 36415 COLL VENOUS BLD VENIPUNCTURE: CPT

## 2022-01-23 PROCEDURE — 94761 N-INVAS EAR/PLS OXIMETRY MLT: CPT

## 2022-01-23 PROCEDURE — 83615 LACTATE (LD) (LDH) ENZYME: CPT

## 2022-01-23 PROCEDURE — 94640 AIRWAY INHALATION TREATMENT: CPT

## 2022-01-23 PROCEDURE — 94664 DEMO&/EVAL PT USE INHALER: CPT

## 2022-01-23 PROCEDURE — 6360000002 HC RX W HCPCS: Performed by: INTERNAL MEDICINE

## 2022-01-23 PROCEDURE — 2500000003 HC RX 250 WO HCPCS: Performed by: INTERNAL MEDICINE

## 2022-01-23 PROCEDURE — 1200000000 HC SEMI PRIVATE

## 2022-01-23 PROCEDURE — 85379 FIBRIN DEGRADATION QUANT: CPT

## 2022-01-23 PROCEDURE — 94669 MECHANICAL CHEST WALL OSCILL: CPT

## 2022-01-23 PROCEDURE — 2580000003 HC RX 258: Performed by: INTERNAL MEDICINE

## 2022-01-23 PROCEDURE — 6370000000 HC RX 637 (ALT 250 FOR IP): Performed by: INTERNAL MEDICINE

## 2022-01-23 RX ADMIN — MEROPENEM 1000 MG: 1 INJECTION, POWDER, FOR SOLUTION INTRAVENOUS at 06:12

## 2022-01-23 RX ADMIN — MEROPENEM 1000 MG: 1 INJECTION, POWDER, FOR SOLUTION INTRAVENOUS at 14:02

## 2022-01-23 RX ADMIN — DILTIAZEM HYDROCHLORIDE 5 MG: 5 INJECTION INTRAVENOUS at 14:02

## 2022-01-23 RX ADMIN — INSULIN LISPRO 1 UNITS: 100 INJECTION, SOLUTION INTRAVENOUS; SUBCUTANEOUS at 17:19

## 2022-01-23 RX ADMIN — ENOXAPARIN SODIUM 40 MG: 100 INJECTION SUBCUTANEOUS at 08:10

## 2022-01-23 RX ADMIN — DILTIAZEM HYDROCHLORIDE 5 MG: 5 INJECTION INTRAVENOUS at 06:12

## 2022-01-23 RX ADMIN — DEXAMETHASONE SODIUM PHOSPHATE 6 MG: 10 INJECTION INTRAMUSCULAR; INTRAVENOUS at 06:12

## 2022-01-23 ASSESSMENT — PAIN SCALES - PAIN ASSESSMENT IN ADVANCED DEMENTIA (PAINAD)
BODYLANGUAGE: 0
CONSOLABILITY: 1
FACIALEXPRESSION: 1
BODYLANGUAGE: 0
BODYLANGUAGE: 0
CONSOLABILITY: 1
TOTALSCORE: 4
CONSOLABILITY: 1
TOTALSCORE: 3
BODYLANGUAGE: 0
BODYLANGUAGE: 0
BREATHING: 0
BREATHING: 0
CONSOLABILITY: 1
NEGVOCALIZATION: 1
CONSOLABILITY: 1
BREATHING: 0
FACIALEXPRESSION: 1
CONSOLABILITY: 1
FACIALEXPRESSION: 1
TOTALSCORE: 3
BREATHING: 0
FACIALEXPRESSION: 1
TOTALSCORE: 3
BREATHING: 0
BODYLANGUAGE: 0
BODYLANGUAGE: 1
NEGVOCALIZATION: 1
TOTALSCORE: 3
CONSOLABILITY: 1
FACIALEXPRESSION: 1
CONSOLABILITY: 1
BODYLANGUAGE: 0
BREATHING: 0
BREATHING: 0
TOTALSCORE: 3
BODYLANGUAGE: 0
CONSOLABILITY: 1
NEGVOCALIZATION: 1
TOTALSCORE: 3
CONSOLABILITY: 1
BREATHING: 0
BREATHING: 0
FACIALEXPRESSION: 1
TOTALSCORE: 3
CONSOLABILITY: 1
FACIALEXPRESSION: 1
CONSOLABILITY: 1
BODYLANGUAGE: 0
TOTALSCORE: 3
BODYLANGUAGE: 0
TOTALSCORE: 3
FACIALEXPRESSION: 1
BREATHING: 0
TOTALSCORE: 3
FACIALEXPRESSION: 0
BODYLANGUAGE: 0
FACIALEXPRESSION: 1
BODYLANGUAGE: 0
BREATHING: 0
NEGVOCALIZATION: 1
TOTALSCORE: 3
BODYLANGUAGE: 0
BREATHING: 0
TOTALSCORE: 3
CONSOLABILITY: 1
CONSOLABILITY: 1
BODYLANGUAGE: 0
CONSOLABILITY: 1
BREATHING: 0
NEGVOCALIZATION: 1
CONSOLABILITY: 1
BREATHING: 0
FACIALEXPRESSION: 1
BREATHING: 0
BREATHING: 0
NEGVOCALIZATION: 1
FACIALEXPRESSION: 1
TOTALSCORE: 3
FACIALEXPRESSION: 0
CONSOLABILITY: 1
TOTALSCORE: 3
NEGVOCALIZATION: 1
BREATHING: 0
BREATHING: 0
CONSOLABILITY: 1
BODYLANGUAGE: 0
NEGVOCALIZATION: 1
NEGVOCALIZATION: 0
TOTALSCORE: 3
TOTALSCORE: 0
BODYLANGUAGE: 0
NEGVOCALIZATION: 1
BODYLANGUAGE: 0
TOTALSCORE: 3
CONSOLABILITY: 1
NEGVOCALIZATION: 1
BODYLANGUAGE: 0
CONSOLABILITY: 1
FACIALEXPRESSION: 1
BODYLANGUAGE: 0
FACIALEXPRESSION: 1
NEGVOCALIZATION: 1
BODYLANGUAGE: 0
NEGVOCALIZATION: 1
CONSOLABILITY: 1
BREATHING: 0
NEGVOCALIZATION: 1
NEGVOCALIZATION: 1
BREATHING: 0
BODYLANGUAGE: 0
CONSOLABILITY: 0
BREATHING: 0
FACIALEXPRESSION: 1
FACIALEXPRESSION: 1
NEGVOCALIZATION: 0
TOTALSCORE: 0
FACIALEXPRESSION: 1
FACIALEXPRESSION: 1
NEGVOCALIZATION: 1
NEGVOCALIZATION: 1
BREATHING: 0
NEGVOCALIZATION: 1
FACIALEXPRESSION: 1
TOTALSCORE: 3
NEGVOCALIZATION: 1
CONSOLABILITY: 1
FACIALEXPRESSION: 1
NEGVOCALIZATION: 1
BREATHING: 0
NEGVOCALIZATION: 1
CONSOLABILITY: 0
NEGVOCALIZATION: 1
BODYLANGUAGE: 0
BREATHING: 0
TOTALSCORE: 3
FACIALEXPRESSION: 1
BREATHING: 0
FACIALEXPRESSION: 1
CONSOLABILITY: 1
NEGVOCALIZATION: 1
TOTALSCORE: 3
BODYLANGUAGE: 0
BODYLANGUAGE: 0
NEGVOCALIZATION: 1

## 2022-01-23 ASSESSMENT — PAIN SCALES - GENERAL: PAINLEVEL_OUTOF10: 4

## 2022-01-23 NOTE — PROGRESS NOTES
Dr. Shyanne Whitten called regarding heart rate of 48-53 bpm--pt is easily aroused and more alert and able to answer questions. /89. No new orders received at this time.

## 2022-01-23 NOTE — PROGRESS NOTES
Pt restless in bed, taking off gown and heart monitor; pt repositioned and new gown placed. Pt asks for water and is educated on NPO status; mouth swabbed. Bed alarm on.

## 2022-01-23 NOTE — PROGRESS NOTES
HOSP GENERAL [facility]  Physical Therapy    Date: 2022  Patient Name: Aaron Kaminski        : 1940       [] Pt Refusal           [x] Pt Unavailable due to:  Patient unable to answer questions or follow directions. Patient needs 2 person assistance for rolling in bed. PT not appropriate at this time but will recheck tomorow.       Samy Johnson, PT, Date: 2022

## 2022-01-23 NOTE — PROGRESS NOTES
Pt brief saturated and pt is changed and renata-care provided; new purewick placed and pt repositioned for comfort with pillow support. Bed alarm on for safety.

## 2022-01-23 NOTE — PROGRESS NOTES
Hospitalist Progress Note  1/23/2022 5:39 AM  Subjective:   Admit Date: 1/21/2022  PCP: Yonatan Davis MD    Interval History: Seble Perez is much more alert this am, following commands, but confused. She denies having pain. Seble Perez denies chest pain or SOB. Continues to be NPO with IV hydration. Am labs pending. Appreciate ID consult. Diet: Diet NPO  Medications:   Scheduled Meds:   [Held by provider] allopurinol  100 mg Oral Daily    [Held by provider] aspirin  81 mg Oral Daily    [Held by provider] oyster shell calcium w/D  1 tablet Oral Daily    [Held by provider] dilTIAZem  30 mg Oral TID    [Held by provider] furosemide  40 mg Oral Daily    [Held by provider] metFORMIN  500 mg Oral BID WC    [Held by provider] metoprolol tartrate  25 mg Oral BID    [Held by provider] multivitamin  1 tablet Oral Daily    [Held by provider] potassium chloride  20 mEq Oral Daily    [Held by provider] lactobacillus  1 capsule Oral Daily    [Held by provider] atorvastatin  10 mg Oral Daily    [Held by provider] sodium chloride  1 g Oral BID    sodium chloride flush  5-40 mL IntraVENous 2 times per day    enoxaparin  40 mg SubCUTAneous Daily    [Held by provider] Vitamin D  1,000 Units Oral Daily    [Held by provider] ascorbic acid  1,000 mg Oral Daily    [Held by provider] zinc sulfate  50 mg Oral Daily    dexamethasone  6 mg IntraVENous Q24H    [Held by provider] guaiFENesin  600 mg Oral BID    dilTIAZem  5 mg IntraVENous Q6H    insulin lispro  0-6 Units SubCUTAneous Q6H    meropenem  1,000 mg IntraVENous Q8H     Continuous Infusions:   sodium chloride      dextrose      sodium chloride 75 mL/hr at 01/22/22 2154       Patient's current medications documented, reviewed, and updated.       CBC:   Recent Labs     01/20/22 2014 01/21/22  1838 01/22/22  0525   WBC 6.5 8.4 7.5   HGB 12.4 12.2 12.0    350 306     BMP:    Recent Labs     01/20/22 2014 01/21/22  1838 01/22/22  0525    140 145*   K 4.1 3.4* 3.4*    101 107   CO2 25 26 28   BUN 23 25* 26*   CREATININE 0.82 0.86 0.86   GLUCOSE 204* 185* 131*     Hepatic:   Recent Labs     01/20/22 2014 01/21/22  1838   AST 30 23   ALT 29 19   BILITOT 0.38 0.38   ALKPHOS 256* 239*     Troponin: No results for input(s): TROPONINI in the last 72 hours. BNP: No results for input(s): BNP in the last 72 hours. Lipids: No results for input(s): CHOL, HDL in the last 72 hours. Invalid input(s): LDLCALCU  INR: No results for input(s): INR in the last 72 hours. Objective:   Vitals: BP (!) 143/80   Pulse 95   Temp 98.5 °F (36.9 °C) (Axillary)   Resp 20   Ht 5' (1.524 m)   Wt 218 lb 3.2 oz (99 kg)   LMP  (LMP Unknown)   SpO2 97%   BMI 42.61 kg/m²   General appearance: alert and cooperative with exam  HEENT: Head: Normocephalic, no lesions, without obvious abnormality. Eye: Normal external eye, conjunctiva, lids cornea, KRISTIN. Nose: Normal external nose, mucus membranes and septum. Neck: no adenopathy and supple, symmetrical, trachea midline  Lungs: Breath sounds are clear but diminished bilateral.  Heart: regular rate and rhythm, S1, S2 normal and distant  Abdomen: soft, non-tender; bowel sounds normal; no masses,  no organomegaly and obese  Extremities: Bilateral lymph edema  Neurologic: Mental status: Alert and follows commands but confused. Assessment and Plan:   1. Unresponsive condition - improving -  Likely from current infection / dehydration. CT head on 1/19 with no acute changes. 2.  Pyelonephritis -  Received Invanz on 1/20 at [facility]. Placed on  IV Rocephin upon admission but changed to [address] [place] [address] on 1/22/22, after Urine culture grew out ESBL Klebsiella. 3.  Possible LLL Pneumonia seen on CT scan. 4.  Dehydrated - started on IV 1/2 normal saline. 5.  COVID 19 infection -  placed in isolation. Diagnosed on 1/12/22. Was on Prednisone at the [facility]. Started on IV Decadron 1/21. ID consulted.   6.  Positive blood culture with Coag negative Staph species (ID following). 7.  H/O SVT / Afib - sinus tachycardia upon admission. Sinus rhythm this am.  8. HTN - stable on Cardizem / Lopressor. 9.  H/O DM II - placed on Humalog sliding scale. 10.  Hyperlipidemia - on Zocor. 11.  Hypokalemia - replacement ordered. 12.  Hypoxia - controlled on nasal oxygen. Plan:  1. Continue the current treatment. 2.  Keep NPO until speech evaluation tomorrow. 3.  Follow up blood culture when reported. 4.  Start on PT / OT. Patient continues to require in patient admission secondary to the need for IV hydration, IV antibiotics, IV Decadron, and close monitoring of condition.     DVT prophylaxis:   [x] Lovenox   [] SCDs   [] SQ Heparin   [] Encourage ambulation, low risk for DVT, no chemical or mechanical    prophylaxis necessary      [] Already on Anticoagulation    Patient Active Problem List:     Essential hypertension, benign     Type 2 diabetes mellitus without complication (HCC)     Mixed hyperlipidemia     Wart     Benign skin lesion of forearm     Lymphedema     Acute drug-induced gout of left knee     Cutaneous abscess of buttock     Decubitus ulcer of right hip     PMB (postmenopausal bleeding)     Morbid obesity with BMI of 50.0-59.9, adult (HCC)     SVT (supraventricular tachycardia) ([facility] [address]     Renal calculus     Pyelonephritis      Barbara Glover MD, MD  [facility] Hospitalist

## 2022-01-24 PROBLEM — E44.1 MILD MALNUTRITION (HCC): Status: ACTIVE | Noted: 2022-01-24

## 2022-01-24 LAB
ALBUMIN SERPL-MCNC: 2.6 G/DL (ref 3.5–5.2)
ALBUMIN/GLOBULIN RATIO: ABNORMAL (ref 1–2.5)
ALP BLD-CCNC: 209 U/L (ref 35–104)
ALT SERPL-CCNC: 26 U/L (ref 5–33)
ANION GAP SERPL CALCULATED.3IONS-SCNC: 11 MMOL/L (ref 9–17)
AST SERPL-CCNC: 29 U/L
BILIRUB SERPL-MCNC: 0.41 MG/DL (ref 0.3–1.2)
BUN BLDV-MCNC: 28 MG/DL (ref 8–23)
BUN/CREAT BLD: 42 (ref 9–20)
C-REACTIVE PROTEIN: 3.9 MG/L (ref 0–5)
CALCIUM SERPL-MCNC: 9.2 MG/DL (ref 8.6–10.4)
CHLORIDE BLD-SCNC: 111 MMOL/L (ref 98–107)
CO2: 24 MMOL/L (ref 20–31)
CREAT SERPL-MCNC: 0.67 MG/DL (ref 0.5–0.9)
D-DIMER QUANTITATIVE: 2.74 MG/L FEU (ref 0–0.59)
EKG ATRIAL RATE: 61 BPM
EKG P AXIS: 41 DEGREES
EKG P-R INTERVAL: 258 MS
EKG Q-T INTERVAL: 464 MS
EKG QRS DURATION: 84 MS
EKG QTC CALCULATION (BAZETT): 467 MS
EKG R AXIS: -14 DEGREES
EKG T AXIS: -13 DEGREES
EKG VENTRICULAR RATE: 61 BPM
FERRITIN: 359 UG/L (ref 13–150)
GFR AFRICAN AMERICAN: >60 ML/MIN
GFR NON-AFRICAN AMERICAN: >60 ML/MIN
GFR SERPL CREATININE-BSD FRML MDRD: ABNORMAL ML/MIN/{1.73_M2}
GFR SERPL CREATININE-BSD FRML MDRD: ABNORMAL ML/MIN/{1.73_M2}
GLUCOSE BLD-MCNC: 127 MG/DL (ref 65–99)
GLUCOSE BLD-MCNC: 133 MG/DL (ref 70–99)
GLUCOSE BLD-MCNC: 142 MG/DL (ref 65–99)
GLUCOSE BLD-MCNC: 144 MG/DL (ref 65–99)
GLUCOSE BLD-MCNC: 170 MG/DL (ref 65–99)
LACTATE DEHYDROGENASE: 190 U/L (ref 135–214)
POTASSIUM SERPL-SCNC: 3.8 MMOL/L (ref 3.7–5.3)
SODIUM BLD-SCNC: 146 MMOL/L (ref 135–144)
TOTAL PROTEIN: 6.2 G/DL (ref 6.4–8.3)

## 2022-01-24 PROCEDURE — 86140 C-REACTIVE PROTEIN: CPT

## 2022-01-24 PROCEDURE — 82947 ASSAY GLUCOSE BLOOD QUANT: CPT

## 2022-01-24 PROCEDURE — 80053 COMPREHEN METABOLIC PANEL: CPT

## 2022-01-24 PROCEDURE — 1200000000 HC SEMI PRIVATE

## 2022-01-24 PROCEDURE — 93005 ELECTROCARDIOGRAM TRACING: CPT | Performed by: INTERNAL MEDICINE

## 2022-01-24 PROCEDURE — 85379 FIBRIN DEGRADATION QUANT: CPT

## 2022-01-24 PROCEDURE — 83615 LACTATE (LD) (LDH) ENZYME: CPT

## 2022-01-24 PROCEDURE — 82728 ASSAY OF FERRITIN: CPT

## 2022-01-24 PROCEDURE — 99232 SBSQ HOSP IP/OBS MODERATE 35: CPT | Performed by: INTERNAL MEDICINE

## 2022-01-24 PROCEDURE — 94761 N-INVAS EAR/PLS OXIMETRY MLT: CPT

## 2022-01-24 PROCEDURE — 6360000002 HC RX W HCPCS: Performed by: INTERNAL MEDICINE

## 2022-01-24 PROCEDURE — 36415 COLL VENOUS BLD VENIPUNCTURE: CPT

## 2022-01-24 PROCEDURE — 94669 MECHANICAL CHEST WALL OSCILL: CPT

## 2022-01-24 PROCEDURE — 2500000003 HC RX 250 WO HCPCS: Performed by: INTERNAL MEDICINE

## 2022-01-24 PROCEDURE — 92610 EVALUATE SWALLOWING FUNCTION: CPT

## 2022-01-24 PROCEDURE — 93010 ELECTROCARDIOGRAM REPORT: CPT | Performed by: INTERNAL MEDICINE

## 2022-01-24 PROCEDURE — 94640 AIRWAY INHALATION TREATMENT: CPT

## 2022-01-24 PROCEDURE — 2580000003 HC RX 258: Performed by: INTERNAL MEDICINE

## 2022-01-24 RX ADMIN — SODIUM CHLORIDE: 4.5 INJECTION, SOLUTION INTRAVENOUS at 12:24

## 2022-01-24 RX ADMIN — MEROPENEM 1000 MG: 1 INJECTION, POWDER, FOR SOLUTION INTRAVENOUS at 07:57

## 2022-01-24 RX ADMIN — DILTIAZEM HYDROCHLORIDE 5 MG: 5 INJECTION INTRAVENOUS at 12:56

## 2022-01-24 RX ADMIN — DEXAMETHASONE SODIUM PHOSPHATE 6 MG: 10 INJECTION INTRAMUSCULAR; INTRAVENOUS at 06:05

## 2022-01-24 RX ADMIN — MEROPENEM 1000 MG: 1 INJECTION, POWDER, FOR SOLUTION INTRAVENOUS at 21:04

## 2022-01-24 RX ADMIN — MEROPENEM 1000 MG: 1 INJECTION, POWDER, FOR SOLUTION INTRAVENOUS at 00:05

## 2022-01-24 RX ADMIN — SODIUM CHLORIDE, PRESERVATIVE FREE 10 ML: 5 INJECTION INTRAVENOUS at 08:24

## 2022-01-24 RX ADMIN — SODIUM CHLORIDE: 4.5 INJECTION, SOLUTION INTRAVENOUS at 00:13

## 2022-01-24 RX ADMIN — SODIUM CHLORIDE, PRESERVATIVE FREE 10 ML: 5 INJECTION INTRAVENOUS at 13:06

## 2022-01-24 RX ADMIN — ENOXAPARIN SODIUM 40 MG: 100 INJECTION SUBCUTANEOUS at 07:57

## 2022-01-24 ASSESSMENT — PAIN SCALES - GENERAL
PAINLEVEL_OUTOF10: 0

## 2022-01-24 ASSESSMENT — PAIN SCALES - PAIN ASSESSMENT IN ADVANCED DEMENTIA (PAINAD)
NEGVOCALIZATION: 1
NEGVOCALIZATION: 1
BREATHING: 0
BODYLANGUAGE: 0
BODYLANGUAGE: 0
TOTALSCORE: 3
NEGVOCALIZATION: 0
BREATHING: 0
FACIALEXPRESSION: 0
TOTALSCORE: 3
TOTALSCORE: 0
CONSOLABILITY: 0
BODYLANGUAGE: 0
FACIALEXPRESSION: 1
BREATHING: 0
BREATHING: 0
CONSOLABILITY: 0
NEGVOCALIZATION: 1
CONSOLABILITY: 1
CONSOLABILITY: 1
FACIALEXPRESSION: 0
TOTALSCORE: 1
BODYLANGUAGE: 0
FACIALEXPRESSION: 1

## 2022-01-24 ASSESSMENT — PAIN - FUNCTIONAL ASSESSMENT
PAIN_FUNCTIONAL_ASSESSMENT: 0-10
PAIN_FUNCTIONAL_ASSESSMENT: 0-10

## 2022-01-24 NOTE — PROGRESS NOTES
Hospitalist Progress Note  1/24/2022 6:59 AM  Subjective:   Admit Date: 1/21/2022  PCP: Howard Riddle MD    Interval History:       Patient is awake this morning, however confused. She is not oriented to place, time or people. Reports no complaints. When asked she denied feeling short of breath, has no chest pain. Has been coughing overnight. Oxygen saturation is better this morning, does not require oxygen supplement.       Diet: Diet NPO  Medications:   Scheduled Meds:   [Held by provider] allopurinol  100 mg Oral Daily    [Held by provider] aspirin  81 mg Oral Daily    [Held by provider] oyster shell calcium w/D  1 tablet Oral Daily    [Held by provider] dilTIAZem  30 mg Oral TID    [Held by provider] furosemide  40 mg Oral Daily    [Held by provider] metFORMIN  500 mg Oral BID WC    [Held by provider] metoprolol tartrate  25 mg Oral BID    [Held by provider] multivitamin  1 tablet Oral Daily    [Held by provider] potassium chloride  20 mEq Oral Daily    [Held by provider] lactobacillus  1 capsule Oral Daily    [Held by provider] atorvastatin  10 mg Oral Daily    [Held by provider] sodium chloride  1 g Oral BID    sodium chloride flush  5-40 mL IntraVENous 2 times per day    enoxaparin  40 mg SubCUTAneous Daily    [Held by provider] Vitamin D  1,000 Units Oral Daily    [Held by provider] ascorbic acid  1,000 mg Oral Daily    [Held by provider] zinc sulfate  50 mg Oral Daily    dexamethasone  6 mg IntraVENous Q24H    [Held by provider] guaiFENesin  600 mg Oral BID    dilTIAZem  5 mg IntraVENous Q6H    insulin lispro  0-6 Units SubCUTAneous Q6H    meropenem  1,000 mg IntraVENous Q8H     Continuous Infusions:   sodium chloride      dextrose      sodium chloride 75 mL/hr at 01/24/22 0621     PRN Medications: albuterol sulfate HFA, guaiFENesin, sodium chloride flush, sodium chloride, ondansetron **OR** ondansetron, polyethylene glycol, acetaminophen **OR** acetaminophen, metoprolol, glucose, dextrose, glucagon (rDNA), dextrose    Objective:   Vitals: BP (!) 149/78   Pulse 75   Temp 97.7 °F (36.5 °C) (Oral)   Resp 20   Ht 5' (1.524 m)   Wt 209 lb (94.8 kg)   LMP  (LMP Unknown)   SpO2 96%   BMI 40.82 kg/m²   BMI: Body mass index is 40.82 kg/m². CBC:   Recent Labs     01/21/22 1838 01/22/22  0525   WBC 8.4 7.5   HGB 12.2 12.0    306     BMP:    Recent Labs     01/22/22  0525 01/23/22  0450 01/24/22  0405   * 143 146*   K 3.4* 4.0 3.8    107 111*   CO2 28 23 24   BUN 26* 26* 28*   CREATININE 0.86 0.66 0.67   GLUCOSE 131* 134* 133*     Hepatic:   Recent Labs     01/21/22  1838 01/23/22  0450 01/24/22  0405   AST 23 27 29   ALT 19 22 26   BILITOT 0.38 0.39 0.41   ALKPHOS 239* 212* 209*     Physical Exam:  General Appearance: Awake, confused  Cardiovascular: normal rate, regular rhythm, normal S1 and S2, no murmurs  Pulmonary/Chest: clear to auscultation bilaterally, no wheezes, rales or rhonchi, normal air movement, no respiratory distress  Abdomen: soft, non-tender, non-distended, normal bowel sounds  Extremities: Bilateral lower extremities with severe lymphedema  Skin: warm and dry,   Neurological: alert, normal speech, no focal findings or movement disorder noted    Assessment and Plan:     1. Altered mental status, unresponsive state -most likely secondary to infectious process and dehydration. CT head 1/19 revealed no acute changes. Patient's mental status improved. Awake and alert this morning. 2.  Acute pyelonephritis -urine cultures positive for ESBL positive Klebsiella. Started on meropenem on 1/22/2022. Patient with recent history of right ureteral stent placement on 12/16/2021. Will check with ECF if stent has been removed by urologist  3. Possible LLL pneumonia seen on CT scan -on IV antibiotics. N.p.o., awaiting speech therapist evaluation  4. COVID-19 infection with Covid pneumonia with hypoxia - diagnosed on 1/12/2022.   Was on prednisone at ECF, started on IV Decadron 1/21 here. ID consulted. Patient is out of the window for remdesivir and monoclonal antibodies. Actemra not indicated. Continue on Decadron. Hypoxia resolving  5. History of SVT/A. Fib - patient with episodes of bradycardia, will obtain twelve-lead EKG, closely monitor on telemetry  6. Hypertension -BP stable  7. Diabetes mellitus type 2, controlled -lispro sliding scale  8. Hypokalemia -on replacement    CODE STATUS listed as full        Advanced Care Plan    ( x)  I confirmed that the patient's Advanced Care Plan is present, code status documented, or surrogate decision maker is listed in the patient's medical record. ( )  The patient's advanced care plan is not present because:  (select)   ( ) I confirmed today that the patient does not wish or was not able to name a surrogate decision maker or provide an [address] ( ) Hospice care is currently being provided or has been provided this calender year. ( )  I did not confirm today the presence of an [address] or surrogate decision maker documented within the patient's medical record. (Does not satisfy MIPS performance). Documentation of Current Medications in the Medical Record   (x )  I have utilized all available immediate resources to obtain, update, or review the patient's current medications. If Yes, Stop Here  ( ) The patient is not eligivel for medications reconciliation; the patient is in an emergent medical situation where delaying treatment would jeopardize the patient's health. ( ) I did not confirm, update or review the patient's current list of medications today.   (does not satisfy MIPS performance)        Patient continues to require inpatient admission related to       Electronically signed by Kylah Gatica MD on 1/24/2022 at 6:59 AM    [facility] [place] Hospitalist

## 2022-01-24 NOTE — DISCHARGE INSTR - COC
Continuity of Care Form    Patient Name: Michelle Mejia   :    MRN:  [id number]    Admit date:  2022  Discharge date:  2022    Code Status Order: [address]:      Admitting Physician:  Calderon Banerjee MD  PCP: Clarissa Martin MD    Discharging Nurse: [facility] Unit/Room#: 7000/8569-72  Discharging Unit Phone Number: [phone]    Emergency Contact:   Extended Emergency Contact Information  Primary Emergency Contact: Deshaun Clancy  Address: [address] [place] [address] [address] [facility] Phone: [phone]  Mobile Phone: [phone]  Relation: Spouse  Secondary Emergency Contact: Dominick Clancy   [facility] [address] [facility] Phone: [phone]  Mobile Phone: [phone]  Relation: Child    Past Surgical History:  Past Surgical History:   Procedure Laterality Date    APPENDECTOMY      COLONOSCOPY      CYSTOSCOPY Right 2021    RIGHT URETEROSCOPY WITH HOLMIUM LASER LITHOTRIPSY AND RIGHT STENT PLACEMENT performed by Matt Rosenberg MD at [address] [place] [address]  knee    PA OFFICE/OUTPT VISIT,PROCEDURE ONLY Right 2018    RIGHT HIP MASS INCISION AND DRAINAGE performed by Pro Olivia MD at [address] [place] [address] Dr OFFICE/OUTPT [address] [place] [address] Right 2018    HIP INCISION AND DRAINAGE (Necrotic Fat Right Hip Debridement) performed by Pro Olivia MD at [facility] OR       Immunization History:   Immunization History   Administered Date(s) Administered    Influenza 2012, 10/24/2013    Influenza Virus Vaccine 10/22/2014, 10/20/2015    Influenza, Huey Beckers, IM, PF (6 mo and older Fluzone, Flulaval, Fluarix, and 3 yrs and older Afluria) 10/12/2016, 10/25/2017, 10/25/2018, 10/30/2019, 10/29/2020, 2021    Pneumococcal Conjugate 13-valent (Yenadit24) 10/20/2015    Pneumococcal Polysaccharide (Hopwoyrmu45) 2017       Active Problems:  Patient Active Problem List Diagnosis Code    Essential hypertension, benign I10    Type 2 diabetes mellitus without complication ([facility] [address] S23.8    Mixed hyperlipidemia E78.2    Wart B07.9    Benign skin lesion of forearm L98.9    Lymphedema I89.0    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    Cutaneous abscess of buttock L02.31    Decubitus ulcer of right hip L89.219    PMB (postmenopausal bleeding) N95.0    Morbid obesity with BMI of 50.0-59.9, adult ([facility]) E66.01, Z68.43    SVT (supraventricular tachycardia) ([facility]) I47.1    Renal calculus N20.0    Pyelonephritis N12    Mild malnutrition ([facility]) E44.1       Isolation/Infection:   Isolation            Droplet Plus  Droplet Plus  Droplet Plus  Droplet Plus          Patient Infection Status       Infection Onset Added Last Indicated Last Indicated By Review Planned Expiration Resolved Resolved By    ESBL (Extended Spectrum Beta Lactamase) 01/19/22 01/22/22 01/19/22 Culture, Urine        COVID-19 01/21/22 01/21/22 01/21/22 COVID-19, Rapid 01/28/22 02/04/22      MDRO (multi-drug resistant organism)  11/04/21 01/19/22 Culture, Urine        E.coli urin 11/2021    Resolved    COVID-19 (Rule Out) 01/19/22 01/19/22 01/21/22 COVID-19, Rapid (Ordered)   01/21/22 Rule-Out Test Resulted            Nurse Assessment:  Last Vital Signs: BP (!) 148/83   Pulse 57   Temp 98 °F (36.7 °C) (Oral)   Resp 19   Ht 5' (1.524 m)   Wt 209 lb (94.8 kg)   LMP  (LMP Unknown)   SpO2 94%   BMI 40.82 kg/m²     Last documented pain score (0-10 scale): Pain Level: 0  Last Weight:   Wt Readings from Last 1 Encounters:   01/24/22 209 lb (94.8 kg)     Mental Status:  oriented, alert, and occasionally forgetful    IV Access:  - PICC - site  R Basilic, insertion date: 1/19/2022 at [facility].   CHG Dressing last changed 1/26/2022    Nursing Mobility/ADLs:  Walking   Dependent  Transfer  Dependent  Bathing  Dependent  Dressing  Dependent  Toileting  Dependent  Feeding  Dependent  Med Admin  Dependent  Med Delivery   whole, crushed, and prefers mixed with applesauce. Able to take some whole, crushable ones crushed. Wound Care Documentation and Therapy:  Wound 11/02/21 Buttocks Medial;Inner pressure stage 2 inner butts, excoriation extends down to thighs (Active)   Number of days: 82        Elimination:  Continence: Bowel: Yes  Bladder: No  Urinary Catheter: None   Colostomy/Ileostomy/Ileal Conduit: No       Date of Last BM: 1/27/2022    Intake/Output Summary (Last 24 hours) at 1/24/2022 1547  Last data filed at 1/24/2022 1307  Gross per 24 hour   Intake 2322.41 ml   Output 1000 ml   Net 1322.41 ml     I/O last 3 completed shifts: In: 4387.1 [I.V.:3959.5; IV Piggyback:427.6]  Out: 1200 [Urine:1200]    Safety Concerns: At Risk for Falls and Aspiration Risk    Impairments/Disabilities:      Vision, Hearing, and non-ambulatory    Nutrition Therapy:  Current Nutrition Therapy:   - Oral Diet:  Carb Control 4 carbs/meal (1800kcals/day) and Dysphagia 1 pureed    Routes of Feeding: Oral  Liquids: No Restrictions  Daily Fluid Restriction: no  Last Modified Barium Swallow with Video (Video Swallowing Test): not done    Treatments at the Time of Hospital Discharge:   Respiratory Treatments: none  Oxygen Therapy:  is not on home oxygen therapy. Ventilator:    - No ventilator support    Rehab Therapies: Speech/Language Therapy  Weight Bearing Status/Restrictions: No weight bearing restirctions  Other Medical Equipment (for information only, NOT a DME order):  hospital bed  Other Treatments: 30 day event monitor (placed 1/28/2022), Outpatient speech therapy.   Contact isolation for ESBL-Klebsiella    Patient's personal belongings (please select all that are sent with patient):  Glasses, denture uppers, shoes, denture cup    RN SIGNATURE:  Electronically signed by Onelia Garsia RN on 1/28/22 at 10:40 AM EST    CASE MANAGEMENT/SOCIAL WORK SECTION    Inpatient Status Date: 1/21/2022    Readmission Risk Assessment Score:  Readmission Risk Risk of Unplanned Readmission:  24           Discharging to Facility/ Agency   Name: [facility]   Address: [address] [id number]  Phone: [phone]  Fax: [phone]    Dialysis Facility (if applicable)   Name:  Address:  Dialysis Schedule:  Phone:  Fax:    / signature: Electronically signed by JOANN López on 1/24/22 at 3:48 PM EST    PHYSICIAN SECTION    Prognosis: Fair    Condition at Discharge: Stable    Rehab Potential (if transferring to Rehab): Fair    Recommended Labs or Other Treatments After Discharge:     Physician Certification: I certify the above information and transfer of Nguyen Tate  is necessary for the continuing treatment of the diagnosis listed and that she requires [facility] for greater 30 days.      Update Admission H&P: No change in H&P    PHYSICIAN SIGNATURE:  Electronically signed by Dayna Nunez MD on 1/28/22 at 7:28 AM EST

## 2022-01-24 NOTE — PROGRESS NOTES
Makeda Bland MA   Medical Assistant      Progress Notes      Signed   Encounter Date:  12/23/2021         Related encounter: Office Visit from 12/23/2021 in [facility]           Signed              Show:Clear all  [x]Manual[x]Template[]Copied    Added by:  Gabriel Medina MA      []Hover for details    Pt had ureteral stent placed on 12/16 following right HLL. Stent removed via string in office today without difficulty.  Pt tolerated removal well.                    Copied noted from previous encounter - patient's stent removed on 12/23/21

## 2022-01-24 NOTE — PROGRESS NOTES
Occupational Therapy      Spoke with RN this afternoon. Pt continues to be confused. Will attempt evaluation tomorrow.      Elier Perez, LIBRADO, OTR/L    1/24/2022  [id number]

## 2022-01-24 NOTE — PROGRESS NOTES
Call to [facility] to verify rt ureter stent was removed or still in place.  Awiting answer back, nurse unable to find info

## 2022-01-24 NOTE — PLAN OF CARE
Problem: Airway Clearance - Ineffective  Goal: Achieve or maintain patent airway  Outcome: Met This Shift     Problem: Isolation Precautions - Risk of Spread of Infection  Goal: Prevent transmission of infection  Outcome: Met This Shift     Problem: Nutrition Deficits  Goal: Optimize nutritional status  Outcome: Ongoing     Problem: Fatigue  Goal: Verbalize increase energy and improved vitality  Outcome: Ongoing     Problem: Skin Integrity:  Goal: Will show no infection signs and symptoms  Description: Will show no infection signs and symptoms  Outcome: Met This Shift

## 2022-01-24 NOTE — PROGRESS NOTES
Nurse at [facility] unable to find any in for regarding stent. Call to Dr Emily Estrada in [facility] 1640. Unable to speak to someone on [place] Office number. Call to answering service, message given and awaiting call back from Dr Emily Estrada or staff from his office.

## 2022-01-24 NOTE — PROGRESS NOTES
Quality flow rounds held on 1/24/22     Checo Aviles is admitted for  Pyelonephritis, covid    Length of stay 3. Education:    Needed Education: pyelonephritis, covid, meds, diet, follow up      Do you have any questions regarding your plan of care while at the hospital? denies    Planned Disposition:               [x]  Home when able                [] Swing Bed                [] ECF/SNF               [] Other/TBD    Barriers to Discharge:    Can you afford your medications? yes   Do you have transportation to follow up appointments?  or [place]   Do you need any new equipment at home? denies   Current equipment includes   walker, wheelchair    Do you have a living will or durable power of  for healthcare? denies               If yes do we have a copy on file? n/a    Do you or your family have any questions or concerns we haven't already discussed? Denies    Phone interview done with  per Beth Lopez and writer. Pt a resident at [place] [address] and plan is to return there at discharge.

## 2022-01-24 NOTE — PROGRESS NOTES
Comprehensive Nutrition Assessment    Type and Reason for Visit:  Initial,Wound    Nutrition Recommendations/Plan: Diet advancement    Nutrition Assessment:  Mild malnutrition r/t altered respiratory status, AEB significant weight decilnes and decreased PO. Increased nutrient needs r/t acute injury or trauma, AEB stage II buttock wound. Remaining NPO this AM while receiving ivf for hydration. Once PO initiated, would recommend add nutrition supplement. Recommend addition of a mvi w/minerals to aid wound healing as well. Malnutrition Assessment:  Malnutrition Status:  Mild malnutrition    Context:  Acute Illness     Findings of the 6 clinical characteristics of malnutrition:  Energy Intake:  Mild decrease in energy intake (Comment) (at least acutely)  Weight Loss:  7 - Greater than 5% over 1 month     Body Fat Loss:  Unable to assess     Muscle Mass Loss:  Unable to assess    Fluid Accumulation:  7 - Moderate to Severe Extremities   Strength:  Not Performed    Estimated Daily Nutrient Needs:  Energy (kcal):  7331-2875 (15-18); Weight Used for Energy Requirements:  Current     Protein (g):  59-68 (1.3-1.5); Weight Used for Protein Requirements:  Ideal        Fluid (ml/day):  1700+; Method Used for Fluid Requirements:  1 ml/kcal      Nutrition Related Findings:  MIKE in isolation.  +3 BLE edema, + b/s, edentulous      Wounds:  Stage II (buttock)       Current Nutrition Therapies:    Diet NPO    Anthropometric Measures:  · Height: 5' (152.4 cm)  · Current Body Weight: 209 lb (94.8 kg)   · Admission Body Weight: 224 lb (101.6 kg)    · Usual Body Weight: 224 lb (101.6 kg)     · Ideal Body Weight: 100 lbs; % Ideal Body Weight 209 %   · BMI: 40.8  · Adjusted Body Weight:  ; No Adjustment   · BMI Categories: Obese Class 3 (BMI 40.0 or greater)       Nutrition Diagnosis:   · Mild malnutrition related to impaired respiratory function as evidenced by weight loss greater than or equal to 5% in 1 month,NPO or clear liquid status due to medical condition,localized or generalized fluid accumulation    Lab Results   Component Value Date     (H) 01/24/2022    K 3.8 01/24/2022     (H) 01/24/2022    CO2 24 01/24/2022    BUN 28 (H) 01/24/2022    CREATININE 0.67 01/24/2022    GLUCOSE 133 (H) 01/24/2022    CALCIUM 9.2 01/24/2022    PROT 6.2 (L) 01/24/2022    LABALBU 2.6 (L) 01/24/2022    BILITOT 0.41 01/24/2022    ALKPHOS 209 (H) 01/24/2022    AST 29 01/24/2022    ALT 26 01/24/2022    LABGLOM >60 01/24/2022    GFRAA >60 01/24/2022     Lab Results   Component Value Date    LABA1C 6.2 (H) 11/01/2021     Lab Results   Component Value Date     11/01/2021     No results found for: VITD25    Nutrition Interventions:   Food and/or Nutrient Delivery:  Start Oral Diet,Start Oral Nutrition Supplement  Nutrition Education/Counseling:  No recommendation at this time   Coordination of Nutrition Care:  Continue to monitor while inpatient    Goals:  PO >75% meals on advanced diet       Nutrition Monitoring and Evaluation:   Behavioral-Environmental Outcomes:  None Identified   Food/Nutrient Intake Outcomes:  Food and Nutrient Intake,Supplement Intake  Physical Signs/Symptoms Outcomes:  Skin,Biochemical Data,Fluid Status or Edema,Weight     Discharge Planning:     Too soon to determine     Electronically signed by Jace Chi RD, LD on 1/24/22 at 6:45 AM EST    Contact: [phone]

## 2022-01-24 NOTE — PROGRESS NOTES
Patient has two episodes of junctional rhythm lasting around one minute each, returns to SB 1st degree AVB with dropped beats. EKG ordered. RT at bedside in attempt to Pod [facility] 1626.

## 2022-01-24 NOTE — PROGRESS NOTES
SW and RN case manager spoke with pt's spouse on the phone to complete assessment during quality rounds. Pt is a 80year old  female admitted for pyelonephritis and Covid 19 infection. Pt with altered mental status as well. Pt has been staying at [facility] since the first of November 2021. Spouse reports that she was using either a walker or wheelchair at the facility when she was able to get up. Pt receives assistance from staff at the facility with whatever she needs. Pt's spouse transports her to appointments whenever she has to go out for one. Pt is a full code and follows with Dr Jojo Gutierrez as PCP. Pt does not have advance directives but reported during last episode that spouse and son would be her decision makers. Reviewed ACP note from November with spouse and he reports that this remains accurate. Pt's medications are covered well by insurance. Spouse reports that plan will be for pt to return to [facility] at discharge. Spouse identifies no other discharge needs or concerns at this time. SW will follow and remain available.  Luna [facility] 1/24/2022

## 2022-01-24 NOTE — PROGRESS NOTES
Speech Language Pathology  Facility/Department: [address] MED SURG TELEMETRY   CLINICAL BEDSIDE SWALLOW EVALUATION    NAME: Yoselyn Johnson  : [id number]  MRN: [id number]    ADMISSION DATE: 2022  ADMITTING DIAGNOSIS: has Essential hypertension, benign; Type 2 diabetes mellitus without complication ([facility] [address] Mixed hyperlipidemia; Wart; Benign skin lesion of forearm; Lymphedema; Acute drug-induced gout of left knee; Cutaneous abscess of buttock; Decubitus ulcer of right hip; PMB (postmenopausal bleeding); Morbid obesity with BMI of 50.0-59.9, [facility]); SVT (supraventricular tachycardia) ([facility] [address] Renal calculus; Pyelonephritis; and Mild malnutrition ([facility] [address] on their problem list.  ONSET DATE: 22    Recent Chest Xray: (22)   Stable bilateral, left greater than right patchy parenchymal opacities most    pronounced in the left lower lung zone which may represent multifocal pneumonia       Date of Eval: 2022  Evaluating Therapist: Rohit Cabello M.S., CCC-SLP    Current Diet level:  Current Diet : NPO    Primary Complaint  Patient Complaint: AMS/ decreased LOC; LLL PNA    Pain:  Pain Assessment  Pain Assessment: 0-10  Pain Level: 0  Patient's Stated Pain Goal: No pain  PAINAD (Pain Assessment in Advance Dementia)  Breathing: normal  Negative Vocalization: none  Facial Expression: smiling or inexpressive  Body Language: relaxed  Consolability: no need to console  PAINAD Score: 0    Reason for Referral  Yoselyn Johnson was referred for a bedside swallow evaluation to assess the efficiency of her swallow function, identify signs and symptoms of aspiration and make recommendations regarding safe dietary consistencies, effective compensatory strategies, and safe eating environment. Impression  Dysphagia Diagnosis: Mild to moderate oral stage dysphagia; Suspected needs further assessment;Mild to moderate pharyngeal stage dysphagia  Dysphagia Impression : Pt presents with oropharyngeal dysphagia secondary to AMS. Dysphagia Outcome Severity Scale: Level 1: Severe dysphagia- NPO. Unable to tolerate any PO safely     Treatment Plan  Requires SLP Intervention: Yes  Duration/Frequency of Treatment: 3-5x/week  D/C Recommendations: To be determined       Recommended Diet and Intervention  Diet Solids Recommendation: NPO  Liquid Consistency Recommendation: NPO     Recommendations: Dysphagia treatment  Therapeutic Interventions: Diet tolerance monitoring;Oral care; Therapeutic PO trials with SLP;Patient/Family education    Treatment/Goals  Short-term Goals  Timeframe for Short-term Goals: 1 week  Goal 1: Pt will tolerate pureed solids with no bolus holding or oral residue in 90% of trials. Goal 2: Pt will tolerate nectar thick liquids with no overt s/s of penetration or aspiration. Long-term Goals  Timeframe for Long-term Goals: 2 weeks  Goal 1: Pt will tolerate least restrictive oral diet for meeting nutritional needs with no overt s/s of penetration or aspiration. Dysphagia Goals: The patient will tolerate repeat BSE when able. General  Chart Reviewed: Yes  Behavior/Cognition: Alert; Cooperative;Confused  Respiratory Status: Room air  O2 Device: None (Room air)  Communication Observation: Functional  Follows Directions: Simple  Dentition: Edentulous (pt reports she has upper dentures however they are not present for evaluation)  Patient Positioning: Upright in bed  Baseline Vocal Quality: Normal  Volitional Cough: Strong  Prior Dysphagia History: none reported  Consistencies Administered: Dysphagia Pureed (Dysphagia I); Nectar - straw; Thin - straw; Thin - cup;Nectar - cup    Pain Level: 0    Vision/Hearing  Vision  Vision: Impaired  Vision Exceptions: Wears glasses at all times  Hearing  Hearing: Within functional limits    Oral Motor Deficits  Oral/Motor  Oral Motor:  Within functional limits (xerostomia)    Oral Phase Dysfunction  Oral Phase  Oral Phase: Exceptions  Oral Phase Dysfunction  Pocketing Left: Puree  Pocketing Right: Puree  Decreased Anterior to Posterior Transit: All (bolus holding)  Lingual/Palatal Residue: Puree  Oral Phase  Oral Phase - Comment: Pt presents with oral dysphagia secondary to AMS. Pt with bolus holding, pocketing, and oral residue of pureed solids. Further solid trials not attempted d/t pt's difficulty with puree as well as dentures not present. Indicators of Pharyngeal Phase Dysfunction   Pharyngeal Phase  Pharyngeal Phase: Exceptions  Indicators of Pharyngeal Phase Dysfunction  Delayed Swallow: All  Cough - Immediate: Thin - straw;Nectar - straw  Cough - Delayed: Puree; Thin - straw;Nectar - straw;Nectar - cup  Pharyngeal Phase   Pharyngeal: Pt presents with questionable pharyngeal dysphagia characterized by delayed swallow, immediate cough with thin and mildly thick liquids via straw, and delayed cough with all consistencies trialed. Pt with increasing cough as well as decreased awareness of bolus with increasing trials. Prognosis  Prognosis  Prognosis for safe diet advancement: fair  Barriers to reach goals: inconsistent alertness;other (comment) (AMS)  Individuals consulted  Consulted and agree with results and recommendations: Patient;RN    Education  Patient Education: Educated pt on outcomes of evaluation, POC, and NPO recommendation  Patient Education Response: Verbalizes understanding;Needs reinforcement (Pt with questionable understanding due to altered mental status overall)  Safety Devices in place: Yes  Type of devices: Left in bed;Nurse notified       Therapy Time  SLP Individual Minutes  Time In: 1230  Time Out: 1300  Minutes: 30     Summary  Pt seen for bedside swallowing evaluation this date. Pt alert to self and place but not time or situation.  Pt presents with oral dysphagia and questionable pharyngeal dysphagia characterized by bolus holding, pocketing, oral residue of pureed solids, delayed swallow, immediate cough with thin and mildly thick liquids via straw, and delayed cough with all consistencies trialed. Pt with increasing cough as well as decreased awareness of bolus with increasing trials. Pt with some hallucination noted as she was pantomiming feeding herself when not holding anything. Pt also requiring verbal cues to swallow and attempting to talk while swallowing. Due to decreased alertness and awareness, ST recommends pt remain NPO at this time. ST to follow 3-5x per week to continue to assess appropriateness of oral diet and possible dysphagia.       CRISTO PerezS., [id number] [place] [address]  1/24/2022 1:09 PM

## 2022-01-25 LAB
ABSOLUTE BANDS #: 0.21 K/UL (ref 0–1)
ABSOLUTE EOS #: ABNORMAL K/UL (ref 0–0.4)
ABSOLUTE IMMATURE GRANULOCYTE: ABNORMAL K/UL (ref 0–0.3)
ABSOLUTE LYMPH #: 0.84 K/UL (ref 1–4.8)
ABSOLUTE MONO #: 0.35 K/UL (ref 0–1)
ANION GAP SERPL CALCULATED.3IONS-SCNC: 7 MMOL/L (ref 9–17)
BANDS: 3 % (ref 0–10)
BASOPHILS # BLD: ABNORMAL % (ref 0–2)
BASOPHILS ABSOLUTE: ABNORMAL K/UL (ref 0–0.2)
BUN BLDV-MCNC: 29 MG/DL (ref 8–23)
BUN/CREAT BLD: 50 (ref 9–20)
C-REACTIVE PROTEIN: <3 MG/L (ref 0–5)
CALCIUM SERPL-MCNC: 9 MG/DL (ref 8.6–10.4)
CHLORIDE BLD-SCNC: 110 MMOL/L (ref 98–107)
CO2: 23 MMOL/L (ref 20–31)
CREAT SERPL-MCNC: 0.58 MG/DL (ref 0.5–0.9)
D-DIMER QUANTITATIVE: 2.43 MG/L FEU (ref 0–0.59)
DIFFERENTIAL TYPE: ABNORMAL
EKG ATRIAL RATE: 52 BPM
EKG ATRIAL RATE: 54 BPM
EKG ATRIAL RATE: 60 BPM
EKG P AXIS: 24 DEGREES
EKG P AXIS: 54 DEGREES
EKG P AXIS: 61 DEGREES
EKG P-R INTERVAL: 192 MS
EKG P-R INTERVAL: 232 MS
EKG P-R INTERVAL: 232 MS
EKG Q-T INTERVAL: 442 MS
EKG Q-T INTERVAL: 500 MS
EKG Q-T INTERVAL: 616 MS
EKG QRS DURATION: 134 MS
EKG QRS DURATION: 142 MS
EKG QRS DURATION: 156 MS
EKG QTC CALCULATION (BAZETT): 442 MS
EKG QTC CALCULATION (BAZETT): 465 MS
EKG QTC CALCULATION (BAZETT): 584 MS
EKG R AXIS: -15 DEGREES
EKG R AXIS: -22 DEGREES
EKG R AXIS: 78 DEGREES
EKG T AXIS: -105 DEGREES
EKG T AXIS: 0 DEGREES
EKG VENTRICULAR RATE: 52 BPM
EKG VENTRICULAR RATE: 54 BPM
EKG VENTRICULAR RATE: 60 BPM
EOSINOPHILS RELATIVE PERCENT: ABNORMAL % (ref 0–5)
FERRITIN: 307 UG/L (ref 13–150)
GFR AFRICAN AMERICAN: >60 ML/MIN
GFR NON-AFRICAN AMERICAN: >60 ML/MIN
GFR SERPL CREATININE-BSD FRML MDRD: ABNORMAL ML/MIN/{1.73_M2}
GFR SERPL CREATININE-BSD FRML MDRD: ABNORMAL ML/MIN/{1.73_M2}
GLUCOSE BLD-MCNC: 101 MG/DL (ref 70–99)
GLUCOSE BLD-MCNC: 141 MG/DL (ref 65–99)
GLUCOSE BLD-MCNC: 145 MG/DL (ref 65–99)
GLUCOSE BLD-MCNC: 157 MG/DL (ref 65–99)
GLUCOSE BLD-MCNC: 160 MG/DL (ref 65–99)
GLUCOSE BLD-MCNC: 88 MG/DL (ref 65–99)
HCT VFR BLD CALC: 26.8 % (ref 36–46)
HEMOGLOBIN: 10.8 G/DL (ref 12–16)
IMMATURE GRANULOCYTES: ABNORMAL %
LACTATE DEHYDROGENASE: 207 U/L (ref 135–214)
LYMPHOCYTES # BLD: 12 % (ref 15–40)
MCH RBC QN AUTO: 37.5 PG (ref 26–34)
MCHC RBC AUTO-ENTMCNC: 40.3 G/DL (ref 31–37)
MCV RBC AUTO: 93.1 FL (ref 80–100)
MONOCYTES # BLD: 5 % (ref 4–8)
MORPHOLOGY: ABNORMAL
NRBC AUTOMATED: ABNORMAL PER 100 WBC
PDW BLD-RTO: 14.1 % (ref 12.1–15.2)
PLATELET # BLD: 188 K/UL (ref 140–450)
PLATELET ESTIMATE: ABNORMAL
PMV BLD AUTO: ABNORMAL FL
POTASSIUM SERPL-SCNC: 3.6 MMOL/L (ref 3.7–5.3)
RBC # BLD: 2.88 M/UL (ref 4–5.2)
RBC # BLD: ABNORMAL 10*6/UL
SEG NEUTROPHILS: 80 % (ref 47–75)
SEGMENTED NEUTROPHILS ABSOLUTE COUNT: 5.6 K/UL (ref 2.5–7)
SODIUM BLD-SCNC: 140 MMOL/L (ref 135–144)
WBC # BLD: 7 K/UL (ref 3.5–11)
WBC # BLD: ABNORMAL 10*3/UL

## 2022-01-25 PROCEDURE — 97163 PT EVAL HIGH COMPLEX 45 MIN: CPT

## 2022-01-25 PROCEDURE — 94669 MECHANICAL CHEST WALL OSCILL: CPT

## 2022-01-25 PROCEDURE — 99222 1ST HOSP IP/OBS MODERATE 55: CPT | Performed by: INTERNAL MEDICINE

## 2022-01-25 PROCEDURE — 80048 BASIC METABOLIC PNL TOTAL CA: CPT

## 2022-01-25 PROCEDURE — 94640 AIRWAY INHALATION TREATMENT: CPT

## 2022-01-25 PROCEDURE — 82947 ASSAY GLUCOSE BLOOD QUANT: CPT

## 2022-01-25 PROCEDURE — 99232 SBSQ HOSP IP/OBS MODERATE 35: CPT | Performed by: INTERNAL MEDICINE

## 2022-01-25 PROCEDURE — 2580000003 HC RX 258: Performed by: INTERNAL MEDICINE

## 2022-01-25 PROCEDURE — 93010 ELECTROCARDIOGRAM REPORT: CPT | Performed by: INTERNAL MEDICINE

## 2022-01-25 PROCEDURE — 92526 ORAL FUNCTION THERAPY: CPT

## 2022-01-25 PROCEDURE — 85025 COMPLETE CBC W/AUTO DIFF WBC: CPT

## 2022-01-25 PROCEDURE — 6370000000 HC RX 637 (ALT 250 FOR IP): Performed by: INTERNAL MEDICINE

## 2022-01-25 PROCEDURE — 1200000000 HC SEMI PRIVATE

## 2022-01-25 PROCEDURE — 94761 N-INVAS EAR/PLS OXIMETRY MLT: CPT

## 2022-01-25 PROCEDURE — 6360000002 HC RX W HCPCS: Performed by: INTERNAL MEDICINE

## 2022-01-25 PROCEDURE — 36415 COLL VENOUS BLD VENIPUNCTURE: CPT

## 2022-01-25 PROCEDURE — 85379 FIBRIN DEGRADATION QUANT: CPT

## 2022-01-25 PROCEDURE — 82728 ASSAY OF FERRITIN: CPT

## 2022-01-25 PROCEDURE — 97530 THERAPEUTIC ACTIVITIES: CPT

## 2022-01-25 PROCEDURE — 93005 ELECTROCARDIOGRAM TRACING: CPT | Performed by: INTERNAL MEDICINE

## 2022-01-25 PROCEDURE — 83615 LACTATE (LD) (LDH) ENZYME: CPT

## 2022-01-25 PROCEDURE — 86140 C-REACTIVE PROTEIN: CPT

## 2022-01-25 RX ADMIN — SODIUM CHLORIDE: 4.5 INJECTION, SOLUTION INTRAVENOUS at 14:47

## 2022-01-25 RX ADMIN — MEROPENEM 1000 MG: 1 INJECTION, POWDER, FOR SOLUTION INTRAVENOUS at 05:41

## 2022-01-25 RX ADMIN — DEXAMETHASONE SODIUM PHOSPHATE 6 MG: 10 INJECTION INTRAMUSCULAR; INTRAVENOUS at 05:40

## 2022-01-25 RX ADMIN — INSULIN LISPRO 1 UNITS: 100 INJECTION, SOLUTION INTRAVENOUS; SUBCUTANEOUS at 00:38

## 2022-01-25 RX ADMIN — MEROPENEM 1000 MG: 1 INJECTION, POWDER, FOR SOLUTION INTRAVENOUS at 12:54

## 2022-01-25 RX ADMIN — MEROPENEM 1000 MG: 1 INJECTION, POWDER, FOR SOLUTION INTRAVENOUS at 20:48

## 2022-01-25 RX ADMIN — ENOXAPARIN SODIUM 40 MG: 100 INJECTION SUBCUTANEOUS at 12:56

## 2022-01-25 RX ADMIN — SODIUM CHLORIDE, PRESERVATIVE FREE 10 ML: 5 INJECTION INTRAVENOUS at 20:49

## 2022-01-25 RX ADMIN — SODIUM CHLORIDE, PRESERVATIVE FREE 10 ML: 5 INJECTION INTRAVENOUS at 12:43

## 2022-01-25 ASSESSMENT — PAIN SCALES - PAIN ASSESSMENT IN ADVANCED DEMENTIA (PAINAD)
BREATHING: 0
FACIALEXPRESSION: 0
BREATHING: 0
TOTALSCORE: 0
CONSOLABILITY: 0
TOTALSCORE: 0
BODYLANGUAGE: 0
FACIALEXPRESSION: 0
BODYLANGUAGE: 0
FACIALEXPRESSION: 0
TOTALSCORE: 0
BREATHING: 0
BREATHING: 0
CONSOLABILITY: 0
BODYLANGUAGE: 0
NEGVOCALIZATION: 0
FACIALEXPRESSION: 0
NEGVOCALIZATION: 0
CONSOLABILITY: 0
FACIALEXPRESSION: 0
TOTALSCORE: 0
NEGVOCALIZATION: 0
NEGVOCALIZATION: 0
FACIALEXPRESSION: 0
BODYLANGUAGE: 0
BREATHING: 0
NEGVOCALIZATION: 0
BREATHING: 0
FACIALEXPRESSION: 0
CONSOLABILITY: 0
TOTALSCORE: 0
CONSOLABILITY: 0
CONSOLABILITY: 0
BODYLANGUAGE: 0
NEGVOCALIZATION: 0
TOTALSCORE: 0
BODYLANGUAGE: 0
BREATHING: 0
TOTALSCORE: 0
CONSOLABILITY: 0
BODYLANGUAGE: 0
NEGVOCALIZATION: 0

## 2022-01-25 ASSESSMENT — PAIN SCALES - GENERAL
PAINLEVEL_OUTOF10: 0

## 2022-01-25 ASSESSMENT — PAIN - FUNCTIONAL ASSESSMENT: PAIN_FUNCTIONAL_ASSESSMENT: 0-10

## 2022-01-25 NOTE — PLAN OF CARE
[facility]    Inpatient Occupational Therapy  Plan of Care  OT Orders Received and Evaluation Complete  Date: 2022  Patient Name: Michelle Mejia        MRN: [id number]    : 1940  (80 y.o.)  Referring Practitioner: Dr. Xochitl Houston  Onset Date: 22  Referral Date: 2022   Diagnosis: COVID  Treatment Diagnosis: COVID    Identified Problem Areas  Performance deficits / Impairments: Decreased functional mobility ,Decreased endurance,Decreased ADL status,Decreased ROM,Decreased balance,Decreased strength,Decreased high-level IADLs,Decreased cognition  Assessment: Pt demonstrates the above deficits. Pt currently requries Max A /Total assist of +2 for supine to sit & sit to supine transfers. Pt unsafe to complete STS or additional transfers at this time secondary to decreased cognition & w/ pt not being ambulatory since 2021. Recommend return to SNF at d/c.   Prognosis: Poor     Justification for Skilled Services:  [x]  Complete Daily Tasks Safely  [x] Improve Balance   [x]  Return to Prior Level of Function  [x]  Family/Caregiver Education    [x] Improve UE strength  [x] Patient Education: [x]Adaptive Equipment   [x]Home Exercise Program and Progression    Treatment Plan  Plan  Times per week: 7x  Times per day: Daily (1x per day)  Plan weeks: 7 days  [] Modalities:  [x] Therapeutic Exercise   [x] Therapeutic Activity  [] Splinting:     [] Home Safety Evaluation         [x] ADL Retraining                       [] Muscle Re-education [] Cognitive Retraining            [] Sensory Integration  [x] Patient Education [x] Home Exercise Program [] Fine Motor Coordination  Discharge Recommendations: Subacute/Skilled Nursing Facility    GOALS  Short term goals  Time Frame for Short term goals: 7 days (2022)  Short term goal 1: Pt to complete bed mobility Mod A +2  Short term goal 2: Pt to tolerate sitting EOB x 2-3 min with CGA in order to engage in UB exercises  Short term goal 3: Pt to tolerate KAIDEN UB exercises/activity x 5 min w/ 3 or fewer rest breaks  Short term goal 4: Pt to follow simple 1-2 step commands in order to engage in functional mobility    Upon Swingbed Transfer this Plan of Care will be followed until revision is completed.     Ailyn Kumar, LIBRADO, OTR/L                    Date: 1/25/2022

## 2022-01-25 NOTE — PROGRESS NOTES
[facility],[address] 9 Note   COVID 19 Patient  Today's Date and Time: 1/25/2022, 4:45 PM    Impression :     COVID 19 Confirmed Infection  Covid tests:  11-2-21: negative  1-12-22: Positive at [facility]  1-21-22: Positive  Altered mentation   Rt side UTI with ESBL + Klebsiella    Recommendations:   Antibiotic treatment:  Meropenem 1 gm IV q 8 hr  Covid Rx:    Remdesivir. Out of the window  Decadron 6 mg q day IV. Start date 1-22-22  Actemra. Not indicated  Monoclonal antibodies. Out of the window      Medical Decision Making/Summary/Discussion:1/25/2022     Patient admitted with COVID 19 infection initially diagnosed at [facility] around 1-12-21  Initial presentation with altered mentation in the setting of hyponatremia, which has since been corrected  Altered mentation has persisted. She has hypoxia but is only requiring only small amounts of nasal 02. There is concern for possible Rt side hydronephrosis and UTI. Klebsiella ESBL + grown from urine    Infection Control Recommendations   [place] Precautions  Airborne isolation  Droplet Isolation  Isolate until 2-2-22    Antimicrobial Stewardship Recommendations     Simplification of therapy  Targeted therapy    Coordination of Outpatient Care:   Estimated Length of IV antimicrobials:TBD  Patient will need Midline Catheter Insertion: TBD  Patient will need PICC line Insertion: No  Patient will need: Home IV , [place],  SNF,  LTAC:TBD  Patient will need outpatient wound care:No    Chief complaint/reason for consultation:   Concern for COVID infection      History of Present Illness:   Steffen Celestin is a 80y.o.-year-old  female who was initially admitted on 1/21/2022. Patient seen at the request of . INITIAL HISTORY:    Patient presented through ER on 1-19-22 with complaints of mentation changes at her ECF, having developed unresponsiveness.  She had tested positive for Covid around 1-12-22 at the ECF. She was found to have sinusitis by CT, hyponatremia (Na 127) and possibly a UTI. Patient received hydration and returned to ECF. She returned on 1-20-22 with an elevated glucose level. CT abdomen suggested possible pyelonephritis and changes at the LLL suggestive of possible pneumonia. She received Ertapenem and was returned to [facility] to continue antibiotics. She returned on 1-21-22 with altered mentation, tachycardia, hypoxia requiring nasal 02. Patient admitted because of concerns with COVID 19 and altered mentation. CURRENT EVALUATION : 1/25/2022    Afebrile  VS stable, HTN    Tolerating Meropenem for ESBL + Klebsiella UTI  Patient exhibiting respiratory distress. yes  Respiratory secretions: No    Patient receiving supplemental oxygen. Nasal 02 @ 2 L/min-->room air  RR 22-->19-->16  02 sat 98-->94-->95%      NEWS Score: 0-4 Low risk group; 5-6: Medium risk group; 7 or above: High risk group  Parameters 3 2 1 0 1 2 3   Age    < 65   = 65   RR = 8  9-11 12-20  21-24 = 25   O2 Sats = 91 92-93 94-95 = 96      Suppl O2  Yes  No      SBP = 90  101-110 111-219   = 220   HR = 40  41-50 51-90  111-130 = 131   Consciousness    Alert   Drowsiness, lethargy, or confusion   Temperature = 35.0 C (95.0 F)  35.1-36.0 C 95.1-96.9 F 36.1-38.0 C 97.0-100.4 F 38.1-39.0 C 100.5-102.3 F = 39.1 C = 102.4 F      NEWS Score:  1-22-22: 14 High Risk  1-: 4 low risk     Overall Daily Picture:     Unchanged    Presence of secondary bacterial Infection:  Yes  Additional antibiotics: meropenem    Labs, X rays reviewed: 1/25/2022    BUN: 26-->28-->29  Cr: 0.86-->0.67-->0.58    WBC: 7.5-->7.0  Hb: 12.0-->10.8  Plat: 306-->188    Absolute Neutrophils: 5.6  Absolute Lymphocytes: 1.30  Neutrophil/Lymphocyte Ratio: 4.3 High Risk    CRP: 12.4-->3.9--> <3.0  Ferritin:  LDH: 175    Pro Calcitonin:      Cultures:  Urine:  1-19-22: Gram negative bacilli 10-50,000 cfu/mL  Blood:  1-20-22: No growth   1-20-22: Coagulase negative Staphylococci = contaminant  Sputum :    Wound:      CXR:   1-19-22: Cardiomegaly. Patchy bibasilar infiltrates  CAT:  1-20-22: Bilateral small effusions with compressive atelectasis. No air bronchograms in areas of atelectasis. Rt kidney with residual distension of pelvis and upper ureter. GB at upper limits of normal.    Discussed with RN, JESUS. I have personally reviewed the past medical history, past surgical history, medications, social history, and family history, and I have updated the database accordingly.   Past Medical History:     Past Medical History:   Diagnosis Date    Cancer ([address]     basal cell skin cancer    COVID-19     Gout     Hyperlipidemia     Hypertension     Hypokalemia     Lymphedema of lower extremity     Obesity     Shingles     70's    SVT (supraventricular tachycardia) (HCC)     Type II or unspecified type diabetes mellitus without mention of complication, not stated as uncontrolled     Venous stasis dermatitis        Past Surgical  History:     Past Surgical History:   Procedure Laterality Date    APPENDECTOMY      COLONOSCOPY      CYSTOSCOPY Right 12/16/2021    RIGHT URETEROSCOPY WITH HOLMIUM LASER LITHOTRIPSY AND RIGHT STENT PLACEMENT performed by Rocio Dias MD at [facility] OFFICE/OUTPT VISIT,PROCEDURE ONLY Right 7/19/2018    RIGHT HIP MASS INCISION AND DRAINAGE performed by Ramandeep Ferro MD at [address] [facility] OFFICE/OUTPT VISIT,PROCEDURE ONLY Right 8/1/2018    HIP INCISION AND DRAINAGE (Necrotic Fat Right Hip Debridement) performed by Ramandeep Ferro MD at [facility] OR       Medications:      meropenem (MERREM) 1000 mg IVPB extended (mini-bag)  1,000 mg IntraVENous Q8H    [Held by provider] allopurinol  100 mg Oral Daily    [Held by provider] aspirin  81 mg Oral Daily    [Held by provider] oyster shell calcium w/D  1 tablet Oral Daily    [Held by provider] dilTIAZem 30 mg Oral TID    [Held by provider] furosemide  40 mg Oral Daily    [Held by provider] metFORMIN  500 mg Oral BID WC    [Held by provider] metoprolol tartrate  25 mg Oral BID    [Held by provider] multivitamin  1 tablet Oral Daily    [Held by provider] potassium chloride  20 mEq Oral Daily    [Held by provider] lactobacillus  1 capsule Oral Daily    [Held by provider] atorvastatin  10 mg Oral Daily    [Held by provider] sodium chloride  1 g Oral BID    sodium chloride flush  5-40 mL IntraVENous 2 times per day    enoxaparin  40 mg SubCUTAneous Daily    [Held by provider] Vitamin D  1,000 Units Oral Daily    [Held by provider] ascorbic acid  1,000 mg Oral Daily    [Held by provider] zinc sulfate  50 mg Oral Daily    dexamethasone  6 mg IntraVENous Q24H    [Held by provider] guaiFENesin  600 mg Oral BID    [Held by provider] dilTIAZem  5 mg IntraVENous Q6H    insulin lispro  0-6 Units SubCUTAneous Q6H       Social History:     Social History     Socioeconomic History    Marital status:      Spouse name: Not on file    Number of children: Not on file    Years of education: Not on file    Highest education level: Not on file   Occupational History    Not on file   Tobacco Use    Smoking status: Never Smoker    Smokeless tobacco: Never Used   Vaping Use    Vaping Use: Never used   Substance and Sexual Activity    Alcohol use: No    Drug use: No    Sexual activity: Not on file   Other Topics Concern    Not on file   Social History Narrative    Not on file     Social Determinants of Health     Financial Resource Strain:     Difficulty of Paying Living Expenses: Not on file   Food Insecurity:     Worried About Running Out of Food in the Last Year: Not on file    Irineo of Food in the Last Year: Not on file   Transportation Needs:     Lack of Transportation (Medical): Not on file    Lack of Transportation (Non-Medical):  Not on file   Physical Activity:     Days of Exercise per Week: Not on file    Minutes of Exercise per Session: Not on file   Stress:     Feeling of Stress : Not on file   Social Connections:     Frequency of Communication with Friends and Family: Not on file    Frequency of Social Gatherings with Friends and Family: Not on file    Attends Sabianist Services: Not on file    Active Member of Clubs or Organizations: Not on file    Attends Club or Organization Meetings: Not on file    Marital Status: Not on file   Intimate Partner Violence:     Fear of Current or Ex-Partner: Not on file    Emotionally Abused: Not on file    Physically Abused: Not on file    Sexually Abused: Not on file   Housing Stability:     Unable to Pay for Housing in the Last Year: Not on file    Number of [place] in the Last Year: Not on file    Unstable Housing in the Last Year: Not on file       Family History:     Family History   Problem Relation Age of Onset    Heart Disease Mother     Cancer Sister     Mult Sclerosis Sister     Cancer Brother         lung        Allergies:   Adhesive tape and Sulfa antibiotics     Review of Systems:     Unable to provide. Altered mentation    Constitutional: No fevers or chills. No systemic complaints  Head: No headaches  Eyes: No double vision or blurry vision. No conjunctival inflammation. ENT: No sore throat or runny nose. . No hearing loss, tinnitus or vertigo. Cardiovascular: No chest pain or palpitations. No Shortness of breath. No M[place]ONOUGH  Lung: No Shortness of breath or cough. No sputum production  Abdomen: No nausea, vomiting, diarrhea, or abdominal pain. Ayla Sida No cramps. Genitourinary: No increased urinary frequency, or dysuria. No hematuria. No suprapubic or CVA pain  Musculoskeletal: No muscle aches or pains. No joint effusions, swelling or deformities  Hematologic: No bleeding or bruising. Neurologic: No headache, weakness, numbness, or tingling. Integument: No rash, no ulcers. Psychiatric: No depression.    Endocrine: No polyuria, no polydipsia, no polyphagia. Physical Examination :     Patient Vitals for the past 8 hrs:   BP Temp Temp src Pulse Resp SpO2   01/25/22 1505 (!) 154/64 97.7 °F (36.5 °C) Oral -- 16 95 %   01/25/22 1259 -- -- -- -- -- 97 %   01/25/22 1122 (!) 155/68 97.8 °F (36.6 °C) Oral 65 16 100 %     General Appearance: Somnolent, difficult to arouse  Head:  Normocephalic, no trauma  Eyes: Pupils equal, round, reactive to light; sclera anicteric; conjunctivae pink. No embolic phenomena. ENT: Oropharynx clear, without erythema, exudate, or thrush. No tenderness of sinuses. Mouth/throat: mucosa pink and moist. No lesions. On nasal 02  Neck:Supple, without lymphadenopathy. Thyroid normal, No bruits. Pulmonary/Chest: Clear to auscultation, without wheezes, rales, or rhonchi. No dullness to percussion. Cardiovascular: Regular rate and rhythm without murmurs, rubs, or gallops. Abdomen: Soft, non tender. Bowel sounds normal. No organomegaly  All four Extremities: No cyanosis, clubbing. Has bilateral leg lymphedema. .  Neurologic: No gross sensory or motor deficits. Difficult to arouse. Moaning. Skin: Warm and dry with good turgor. Signs of peripheral arterial and venous insufficiency. No ulcerations. No open wounds. Medical Decision Making -Laboratory:   I have independently reviewed/ordered the following labs:    CBC with Differential:   Recent Labs     01/25/22  0430   WBC 7.0   HGB 10.8*   HCT 26.8*      LYMPHOPCT 12*   MONOPCT 5     BMP:   Recent Labs     01/24/22  0405 01/25/22  0430   * 140   K 3.8 3.6*   * 110*   CO2 24 23   BUN 28* 29*   CREATININE 0.67 0.58     Hepatic Function Panel:   Recent Labs     01/23/22  0450 01/24/22  0405   PROT 6.5 6.2*   LABALBU 2.6* 2.6*   BILITOT 0.39 0.41   ALKPHOS 212* 209*   ALT 22 26   AST 27 29     No results for input(s): RPR in the last 72 hours. No results for input(s): HIV in the last 72 hours. No results for input(s): BC in the last 72 hours.   Lab Results   Component Value Date    MUCUS 1+ 01/21/2022    RBC 2.88 01/25/2022    TRICHOMONAS NOT REPORTED 01/21/2022    WBC 7.0 01/25/2022    YEAST NOT REPORTED 01/21/2022    TURBIDITY Clear 01/21/2022     Lab Results   Component Value Date    CREATININE 0.58 01/25/2022    GLUCOSE 101 01/25/2022    GLUCOSE 177 04/21/2012       Medical Decision Making-Imaging:     EXAMINATION: CT ABDOMEN PELVIS WO CONTRAST, 1/20/2022 8:40 PM EST       HISTORY: Abdominal pain. Urinary tract infection.       COMPARISON: November 3, 2021            TECHNIQUE: CT scan of the abdomen and pelvis was performed without IV contrast.    CT dose reduction technique was used, including Automated Exposure Control.            FINDINGS:        CT ABDOMEN: Included lower thorax demonstrates small bilateral pleural    effusions with adjacent atelectasis involving the lower lobes. There is also    patchy peripheral consolidation within the left lower lobe and lingula. The    heart is enlarged, with coronary arthroscopic vascular calcifications in    addition to thick calcification at the mitral annulus. Small hiatal hernia.       The liver, left kidney, and pancreas have an unremarkable noncontrast    appearance. The spleen measures 16.9 cm in length. Nodular thickening to the    adrenal glands suggestive of adenomatous hyperplasia. The gallbladder is    distended to 10 cm with intrinsic dependent density noted.       There is slight asymmetric perinephric inflammatory stranding that is greater    on the right with mild dilatation of right renal collecting system and proximal    right ureter where there is uroepithelial thickening. Within the pelvis, the    ureters not dilated. Borderline enlarged portacaval lymph node measuring 1.8 x    1.3 cm. No retroperitoneal adenopathy.       CT PELVIS: The bladder is nondistended. The uterus and adnexal regions are    unremarkable.  There are multiple colonic diverticula with most of the colon    mildly distended with stool. No dilated loops of small bowel. Multilevel    moderate to severe degenerative changes of the spine with minimal    anterolisthesis at L4 on L5.               Impression       1. Mild right hydronephrosis and proximal hydroureter with uroepithelial    thickening and asymmetric perinephric inflammatory stranding. Although possibly    associated with a recently placed ureteral stent, this is concerning for    infection (acute right pyelonephritis, pyelitis and ureteritis). No obstructing    ureteral stone identified.       2. Small bilateral pleural effusions with adjacent atelectasis at the lung    bases. Peripheral patchy consolidation within the left lower lobe and lingula    could be pneumonia.       3. Cardiomegaly with coronary atherosclerotic vascular disease.       4. Small hiatal hernia.       5. Colonic diverticulosis.       6. Cholelithiasis within a borderline hydropic gallbladder.       7. Splenomegaly.       8. Adenomatous hyperplasia of the adrenal glands.             EXAM: XR CHEST PORTABLE        HISTORY: Reason for exam:->AMS, dx COVID(+) ~1 week ago       COMPARISON: Portable chest 11/2/2021.            TECHNIQUE: AP portable chest 1326 hours.           FINDINGS: Nonspecific patchy infiltrates are present bilaterally,    mild-to-moderate, left greater than right. Mild cardiomegaly.               Impression       Mild patchy bilateral infiltrates suspicious for early pneumonia. Medical Decision Gjvcyh-Mqizuujy-Fclxe:     Contains abnormal data Culture, Urine  Order: [id number]  Status: Final result    Visible to patient: Yes (not seen)    Next appt: 02/03/2022 at 02:30 PM in Urology Padmini Cintron MD)    Specimen Information: Urine, catheter        0 Result Notes    Component 1/19/22 1400 Resulting Agency   Specimen Description . URINE, CATHETERIZED  [address] Requests STRAIGHT CATH  [address] [facility]   Culture  Abnormal   KLEBSIELLA PNEUMONIAE 10 to 50,000 CFU/ML THIS ORGANISM IS AN EXTENDED-SPECTRUM BETA-LACTAMASE  AND RESISTANCE TO THERAPY WITH PENICILLINS, CEPHALOSPORINS AND AZTREONAM IS EXPECTED.  THESE ORGANISMS GENERALLY REMAIN SUSCEPTIBLE TO CARBAPENEMS.  CONSIDER ID CONSULTATION. [facility]     Klebsiella pneumoniae     BACTERIAL SUSCEPTIBILITY PANEL ANASTACIA     amikacin <=2  Sensitive     ampicillin >=32  Resistant     ampicillin-sulbactam NOT REPORTED       aztreonam 16  Resistant     ceFAZolin >=64   Cefazolin s. .. Resistant  Resistant     cefepime NOT REPORTED       cefTRIAXone >=64  Resistant     ciprofloxacin 2  Intermediate     Confirmatory Extended Spectrum Beta-Lactamase POSITIVE  Positive     ertapenem NOT REPORTED       gentamicin >=16  Resistant     meropenem <=0.25  Sensitive     nitrofurantoin 128  Resistant     piperacillin-tazobactam 16  Resistant     tigecycline NOT REPORTED       tobramycin >=16  Resistant     trimethoprim-sulfamethoxazole >=320  Resistant                  Specimen Collected: 01/19/22 14:00 Last Resulted: 01/22/22 12:27             Medical Decision Making-Other:     Note:  Labs, medications, radiologic studies were reviewed with personal review of films  Large amounts of data were reviewed  Discussed with nursing Staff, Discharge planner  Infection Control and Prevention measures reviewed  All prior entries were reviewed  Administer medications as ordered  Prognosis: Guarded  Discharge planning reviewed  Follow up as outpatient. Thank you for allowing us to participate in the care of this patient. Please call with questions. MD Kelly Luz participated in the evaluation of this patient under my direct supervision.     ATTESTATION:     I have discussed the case, including pertinent history and exam findings with the residents and students. I have seen and examined the patient and the key elements of the encounter have been performed by me.  I was present when the student obtained his information or examined the patient. I have reviewed the laboratory data, other diagnostic studies and discussed them with the residents.  I have updated the medical record where necessary.     I agree with the assessment, plan and orders as documented by the resident/ student.  No Yost MD.

## 2022-01-25 NOTE — PROGRESS NOTES
[facility]  Physical Therapy  Evaluation  Date: 2022  Patient Name: Tori Siegel        MRN: [id number]    : 1940  (80 y.o.)  Gender: female   Referring Practitioner: Dr. Augustin Alaniz  Diagnosis: Pyelonephritis and covid  Additional Pertinent Hx: Patient to ED 22 and admit due to covid and pyelonephritis.    Past Medical History:   Diagnosis Date    Cancer ([address]     basal cell skin cancer    COVID-19     Gout     Hyperlipidemia     Hypertension     Hypokalemia     Lymphedema of lower extremity     Obesity     Shingles     70's    SVT (supraventricular tachycardia) (HCC)     Type II or unspecified type diabetes mellitus without mention of complication, not stated as uncontrolled     Venous stasis dermatitis      Past Surgical History:   Procedure Laterality Date    APPENDECTOMY      COLONOSCOPY      CYSTOSCOPY Right 2021    RIGHT URETEROSCOPY WITH HOLMIUM LASER LITHOTRIPSY AND RIGHT STENT PLACEMENT performed by Clarke Powers MD at [facility] knee    PA OFFICE/OUTPT VISIT,PROCEDURE ONLY Right 2018    RIGHT HIP MASS INCISION AND DRAINAGE performed by Minna Cruz MD at [address] [facility] OFFICE/OUTPT VISIT,PROCEDURE ONLY Right 2018    HIP INCISION AND DRAINAGE (Necrotic Fat Right Hip Debridement) performed by Minna Cruz MD at [facility] OR       Restrictions  Restrictions/Precautions: General Precautions      Subjective         Pain Level: 0    Orientation       Home Living       Prior Level of Function         Objective                 PROM RLE (degrees)  RLE PROM: Exceptions  RLE General PROM: LE swollen and painful  PROM LLE (degrees)  LLE PROM: Exceptions  LLE General PROM: LE swollen and painful     Strength RLE  Strength RLE: Exception  Comment: gossly 3-/5  Strength LLE  Strength LLE: Exception  Comment: grossly 3-/5             Supine to Sit: Maximal assistance (x2 person)  Sit to Supine: Maximal assistance (x2 person)  Sit to Stand: Unable to assess              Balance  Sitting - Static: Fair  Sitting - Dynamic: Fair    Assessment      Body structures, Functions, Activity limitations: Decreased functional mobility ,Decreased ROM,Decreased strength,Decreased balance  Chart Reviewed: Yes  Assessment: Patient in bed on arrival.  Patient doesn't answer questions but mumbles/ speaks sofly. Some confusion. Patient needed max assist x2 to roll in bed and max assist x2 to transfer supine to sit. Patient able to sit on edge of bed with SBA x 4 minutes before she started leaning backwards. Patient was able to folow some simple one step directions. Partial AROM bilateral LE. Patient needed max assistx2 to transfer sit to supine. Plan to completed therex as able and transfers. Prognosis: Good           Plan  Times per week: daily  Times per day:  (1-2 day)  Current Treatment Recommendations: Strengthening,Balance Training,Transfer Training    Goals  Short term goals  Time Frame for Short term goals: 7 days  Short term goal 1: Patient to transfer supine to sit with mod assist x1  Short term goal 2: Patient to sit bedside with reaching with good dynamic balance  Short term goal 3: Patient to have improved endurance with complete 15 min of therex following directions            Time In: 10:04  Time Out: 10:18  Timed Coded Minutes:    Total Treatment Time: [address] Quiroz PT, PT 1/25/2022

## 2022-01-25 NOTE — PROGRESS NOTES
Hospitalist Progress Note  1/25/2022 6:01 AM  Subjective:   Admit Date: 1/21/2022  PCP: Vicente Coleman MD    Interval History:     Patient is confused this morning. Night was uneventful. Yesterday she developed episodes of junctional rhythm and bradycardia with heart rate dropping into 40s. She was asymptomatic. IV Cardizem was discontinued. She is not taking oral Cardizem and Lopressor due to n.p.o. status, concerns with dysphagia. Yesterday was seen by ST for bedside swallow evaluation, recommends to continue n.p.o. due to oral dysphagia.     Diet: Diet NPO  Medications:   Scheduled Meds:   meropenem (MERREM) 1000 mg IVPB extended (mini-bag)  1,000 mg IntraVENous Q8H    [Held by provider] allopurinol  100 mg Oral Daily    [Held by provider] aspirin  81 mg Oral Daily    [Held by provider] oyster shell calcium w/D  1 tablet Oral Daily    [Held by provider] dilTIAZem  30 mg Oral TID    [Held by provider] furosemide  40 mg Oral Daily    [Held by provider] metFORMIN  500 mg Oral BID WC    [Held by provider] metoprolol tartrate  25 mg Oral BID    [Held by provider] multivitamin  1 tablet Oral Daily    [Held by provider] potassium chloride  20 mEq Oral Daily    [Held by provider] lactobacillus  1 capsule Oral Daily    [Held by provider] atorvastatin  10 mg Oral Daily    [Held by provider] sodium chloride  1 g Oral BID    sodium chloride flush  5-40 mL IntraVENous 2 times per day    enoxaparin  40 mg SubCUTAneous Daily    [Held by provider] Vitamin D  1,000 Units Oral Daily    [Held by provider] ascorbic acid  1,000 mg Oral Daily    [Held by provider] zinc sulfate  50 mg Oral Daily    dexamethasone  6 mg IntraVENous Q24H    [Held by provider] guaiFENesin  600 mg Oral BID    [Held by provider] dilTIAZem  5 mg IntraVENous Q6H    insulin lispro  0-6 Units SubCUTAneous Q6H     Continuous Infusions:   sodium chloride      dextrose      sodium chloride 75 mL/hr at 01/25/22 0000     PRN Medications: albuterol sulfate HFA, guaiFENesin, sodium chloride flush, sodium chloride, ondansetron **OR** ondansetron, polyethylene glycol, acetaminophen **OR** acetaminophen, metoprolol, glucose, dextrose, glucagon (rDNA), dextrose    Objective:   Vitals: BP (!) 150/65   Pulse 56   Temp 97.5 °F (36.4 °C) (Oral)   Resp 16   Ht 5' (1.524 m)   Wt 210 lb 9.6 oz (95.5 kg)   LMP  (LMP Unknown)   SpO2 97%   BMI 41.13 kg/m²   BMI: Body mass index is 41.13 kg/m². BMP:    Recent Labs     01/23/22  0450 01/24/22  0405    146*   K 4.0 3.8    111*   CO2 23 24   BUN 26* 28*   CREATININE 0.66 0.67   GLUCOSE 134* 133*     Hepatic:   Recent Labs     01/23/22  0450 01/24/22  0405   AST 27 29   ALT 22 26   BILITOT 0.39 0.41   ALKPHOS 212* 209*       Physical Exam:    General Appearance: Awake, confused  Cardiovascular: normal rate, regular rhythm, normal S1 and S2, no murmurs  Pulmonary/Chest: Mild scattered rhonchi, no wheezes, normal air movement, no respiratory distress  Abdomen: soft, non-tender, non-distended, normal bowel sounds  Extremities: Bilateral lower extremities with severe lymphedema  Skin: warm and dry,   Neurological:  Awake, normal speech, no focal findings or movement disorder noted      Assessment and Plan:       1. Altered mental status, unresponsive state - most likely secondary to infectious process and dehydration. CT head 1/19 revealed no acute changes. Patient's mental status improved. 2.  Acute pyelonephritis - urine cultures positive for ESBL positive Klebsiella. Started on meropenem on 1/22/2022. Patient with recent history of right ureteral stent placement on 12/16/2021. It is unclear if the stent has been removed. Will consult urologist Dr. Terence Donis  3. Possible LLL pneumonia seen on CT scan -on IV antibiotics. N.p.o., awaiting speech therapist evaluation  4. COVID-19 infection with Covid pneumonia with hypoxia - diagnosed on 1/12/2022.   Was on prednisone at [facility], started on IV Decadron 1/21 here. ID consulted. Patient is out of the window for remdesivir and monoclonal antibodies. Actemra not indicated. Continue on Decadron. Hypoxia resolving  5. Bradycardia, episodes of junctional rhythm - patient with h/o SVT/A. Fib. Holding Cardizem and Lopressor. Patient asymptomatic. Will consult Dr. José Miguel Kim for recommendations  6. Dysphagia -continue n.p.o. status, speech therapist to follow-up for reevaluation  7. Diabetes mellitus type 2, controlled -lispro sliding scale  8.   Hypokalemia -on replacement           Patient continues to require inpatient admission related to need for IV antibiotics, close monitoring of labs, clinical condition      Electronically signed by Zuhair Gomez MD on 1/25/2022 at [address]

## 2022-01-25 NOTE — PLAN OF CARE
[facility]  Inpatient Physical Therapy  Plan of Care  Date: 2022  Patient Name: Tami Smith        :   (12 y.o.)  Referring Practitioner: Dr. Adelfo Keith  Admission Date: 2022  Referral Date : 22  Diagnosis: Pyelonephritis and covid  Treatment Diagnosis: Generalized weakness  PT Orders Received and Evaluation Complete  Identified Problem Areas: Body structures, Functions, Activity limitations: Decreased functional mobility ,Decreased ROM,Decreased strength,Decreased balance  Prognosis: Good    Justification for Skilled Services:  [] Reduce Falls   [] Improve Ambulation  [x]  Complete Daily Tasks Safely   [x] Improve Balance   [x] Improve LE strength  [x]  Return to Prior Level of Function  [x] Improve Functional Mobility   []  Family/Caregiver Education  [x] Patient Education: []Assistive Devices []Home Exercise Program and Progression    Plan  Times per week: daily  Times per day:  (1-2 day)  [] Modalities:  [x] Therapeutic Exercise   [] Gait Training  [x] Therapeutic Activity    [x] Home Safety Evaluation         [] Massage                        [x] Neuromuscular Re-education [] Back Education             [x] Patient Education [] Home Exercise Program       Rehab Potential:  [x]  Good [] Fair   []  Poor    Goals  Short term goals  Time Frame for Short term goals: 7 days  Short term goal 1: Patient to transfer supine to sit with mod assist x1  Short term goal 2: Patient to sit bedside with reaching with good dynamic balance  Short term goal 3: Patient to have improved endurance with complete 15 min of therex following directions         Upon Swingbed Transfer this Plan of Care will be followed until revision is complete.     Trey Cates, PT, PT   Date: 2022

## 2022-01-25 NOTE — PLAN OF CARE
Problem: Gas Exchange - Impaired  Goal: Absence of hypoxia  Outcome: Met This Shift     Problem: Breathing Pattern - Ineffective  Goal: Ability to achieve and maintain a regular respiratory rate  Outcome: Met This Shift     Problem: Skin Integrity:  Goal: Will show no infection signs and symptoms  Description: Will show no infection signs and symptoms  Outcome: Met This Shift  Goal: Absence of new skin breakdown  Description: Absence of new skin breakdown  Outcome: Met This Shift     Problem: Falls - Risk of:  Goal: Will remain free from falls  Description: Will remain free from falls  Outcome: Met This Shift  Goal: Absence of physical injury  Description: Absence of physical injury  Outcome: Met This Shift     Problem: Injury - Risk of, Physical Injury:  Goal: Will remain free from falls  Description: Will remain free from falls  Outcome: Met This Shift  Goal: Absence of physical injury  Description: Absence of physical injury  Outcome: Met This Shift     Problem: SAFETY  Goal: Free from accidental physical injury  Outcome: Met This Shift  Goal: Free from intentional harm  Outcome: Met This Shift     Problem: DAILY CARE  Goal: Daily care needs are met  Outcome: Met This Shift     Problem: SKIN INTEGRITY  Goal: Skin integrity is maintained or improved  Outcome: Met This Shift     Problem: Pain:  Description: Pain management should include both nonpharmacologic and pharmacologic interventions. Goal: Control of acute pain  Description: Control of acute pain  Outcome: Met This Shift     Problem: Safety:  Goal: Ability to chew and swallow food without choking will improve  Description: Ability to chew and swallow food without choking will improve  Outcome: Ongoing  Goal: Signs and symptoms of aspiration will decrease  Description: Signs and symptoms of aspiration will decrease  Outcome: Ongoing     Problem:  Body Temperature -  Risk of, Imbalanced  Goal: Will regain or maintain usual level of consciousness  Outcome: Not Met This Shift     Problem: Nutrition Deficits  Goal: Optimize nutritional status  Outcome: Not Met This Shift     Problem: Confusion - Acute:  Goal: Absence of continued neurological deterioration signs and symptoms  Description: Absence of continued neurological deterioration signs and symptoms  Outcome: Not Met This Shift     Problem: Discharge Planning:  Goal: Ability to perform activities of daily living will improve  Description: Ability to perform activities of daily living will improve  Outcome: Not Met This Shift

## 2022-01-25 NOTE — PROGRESS NOTES
[facility],[address] Note   COVID 19 Patient  Today's Date and Time: 1/24/2022, 7:15 PM    Impression :     COVID 19 Confirmed Infection  Covid tests:  11-2-21: negative  1-12-22: Positive at [facility]  1-21-22: Positive  Altered mentation   Rt side UTI with ESBL + Klebsiella    Recommendations:   Antibiotic treatment:  Meropenem 1 gm IV q 8 hr  Covid Rx:    Remdesivir. Out of the window  Decadron 6 mg q day IV. Start date 1-22-22  Actemra. Not indicated  Monoclonal antibodies. Out of the window      Medical Decision Making/Summary/Discussion:1/24/2022     Patient admitted with COVID 19 infection initially diagnosed at [facility] around 1-12-21  Initial presentation with altered mentation in the setting of hyponatremia, which has since been corrected  Altered mentation has persisted. She has hypoxia but is only requiring only small amounts of nasal 02. There is concern for possible Rt side hydronephrosis and UTI. Klebsiella ESBL + grown from urine    Infection Control Recommendations   [place] Precautions  Airborne isolation  Droplet Isolation  Isolate until 2-2-22    Antimicrobial Stewardship Recommendations     Simplification of therapy  Targeted therapy    Coordination of Outpatient Care:   Estimated Length of IV antimicrobials:TBD  Patient will need Midline Catheter Insertion: TBD  Patient will need PICC line Insertion: No  Patient will need: Home IV , [place],  SNF,  LTAC:TBD  Patient will need outpatient wound care:No    Chief complaint/reason for consultation:   Concern for COVID infection      History of Present Illness:   Warren Sanches is a 80y.o.-year-old  female who was initially admitted on 1/21/2022. Patient seen at the request of . INITIAL HISTORY:    Patient presented through ER on 1-19-22 with complaints of mentation changes at her ECF, having developed unresponsiveness.  She had tested positive for Covid around 1-12-22 at the ECF. She was found to have sinusitis by CT, hyponatremia (Na 127) and possibly a UTI. Patient received hydration and returned to ECF. She returned on 1-20-22 with an elevated glucose level. CT abdomen suggested possible pyelonephritis and changes at the LLL suggestive of possible pneumonia. She received Ertapenem and was returned to [facility] to continue antibiotics. She returned on 1-21-22 with altered mentation, tachycardia, hypoxia requiring nasal 02. Patient admitted because of concerns with COVID 19 and altered mentation. CURRENT EVALUATION : 1/24/2022    Afebrile  VS stable    Tolerating Meropenem for ESBL + Klebsiella UTI  Patient exhibiting respiratory distress. yes  Respiratory secretions: No    Patient receiving supplemental oxygen. Nasal 02 @ 2 L/min-->room air  RR 22-->19-->  02 sat 98-->94      NEWS Score: 0-4 Low risk group; 5-6: Medium risk group; 7 or above: High risk group  Parameters 3 2 1 0 1 2 3   Age    < 65   = 65   RR = 8  9-11 12-20  21-24 = 25   O2 Sats = 91 92-93 94-95 = 96      Suppl O2  Yes  No      SBP = 90  101-110 111-219   = 220   HR = 40  41-50 51-90  111-130 = 131   Consciousness    Alert   Drowsiness, lethargy, or confusion   Temperature = 35.0 C (95.0 F)  35.1-36.0 C 95.1-96.9 F 36.1-38.0 C 97.0-100.4 F 38.1-39.0 C 100.5-102.3 F = 39.1 C = 102.4 F      NEWS Score:  1-22-22: 14 High Risk    Overall Daily Picture:     Unchanged    Presence of secondary bacterial Infection:  Yes  Additional antibiotics: meropenem    Labs, X rays reviewed: 1/24/2022    BUN: 26-->28  Cr: 0.86-->0.67    WBC: 7.5  Hb: 12.0  Plat: 306    Absolute Neutrophils: 5.6  Absolute Lymphocytes: 1.30  Neutrophil/Lymphocyte Ratio: 4.3 High Risk    CRP: 12.4-->3.9  Ferritin:  LDH: 175    Pro Calcitonin:      Cultures:  Urine:  1-19-22: Gram negative bacilli 10-50,000 cfu/mL  Blood:  1-20-22: No growth   1-20-22: Coagulase negative Staphylococci = contaminant  Sputum :    Wound:      CXR:   1-19-22: Cardiomegaly. Patchy bibasilar infiltrates  CAT:  1-20-22: Bilateral small effusions with compressive atelectasis. No air bronchograms in areas of atelectasis. Rt kidney with residual distension of pelvis and upper ureter. GB at upper limits of normal.    Discussed with RN, JESUS. I have personally reviewed the past medical history, past surgical history, medications, social history, and family history, and I have updated the database accordingly.   Past Medical History:     Past Medical History:   Diagnosis Date    Cancer ([address]     basal cell skin cancer    COVID-19     Gout     Hyperlipidemia     Hypertension     Hypokalemia     Lymphedema of lower extremity     Obesity     Shingles     70's    SVT (supraventricular tachycardia) (HCC)     Type II or unspecified type diabetes mellitus without mention of complication, not stated as uncontrolled     Venous stasis dermatitis        Past Surgical  History:     Past Surgical History:   Procedure Laterality Date    APPENDECTOMY      COLONOSCOPY      CYSTOSCOPY Right 12/16/2021    RIGHT URETEROSCOPY WITH HOLMIUM LASER LITHOTRIPSY AND RIGHT STENT PLACEMENT performed by Iantha Sandhoff, MD at [facility] OFFICE/OUTPT VISIT,PROCEDURE ONLY Right 7/19/2018    RIGHT HIP MASS INCISION AND DRAINAGE performed by Carmela Garcia MD at [address] OFFICE/OUTPT VISIT,PROCEDURE ONLY Right 8/1/2018    HIP INCISION AND DRAINAGE (Necrotic Fat Right Hip Debridement) performed by Carmela Garcia MD at [facility] ([place], [place])       Medications:      meropenem (MERREM) 1000 mg IVPB extended (mini-bag)  1,000 mg IntraVENous Q8H    [Held by provider] allopurinol  100 mg Oral Daily    [Held by provider] aspirin  81 mg Oral Daily    [Held by provider] oyster shell calcium w/D  1 tablet Oral Daily    [Held by provider] dilTIAZem  30 mg Oral TID    [Held by provider] furosemide  40 mg Oral Daily    [Held by provider] metFORMIN  500 mg Oral BID WC    [Held by provider] metoprolol tartrate  25 mg Oral BID    [Held by provider] multivitamin  1 tablet Oral Daily    [Held by provider] potassium chloride  20 mEq Oral Daily    [Held by provider] lactobacillus  1 capsule Oral Daily    [Held by provider] atorvastatin  10 mg Oral Daily    [Held by provider] sodium chloride  1 g Oral BID    sodium chloride flush  5-40 mL IntraVENous 2 times per day    enoxaparin  40 mg SubCUTAneous Daily    [Held by provider] Vitamin D  1,000 Units Oral Daily    [Held by provider] ascorbic acid  1,000 mg Oral Daily    [Held by provider] zinc sulfate  50 mg Oral Daily    dexamethasone  6 mg IntraVENous Q24H    [Held by provider] guaiFENesin  600 mg Oral BID    [Held by provider] dilTIAZem  5 mg IntraVENous Q6H    insulin lispro  0-6 Units SubCUTAneous Q6H       Social History:     Social History     Socioeconomic History    Marital status:      Spouse name: Not on file    Number of children: Not on file    Years of education: Not on file    Highest education level: Not on file   Occupational History    Not on file   Tobacco Use    Smoking status: Never Smoker    Smokeless tobacco: Never Used   Vaping Use    Vaping Use: Never used   Substance and Sexual Activity    Alcohol use: No    Drug use: No    Sexual activity: Not on file   Other Topics Concern    Not on file   Social History Narrative    Not on file     Social Determinants of Health     Financial Resource Strain:     Difficulty of Paying Living Expenses: Not on file   Food Insecurity:     Worried About Running Out of Food in the Last Year: Not on file    Irineo of Food in the Last Year: Not on file   Transportation Needs:     Lack of Transportation (Medical): Not on file    Lack of Transportation (Non-Medical):  Not on file   Physical Activity:     Days of Exercise per Week: Not on file    Minutes of Exercise per Session: Not on file   Stress:     Feeling of Stress : Not on file   Social Connections:     Frequency of Communication with Friends and Family: Not on file    Frequency of Social Gatherings with Friends and Family: Not on file    Attends Restorationism Services: Not on file    Active Member of Clubs or Organizations: Not on file    Attends Club or Organization Meetings: Not on file    Marital Status: Not on file   Intimate Partner Violence:     Fear of Current or Ex-Partner: Not on file    Emotionally Abused: Not on file    Physically Abused: Not on file    Sexually Abused: Not on file   Housing Stability:     Unable to Pay for Housing in the Last Year: Not on file    Number of [place] in the Last Year: Not on file    Unstable Housing in the Last Year: Not on file       Family History:     Family History   Problem Relation Age of Onset    Heart Disease Mother     Cancer Sister     Mult Sclerosis Sister     Cancer Brother         lung        Allergies:   Adhesive tape and Sulfa antibiotics     Review of Systems:     Unable to provide. Altered mentation    Constitutional: No fevers or chills. No systemic complaints  Head: No headaches  Eyes: No double vision or blurry vision. No conjunctival inflammation. ENT: No sore throat or runny nose. . No hearing loss, tinnitus or vertigo. Cardiovascular: No chest pain or palpitations. No Shortness of breath. No MCDONOUGH  Lung: No Shortness of breath or cough. No sputum production  Abdomen: No nausea, vomiting, diarrhea, or abdominal pain. Deepti Maid No cramps. Genitourinary: No increased urinary frequency, or dysuria. No hematuria. No suprapubic or CVA pain  Musculoskeletal: No muscle aches or pains. No joint effusions, swelling or deformities  Hematologic: No bleeding or bruising. Neurologic: No headache, weakness, numbness, or tingling. Integument: No rash, no ulcers. Psychiatric: No depression. Endocrine: No polyuria, no polydipsia, no polyphagia.     Physical Examination :     Patient Vitals for the past 8 hrs:   BP Temp Temp src Pulse Resp SpO2   01/24/22 1518 (!) 148/83 98 °F (36.7 °C) Oral 57 19 94 %   01/24/22 1200 (!) 144/76 97.9 °F (36.6 °C) Oral 67 20 98 %   01/24/22 1134 -- -- -- -- -- 97 %     General Appearance: Somnolent, difficult to arouse  Head:  Normocephalic, no trauma  Eyes: Pupils equal, round, reactive to light; sclera anicteric; conjunctivae pink. No embolic phenomena. ENT: Oropharynx clear, without erythema, exudate, or thrush. No tenderness of sinuses. Mouth/throat: mucosa pink and moist. No lesions. On nasal 02  Neck:Supple, without lymphadenopathy. Thyroid normal, No bruits. Pulmonary/Chest: Clear to auscultation, without wheezes, rales, or rhonchi. No dullness to percussion. Cardiovascular: Regular rate and rhythm without murmurs, rubs, or gallops. Abdomen: Soft, non tender. Bowel sounds normal. No organomegaly  All four Extremities: No cyanosis, clubbing. Has bilateral leg lymphedema. .  Neurologic: No gross sensory or motor deficits. Difficult to arouse. Moaning. Skin: Warm and dry with good turgor. Signs of peripheral arterial and venous insufficiency. No ulcerations. No open wounds. Medical Decision Making -Laboratory:   I have independently reviewed/ordered the following labs:    CBC with Differential:   Recent Labs     01/22/22  0525   WBC 7.5   HGB 12.0   HCT 31.9*      LYMPHOPCT 17   MONOPCT 7     BMP:   Recent Labs     01/22/22  0525 01/22/22  0525 01/23/22  0450 01/24/22  0405   *   < > 143 146*   K 3.4*   < > 4.0 3.8      < > 107 111*   CO2 28   < > 23 24   BUN 26*   < > 26* 28*   CREATININE 0.86   < > 0.66 0.67   MG 1.8  --   --   --     < > = values in this interval not displayed. Hepatic Function Panel:   Recent Labs     01/23/22  0450 01/24/22  0405   PROT 6.5 6.2*   LABALBU 2.6* 2.6*   BILITOT 0.39 0.41   ALKPHOS 212* 209*   ALT 22 26   AST 27 29     No results for input(s): RPR in the last 72 hours.   No results for input(s): HIV in the last 72 hours. No results for input(s): BC in the last 72 hours. Lab Results   Component Value Date    MUCUS 1+ 01/21/2022    RBC 3.55 01/22/2022    TRICHOMONAS NOT REPORTED 01/21/2022    WBC 7.5 01/22/2022    YEAST NOT REPORTED 01/21/2022    TURBIDITY Clear 01/21/2022     Lab Results   Component Value Date    CREATININE 0.67 01/24/2022    GLUCOSE 133 01/24/2022    GLUCOSE 177 04/21/2012       Medical Decision Making-Imaging:     EXAMINATION: CT ABDOMEN PELVIS WO CONTRAST, 1/20/2022 8:40 PM EST       HISTORY: Abdominal pain. Urinary tract infection.       COMPARISON: November 3, 2021            TECHNIQUE: CT scan of the abdomen and pelvis was performed without IV contrast.    CT dose reduction technique was used, including Automated Exposure Control.            FINDINGS:        CT ABDOMEN: Included lower thorax demonstrates small bilateral pleural    effusions with adjacent atelectasis involving the lower lobes. There is also    patchy peripheral consolidation within the left lower lobe and lingula. The    heart is enlarged, with coronary arthroscopic vascular calcifications in    addition to thick calcification at the mitral annulus. Small hiatal hernia.       The liver, left kidney, and pancreas have an unremarkable noncontrast    appearance. The spleen measures 16.9 cm in length. Nodular thickening to the    adrenal glands suggestive of adenomatous hyperplasia. The gallbladder is    distended to 10 cm with intrinsic dependent density noted.       There is slight asymmetric perinephric inflammatory stranding that is greater    on the right with mild dilatation of right renal collecting system and proximal    right ureter where there is uroepithelial thickening. Within the pelvis, the    ureters not dilated. Borderline enlarged portacaval lymph node measuring 1.8 x    1.3 cm. No retroperitoneal adenopathy.       CT PELVIS: The bladder is nondistended. The uterus and adnexal regions are    unremarkable.  There are multiple colonic diverticula with most of the colon    mildly distended with stool. No dilated loops of small bowel. Multilevel    moderate to severe degenerative changes of the spine with minimal    anterolisthesis at L4 on L5.               Impression       1. Mild right hydronephrosis and proximal hydroureter with uroepithelial    thickening and asymmetric perinephric inflammatory stranding. Although possibly    associated with a recently placed ureteral stent, this is concerning for    infection (acute right pyelonephritis, pyelitis and ureteritis). No obstructing    ureteral stone identified.       2. Small bilateral pleural effusions with adjacent atelectasis at the lung    bases. Peripheral patchy consolidation within the left lower lobe and lingula    could be pneumonia.       3. Cardiomegaly with coronary atherosclerotic vascular disease.       4. Small hiatal hernia.       5. Colonic diverticulosis.       6. Cholelithiasis within a borderline hydropic gallbladder.       7. Splenomegaly.       8. Adenomatous hyperplasia of the adrenal glands.             EXAM: XR CHEST PORTABLE        HISTORY: Reason for exam:->AMS, dx COVID(+) ~1 week ago       COMPARISON: Portable chest 11/2/2021.            TECHNIQUE: AP portable chest 1326 hours.           FINDINGS: Nonspecific patchy infiltrates are present bilaterally,    mild-to-moderate, left greater than right. Mild cardiomegaly.               Impression       Mild patchy bilateral infiltrates suspicious for early pneumonia. Medical Decision Xpecvs-Wzimxzbc-Akxhs:     Contains abnormal data Culture, Urine  Order: [id number]  Status: Final result    Visible to patient: Yes (not seen)    Next appt: 02/03/2022 at 02:30 PM in Urology Jennifer Burns MD)    Specimen Information: Urine, catheter        0 Result Notes    Component 1/19/22 1400 Resulting Agency   Specimen Description . URINE, CATHETERIZED  [address] [facility]   Special Requests STRAIGHT CATH [address] [facility]   Culture  Abnormal   KLEBSIELLA PNEUMONIAE 10 to 50,000 CFU/ML THIS ORGANISM IS AN EXTENDED-SPECTRUM BETA-LACTAMASE  AND RESISTANCE TO THERAPY WITH PENICILLINS, CEPHALOSPORINS AND AZTREONAM IS EXPECTED.  THESE ORGANISMS GENERALLY REMAIN SUSCEPTIBLE TO CARBAPENEMS.  CONSIDER ID CONSULTATION. [facility]     Klebsiella pneumoniae     BACTERIAL SUSCEPTIBILITY PANEL ANASTACIA     amikacin <=2  Sensitive     ampicillin >=32  Resistant     ampicillin-sulbactam NOT REPORTED       aztreonam 16  Resistant     ceFAZolin >=64   Cefazolin s. .. Resistant  Resistant     cefepime NOT REPORTED       cefTRIAXone >=64  Resistant     ciprofloxacin 2  Intermediate     Confirmatory Extended Spectrum Beta-Lactamase POSITIVE  Positive     ertapenem NOT REPORTED       gentamicin >=16  Resistant     meropenem <=0.25  Sensitive     nitrofurantoin 128  Resistant     piperacillin-tazobactam 16  Resistant     tigecycline NOT REPORTED       tobramycin >=16  Resistant     trimethoprim-sulfamethoxazole >=320  Resistant                  Specimen Collected: 01/19/22 14:00 Last Resulted: 01/22/22 12:27             Medical Decision Making-Other:     Note:  Labs, medications, radiologic studies were reviewed with personal review of films  Large amounts of data were reviewed  Discussed with nursing Staff, Discharge planner  Infection Control and Prevention measures reviewed  All prior entries were reviewed  Administer medications as ordered  Prognosis: Guarded  Discharge planning reviewed  Follow up as outpatient. Thank you for allowing us to participate in the care of this patient. Please call with questions.     Misty Eubanks MD  Pager: [phone] - Office: [phone]

## 2022-01-25 NOTE — PROGRESS NOTES
Speech Language Pathology  Facility/Department: [address] MED SURG TELEMETRY   DYSPHAGIA TREATMENT    NAME: Tiffanie Denson  :   MRN: [id number]    ADMISSION DATE: 2022  ADMITTING DIAGNOSIS: has Essential hypertension, benign; Type 2 diabetes mellitus without complication ([facility] [address] Mixed hyperlipidemia; Wart; Benign skin lesion of forearm; Lymphedema; Acute drug-induced gout of left knee; Cutaneous abscess of buttock; Decubitus ulcer of right hip; PMB (postmenopausal bleeding); Morbid obesity with BMI of 50.0-59.9, adult [facility]); SVT (supraventricular tachycardia) ([facility] [address] Renal calculus; Pyelonephritis; and Mild malnutrition ([facility] [address] on their problem list.    Current Diet level:  Current Diet : NPO  Current Liquid Diet : NPO    Primary Complaint  Patient Complaint: AMS/ decreased LOC    Pain:  Pain Assessment  Pain Assessment: 0-10  Pain Level: 0  Patient's Stated Pain Goal: No pain  PAINAD (Pain Assessment in Advance Dementia)  Breathing: normal  Negative Vocalization: none  Facial Expression: smiling or inexpressive  Body Language: relaxed  Consolability: no need to console  PAINAD Score: 0    Impression  Dysphagia Diagnosis: Mild to moderate oral stage dysphagia; Suspected needs further assessment;Mild pharyngeal stage dysphagia  Dysphagia Impression : Pt presents with oropharyngeal dysphagia secondary to AMS. Dysphagia Outcome Severity Scale: Level 3: Moderate dysphagia- Total assisstance, supervision or strategies. Two or more diet consistencies restricted     Treatment Plan  Requires SLP Intervention: Yes  Duration/Frequency of Treatment: 3-5x/week  D/C Recommendations: To be determined     Recommended Diet and Intervention  Diet Solids Recommendation: Dysphagia Pureed (Dysphagia I)  Liquid Consistency Recommendation: Thin  Recommended Form of Meds: Crushed in puree as able  Recommendations: Dysphagia treatment  Therapeutic Interventions: Diet tolerance monitoring;Oral care; Therapeutic PO trials with SLP;Patient/Family education    Compensatory Swallowing Strategies  Compensatory Swallowing Strategies: Alternate solids and liquids;Eat/Feed slowly;Upright as possible for all oral intake; Total feed; Check for pocketing of food on the Left;Small bites/sips (Cue pt to swallow, ensure each bite is swallowed before presenting next bite)    Treatment/Goals  Short-term Goals  Timeframe for Short-term Goals: 1 week  Goal 1: Pt will tolerate pureed solids with no bolus holding or oral residue in 90% of trials. Goal 2: Pt will tolerate nectar thick liquids with no overt s/s of penetration or aspiration. Long-term Goals  Timeframe for Long-term Goals: 2 weeks  Goal 1: Pt will tolerate least restrictive oral diet for meeting nutritional needs with no overt s/s of penetration or aspiration. Dysphagia Goals: The patient will tolerate repeat BSE when able. General  Behavior/Cognition: Alert; Cooperative;Confused  Respiratory Status: Room air  O2 Device: None (Room air)  Communication Observation: Functional  Follows Directions: Simple  Dentition: Edentulous  Patient Positioning: Upright in bed  Baseline Vocal Quality: Normal  Volitional Cough: Strong  Consistencies Administered: Dysphagia Pureed (Dysphagia I); Thin - straw; Thin - cup    Oral Phase Dysfunction  Oral Phase Dysfunction  Decreased Anterior to Posterior Transit: Puree  Lingual/Palatal Residue: Puree     Indicators of Pharyngeal Phase Dysfunction   Pharyngeal Phase  Pharyngeal Phase: Exceptions  Indicators of Pharyngeal Phase Dysfunction  Delayed Swallow: Puree  Cough - Delayed:  All    Prognosis  Prognosis  Prognosis for safe diet advancement: fair  Barriers to reach goals: inconsistent alertness;other (comment) (AMS)  Individuals consulted  Consulted and agree with results and recommendations: Patient;RN    Education  Patient Education: Educated pt on outcomes of evaluation, POC, and NPO recommendation  Patient Education Response: Radha Garcia understanding;Needs reinforcement  Safety Devices in place: Yes  Type of devices: Left in bed;Nurse notified       Daily Note  Pt seen for dysphagia treatment this date. Oral care completed prior to PO intake d/t xerostomia. Pt tolerating pureed solids and thin liquids via cup and straw with no overt s/s of penetration or aspiration. Pt still requiring 1:1 feeding assist and mod cues to swallow d/t bolus holding. Pt did demonstrate coughing several minutes after PO trials, however unsure if related to swallow. Reviewed strategies with pt, she expressed good understanding, however unsure of understanding due to current AMS. Recommend diet upgrade to pureed solids and thin liquids, crush pills in food, 1:1 feed, feed only when alert, small bites/sips, cue pt to swallow, check for pocketing. ST to continue to follow to ensure toleration of diet and possible upgrade.     Therapy Time  SLP Individual Minutes  Time In: 9264  Time Out: 1320  Minutes: [address] [facility] Jani M.SShiva, [address] [facility]  1/25/2022 5:42 PM

## 2022-01-25 NOTE — PROGRESS NOTES
Phone: Mikki  Date: 2022  Fax: [phone]      Physical Therapy    Daily Note    Patient Name: Nguyen Tate      : 1940  (9 y.o.)  MRN: [id number]     Pt is PROGRESSING toward goals and increased independence of mobility this treatment session        Assessment        Supine to Sit: Maximal assistance (x2)  Sit to Supine: Maximal assistance (x2)       Sit to Stand: Unable to assess                                    Assessment: Patient in bed upon arrival to room. Supine to sit is max assist x 2. Sits at edge of bed for ~10 min. Able to complete seated exercises as outlined above. She needs CGA initally for sitting balance but is then able to maintain without assist. Returns to supine position with max x 2. Call light in reach and bed alarm turned on.   Safety Devices  Type of devices: Bed alarm in place,Call light within reach,Left in bed          Time In: 1328  Time Out: 1344  Timed Coded Minutes: 16  Total Treatment Time: 16    Exercises:  See Flowsheets    Plan  Cont Per Plan Of Care    Goals  Short Term Goals  Time Frame for Short term goals: 7 days (exp 22)  Short term goal 1: Patient to transfer supine to sit with mod assist x1  Short term goal 2: Patient to sit bedside with reaching with good dynamic balance  Short term goal 3: Patient to have improved endurance with complete 15 min of therex following directions          [address] [facility] License Number: [id number]    Date: 2022

## 2022-01-25 NOTE — PROGRESS NOTES
PHILL spoke with Ally Soler at [facility] and discussed plan for pt to return there when she is medically stable and [facility] will accept her back. Updates faxed to them this date and will keep updating them as to when pt can return.  Jose Batista MSW LSW 1/25/2022

## 2022-01-25 NOTE — PROGRESS NOTES
Physical Therapy      Per nursing, patient continues to altered mental status and medical issues which make her inappropriate for PT evaluation. Will check tomorrow.       Evelyne Peguero PT

## 2022-01-25 NOTE — PROGRESS NOTES
[facility]  Occupational Therapy  Evaluation  Date: 2022  Patient Name: Russell Mauricio        MRN: [id number]    : 1940  [de-identified]80 y.o.)  Gender: female   Referring Practitioner: Dr. Anli Hale  Diagnosis: Charly  Additional Pertinent Hx: Admitted from [facility]) secondary to decreased responsiveness and weakness. Pt found to have COVID & referred to OT to assess ability to care for self.   Past Medical History:   Diagnosis Date    Cancer ([address]     basal cell skin cancer    COVID-19     Gout     Hyperlipidemia     Hypertension     Hypokalemia     Lymphedema of lower extremity     Obesity     Shingles     70's    SVT (supraventricular tachycardia) (HCC)     Type II or unspecified type diabetes mellitus without mention of complication, not stated as uncontrolled     Venous stasis dermatitis      Past Surgical History:   Procedure Laterality Date    APPENDECTOMY      COLONOSCOPY      CYSTOSCOPY Right 2021    RIGHT URETEROSCOPY WITH HOLMIUM LASER LITHOTRIPSY AND RIGHT STENT PLACEMENT performed by Gopal Bell MD at [address] [facility] knee    [place] OFFICE/OUTPT VISIT,PROCEDURE ONLY Right 2018    RIGHT HIP MASS INCISION AND DRAINAGE performed by Macario Pavon MD at [address] [facility] [address] OFFICE/OUTPT VISIT,PROCEDURE ONLY Right 2018    HIP INCISION AND DRAINAGE (Necrotic Fat Right Hip Debridement) performed by Macario Pavon MD at [address] [place] [address]       Restrictions/Precautions: General Precautions        Subjective  Subjective: Pt in bed upon arrival  Pain Level: 0     Vision  Vision: Impaired  Vision Exceptions: Wears glasses at all times  Hearing  Hearing: Within functional limits  Social/Functional History  Lives With:  (SNF)       Objective      ADL  Feeding: NPO  Grooming: Dependent/Total  UE Bathing: Dependent/Total  LE Bathing: Dependent/Total  UE Dressing: Dependent/Total  LE Dressing: Dependent/Total  Toileting: Dependent/Total   Supine to Sit: Dependent/Total,Maximum assistance,2 Person assistance  Sit to Supine: Dependent/Total,Maximum assistance,2 Person assistance       Balance  Sitting Balance: Minimal assistance  Standing Balance: Unable to assess(comment)                 Assessment: Pt demonstrates the above deficits. Pt currently requries Max A /Total assist of +2 for supine to sit & sit to supine transfers. Pt unsafe to complete STS or additional transfers at this time secondary to decreased cognition & w/ pt not being ambulatory since December 2021. Recommend return to SNF at d/c.      Goals  Short term goals  Time Frame for Short term goals: 7 days (1/31/2022)  Short term goal 1: Pt to complete bed mobility Mod A +2  Short term goal 2: Pt to tolerate sitting EOB x 2-3 min with CGA in order to engage in UB exercises  Short term goal 3: Pt to tolerate KAIDEN UB exercises/activity x 5 min w/ 3 or fewer rest breaks  Short term goal 4: Pt to follow simple 1-2 step commands in order to engage in functional mobility    Plan       Times per week: 7x  Times per day: Daily (1x per day)         Time In: 1004  Time Out: 1015  Timed Coded Minutes: 0  Total Treatment Time: 11    Dayanara Kumar, LIBRADO, OTR/L  1/25/2022

## 2022-01-25 NOTE — PROGRESS NOTES
Cardiology  Full note dictated    1. Hx of SVT  2. SSS with bradycardia and junctional rhythm  3. Pyelonephritis  4. Possible pneumonia    Will hold beta blockers and calcium channel blockers and observe. If she develops SVT, will then treat.       Thanks, Sarkis Adkins MD

## 2022-01-25 NOTE — PROGRESS NOTES
Per conversation with Dr Nathaniel Jaimes, will continue to hold all medications at this time continue to monitor for intake and swallowing. Allan Gallagher .Electronically signed by Omero Millan RN on 1/25/2022 at 4:17 PM

## 2022-01-26 LAB
C-REACTIVE PROTEIN: <3 MG/L (ref 0–5)
CULTURE: NORMAL
D-DIMER QUANTITATIVE: 2.55 MG/L FEU (ref 0–0.59)
GLUCOSE BLD-MCNC: 120 MG/DL (ref 65–99)
GLUCOSE BLD-MCNC: 229 MG/DL (ref 65–99)
GLUCOSE BLD-MCNC: 245 MG/DL (ref 65–99)
GLUCOSE BLD-MCNC: 266 MG/DL (ref 65–99)
GLUCOSE BLD-MCNC: 92 MG/DL (ref 65–99)
GLUCOSE BLD-MCNC: 93 MG/DL (ref 65–99)
LACTATE DEHYDROGENASE: 219 U/L (ref 135–214)
Lab: NORMAL
SPECIMEN DESCRIPTION: NORMAL

## 2022-01-26 PROCEDURE — 94640 AIRWAY INHALATION TREATMENT: CPT

## 2022-01-26 PROCEDURE — 82947 ASSAY GLUCOSE BLOOD QUANT: CPT

## 2022-01-26 PROCEDURE — 86140 C-REACTIVE PROTEIN: CPT

## 2022-01-26 PROCEDURE — 82728 ASSAY OF FERRITIN: CPT

## 2022-01-26 PROCEDURE — 94761 N-INVAS EAR/PLS OXIMETRY MLT: CPT

## 2022-01-26 PROCEDURE — 97530 THERAPEUTIC ACTIVITIES: CPT

## 2022-01-26 PROCEDURE — 2580000003 HC RX 258: Performed by: INTERNAL MEDICINE

## 2022-01-26 PROCEDURE — 85379 FIBRIN DEGRADATION QUANT: CPT

## 2022-01-26 PROCEDURE — 99232 SBSQ HOSP IP/OBS MODERATE 35: CPT | Performed by: INTERNAL MEDICINE

## 2022-01-26 PROCEDURE — 2500000003 HC RX 250 WO HCPCS: Performed by: INTERNAL MEDICINE

## 2022-01-26 PROCEDURE — 6360000002 HC RX W HCPCS: Performed by: INTERNAL MEDICINE

## 2022-01-26 PROCEDURE — 92526 ORAL FUNCTION THERAPY: CPT

## 2022-01-26 PROCEDURE — 6370000000 HC RX 637 (ALT 250 FOR IP): Performed by: INTERNAL MEDICINE

## 2022-01-26 PROCEDURE — 36415 COLL VENOUS BLD VENIPUNCTURE: CPT

## 2022-01-26 PROCEDURE — 83615 LACTATE (LD) (LDH) ENZYME: CPT

## 2022-01-26 PROCEDURE — 1200000000 HC SEMI PRIVATE

## 2022-01-26 PROCEDURE — 94669 MECHANICAL CHEST WALL OSCILL: CPT

## 2022-01-26 RX ORDER — MEROPENEM 1 G/1
INJECTION, POWDER, FOR SOLUTION INTRAVENOUS
Status: DISPENSED
Start: 2022-01-26 | End: 2022-01-27

## 2022-01-26 RX ORDER — MEROPENEM 1 G/1
INJECTION, POWDER, FOR SOLUTION INTRAVENOUS
Status: DISPENSED
Start: 2022-01-26 | End: 2022-01-26

## 2022-01-26 RX ORDER — DILTIAZEM HYDROCHLORIDE 5 MG/ML
5 INJECTION INTRAVENOUS ONCE
Status: COMPLETED | OUTPATIENT
Start: 2022-01-26 | End: 2022-01-26

## 2022-01-26 RX ADMIN — INSULIN LISPRO 3 UNITS: 100 INJECTION, SOLUTION INTRAVENOUS; SUBCUTANEOUS at 13:02

## 2022-01-26 RX ADMIN — MEROPENEM 1000 MG: 1 INJECTION, POWDER, FOR SOLUTION INTRAVENOUS at 21:07

## 2022-01-26 RX ADMIN — SODIUM CHLORIDE, PRESERVATIVE FREE 10 ML: 5 INJECTION INTRAVENOUS at 21:00

## 2022-01-26 RX ADMIN — DEXAMETHASONE SODIUM PHOSPHATE 6 MG: 10 INJECTION INTRAMUSCULAR; INTRAVENOUS at 06:54

## 2022-01-26 RX ADMIN — DILTIAZEM HYDROCHLORIDE 5 MG: 5 INJECTION INTRAVENOUS at 18:17

## 2022-01-26 RX ADMIN — SODIUM CHLORIDE: 4.5 INJECTION, SOLUTION INTRAVENOUS at 16:25

## 2022-01-26 RX ADMIN — SODIUM CHLORIDE: 4.5 INJECTION, SOLUTION INTRAVENOUS at 03:20

## 2022-01-26 RX ADMIN — MEROPENEM 1000 MG: 1 INJECTION, POWDER, FOR SOLUTION INTRAVENOUS at 13:02

## 2022-01-26 RX ADMIN — MEROPENEM 1000 MG: 1 INJECTION, POWDER, FOR SOLUTION INTRAVENOUS at 06:57

## 2022-01-26 RX ADMIN — ENOXAPARIN SODIUM 40 MG: 100 INJECTION SUBCUTANEOUS at 08:35

## 2022-01-26 RX ADMIN — INSULIN LISPRO 2 UNITS: 100 INJECTION, SOLUTION INTRAVENOUS; SUBCUTANEOUS at 17:15

## 2022-01-26 RX ADMIN — INSULIN LISPRO 1 UNITS: 100 INJECTION, SOLUTION INTRAVENOUS; SUBCUTANEOUS at 21:11

## 2022-01-26 ASSESSMENT — PAIN SCALES - GENERAL
PAINLEVEL_OUTOF10: 0

## 2022-01-26 ASSESSMENT — PAIN - FUNCTIONAL ASSESSMENT: PAIN_FUNCTIONAL_ASSESSMENT: 0-10

## 2022-01-26 NOTE — PROGRESS NOTES
Speech Language Pathology  Facility/Department: [address] MED SURG TELEMETRY  Dysphagia Treatment   NAME: Sisi Tripp  :   MRN: [id number]    ADMISSION DATE: 2022  ADMITTING DIAGNOSIS: has Essential hypertension, benign; Type 2 diabetes mellitus without complication ([facility] Mixed hyperlipidemia; Wart; Benign skin lesion of forearm; Lymphedema; Acute drug-induced gout of left knee; Cutaneous abscess of buttock; Decubitus ulcer of right hip; PMB (postmenopausal bleeding); Morbid obesity with BMI of 50.0-59.9, adult [facility]); SVT (supraventricular tachycardia) ([facility] Renal calculus; Pyelonephritis; and Mild malnutrition ([facility] [facility] on their problem list.  ONSET DATE: 22    Current Diet level:  Current Diet : Dysphagia Pureed (Dysphagia I)  Current Liquid Diet : Thin    Primary Complaint  Patient Complaint: AMS/ decreased LOC    Pain:  Pain Assessment  Pain Assessment: 0-10  Pain Level: 0  Patient's Stated Pain Goal: No pain    Impression  Dysphagia Diagnosis: Swallow function appears grossly intact      Treatment Plan  Requires SLP Intervention: No  Duration/Frequency of Treatment: No longer indicated  D/C Recommendations: SNF     Recommended Diet and Intervention  Diet Solids Recommendation: Dysphagia Pureed (Dysphagia I)  Liquid Consistency Recommendation: Thin  Recommended Form of Meds: Crushed in puree as able     Compensatory Swallowing Strategies  Compensatory Swallowing Strategies: Alternate solids and liquids;Eat/Feed slowly;Upright as possible for all oral intake; Total feed; Check for pocketing of food on the Left;Small bites/sips    Treatment/Goals  Short-term Goals  Timeframe for Short-term Goals: 1 week  Goal 1: Pt will tolerate pureed solids with no bolus holding or oral residue in 90% of trials. GOAL MET  Goal 2: Pt will tolerate nectar thick liquids with no overt s/s of penetration or aspiration.  GOAL MET    Long-term Goals  Timeframe for Long-term Goals: 2 weeks  Goal 1: Pt will tolerate least restrictive oral diet for meeting nutritional needs with no overt s/s of penetration or aspiration. GOAL MET    General  Behavior/Cognition: Alert; Cooperative  Respiratory Status: Room air  Communication Observation: Functional  Follows Directions: Simple  Dentition: Edentulous  Patient Positioning: Upright in bed  Baseline Vocal Quality: Normal  Volitional Cough: Strong  Consistencies Administered: Dysphagia Pureed (Dysphagia I); Thin - straw    Oral Phase Dysfunction  Oral Phase  Oral Phase: WFL     Indicators of Pharyngeal Phase Dysfunction   Indicators of Pharyngeal Phase Dysfunction  Delayed Swallow: Puree    Prognosis  Prognosis  Prognosis for safe diet advancement: good  Individuals consulted  Consulted and agree with results and recommendations: Patient;RN    Education  Patient Education: Educated pt on POC/discharge from speech therapy and diet recommendation  Patient Education Response: Verbalizes understanding  Safety Devices in place: Yes  Type of devices: Left in bed;Nurse notified       Daily Note  Pt seen for dysphagia treatment this date. Pt tolerating pureed solids and thin liquids with no overt s/s of penetration or aspiration, minimal bolus holding, and no pocketing. Pt still requiring total feeding assist at this time. Per nursing pt tolerating breakfast well. AT this time recommend pt remain on diet of pureed solids and thin liquids, 1:1 feed, check for pocketing, cue pt to swallow as needed. No further ST is indicated at this time; please re-consult if pt condition changes or if dentures are brought in. Reviewed strategies, diet recommendations and d/c plan with pt, she expressed good understanding.     Therapy Time  SLP Individual Minutes  Time In: 8963  Time Out: 0940  Minutes: [address] [facility] [address] [address] [facility] [place]  1/26/2022 9:50 AM

## 2022-01-26 NOTE — CONSULTS
[place] [address]                                  CONSULTATION    PATIENT NAME: Luis Eduardo Marques                   :        1940  MED REC NO:   [id number]                              ROOM:       5530  ACCOUNT NO:   [de-identified]                           ADMIT DATE: 2022  PROVIDER:     Blaine Salazar    CONSULT DATE:  2022    REASON FOR CONSULT:  Bradycardia. HISTORY OF PRESENT ILLNESS:  The patient is an 80-year-old female. She  resides at [facility] and has been there since 2021. She became  unresponsive and was brought to the emergency room. She was in sinus  tachycardia when she arrived. She had had a UTI and had been treated at  [facility] with Jose Mcdonnell. CT scan showed possible pyelonephritis. She came back to the emergency room on , became less responsive and  was tachycardic. She was in sinus tachycardia in the emergency room. She had a history of SVT and had been on Cardizem and Lopressor. She  was admitted for pyelonephritis and a possible left lower lobe  pneumonia. She was more alert the following day. She was in sinus  rhythm. Her sinus rate had decreased on the following day and she was  stable. On , she was confused. She began developing bradycardia  with heart rates in the 30s and 40s. She developed also junctional  rhythm, although she was asymptomatic. She has been receiving IV  Cardizem and IV Lopressor. I was asked to see her for her bradycardia. When I see her, she is confused. She says she is in no discomfort. She  denies any history of myocardial infarction, although again she is not a  good historian. I saw her on  when she was hospitalized for a syncopal episode. She was seen at the [facility]. In the emergency room, she had short runs  of SVT at 140 beats per minute.   She was placed on Cardizem and  Lopressor. An echocardiogram at that time showed normal LV function. She has had no history of atrial fibrillation, never had a myocardial  infarction or cardiac catheterization. CARDIAC RISK FACTORS:  Other Family Members:  Negative. Hypertension:  Positive. Hyperlipidemia:  Positive. Non-Insulin-Dependent Diabetes:  Positive. Peripheral Vascular Disease:  Negative. Smoking:  Negative. MEDICATIONS PRIOR TO ADMISSION:  She is on albuterol p.r.n., metoprolol  25 mg b.i.d., Cardizem 30 mg t.i.d., Lasix 40 mg daily, potassium 20 mEq  daily, Zyloprim 100 mg daily, Glucophage 500 mg b.i.d. PAST MEDICAL AND SURGICAL HISTORY:  1. She has basal cell cancer of the skin. 2.  COVID-19 diagnosed on 2022.  3.  Gout. 4.  Hyperlipidemia. 5.  Hypertension. 6.  History of SVT, although I have no details. 7.  Non-insulin-dependent diabetes. 8.  Appendectomy. 9.  D and C.  10.  Right knee replacement. FAMILY HISTORY:  Mother passed away with CHF. Father, unknown. Two  brothers and one sister had cancer, no heart disease. SOCIAL HISTORY:  She is 80years old, , three children. One girl   in a crib death. Two boys, [facility], 64; _____ 46.  worked in  the railroad for 40 years. REVIEW OF SYSTEMS:  Cardiac as above. Other systems reviewed including  constitutional, eyes, ears, nose and throat, cardiovascular,  respiratory, GI, , musculoskeletal, integumentary, neurologic,  endocrine, hematologic, and allergic/immunologic are negative except for  what is described above. PHYSICAL EXAMINATION:  VITAL SIGNS:  Her blood pressure was 145/74 with a heart rate of 50 and  regular. Respiratory rate 18. O2 saturation 94%. Weight 210 pounds. GENERAL:  She is an 80-year-old female, somewhat confused. She was in  no distress. HEENT:  The pupils are equally round and intact. Mucous membranes were  dry. NECK:  No JVD. Good carotid pulses. No carotid bruits. No  lymphadenopathy or thyromegaly. CARDIOVASCULAR EXAM:  S1 and S2 were normal.  No S3 or S4. Soft  systolic blowing type murmur. No diastolic murmur. PMI was normal.  No  lift, thrust, or pericardial friction rub. LUNGS:  Quite clear to auscultation and percussion. ABDOMEN:  Soft and nontender. Good bowel sounds. EXTREMITIES:  Good femoral pulses. Good pedal pulses. No pedal edema. LABORATORY DATA:  Her sodium was 140, potassium 3.6, BUN 29, creatinine  0.58, GFR greater than 60. Calcium was 9.0. White count 7.0,  hemoglobin 10.8 with a platelet count 792,783. EKG showed sinus bradycardia with a right bundle-branch block. Rhythm  strips showed junctional rhythm at 40 beats per minute. IMPRESSION:  1. Sick sinus syndrome. 2.  Bradycardia with junctional rhythm at 40 beats per minute as well as  sinus bradycardia. 3.  History of SVT in 11/2021 when she was hospitalized with syncope. 4. No SVT noted on this hospitalization. 5.  Pyelonephritis. 6.  COVID-19 diagnosed on 01/12/2022.  7.  Type 2 diabetes. PLAN:  1. Hold Cardizem and Lopressor. 2.  Watch rhythm. 3.  I would only treat if she began developing episodes of SVT again. DISCUSSION:  The patient was found to have SVT when she was hospitalized  in 11/2021. She was treated with Cardizem and Lopressor. She has been n.p.o. and was treated with IV Cardizem and Lopressor. She  developed bradycardia. Her Cardizem and Lopressor have already been held. I would not restart  unless she began developing episodes of tachycardia, in particular SVT. I would then institute one, perhaps Cardizem 5 mg IV every 6 to 8 hours. There is no evidence of any acute coronary syndrome and no CHF. We will watch her rhythm at this time with no treatment. Thank you very much.         Clovis Gupta    D: 01/26/2022 7:30:54       T: 01/26/2022 9:06:57     [email or web address]  Job#: [id number]     Doc#: [id number]    CC:

## 2022-01-26 NOTE — PROGRESS NOTES
Patient repositioned. High odonnell's for pureed breakfast. 1:1 feed. Oriented to self. Unable to voice date/time or location, however, does report that [place]. \"

## 2022-01-26 NOTE — PROGRESS NOTES
Patient up to EOB with therapy, telemetry shows heart rate in 150s. Heart rate returns to high 90s upon laying back down.

## 2022-01-26 NOTE — PROGRESS NOTES
Hospitalist Progress Note  1/26/2022 9:47 AM  Subjective:   Admit Date: 1/21/2022  PCP: Ferdinand Sánchez MD    Interval History:     Yetta Schilder is more alert this morning, she is oriented to place. Has no complaints. Was evaluated by ST and started on puréed diet. Has no chest pain, no shortness of breath. Heart rate fluctuating. Per RN her heart rate jumped to 170 when she was working with PT. Was asymptomatic. Heart rate improved into 80s with resting. Diet: ADULT DIET;  Dysphagia - Pureed; 4 carb choices (60 gm/meal)  Medications:   Scheduled Meds:   meropenem        insulin lispro  0-6 Units SubCUTAneous TID WC    insulin lispro  0-3 Units SubCUTAneous Nightly    meropenem (MERREM) 1000 mg IVPB extended (mini-bag)  1,000 mg IntraVENous Q8H    [Held by provider] allopurinol  100 mg Oral Daily    [Held by provider] aspirin  81 mg Oral Daily    [Held by provider] oyster shell calcium w/D  1 tablet Oral Daily    [Held by provider] dilTIAZem  30 mg Oral TID    [Held by provider] furosemide  40 mg Oral Daily    [Held by provider] metFORMIN  500 mg Oral BID WC    [Held by provider] metoprolol tartrate  25 mg Oral BID    [Held by provider] multivitamin  1 tablet Oral Daily    [Held by provider] potassium chloride  20 mEq Oral Daily    [Held by provider] lactobacillus  1 capsule Oral Daily    [Held by provider] atorvastatin  10 mg Oral Daily    [Held by provider] sodium chloride  1 g Oral BID    sodium chloride flush  5-40 mL IntraVENous 2 times per day    enoxaparin  40 mg SubCUTAneous Daily    [Held by provider] Vitamin D  1,000 Units Oral Daily    [Held by provider] ascorbic acid  1,000 mg Oral Daily    [Held by provider] zinc sulfate  50 mg Oral Daily    dexamethasone  6 mg IntraVENous Q24H    [Held by provider] guaiFENesin  600 mg Oral BID    [Held by provider] dilTIAZem  5 mg IntraVENous Q6H     Continuous Infusions:   sodium chloride      dextrose      sodium chloride 75 mL/hr at 01/26/22 0320     PRN Medications: albuterol sulfate HFA, guaiFENesin, sodium chloride flush, sodium chloride, ondansetron **OR** ondansetron, polyethylene glycol, acetaminophen **OR** acetaminophen, metoprolol, glucose, dextrose, glucagon (rDNA), dextrose    Objective:   Vitals: BP (!) 153/70   Pulse 65   Temp 97.8 °F (36.6 °C) (Oral)   Resp 18   Ht 5' (1.524 m)   Wt 210 lb 9.6 oz (95.5 kg)   LMP  (LMP Unknown)   SpO2 97%   BMI 41.13 kg/m²   BMI: Body mass index is 41.13 kg/m². CBC:   Recent Labs     01/25/22  0430   WBC 7.0   HGB 10.8*        BMP:    Recent Labs     01/24/22  0405 01/25/22  0430   * 140   K 3.8 3.6*   * 110*   CO2 24 23   BUN 28* 29*   CREATININE 0.67 0.58   GLUCOSE 133* 101*     Hepatic:   Recent Labs     01/24/22  0405   AST 29   ALT 26   BILITOT 0.41   ALKPHOS 209*       Physical Exam:       General Appearance: Awake, cooperative, oriented to place  Cardiovascular: normal rate, regular rhythm, normal S1 and S2, no murmurs  Pulmonary/Chest: Mild scattered rhonchi, no wheezes, normal air movement, no respiratory distress  Abdomen: soft, non-tender, non-distended, normal bowel sounds  Extremities: Bilateral lower extremities with severe lymphedema  Skin: warm and dry, no rash  Neurological:  Awake, normal speech, no focal findings or movement disorder noted         Assessment and Plan:       1.  Altered mental status, unresponsive state  - improving, most likely secondary to infectious process and dehydration.  CT head 1/19 revealed no acute changes. 2.  Acute pyelonephritis - urine cultures positive for ESBL positive Klebsiella.    Started on meropenem on 1/22/2022. Patient with recent history of right ureteral stent placement on 12/16/2021. Stent has been removed.   3.  Possible LLL pneumonia seen on CT scan -on IV antibiotics  4.  COVID - 19 infection with Covid pneumonia with hypoxia - diagnosed on 1/12/2022.  Was on prednisone at [facility], started on IV Decadron 1/21 here.  ID consulted. Milderd Seamus is out of the window for remdesivir and monoclonal antibodies. Alvenia Lianne not indicated.  Continue on Decadron.  Hypoxia resolving  5.   Bradycardia, episodes of junctional rhythm -patient asymptomatic. Has a h/o SVTs. Appreciate Dr. Meagan Young for consult. Holding Cardizem and Lopressor. Monitor on telemetry. 6.   Dysphagia - patient was reevaluated by speech therapist and started on puréed diet . Holding most of p.o. meds that are not  Essential at this time  7.  Diabetes mellitus type 2, controlled. On as needed sliding scale lispro. 8.  Hypokalemia -on replacement  9.   Mild malnutrition    Patient continues to require inpatient admission related to need for IV antibiotics, close monitoring of cardiac status      Electronically signed by Valente Alejo MD on 1/26/2022 at 9:47 AM    Rounding Hospitalist

## 2022-01-26 NOTE — PROGRESS NOTES
Phone: Mikki  Date: 2022  Fax: [phone]      Physical Therapy    Daily Note    Patient Name: Kristen Gerardo      : 1940  (4 y.o.)  MRN: [id number]     Pt is PROGRESSING toward goals and increased independence of mobility this treatment session        Assessment        Supine to Sit: Minimal assistance (x1-2)  Sit to Supine: Maximal assistance (x2)       Sit to Stand: Unable to assess                                    Assessment: Co-treat with MARINA due to safety concerns. Patient is in bed upon arrival and is wide awake. She agree to sit up at edge of bed with therapies. Supine to sit is min x 1-2. She is able to scoot B LE's to edge of bed. Sitting balance at edge of bed is good. Nurse into room stating that patient's HR is high. She advises that patient be laid back down and allowed to rest. Sits ~4-5 minutes. Sit to supine is max assist x 2. Call light in reach following.   Safety Devices  Type of devices: Call light within reach,Left in bed,Nurse notified          Time In: 569  Time Out: 913  Timed Coded Minutes: 12 (co-treat with MARINA)  Total Treatment Time: 24    Exercises:  See Flowsheets    Plan  Cont Per Plan Of Care    Goals  Short Term Goals  Time Frame for Short term goals: 7 days (exp 22)  Short term goal 1: Patient to transfer supine to sit with mod assist x1  Short term goal 2: Patient to sit bedside with reaching with good dynamic balance  Short term goal 3: Patient to have improved endurance with complete 15 min of therex following directions          [id number] [employer] License Number: [id number]    Date: 2022

## 2022-01-26 NOTE — PROGRESS NOTES
Patient HOB raised to high fowlers for aspiration precautions, telemetry shows sinus arrhythmia with frequent PACs. After position change, telemetry goes into intermittent SVT in 150s. Dr. Fabiana Villaseñor notified. New order for IV Cardizem.

## 2022-01-26 NOTE — PROGRESS NOTES
[facility]  Occupational Therapy  Daily Note  Date: 2022  Patient Name: Marbella Machado        MRN: [id number]    : 1940  (80 y.o.)    Subjective: Pt in bed upon arrival  Pt is PROGRESSING toward goals and independence of Self Care this treatment session  Continue to assess Pending Progress    Objective  Supine to Sit: Maximum assistance,2 Person assistance  Sit to Supine: Minimal assistance,2 Person assistance       Balance  Sitting Balance: Minimal assistance  Standing Balance: Unable to assess    Sit to stand: Unable to assess  Stand to sit: Unable to assess    Assessment  Assessment: Patient is supine in bed upon arrival, agreeable to therapy interventions at this time. Co-tx with PTA is completed d/t increased assistance being required to complete bed mobility and safety concerns. Patient demonstrates improved independence with completing supine-sit transfers, MIN A 1-2. Patient is fearful of falling out of bed, requires reassurance from therapist that will not happen. Patient sits EOB x4-5 minutes, RN then reports that patient's HR is in the 170's range, advises therapists' it would be best to lay patient back down. MAX A x2 is required for sit-supine and to reposition patient in bed. Remains in bed at end of session with call light and other personal items within reach.     Prognosis: Poor  Discharge Recommendations: Subacute/Skilled Nursing Facility  Activity Tolerance: Patient limited by fatigue,Patient limited by pain,Treatment limited secondary to medical complications (free text) (High HR)  Safety Devices in place: Yes     Exercises:  See Flowsheets    Goals  Short Term Goals  Time Frame for Short term goals: 7 days (2022)  Short term goal 1: Pt to complete bed mobility Mod A +2  Short term goal 2: Pt to tolerate sitting EOB x 2-3 min with CGA in order to engage in UB exercises  Short term goal 3: Pt to tolerate KAIDEN UB exercises/activity x 5 min w/ 3 or fewer rest breaks  Short term goal 4: Pt to follow simple 1-2 step commands in order to engage in functional mobility    Time In: 0849  Time Out: 0913  Timed Coded Minutes: 12  Total Treatment Time: 24 (Co-tx with PTA)    AVIVA Rios    Date: 1/26/2022

## 2022-01-26 NOTE — PROGRESS NOTES
[facility] Note   COVID 19 Patient  Today's Date and Time: 1/26/2022, 8:38 AM    Impression :     COVID 19 Confirmed Infection  Covid tests:  11-2-21: negative  1-12-22: Positive at [facility]  1-21-22: Positive  Altered mentation   Rt side UTI with ESBL + Klebsiella    Recommendations:   Antibiotic treatment:  Meropenem 1 gm IV q 8 hr. Stop date 1-31-22  Covid Rx:    Remdesivir. Out of the window  Decadron 6 mg q day IV. Start date 1-22-22  Actemra. Not indicated  Monoclonal antibodies. Out of the window      Medical Decision Making/Summary/Discussion:1/26/2022     Patient admitted with COVID 19 infection initially diagnosed at [facility] around 1-12-21  Initial presentation with altered mentation in the setting of hyponatremia, which has since been corrected  Altered mentation has persisted. She has hypoxia but is only requiring only small amounts of nasal 02. There is concern for possible Rt side hydronephrosis and UTI. Klebsiella ESBL + grown from urine    Infection Control Recommendations   Belle Precautions  Airborne isolation  Droplet Isolation  Isolate until 2-2-22    Antimicrobial Stewardship Recommendations     Simplification of therapy  Targeted therapy    Coordination of Outpatient Care:   Estimated Length of IV antimicrobials:TBD  Patient will need Midline Catheter Insertion: TBD  Patient will need PICC line Insertion: No  Patient will need: Home IV , [place],  SNF,  LTAC:TBD  Patient will need outpatient wound care:No    Chief complaint/reason for consultation:   Concern for COVID infection      History of Present Illness:   Ander Parmar is a 80y.o.-year-old  female who was initially admitted on 1/21/2022. Patient seen at the request of . INITIAL HISTORY:    Patient presented through ER on 1-19-22 with complaints of mentation changes at her ECF, having developed unresponsiveness.  She had tested positive for Covid around 1-12-22 at the [facility]. She was found to have sinusitis by CT, hyponatremia (Na 127) and possibly a UTI. Patient received hydration and returned to [facility]. She returned on 1-20-22 with an elevated glucose level. CT abdomen suggested possible pyelonephritis and changes at the LLL suggestive of possible pneumonia. She received Ertapenem and was returned to [facility] to continue antibiotics. She returned on 1-21-22 with altered mentation, tachycardia, hypoxia requiring nasal 02. Patient admitted because of concerns with COVID 19 and altered mentation. CURRENT EVALUATION : 1/26/2022    Afebrile  VS stable, HTN    Tolerating Meropenem for ESBL + Klebsiella UTI  Patient exhibiting respiratory distress. yes  Respiratory secretions: No    Patient receiving supplemental oxygen. Nasal 02 @ 2 L/min-->room air  RR 22-->19-->16-->18  02 sat 98-->94-->95 -->97 %      NEWS Score: 0-4 Low risk group; 5-6: Medium risk group; 7 or above: High risk group  Parameters 3 2 1 0 1 2 3   Age    < 65   = 65   RR = 8  9-11 12-20  21-24 = 25   O2 Sats = 91 92-93 94-95 = 96      Suppl O2  Yes  No      SBP = 90  101-110 111-219   = 220   HR = 40  41-50 51-90  111-130 = 131   Consciousness    Alert   Drowsiness, lethargy, or confusion   Temperature = 35.0 C (95.0 F)  35.1-36.0 C 95.1-96.9 F 36.1-38.0 C 97.0-100.4 F 38.1-39.0 C 100.5-102.3 F = 39.1 C = 102.4 F      NEWS Score:  1-22-22: 14 High Risk  1-: 4 low risk     Overall Daily Picture:     Unchanged    Presence of secondary bacterial Infection:  Yes  Additional antibiotics: meropenem    Labs, X rays reviewed: 1/26/2022    BUN: 26-->28-->29  Cr: 0.86-->0.67-->0.58    WBC: 7.5-->7.0  Hb: 12.0-->10.8  Plat: 306-->188    Absolute Neutrophils: 5.6  Absolute Lymphocytes: 1.30  Neutrophil/Lymphocyte Ratio: 4.3 High Risk    CRP: 12.4-->3.9--> <3.0  Ferritin:  LDH: 175    Pro Calcitonin:      Cultures:  Urine:  1-19-22: Gram negative bacilli 10-50,000 cfu/mL  Blood:  1-20-22: No growth   1-20-22: Coagulase negative Staphylococci = contaminant  Sputum :    Wound:      CXR:   1-19-22: Cardiomegaly. Patchy bibasilar infiltrates  CAT:  1-20-22: Bilateral small effusions with compressive atelectasis. No air bronchograms in areas of atelectasis. Rt kidney with residual distension of pelvis and upper ureter. GB at upper limits of normal.    Discussed with RN, JESUS. I have personally reviewed the past medical history, past surgical history, medications, social history, and family history, and I have updated the database accordingly.   Past Medical History:     Past Medical History:   Diagnosis Date    Cancer ([facility] [address]     basal cell skin cancer    COVID-19     Gout     Hyperlipidemia     Hypertension     Hypokalemia     Lymphedema of lower extremity     Obesity     Shingles     70's    SVT (supraventricular tachycardia) (HCC)     Type II or unspecified type diabetes mellitus without mention of complication, not stated as uncontrolled     Venous stasis dermatitis        Past Surgical  History:     Past Surgical History:   Procedure Laterality Date    APPENDECTOMY      COLONOSCOPY      CYSTOSCOPY Right 12/16/2021    RIGHT URETEROSCOPY WITH HOLMIUM LASER LITHOTRIPSY AND RIGHT STENT PLACEMENT performed by Ashly Harrison MD at [facility]    [place] OFFICE/OUTPT VISIT,PROCEDURE ONLY Right 7/19/2018    RIGHT HIP MASS INCISION AND DRAINAGE performed by Inez Lakhani MD at [address] [facility] OFFICE/OUTPT VISIT,PROCEDURE ONLY Right 8/1/2018    HIP INCISION AND DRAINAGE (Necrotic Fat Right Hip Debridement) performed by Inez Lakhani MD at [facility] OR       Medications:      meropenem        insulin lispro  0-6 Units SubCUTAneous TID WC    insulin lispro  0-3 Units SubCUTAneous Nightly    meropenem (MERREM) 1000 mg IVPB extended (mini-bag)  1,000 mg IntraVENous Q8H    [Held by provider] allopurinol  100 mg Oral Daily    [Held by provider] aspirin  81 mg Oral Daily    [Held by provider] oyster shell calcium w/D  1 tablet Oral Daily    [Held by provider] dilTIAZem  30 mg Oral TID    [Held by provider] furosemide  40 mg Oral Daily    [Held by provider] metFORMIN  500 mg Oral BID WC    [Held by provider] metoprolol tartrate  25 mg Oral BID    [Held by provider] multivitamin  1 tablet Oral Daily    [Held by provider] potassium chloride  20 mEq Oral Daily    [Held by provider] lactobacillus  1 capsule Oral Daily    [Held by provider] atorvastatin  10 mg Oral Daily    [Held by provider] sodium chloride  1 g Oral BID    sodium chloride flush  5-40 mL IntraVENous 2 times per day    enoxaparin  40 mg SubCUTAneous Daily    [Held by provider] Vitamin D  1,000 Units Oral Daily    [Held by provider] ascorbic acid  1,000 mg Oral Daily    [Held by provider] zinc sulfate  50 mg Oral Daily    dexamethasone  6 mg IntraVENous Q24H    [Held by provider] guaiFENesin  600 mg Oral BID    [Held by provider] dilTIAZem  5 mg IntraVENous Q6H       Social History:     Social History     Socioeconomic History    Marital status:      Spouse name: Not on file    Number of children: Not on file    Years of education: Not on file    Highest education level: Not on file   Occupational History    Not on file   Tobacco Use    Smoking status: Never Smoker    Smokeless tobacco: Never Used   Vaping Use    Vaping Use: Never used   Substance and Sexual Activity    Alcohol use: No    Drug use: No    Sexual activity: Not on file   Other Topics Concern    Not on file   Social History Narrative    Not on file     Social Determinants of Health     Financial Resource Strain:     Difficulty of Paying Living Expenses: Not on file   Food Insecurity:     Worried About Running Out of Food in the Last Year: Not on file    Irineo of Food in the Last Year: Not on file   Transportation Needs:     Lack of Transportation (Medical):  Not on file    Lack of Transportation (Non-Medical): Not on file   Physical Activity:     Days of Exercise per Week: Not on file    Minutes of Exercise per Session: Not on file   Stress:     Feeling of Stress : Not on file   Social Connections:     Frequency of Communication with Friends and Family: Not on file    Frequency of Social Gatherings with Friends and Family: Not on file    Attends Bahai Services: Not on file    Active Member of Clubs or Organizations: Not on file    Attends Club or Organization Meetings: Not on file    Marital Status: Not on file   Intimate Partner Violence:     Fear of Current or Ex-Partner: Not on file    Emotionally Abused: Not on file    Physically Abused: Not on file    Sexually Abused: Not on file   Housing Stability:     Unable to Pay for Housing in the Last Year: Not on file    Number of [place] in the Last Year: Not on file    Unstable Housing in the Last Year: Not on file       Family History:     Family History   Problem Relation Age of Onset    Heart Disease Mother     Cancer Sister     Mult Sclerosis Sister     Cancer Brother         lung        Allergies:   Adhesive tape and Sulfa antibiotics     Review of Systems:     Unable to provide. Altered mentation    Constitutional: No fevers or chills. No systemic complaints  Head: No headaches  Eyes: No double vision or blurry vision. No conjunctival inflammation. ENT: No sore throat or runny nose. . No hearing loss, tinnitus or vertigo. Cardiovascular: No chest pain or palpitations. No Shortness of breath. No MCDONOUGH  Lung: No Shortness of breath or cough. No sputum production  Abdomen: No nausea, vomiting, diarrhea, or abdominal pain. Paramjit Pesa No cramps. Genitourinary: No increased urinary frequency, or dysuria. No hematuria. No suprapubic or CVA pain  Musculoskeletal: No muscle aches or pains. No joint effusions, swelling or deformities  Hematologic: No bleeding or bruising.   Neurologic: No headache, weakness, numbness, or tingling. Integument: No rash, no ulcers. Psychiatric: No depression. Endocrine: No polyuria, no polydipsia, no polyphagia. Physical Examination :     Patient Vitals for the past 8 hrs:   BP Temp Temp src Resp SpO2   01/26/22 0800 (!) 153/70 97.8 °F (36.6 °C) Oral 18 97 %   01/26/22 0635 -- -- -- 18 94 %   01/26/22 0352 (!) 145/74 98 °F (36.7 °C) Axillary 18 97 %   01/26/22 0045 (!) 145/73 -- -- 18 97 %     General Appearance: Somnolent, difficult to arouse  Head:  Normocephalic, no trauma  Eyes: Pupils equal, round, reactive to light; sclera anicteric; conjunctivae pink. No embolic phenomena. ENT: Oropharynx clear, without erythema, exudate, or thrush. No tenderness of sinuses. Mouth/throat: mucosa pink and moist. No lesions. On nasal 02  Neck:Supple, without lymphadenopathy. Thyroid normal, No bruits. Pulmonary/Chest: Clear to auscultation, without wheezes, rales, or rhonchi. No dullness to percussion. Cardiovascular: Regular rate and rhythm without murmurs, rubs, or gallops. Abdomen: Soft, non tender. Bowel sounds normal. No organomegaly  All four Extremities: No cyanosis, clubbing. Has bilateral leg lymphedema. .  Neurologic: No gross sensory or motor deficits. Difficult to arouse. Moaning. Skin: Warm and dry with good turgor. Signs of peripheral arterial and venous insufficiency. No ulcerations. No open wounds. Medical Decision Making -Laboratory:   I have independently reviewed/ordered the following labs:    CBC with Differential:   Recent Labs     01/25/22  0430   WBC 7.0   HGB 10.8*   HCT 26.8*      LYMPHOPCT 12*   MONOPCT 5     BMP:   Recent Labs     01/24/22  0405 01/25/22  0430   * 140   K 3.8 3.6*   * 110*   CO2 24 23   BUN 28* 29*   CREATININE 0.67 0.58     Hepatic Function Panel:   Recent Labs     01/24/22  0405   PROT 6.2*   LABALBU 2.6*   BILITOT 0.41   ALKPHOS 209*   ALT 26   AST 29     No results for input(s): RPR in the last 72 hours.   No results for input(s): HIV in the last 72 hours. No results for input(s): BC in the last 72 hours. Lab Results   Component Value Date    MUCUS 1+ 01/21/2022    RBC 2.88 01/25/2022    TRICHOMONAS NOT REPORTED 01/21/2022    WBC 7.0 01/25/2022    YEAST NOT REPORTED 01/21/2022    TURBIDITY Clear 01/21/2022     Lab Results   Component Value Date    CREATININE 0.58 01/25/2022    GLUCOSE 101 01/25/2022    GLUCOSE 177 04/21/2012       Medical Decision Making-Imaging:     EXAMINATION: CT ABDOMEN PELVIS WO CONTRAST, 1/20/2022 8:40 PM EST       HISTORY: Abdominal pain. Urinary tract infection.       COMPARISON: November 3, 2021            TECHNIQUE: CT scan of the abdomen and pelvis was performed without IV contrast.    CT dose reduction technique was used, including Automated Exposure Control.            FINDINGS:        CT ABDOMEN: Included lower thorax demonstrates small bilateral pleural    effusions with adjacent atelectasis involving the lower lobes. There is also    patchy peripheral consolidation within the left lower lobe and lingula. The    heart is enlarged, with coronary arthroscopic vascular calcifications in    addition to thick calcification at the mitral annulus. Small hiatal hernia.       The liver, left kidney, and pancreas have an unremarkable noncontrast    appearance. The spleen measures 16.9 cm in length. Nodular thickening to the    adrenal glands suggestive of adenomatous hyperplasia. The gallbladder is    distended to 10 cm with intrinsic dependent density noted.       There is slight asymmetric perinephric inflammatory stranding that is greater    on the right with mild dilatation of right renal collecting system and proximal    right ureter where there is uroepithelial thickening. Within the pelvis, the    ureters not dilated. Borderline enlarged portacaval lymph node measuring 1.8 x    1.3 cm. No retroperitoneal adenopathy.       CT PELVIS: The bladder is nondistended. The uterus and adnexal regions are    unremarkable. There are multiple colonic diverticula with most of the colon    mildly distended with stool. No dilated loops of small bowel. Multilevel    moderate to severe degenerative changes of the spine with minimal    anterolisthesis at L4 on L5.               Impression       1. Mild right hydronephrosis and proximal hydroureter with uroepithelial    thickening and asymmetric perinephric inflammatory stranding. Although possibly    associated with a recently placed ureteral stent, this is concerning for    infection (acute right pyelonephritis, pyelitis and ureteritis). No obstructing    ureteral stone identified.       2. Small bilateral pleural effusions with adjacent atelectasis at the lung    bases. Peripheral patchy consolidation within the left lower lobe and lingula    could be pneumonia.       3. Cardiomegaly with coronary atherosclerotic vascular disease.       4. Small hiatal hernia.       5. Colonic diverticulosis.       6. Cholelithiasis within a borderline hydropic gallbladder.       7. Splenomegaly.       8. Adenomatous hyperplasia of the adrenal glands.             EXAM: XR CHEST PORTABLE        HISTORY: Reason for exam:->AMS, dx COVID(+) ~1 week ago       COMPARISON: Portable chest 11/2/2021.            TECHNIQUE: AP portable chest 1326 hours.           FINDINGS: Nonspecific patchy infiltrates are present bilaterally,    mild-to-moderate, left greater than right. Mild cardiomegaly.               Impression       Mild patchy bilateral infiltrates suspicious for early pneumonia. Medical Decision Fptpfw-[id number]-Rxihl:     Contains abnormal data Culture, Urine  Order: [id number]  Status: Final result    Visible to patient: Yes (not seen)    Next appt: 02/03/2022 at 02:30 PM in Urology Zachary Gonzales MD)    Specimen Information: Urine, catheter        0 Result Notes    Component 1/19/22 1400 Resulting Agency   Specimen Description . URINE, CATHETERIZED  [address] [facility]   Special Requests [facility]  [address] [facility]   Culture  Abnormal   KLEBSIELLA PNEUMONIAE 10 to 50,000 CFU/ML THIS ORGANISM IS AN EXTENDED-SPECTRUM BETA-LACTAMASE  AND RESISTANCE TO THERAPY WITH PENICILLINS, CEPHALOSPORINS AND AZTREONAM IS EXPECTED.  THESE ORGANISMS GENERALLY REMAIN SUSCEPTIBLE TO CARBAPENEMS.  CONSIDER ID CONSULTATION. [facility]     Klebsiella pneumoniae     BACTERIAL SUSCEPTIBILITY PANEL ANASTACIA     amikacin <=2  Sensitive     ampicillin >=32  Resistant     ampicillin-sulbactam NOT REPORTED       aztreonam 16  Resistant     ceFAZolin >=64   Cefazolin s. .. Resistant  Resistant     cefepime NOT REPORTED       cefTRIAXone >=64  Resistant     ciprofloxacin 2  Intermediate     Confirmatory Extended Spectrum Beta-Lactamase POSITIVE  Positive     ertapenem NOT REPORTED       gentamicin >=16  Resistant     meropenem <=0.25  Sensitive     nitrofurantoin 128  Resistant     piperacillin-tazobactam 16  Resistant     tigecycline NOT REPORTED       tobramycin >=16  Resistant     trimethoprim-sulfamethoxazole >=320  Resistant                  Specimen Collected: 01/19/22 14:00 Last Resulted: 01/22/22 12:27             Medical Decision Making-Other:     Note:  Labs, medications, radiologic studies were reviewed with personal review of films  Large amounts of data were reviewed  Discussed with nursing Staff, Discharge planner  Infection Control and Prevention measures reviewed  All prior entries were reviewed  Administer medications as ordered  Prognosis: Guarded  Discharge planning reviewed  Follow up as outpatient. Thank you for allowing us to participate in the care of this patient. Please call with questions.     Trudell Duverney, MD

## 2022-01-27 LAB
ABSOLUTE BANDS #: 0.44 K/UL (ref 0–1)
ABSOLUTE EOS #: ABNORMAL K/UL (ref 0–0.4)
ABSOLUTE IMMATURE GRANULOCYTE: ABNORMAL K/UL (ref 0–0.3)
ABSOLUTE LYMPH #: 0.62 K/UL (ref 1–4.8)
ABSOLUTE MONO #: 0.44 K/UL (ref 0–1)
ANION GAP SERPL CALCULATED.3IONS-SCNC: 8 MMOL/L (ref 9–17)
BANDS: 5 % (ref 0–10)
BASOPHILS # BLD: ABNORMAL % (ref 0–2)
BASOPHILS ABSOLUTE: ABNORMAL K/UL (ref 0–0.2)
BUN BLDV-MCNC: 21 MG/DL (ref 8–23)
BUN/CREAT BLD: 45 (ref 9–20)
CALCIUM SERPL-MCNC: 8.8 MG/DL (ref 8.6–10.4)
CHLORIDE BLD-SCNC: 107 MMOL/L (ref 98–107)
CO2: 21 MMOL/L (ref 20–31)
CREAT SERPL-MCNC: 0.47 MG/DL (ref 0.5–0.9)
DIFFERENTIAL TYPE: ABNORMAL
EOSINOPHILS RELATIVE PERCENT: ABNORMAL % (ref 0–5)
FERRITIN: 371 UG/L (ref 13–150)
GFR AFRICAN AMERICAN: >60 ML/MIN
GFR NON-AFRICAN AMERICAN: >60 ML/MIN
GFR SERPL CREATININE-BSD FRML MDRD: ABNORMAL ML/MIN/{1.73_M2}
GFR SERPL CREATININE-BSD FRML MDRD: ABNORMAL ML/MIN/{1.73_M2}
GLUCOSE BLD-MCNC: 154 MG/DL (ref 65–99)
GLUCOSE BLD-MCNC: 171 MG/DL (ref 70–99)
GLUCOSE BLD-MCNC: 224 MG/DL (ref 65–99)
GLUCOSE BLD-MCNC: 274 MG/DL (ref 65–99)
GLUCOSE BLD-MCNC: 289 MG/DL (ref 65–99)
HCT VFR BLD CALC: 34.2 % (ref 36–46)
HEMOGLOBIN: 12 G/DL (ref 12–16)
IMMATURE GRANULOCYTES: ABNORMAL %
LYMPHOCYTES # BLD: 7 % (ref 15–40)
MCH RBC QN AUTO: 31 PG (ref 26–34)
MCHC RBC AUTO-ENTMCNC: 35.2 G/DL (ref 31–37)
MCV RBC AUTO: 88 FL (ref 80–100)
MONOCYTES # BLD: 5 % (ref 4–8)
MORPHOLOGY: ABNORMAL
NRBC AUTOMATED: ABNORMAL PER 100 WBC
PDW BLD-RTO: 14.3 % (ref 12.1–15.2)
PLATELET # BLD: 181 K/UL (ref 140–450)
PLATELET ESTIMATE: ABNORMAL
PMV BLD AUTO: ABNORMAL FL (ref 6–12)
POTASSIUM SERPL-SCNC: 3.9 MMOL/L (ref 3.7–5.3)
RBC # BLD: 3.88 M/UL (ref 4–5.2)
RBC # BLD: ABNORMAL 10*6/UL
SEG NEUTROPHILS: 83 % (ref 47–75)
SEGMENTED NEUTROPHILS ABSOLUTE COUNT: 7.3 K/UL (ref 2.5–7)
SODIUM BLD-SCNC: 136 MMOL/L (ref 135–144)
WBC # BLD: 8.8 K/UL (ref 3.5–11)
WBC # BLD: ABNORMAL 10*3/UL

## 2022-01-27 PROCEDURE — 94640 AIRWAY INHALATION TREATMENT: CPT

## 2022-01-27 PROCEDURE — 6370000000 HC RX 637 (ALT 250 FOR IP): Performed by: INTERNAL MEDICINE

## 2022-01-27 PROCEDURE — 99232 SBSQ HOSP IP/OBS MODERATE 35: CPT | Performed by: INTERNAL MEDICINE

## 2022-01-27 PROCEDURE — 85025 COMPLETE CBC W/AUTO DIFF WBC: CPT

## 2022-01-27 PROCEDURE — 94669 MECHANICAL CHEST WALL OSCILL: CPT

## 2022-01-27 PROCEDURE — 6360000002 HC RX W HCPCS: Performed by: INTERNAL MEDICINE

## 2022-01-27 PROCEDURE — 94761 N-INVAS EAR/PLS OXIMETRY MLT: CPT

## 2022-01-27 PROCEDURE — 97530 THERAPEUTIC ACTIVITIES: CPT

## 2022-01-27 PROCEDURE — 99231 SBSQ HOSP IP/OBS SF/LOW 25: CPT | Performed by: INTERNAL MEDICINE

## 2022-01-27 PROCEDURE — 1200000000 HC SEMI PRIVATE

## 2022-01-27 PROCEDURE — 36415 COLL VENOUS BLD VENIPUNCTURE: CPT

## 2022-01-27 PROCEDURE — 80048 BASIC METABOLIC PNL TOTAL CA: CPT

## 2022-01-27 PROCEDURE — 82947 ASSAY GLUCOSE BLOOD QUANT: CPT

## 2022-01-27 PROCEDURE — 2580000003 HC RX 258: Performed by: INTERNAL MEDICINE

## 2022-01-27 PROCEDURE — 93005 ELECTROCARDIOGRAM TRACING: CPT | Performed by: INTERNAL MEDICINE

## 2022-01-27 RX ORDER — DILTIAZEM HYDROCHLORIDE 120 MG/1
120 CAPSULE, COATED, EXTENDED RELEASE ORAL DAILY
Status: DISCONTINUED | OUTPATIENT
Start: 2022-01-27 | End: 2022-01-27

## 2022-01-27 RX ORDER — MEROPENEM 1 G/1
INJECTION, POWDER, FOR SOLUTION INTRAVENOUS
Status: DISPENSED
Start: 2022-01-27 | End: 2022-01-28

## 2022-01-27 RX ORDER — MEROPENEM 1 G/1
INJECTION, POWDER, FOR SOLUTION INTRAVENOUS
Status: DISPENSED
Start: 2022-01-27 | End: 2022-01-27

## 2022-01-27 RX ADMIN — ENOXAPARIN SODIUM 40 MG: 100 INJECTION SUBCUTANEOUS at 10:03

## 2022-01-27 RX ADMIN — POTASSIUM CHLORIDE 20 MEQ: 750 TABLET, FILM COATED, EXTENDED RELEASE ORAL at 10:04

## 2022-01-27 RX ADMIN — INSULIN LISPRO 3 UNITS: 100 INJECTION, SOLUTION INTRAVENOUS; SUBCUTANEOUS at 16:58

## 2022-01-27 RX ADMIN — METFORMIN HYDROCHLORIDE 500 MG: 500 TABLET ORAL at 16:59

## 2022-01-27 RX ADMIN — INSULIN LISPRO 1 UNITS: 100 INJECTION, SOLUTION INTRAVENOUS; SUBCUTANEOUS at 08:00

## 2022-01-27 RX ADMIN — DILTIAZEM HYDROCHLORIDE 30 MG: 30 TABLET, FILM COATED ORAL at 21:26

## 2022-01-27 RX ADMIN — DEXAMETHASONE SODIUM PHOSPHATE 6 MG: 10 INJECTION INTRAMUSCULAR; INTRAVENOUS at 05:39

## 2022-01-27 RX ADMIN — INSULIN LISPRO 1 UNITS: 100 INJECTION, SOLUTION INTRAVENOUS; SUBCUTANEOUS at 21:28

## 2022-01-27 RX ADMIN — FUROSEMIDE 40 MG: 40 TABLET ORAL at 10:04

## 2022-01-27 RX ADMIN — ASPIRIN 81 MG: 81 TABLET, COATED ORAL at 10:04

## 2022-01-27 RX ADMIN — MEROPENEM 1000 MG: 1 INJECTION, POWDER, FOR SOLUTION INTRAVENOUS at 05:46

## 2022-01-27 RX ADMIN — ALLOPURINOL 100 MG: 100 TABLET ORAL at 10:05

## 2022-01-27 RX ADMIN — MEROPENEM 1000 MG: 1 INJECTION, POWDER, FOR SOLUTION INTRAVENOUS at 12:35

## 2022-01-27 RX ADMIN — INSULIN LISPRO 3 UNITS: 100 INJECTION, SOLUTION INTRAVENOUS; SUBCUTANEOUS at 12:28

## 2022-01-27 RX ADMIN — OXYCODONE HYDROCHLORIDE AND ACETAMINOPHEN 1000 MG: 500 TABLET ORAL at 10:05

## 2022-01-27 RX ADMIN — SODIUM CHLORIDE, PRESERVATIVE FREE 10 ML: 5 INJECTION INTRAVENOUS at 21:00

## 2022-01-27 RX ADMIN — ZINC SULFATE 220 MG (50 MG) CAPSULE 50 MG: CAPSULE at 10:06

## 2022-01-27 RX ADMIN — DILTIAZEM HYDROCHLORIDE 30 MG: 30 TABLET, FILM COATED ORAL at 10:02

## 2022-01-27 RX ADMIN — Medication 1 CAPSULE: at 10:03

## 2022-01-27 RX ADMIN — MEROPENEM 1000 MG: 1 INJECTION, POWDER, FOR SOLUTION INTRAVENOUS at 21:20

## 2022-01-27 RX ADMIN — SODIUM CHLORIDE: 4.5 INJECTION, SOLUTION INTRAVENOUS at 03:51

## 2022-01-27 RX ADMIN — THERA TABS 1 TABLET: TAB at 10:05

## 2022-01-27 RX ADMIN — SODIUM CHLORIDE 25 ML: 9 INJECTION, SOLUTION INTRAVENOUS at 21:19

## 2022-01-27 RX ADMIN — ATORVASTATIN CALCIUM 10 MG: 20 TABLET, FILM COATED ORAL at 10:05

## 2022-01-27 RX ADMIN — CHOLECALCIFEROL TAB 25 MCG (1000 UNIT) 1000 UNITS: 25 TAB at 10:04

## 2022-01-27 ASSESSMENT — PAIN SCALES - GENERAL
PAINLEVEL_OUTOF10: 0
PAINLEVEL_OUTOF10: 0

## 2022-01-27 NOTE — PROGRESS NOTES
Phone: Mikki  Date: 2022  Fax: [phone]      Physical Therapy    Daily Note    Patient Name: Shannon Corbin      : 1940  (89 y.o.)  MRN: [id number]     Pt is PROGRESSING toward goals and increased independence of mobility this treatment session        Assessment        Supine to Sit: Minimal assistance (x1-2 with extended time)  Sit to Supine: Maximal assistance (x2)       Sit to Stand: Unable to assess                                    Assessment: Co-treat with MARINA for safety concerns. Patient in bed upon arrival to room. She agrees to sit up at side of bed. Able to complete this task with min assist x 1-2 and extended time allowed. She sits at edge of bed with good balance. Able to wash her face and completes limited exericses. Patient able to sit at edge of bed ~10 minutes. Returns to supine position with max assist x 2. Rolling side to side is mod assist.  Call light in reach following and bed alarm turned on.   Safety Devices  Type of devices: Call light within reach,Left in bed          Time RN:[id number]  Time Out: 931  Timed Coded Minutes: 16 (co-treat with MARINA)  Total Treatment Time: 32  Exercises:  See Flowsheets    Plan  Cont Per Plan Of Care    Goals  Short Term Goals  Time Frame for Short term goals: 7 days (exp 22)  Short term goal 1: Patient to transfer supine to sit with mod assist x1  Short term goal 2: Patient to sit bedside with reaching with good dynamic balance  Short term goal 3: Patient to have improved endurance with complete 15 min of therex following directions          [id number] [facility] License Number: [id number]    Date: 2022

## 2022-01-27 NOTE — PROGRESS NOTES
Cardiology    Developed SVT yesterday which converted to NSR. Difficult situation. Will start Cardizem  daily tomorrow. Would place 30 day event recorder on discharge.     Thanks, Guerline Merino MD

## 2022-01-27 NOTE — PROGRESS NOTES
Hospitalist Progress Note  1/27/2022 7:56 AM  Subjective:   Admit Date: 1/21/2022  PCP: Kishore Dueñas MD    Interval History: It is more alert this morning, engaging in conversation and actually asking questions. Getting ready for breakfast.  Has no complaints. Yesterday she had intermittent episodes of SVTs yesterday with heart rate in 150s. Was treated with IV Cardizem. Continues to be bradycardic on telemetry with heart rate in 50s. Totally asymptomatic. Diet: ADULT DIET;  Dysphagia - Pureed; 4 carb choices (60 gm/meal)  Medications:   Scheduled Meds:   meropenem        insulin lispro  0-6 Units SubCUTAneous TID WC    insulin lispro  0-3 Units SubCUTAneous Nightly    meropenem        meropenem (MERREM) 1000 mg IVPB extended (mini-bag)  1,000 mg IntraVENous Q8H    [Held by provider] allopurinol  100 mg Oral Daily    [Held by provider] aspirin  81 mg Oral Daily    [Held by provider] oyster shell calcium w/D  1 tablet Oral Daily    [Held by provider] dilTIAZem  30 mg Oral TID    [Held by provider] furosemide  40 mg Oral Daily    [Held by provider] metFORMIN  500 mg Oral BID WC    [Held by provider] metoprolol tartrate  25 mg Oral BID    [Held by provider] multivitamin  1 tablet Oral Daily    [Held by provider] potassium chloride  20 mEq Oral Daily    [Held by provider] lactobacillus  1 capsule Oral Daily    [Held by provider] atorvastatin  10 mg Oral Daily    [Held by provider] sodium chloride  1 g Oral BID    sodium chloride flush  5-40 mL IntraVENous 2 times per day    enoxaparin  40 mg SubCUTAneous Daily    [Held by provider] Vitamin D  1,000 Units Oral Daily    [Held by provider] ascorbic acid  1,000 mg Oral Daily    [Held by provider] zinc sulfate  50 mg Oral Daily    dexamethasone  6 mg IntraVENous Q24H    [Held by provider] guaiFENesin  600 mg Oral BID    [Held by provider] dilTIAZem  5 mg IntraVENous Q6H     Continuous Infusions:   sodium chloride      dextrose      sodium chloride 75 mL/hr at 01/27/22 0351     PRN Medications: albuterol sulfate HFA, guaiFENesin, sodium chloride flush, sodium chloride, ondansetron **OR** ondansetron, polyethylene glycol, acetaminophen **OR** acetaminophen, metoprolol, glucose, dextrose, glucagon (rDNA), dextrose    Objective:   Vitals: BP (!) 148/69   Pulse 61   Temp 97.9 °F (36.6 °C) (Axillary)   Resp 18   Ht 5' (1.524 m)   Wt 214 lb (97.1 kg)   LMP  (LMP Unknown)   SpO2 96%   BMI 41.79 kg/m²   BMI: Body mass index is 41.79 kg/m². CBC:   Recent Labs     01/25/22  0430   WBC 7.0   HGB 10.8*        BMP:    Recent Labs     01/25/22  0430      K 3.6*   *   CO2 23   BUN 29*   CREATININE 0.58   GLUCOSE 101*       Physical Exam:       General Appearance: Awake, cooperative, oriented to place  Cardiovascular: Mildly bradycardic, regular rhythm, normal S1 and S2, no murmurs  Pulmonary/Chest: Mild scattered rhonchi, no wheezes, normal air movement  Abdomen: soft, non-tender, non-distended, normal bowel sounds  Extremities: Bilateral lower extremities with severe lymphedema  Skin: warm and dry, no rash  Neurological:  Awake, normal speech, no focal findings or movement disorder noted      Assessment and Plan:     1.  Altered mental status, unresponsive state  - improved, likely was secondary to nfectious process and dehydration.  CT head 1/19 revealed no acute changes. 2.  Acute pyelonephritis - urine cultures positive for ESBL positive Klebsiella.  Started on meropenem on 1/22/2022. Patient with recent history of right ureteral stent placement on 12/16/2021. Stent has been removed. 3.  Possible LLL pneumonia seen on CT scan -on IV antibiotics  4.  COVID - 19 infection with Covid pneumonia with hypoxia - diagnosed on 1/12/2022.  Was on prednisone at [facility], started on IV Decadron 1/21 here.  ID consulted. Peg Bucker is out of the window for remdesivir and monoclonal antibodies.  Actemra not indicated.  Continue on Decadron.

## 2022-01-27 NOTE — PLAN OF CARE
Problem: Airway Clearance - Ineffective  Goal: Achieve or maintain patent airway  Outcome: Met This Shift     Problem: Nutrition Deficits  Goal: Optimize nutritional status  Outcome: Ongoing     Problem: Risk for Fluid Volume Deficit  Goal: Maintain normal heart rhythm  Outcome: Ongoing

## 2022-01-27 NOTE — PROGRESS NOTES
[facility]  Occupational Therapy  Daily Note  Date: 2022  Patient Name: Michel Coleman        MRN: [id number]    : 1940  (80 y.o.)    Subjective: Pt in bed upon arrival  Pt is PROGRESSING toward goals and independence of Self Care this treatment session  Continue to assess Pending Progress    Objective  ADL  Grooming: Setup,Minimal assistance (comb hair sitting EOB)  UE Bathing: Setup (wash face sitting EOB)     Supine to Sit: Minimal assistance,2 Person assistance (increased time to complete)  Sit to Supine: Maximum assistance,2 Person assistance    Balance  Sitting Balance: Minimal assistance    Assessment  Assessment: Patient supine in bed upon arrival, agreeable to therapy interventions at this time. Co-tx with PTA is required d/t increased assistance with bed mobility and safety concerns. Patient demonstrates above documented assistance when completing bed mobility. Sits EOB x10 min to engage in grooming and bathing tasks (wash face and comb hair). HR is monitored throughout session per nursing. MAX HR sitting EOB was 154 bpm, patient reports she begins to feel dizzy, assisted patient back to supine. Remains in bed at end of session with call light and other personal items within reach. Bed alarm in place. Will continue to address goals as able.     Prognosis: Poor  Discharge Recommendations: Subacute/Skilled Nursing Facility  Activity Tolerance: Treatment limited secondary to decreased cognition  Safety Devices in place: Yes    Exercises:  See Flowsheets    Goals  Short Term Goals  Time Frame for Short term goals: 7 days (2022)  Short term goal 1: Pt to complete bed mobility Mod A +2  Short term goal 2: Pt to tolerate sitting EOB x 2-3 min with CGA in order to engage in UB exercises  Short term goal 3: Pt to tolerate KAIDEN UB exercises/activity x 5 min w/ 3 or fewer rest breaks  Short term goal 4: Pt to follow simple 1-2 step commands in order to engage in functional mobility -MET    Time In: 5704  Time Out: 1874  Timed Coded Minutes: 16  Total Treatment Time: 32 (Co-tx with PTA)    AVIVA Quan    Date: 1/27/2022

## 2022-01-27 NOTE — PROGRESS NOTES
[facility] Note   COVID 19 Patient  Today's Date and Time: 1/27/2022, 4:23 PM    Impression :     COVID 19 Confirmed Infection  Covid tests:  11-2-21: negative  1-12-22: Positive at [facility]  1-21-22: Positive  Altered mentation   Rt side UTI with ESBL + Klebsiella    Recommendations:   Antibiotic treatment:  Meropenem 1 gm IV q 8 hr. Stop date 1-31-22  Covid Rx:    Remdesivir. Out of the window  Decadron 6 mg q day IV. Start date 1-22-22  Actemra. Not indicated  Monoclonal antibodies. Out of the window      Medical Decision Making/Summary/Discussion:1/27/2022     Patient admitted with COVID 19 infection initially diagnosed at [facility] around 1-12-21  Initial presentation with altered mentation in the setting of hyponatremia, which has since been corrected  Altered mentation has persisted. She has hypoxia but is only requiring only small amounts of nasal 02. There is concern for possible Rt side hydronephrosis and UTI. Klebsiella ESBL + grown from urine    Infection Control Recommendations   [place] Precautions  Airborne isolation  Droplet Isolation  Isolate until 2-2-22    Antimicrobial Stewardship Recommendations     Simplification of therapy  Targeted therapy    Coordination of Outpatient Care:   Estimated Length of IV antimicrobials:TBD  Patient will need Midline Catheter Insertion: TBD  Patient will need PICC line Insertion: No  Patient will need: Home IV , [place],  SNF,  LTAC:TBD  Patient will need outpatient wound care:No    Chief complaint/reason for consultation:   Concern for COVID infection      History of Present Illness:   Jolanta Martin is a 80y.o.-year-old  female who was initially admitted on 1/21/2022. Patient seen at the request of . INITIAL HISTORY:    Patient presented through ER on 1-19-22 with complaints of mentation changes at her ECF, having developed unresponsiveness.  She had tested positive for Covid around 1-12-22 at the [facility]. She was found to have sinusitis by CT, hyponatremia (Na 127) and possibly a UTI. Patient received hydration and returned to [facility]. She returned on 1-20-22 with an elevated glucose level. CT abdomen suggested possible pyelonephritis and changes at the LLL suggestive of possible pneumonia. She received Ertapenem and was returned to [facility] to continue antibiotics. She returned on 1-21-22 with altered mentation, tachycardia, hypoxia requiring nasal 02. Patient admitted because of concerns with COVID 19 and altered mentation. CURRENT EVALUATION : 1/27/2022    Afebrile  VS stable, HTN    Patient is more alert today    Tolerating Meropenem for ESBL + Klebsiella UTI  Patient exhibiting respiratory distress. yes  Respiratory secretions: No    Patient receiving supplemental oxygen. Nasal 02 @ 2 L/min-->room air  RR 22-->19-->16-->18  02 sat 98-->94-->95 -->97 %      NEWS Score: 0-4 Low risk group; 5-6: Medium risk group; 7 or above: High risk group  Parameters 3 2 1 0 1 2 3   Age    < 65   = 65   RR = 8  9-11 12-20  21-24 = 25   O2 Sats = 91 92-93 94-95 = 96      Suppl O2  Yes  No      SBP = 90  101-110 111-219   = 220   HR = 40  41-50 51-90  111-130 = 131   Consciousness    Alert   Drowsiness, lethargy, or confusion   Temperature = 35.0 C (95.0 F)  35.1-36.0 C 95.1-96.9 F 36.1-38.0 C 97.0-100.4 F 38.1-39.0 C 100.5-102.3 F = 39.1 C = 102.4 F      NEWS Score:  1-22-22: 14 High Risk  1-: 4 low risk     Overall Daily Picture:     Unchanged    Presence of secondary bacterial Infection:  Yes  Additional antibiotics: meropenem    Labs, X rays reviewed: 1/27/2022    BUN: 26-->28-->29-->21  Cr: 0.86-->0.67-->0.58-->0.47    WBC: 7.5-->7.0-->8.8  Hb: 12.0-->10.8-->12.0  Plat: 306-->188-->181    Absolute Neutrophils: 5.6  Absolute Lymphocytes: 1.30  Neutrophil/Lymphocyte Ratio: 4.3 High Risk    CRP: 12.4-->3.9--> <3.0  Ferritin:  LDH: 175    Pro Units SubCUTAneous Nightly    meropenem (MERREM) 1000 mg IVPB extended (mini-bag)  1,000 mg IntraVENous Q8H    allopurinol  100 mg Oral Daily    aspirin  81 mg Oral Daily    furosemide  40 mg Oral Daily    metFORMIN  500 mg Oral BID WC    multivitamin  1 tablet Oral Daily    potassium chloride  20 mEq Oral Daily    lactobacillus  1 capsule Oral Daily    atorvastatin  10 mg Oral Daily    sodium chloride flush  5-40 mL IntraVENous 2 times per day    enoxaparin  40 mg SubCUTAneous Daily    Vitamin D  1,000 Units Oral Daily    ascorbic acid  1,000 mg Oral Daily    zinc sulfate  50 mg Oral Daily    dexamethasone  6 mg IntraVENous Q24H       Social History:     Social History     Socioeconomic History    Marital status:      Spouse name: Not on file    Number of children: Not on file    Years of education: Not on file    Highest education level: Not on file   Occupational History    Not on file   Tobacco Use    Smoking status: Never Smoker    Smokeless tobacco: Never Used   Vaping Use    Vaping Use: Never used   Substance and Sexual Activity    Alcohol use: No    Drug use: No    Sexual activity: Not on file   Other Topics Concern    Not on file   Social History Narrative    Not on file     Social Determinants of Health     Financial Resource Strain:     Difficulty of Paying Living Expenses: Not on file   Food Insecurity:     Worried About Running Out of Food in the Last Year: Not on file    Irineo of Food in the Last Year: Not on file   Transportation Needs:     Lack of Transportation (Medical): Not on file    Lack of Transportation (Non-Medical):  Not on file   Physical Activity:     Days of Exercise per Week: Not on file    Minutes of Exercise per Session: Not on file   Stress:     Feeling of Stress : Not on file   Social Connections:     Frequency of Communication with Friends and Family: Not on file    Frequency of Social Gatherings with Friends and Family: Not on file   [employer] Attends Buddhism Services: Not on file    Active Member of Clubs or Organizations: Not on file    Attends Club or Organization Meetings: Not on file    Marital Status: Not on file   Intimate Partner Violence:     Fear of Current or Ex-Partner: Not on file    Emotionally Abused: Not on file    Physically Abused: Not on file    Sexually Abused: Not on file   Housing Stability:     Unable to Pay for Housing in the Last Year: Not on file    Number of [place] in the Last Year: Not on file    Unstable Housing in the Last Year: Not on file       Family History:     Family History   Problem Relation Age of Onset    Heart Disease Mother     Cancer Sister     Mult Sclerosis Sister     Cancer Brother         lung        Allergies:   Adhesive tape and Sulfa antibiotics     Review of Systems:     Unable to provide. Altered mentation    Constitutional: No fevers or chills. No systemic complaints  Head: No headaches  Eyes: No double vision or blurry vision. No conjunctival inflammation. ENT: No sore throat or runny nose. . No hearing loss, tinnitus or vertigo. Cardiovascular: No chest pain or palpitations. No Shortness of breath. No MCDONOUGH  Lung: No Shortness of breath or cough. No sputum production  Abdomen: No nausea, vomiting, diarrhea, or abdominal pain. Clayborne Hoguet No cramps. Genitourinary: No increased urinary frequency, or dysuria. No hematuria. No suprapubic or CVA pain  Musculoskeletal: No muscle aches or pains. No joint effusions, swelling or deformities  Hematologic: No bleeding or bruising. Neurologic: No headache, weakness, numbness, or tingling. Integument: No rash, no ulcers. Psychiatric: No depression. Endocrine: No polyuria, no polydipsia, no polyphagia.     Physical Examination :     Patient Vitals for the past 8 hrs:   BP Temp Temp src Pulse Resp SpO2   01/27/22 1518 134/73 97.3 °F (36.3 °C) Axillary 93 18 95 %   01/27/22 1130 134/75 97.3 °F (36.3 °C) Axillary 94 16 96 %   01/27/22 1116 -- -- -- -- 18 98 %   01/27/22 0900 117/65 97.4 °F (36.3 °C) Axillary 103 18 93 %     General Appearance: Somnolent, difficult to arouse  Head:  Normocephalic, no trauma  Eyes: Pupils equal, round, reactive to light; sclera anicteric; conjunctivae pink. No embolic phenomena. ENT: Oropharynx clear, without erythema, exudate, or thrush. No tenderness of sinuses. Mouth/throat: mucosa pink and moist. No lesions. On nasal 02  Neck:Supple, without lymphadenopathy. Thyroid normal, No bruits. Pulmonary/Chest: Clear to auscultation, without wheezes, rales, or rhonchi. No dullness to percussion. Cardiovascular: Regular rate and rhythm without murmurs, rubs, or gallops. Abdomen: Soft, non tender. Bowel sounds normal. No organomegaly  All four Extremities: No cyanosis, clubbing. Has bilateral leg lymphedema. .  Neurologic: No gross sensory or motor deficits. Difficult to arouse. Moaning. Skin: Warm and dry with good turgor. Signs of peripheral arterial and venous insufficiency. No ulcerations. No open wounds. Medical Decision Making -Laboratory:   I have independently reviewed/ordered the following labs:    CBC with Differential:   Recent Labs     01/25/22  0430 01/27/22  0831   WBC 7.0 8.8   HGB 10.8* 12.0   HCT 26.8* 34.2*    181   LYMPHOPCT 12* 7*   MONOPCT 5 5     BMP:   Recent Labs     01/25/22  0430 01/27/22  0831    136   K 3.6* 3.9   * 107   CO2 23 21   BUN 29* 21   CREATININE 0.58 0.47*     Hepatic Function Panel:   No results for input(s): PROT, LABALBU, BILIDIR, IBILI, BILITOT, ALKPHOS, ALT, AST in the last 72 hours. No results for input(s): RPR in the last 72 hours. No results for input(s): HIV in the last 72 hours. No results for input(s): BC in the last 72 hours.   Lab Results   Component Value Date    MUCUS 1+ 01/21/2022    RBC 3.88 01/27/2022    TRICHOMONAS NOT REPORTED 01/21/2022    WBC 8.8 01/27/2022    YEAST NOT REPORTED 01/21/2022    TURBIDITY Clear 01/21/2022     Lab Results   Component Value Date    CREATININE 0.47 01/27/2022    GLUCOSE 171 01/27/2022    GLUCOSE 177 04/21/2012       Medical Decision Making-Imaging:     EXAMINATION: CT ABDOMEN PELVIS WO CONTRAST, 1/20/2022 8:40 PM EST       HISTORY: Abdominal pain. Urinary tract infection.       COMPARISON: November 3, 2021            TECHNIQUE: CT scan of the abdomen and pelvis was performed without IV contrast.    CT dose reduction technique was used, including Automated Exposure Control.            FINDINGS:        CT ABDOMEN: Included lower thorax demonstrates small bilateral pleural    effusions with adjacent atelectasis involving the lower lobes. There is also    patchy peripheral consolidation within the left lower lobe and lingula. The    heart is enlarged, with coronary arthroscopic vascular calcifications in    addition to thick calcification at the mitral annulus. Small hiatal hernia.       The liver, left kidney, and pancreas have an unremarkable noncontrast    appearance. The spleen measures 16.9 cm in length. Nodular thickening to the    adrenal glands suggestive of adenomatous hyperplasia. The gallbladder is    distended to 10 cm with intrinsic dependent density noted.       There is slight asymmetric perinephric inflammatory stranding that is greater    on the right with mild dilatation of right renal collecting system and proximal    right ureter where there is uroepithelial thickening. Within the pelvis, the    ureters not dilated. Borderline enlarged portacaval lymph node measuring 1.8 x    1.3 cm. No retroperitoneal adenopathy.       CT PELVIS: The bladder is nondistended. The uterus and adnexal regions are    unremarkable. There are multiple colonic diverticula with most of the colon    mildly distended with stool. No dilated loops of small bowel. Multilevel    moderate to severe degenerative changes of the spine with minimal    anterolisthesis at L4 on L5.               Impression       1.  Mild right hydronephrosis and proximal hydroureter with uroepithelial    thickening and asymmetric perinephric inflammatory stranding. Although possibly    associated with a recently placed ureteral stent, this is concerning for    infection (acute right pyelonephritis, pyelitis and ureteritis). No obstructing    ureteral stone identified.       2. Small bilateral pleural effusions with adjacent atelectasis at the lung    bases. Peripheral patchy consolidation within the left lower lobe and lingula    could be pneumonia.       3. Cardiomegaly with coronary atherosclerotic vascular disease.       4. Small hiatal hernia.       5. Colonic diverticulosis.       6. Cholelithiasis within a borderline hydropic gallbladder.       7. Splenomegaly.       8. Adenomatous hyperplasia of the adrenal glands.             EXAM: XR CHEST PORTABLE        HISTORY: Reason for exam:->AMS, dx COVID(+) ~1 week ago       COMPARISON: Portable chest 11/2/2021.            TECHNIQUE: AP portable chest 1326 hours.           FINDINGS: Nonspecific patchy infiltrates are present bilaterally,    mild-to-moderate, left greater than right. Mild cardiomegaly.               Impression       Mild patchy bilateral infiltrates suspicious for early pneumonia. Medical Decision [id number]:     Contains abnormal data Culture, Urine  Order: [id number]  Status: Final result    Visible to patient: Yes (not seen)    Next appt: 02/03/2022 at 02:30 PM in Urology Armando Barrow MD)    Specimen Information: Urine, catheter        0 Result Notes    Component 1/19/22 1400 Resulting Agency   Specimen Description . URINE, CATHETERIZED  [address] [facility]   Special Requests STRAIGHT CATH  [address] [facility]   Culture  Abnormal   KLEBSIELLA PNEUMONIAE 10 to 50,000 CFU/ML THIS ORGANISM IS AN EXTENDED-SPECTRUM BETA-LACTAMASE  AND RESISTANCE TO THERAPY WITH PENICILLINS, CEPHALOSPORINS AND AZTREONAM IS EXPECTED.  THESE ORGANISMS GENERALLY REMAIN SUSCEPTIBLE TO CARBAPENEMS.  CONSIDER ID CONSULTATION. [facility] Sobia Rosa     Klebsiella pneumoniae     BACTERIAL SUSCEPTIBILITY PANEL ANASTACIA     amikacin <=2  Sensitive     ampicillin >=32  Resistant     ampicillin-sulbactam NOT REPORTED       aztreonam 16  Resistant     ceFAZolin >=64   Cefazolin s. .. Resistant  Resistant     cefepime NOT REPORTED       cefTRIAXone >=64  Resistant     ciprofloxacin 2  Intermediate     Confirmatory Extended Spectrum Beta-Lactamase POSITIVE  Positive     ertapenem NOT REPORTED       gentamicin >=16  Resistant     meropenem <=0.25  Sensitive     nitrofurantoin 128  Resistant     piperacillin-tazobactam 16  Resistant     tigecycline NOT REPORTED       tobramycin >=16  Resistant     trimethoprim-sulfamethoxazole >=320  Resistant                  Specimen Collected: 01/19/22 14:00 Last Resulted: 01/22/22 12:27             Medical Decision Making-Other:     Note:  Labs, medications, radiologic studies were reviewed with personal review of films  Large amounts of data were reviewed  Discussed with nursing Staff, Discharge planner  Infection Control and Prevention measures reviewed  All prior entries were reviewed  Administer medications as ordered  Prognosis: Guarded  Discharge planning reviewed  Follow up as outpatient. Thank you for allowing us to participate in the care of this patient. Please call with questions. MD Simon Wood participated in the evaluation of this patient under my direct supervision. ATTESTATION:    I have discussed the case, including pertinent history and exam findings with the residents and students. I have seen and examined the patient and the key elements of the encounter have been performed by me. I was present when the student obtained his information or examined the patient. I have reviewed the laboratory data, other diagnostic studies and discussed them with the residents. I have updated the medical record where necessary.     I agree with the assessment, plan and orders as documented by the resident/ student.     Arlen Jarvis MD.

## 2022-01-27 NOTE — PROGRESS NOTES
Patient takes PO medications this morning as ordered, unheld by Dr. Nathaniel Jaimes, without incident. Takes pills one at a time in applesauce.

## 2022-01-27 NOTE — PROGRESS NOTES
Patient sinus cheikh with 1st AVB with frequent PACs, PVCs. At 0930 becomes Afib rate controlled. Currently heart rate is 89 in continued Afib.

## 2022-01-28 VITALS
HEART RATE: 68 BPM | SYSTOLIC BLOOD PRESSURE: 134 MMHG | DIASTOLIC BLOOD PRESSURE: 82 MMHG | RESPIRATION RATE: 18 BRPM | HEIGHT: 60 IN | WEIGHT: 214 LBS | TEMPERATURE: 98.2 F | OXYGEN SATURATION: 98 % | BODY MASS INDEX: 42.01 KG/M2

## 2022-01-28 PROBLEM — R13.19 OTHER DYSPHAGIA: Status: ACTIVE | Noted: 2022-01-28

## 2022-01-28 LAB
GLUCOSE BLD-MCNC: 159 MG/DL (ref 65–99)
GLUCOSE BLD-MCNC: 244 MG/DL (ref 65–99)

## 2022-01-28 PROCEDURE — 6360000002 HC RX W HCPCS: Performed by: INTERNAL MEDICINE

## 2022-01-28 PROCEDURE — 2580000003 HC RX 258: Performed by: INTERNAL MEDICINE

## 2022-01-28 PROCEDURE — 6370000000 HC RX 637 (ALT 250 FOR IP): Performed by: INTERNAL MEDICINE

## 2022-01-28 PROCEDURE — 94761 N-INVAS EAR/PLS OXIMETRY MLT: CPT

## 2022-01-28 PROCEDURE — 93270 REMOTE 30 DAY ECG REV/REPORT: CPT

## 2022-01-28 PROCEDURE — 94640 AIRWAY INHALATION TREATMENT: CPT

## 2022-01-28 PROCEDURE — 82947 ASSAY GLUCOSE BLOOD QUANT: CPT

## 2022-01-28 PROCEDURE — 94669 MECHANICAL CHEST WALL OSCILL: CPT

## 2022-01-28 RX ORDER — SODIUM CHLORIDE 1000 MG
1 TABLET, SOLUBLE MISCELLANEOUS DAILY
Qty: 90 TABLET | Refills: 3
Start: 2022-01-28

## 2022-01-28 RX ORDER — ZINC SULFATE 50(220)MG
50 CAPSULE ORAL DAILY
Qty: 30 CAPSULE | Refills: 3 | DISCHARGE
Start: 2022-01-28 | End: 2022-10-18

## 2022-01-28 RX ORDER — ASPIRIN 81 MG/1
81 TABLET ORAL DAILY
Qty: 30 TABLET | Refills: 3 | DISCHARGE
Start: 2022-01-28

## 2022-01-28 RX ORDER — VITAMIN B COMPLEX
1000 TABLET ORAL DAILY
Qty: 60 TABLET | DISCHARGE
Start: 2022-01-28

## 2022-01-28 RX ORDER — DILTIAZEM HYDROCHLORIDE 120 MG/1
120 CAPSULE, COATED, EXTENDED RELEASE ORAL DAILY
Qty: 30 CAPSULE | Refills: 3 | DISCHARGE
Start: 2022-01-28 | End: 2022-02-01 | Stop reason: ALTCHOICE

## 2022-01-28 RX ADMIN — METFORMIN HYDROCHLORIDE 500 MG: 500 TABLET ORAL at 08:05

## 2022-01-28 RX ADMIN — Medication 1 CAPSULE: at 08:04

## 2022-01-28 RX ADMIN — OXYCODONE HYDROCHLORIDE AND ACETAMINOPHEN 1000 MG: 500 TABLET ORAL at 08:04

## 2022-01-28 RX ADMIN — ZINC SULFATE 220 MG (50 MG) CAPSULE 50 MG: CAPSULE at 08:05

## 2022-01-28 RX ADMIN — ATORVASTATIN CALCIUM 10 MG: 20 TABLET, FILM COATED ORAL at 08:05

## 2022-01-28 RX ADMIN — ALLOPURINOL 100 MG: 100 TABLET ORAL at 08:05

## 2022-01-28 RX ADMIN — ASPIRIN 81 MG: 81 TABLET, COATED ORAL at 08:04

## 2022-01-28 RX ADMIN — DILTIAZEM HYDROCHLORIDE 30 MG: 30 TABLET, FILM COATED ORAL at 08:04

## 2022-01-28 RX ADMIN — MEROPENEM 1000 MG: 1 INJECTION, POWDER, FOR SOLUTION INTRAVENOUS at 05:13

## 2022-01-28 RX ADMIN — DEXAMETHASONE SODIUM PHOSPHATE 6 MG: 10 INJECTION INTRAMUSCULAR; INTRAVENOUS at 05:05

## 2022-01-28 RX ADMIN — INSULIN LISPRO 1 UNITS: 100 INJECTION, SOLUTION INTRAVENOUS; SUBCUTANEOUS at 07:46

## 2022-01-28 RX ADMIN — MEROPENEM 1000 MG: 1 INJECTION, POWDER, FOR SOLUTION INTRAVENOUS at 12:43

## 2022-01-28 RX ADMIN — SODIUM CHLORIDE, PRESERVATIVE FREE 10 ML: 5 INJECTION INTRAVENOUS at 08:15

## 2022-01-28 RX ADMIN — THERA TABS 1 TABLET: TAB at 08:05

## 2022-01-28 RX ADMIN — SODIUM CHLORIDE, PRESERVATIVE FREE 10 ML: 5 INJECTION INTRAVENOUS at 12:39

## 2022-01-28 RX ADMIN — FUROSEMIDE 40 MG: 40 TABLET ORAL at 08:05

## 2022-01-28 RX ADMIN — SODIUM CHLORIDE, PRESERVATIVE FREE 10 ML: 5 INJECTION INTRAVENOUS at 13:38

## 2022-01-28 RX ADMIN — CHOLECALCIFEROL TAB 25 MCG (1000 UNIT) 1000 UNITS: 25 TAB at 08:05

## 2022-01-28 RX ADMIN — SODIUM CHLORIDE 25 ML: 9 INJECTION, SOLUTION INTRAVENOUS at 05:10

## 2022-01-28 RX ADMIN — POTASSIUM CHLORIDE 20 MEQ: 750 TABLET, FILM COATED, EXTENDED RELEASE ORAL at 08:05

## 2022-01-28 RX ADMIN — ENOXAPARIN SODIUM 40 MG: 100 INJECTION SUBCUTANEOUS at 08:05

## 2022-01-28 RX ADMIN — INSULIN LISPRO 2 UNITS: 100 INJECTION, SOLUTION INTRAVENOUS; SUBCUTANEOUS at 11:56

## 2022-01-28 ASSESSMENT — PAIN SCALES - GENERAL
PAINLEVEL_OUTOF10: 0
PAINLEVEL_OUTOF10: 0

## 2022-01-28 NOTE — CONSULTS
Spoke with Nathalia's spouse [facility] via phone. Discussed advanced directives. [facility] states that Jin has a living will and [address] at home with [facility] listed as primary decision maker followed by Garrett Espinosa. Discussed Nathalia's care preferences for CPR and intubation. [facility] states \"before she got sick she told Garrett Espinosa that she would not want to be kept alive if it was her time. \" [facility] questions if Napolean Bamberger is doing ok. Reassurance provided and he states \"its just hard to talk about this stuff. We have been  62 years. \" After further discussion [facility] states that Napolean Bamberger would not want intubation or CPR. Discussed code status options. [facility] chooses [facility] for Napolean Bamberger at this time. DNR-CCA order prepared and left for physician review. Spoke with attending via phone. New order placed reflecting patient and family wishes.      [address] Nurse Coordinator  1/28/2022 12:50 PM

## 2022-01-28 NOTE — PROGRESS NOTES
Patient discharging back to [facility] today. Patient, family () and facility aware and agreeable. Discharge paperwork FAXED to facility per this writer. Transportation provided via [employer] with ETA of 2:30 p.m.     [address], [facility]  1/28/2022

## 2022-01-28 NOTE — PLAN OF CARE
Problem:  Body Temperature -  Risk of, Imbalanced  Goal: Ability to maintain a body temperature within defined limits  Outcome: Ongoing  Goal: Will regain or maintain usual level of consciousness  Outcome: Ongoing  Goal: Complications related to the disease process, condition or treatment will be avoided or minimized  Outcome: Ongoing     Problem: Risk for Fluid Volume Deficit  Goal: Maintain normal heart rhythm  Outcome: Ongoing  Goal: Maintain absence of muscle cramping  Outcome: Ongoing  Goal: Maintain normal serum potassium, sodium, calcium, phosphorus, and pH  Outcome: Ongoing

## 2022-01-28 NOTE — PROGRESS NOTES
calls in-updated with pt transfer to [facility] later today.  to call  with time when known.

## 2022-01-28 NOTE — PROGRESS NOTES
purewick removed and pt bathed. Barrier cream to bruised/reddened buttocks/renata area. Clean brief on.

## 2022-01-28 NOTE — PROGRESS NOTES
La Nena Mcdaniels at [facility] called to update that pt is [facility] but paperwork has not been signed by Dr. Anil Hale yet but will be signed in the morning. Original will then be brought to [facility].

## 2022-01-28 NOTE — PROGRESS NOTES
Several attempts to contact Dr Jennings Flight office, no answer. Message left for the office to call [address] home with cardiology f/u in 4-6 weeks.

## 2022-01-28 NOTE — PROGRESS NOTES
Meropenem to be infused over 30 minutes due to discharge to [facility] prior to time extended interval dosing would be completed. Nursing aware of timing change.    Thank you,  Orestes Adrian, RACHID.Ph., 1/28/2022,12:16 PM

## 2022-01-28 NOTE — DISCHARGE SUMMARY
Hospitalist Discharge Summary    Patient:  Michelle Mejia  YOB: 1940    MRN: [id number]   Acct: [de-identified]    Primary Care Physician: Clarissa Martin MD    Admit date:  1/21/2022    Discharge date:  1/28/2022       Discharge Diagnoses:     1. Altered mental status, unresponsive state, resolved  2. Pyelonephritis with urine cultures positive for ESBL + Klebsiella  3. Questionable left lower lobe pneumonia  4. COVID-19 infection with pneumonia and hypoxia  5. Bradycardia and episodes of junctional rhythm  6. SVTs  7. Dysphagia  8. Diabetes mellitus type 2, controlled  9. Hypokalemia  10. Mild malnutrition  11. Hyponatremia  12. Chronic lymphedema lower extremities      Discharge Medications:         Medication List      START taking these medications    ascorbic acid 1000 MG tablet  Commonly known as: VITAMIN C  Take 1 tablet by mouth daily     aspirin 81 MG EC tablet  Take 1 tablet by mouth daily  Replaces: aspirin 325 MG tablet     dilTIAZem 120 MG extended release capsule  Commonly known as: Cardizem CD  Take 1 capsule by mouth daily     ertapenem  infusion  Commonly known as: INVanz  Infuse 1,000 mg intravenously every 24 hours for 3 days Compound per protocol.   Replaces: ertapenem 1 GM injection     Vitamin D 25 MCG (1000 UT) Tabs tablet  Commonly known as: CHOLECALCIFEROL  Take 1 tablet by mouth daily     zinc sulfate 220 (50 Zn) MG capsule  Commonly known as: ZINCATE  Take 1 capsule by mouth daily        CHANGE how you take these medications    sodium chloride 1 g tablet  Take 1 tablet by mouth daily  What changed: when to take this        CONTINUE taking these medications    acetaminophen 325 MG tablet  Commonly known as: TYLENOL     ACIDOPHILUS HIGH-POTENCY PO     allopurinol 100 MG tablet  Commonly known as: ZYLOPRIM  TAKE 1 TABLET EVERY DAY     CALCIUM 600 + D PO     * CHOICE DM FORA G20 TEST STRIPS VI     * ONE TOUCH ULTRA TEST strip  Generic drug: blood glucose test strips  Test blood sugar once daily and as needed. * blood glucose test strips strip  Commonly known as: ASCENSIA AUTODISC VI;ONE TOUCH ULTRA TEST VI  Testing blood sugar once daily and as needed     furosemide 40 MG tablet  Commonly known as: LASIX  Take 1 tablet by mouth daily     guaiFENesin 100 MG/5ML syrup  Commonly known as: ROBITUSSIN     metFORMIN 500 MG tablet  Commonly known as: GLUCOPHAGE  Take 1 tablet by mouth 2 times daily (with meals)     multivitamin Tabs tablet  Take 1 tablet by mouth daily     OneTouch Delica Plus [id number] Misc  Testing blood sugar once daily and as needed, uses OneTouch UltraMini     Polyethylene Glycol 1450 Powd     potassium chloride 20 MEQ Tbcr extended release tablet  Commonly known as: KLOR-CON M  Take 1 tablet by mouth daily     simvastatin 20 MG tablet  Commonly known as: ZOCOR  TAKE 1 TABLET EVERY NIGHT     Ventolin  (90 Base) MCG/ACT inhaler  Generic drug: albuterol sulfate HFA         * This list has 3 medication(s) that are the same as other medications prescribed for you. Read the directions carefully, and ask your doctor or other care provider to review them with you. STOP taking these medications    aspirin 325 MG tablet  Replaced by: aspirin 81 MG EC tablet     dilTIAZem 30 MG tablet  Commonly known as: CARDIZEM     ertapenem 1 GM injection  Commonly known as:  INVanz  Replaced by: ertapenem  infusion     metoprolol tartrate 25 MG tablet  Commonly known as: LOPRESSOR     predniSONE 20 MG tablet  Commonly known as: Miri Pan           Where to Get Your Medications      Information about where to get these medications is not yet available    Ask your nurse or doctor about these medications  · ascorbic acid 1000 MG tablet  · aspirin 81 MG EC tablet  · dilTIAZem 120 MG extended release capsule  · ertapenem  infusion  · sodium chloride 1 g tablet  · Vitamin D 25 MCG (1000 UT) Tabs tablet  · zinc sulfate 220 (50 Zn) MG capsule         Diet:  ADULT DIET; Dysphagia - Pureed; 4 carb choices (60 gm/meal)    Activity: Up with assistance and a walker, advance activities as tolerates    Follow-up: Follow-up with the physician at [facility]    Consultants: Cardiology, Dr. Ronnie Nguyen    Diagnostic Test:      CBC:   Recent Labs     01/27/22  0831   WBC 8.8   HGB 12.0        BMP:    Recent Labs     01/27/22  0831      K 3.9      CO2 21   BUN 21   CREATININE 0.47*   GLUCOSE 171*     Calcium:  Recent Labs     01/27/22  0831   CALCIUM 8.8       Physical Exam:    Vitals:  Patient Vitals for the past 24 hrs:   BP Temp Temp src Pulse Resp SpO2 Weight   01/28/22 0609 -- -- -- -- -- 94 % --   01/28/22 0415 -- -- -- -- -- -- 214 lb (97.1 kg)   01/28/22 0400 136/78 97.7 °F (36.5 °C) Oral -- 18 100 % --   01/27/22 2143 -- -- -- -- -- 98 % --   01/27/22 1941 135/76 98.1 °F (36.7 °C) Oral -- 18 98 % --   01/27/22 1745 -- -- -- -- 18 97 % --   01/27/22 1518 134/73 97.3 °F (36.3 °C) Axillary 93 18 95 % --   01/27/22 1130 134/75 97.3 °F (36.3 °C) Axillary 94 16 96 % --   01/27/22 1116 -- -- -- -- 18 98 % --   01/27/22 0900 117/65 97.4 °F (36.3 °C) Axillary 103 18 93 % --     Weight: Weight: 214 lb (97.1 kg)         General Appearance: Awake, cooperative, oriented to place  Cardiovascular: Mildly bradycardic, regular rhythm, normal S1 and S2, no murmurs  Pulmonary/Chest: Mild scattered rhonchi, no wheezes, normal air movement  Abdomen: soft, non-tender, non-distended, normal bowel sounds  Extremities: Bilateral lower extremities with severe lymphedema  Skin: warm and dry, no rash, multiple ecchymotic lesions on upper extremities  Neurological:  Awake, normal speech, no focal findings or movement disorder noted    Hospital Course: The patient is a 80 y.o. female who presented to the ER on 1/19 via EMS, with nursing home staff stating that Jaren De Jesus was unresponsive. She was found to be responsive in the ER with stable vital signs.   She was noted to have tested for COVID 1 week prior. Labs showed a sodium of 127 and she was given IV hydration. CBC was normal.  UA showed a UTI. CT of the head showed no acute changes but did show sinusitis. She was sent back to the [facility] but was sent back to the ER on 1/20 secondary to the nursing staff at her ECF feeling she needed IV fluids. Apparently, she had a PICC line placed at the [facility] but nursing had a difficult time accessing it. She was given a dose of Invanz. CT scan of the abdomen and pelvis showed probable pyelonephritis. A patchy consolidation was seen in the LLL with the radiologist stating \"could be pneumonia\". Labs were normal at that time other than a glucose of 204. It was felt she could be discharged back to the ECF to continue on IV Invanz. She was sent back to the ER, from the ECF, with patient having less responsiveness and being tachycardic. She was found to have sinus tachycardia in the ER. Potassium was found to be 3.4 with a glucose of 185. CBC remained normal.    Vital signs appeared stable but Napolean Bamberger was very difficult to arouse and did require nasal oxygen to keep SPO2 > 90%. Napolean Bamberger was  admitted on cardiac monitoring with IV fluid hydration, IV meropenem. Due to unresponsive state she  was made NPO. Patient's hospital course as follows      1.  Altered mental status, unresponsive state  - improved, likely was secondary to nfectious process and dehydration.  CT head 1/19 revealed no acute changes. 2.  Acute pyelonephritis - urine cultures positive for ESBL positive Klebsiella.    Started on meropenem on 1/22/2022. ID recommends to continue IV antibiotics until 1/31/2022   patient with recent history of right ureteral stent placement on 12/16/2021. Stent has been removed.   3.  Possible LLL pneumonia seen on CT scan -treated with IV antibiotics  4.  COVID - 19 infection with Covid pneumonia with hypoxia - diagnosed on 1/12/2022.  Was on prednisone at [facility], started on IV Decadron 1/21 here.  ID consulted. Charito Car is out of the window for remdesivir and monoclonal antibodies. Savita Estrada not indicated. Will stop Decadron on discharge  5.   Bradycardia, episodes of junctional rhythm -patient asymptomatic. Cardizem and Lopressor initially were placed on hold.  Has a h/o SVTs and developed episodes of SVTs.    Dr. Marietta Pantoja was consulted. Eventually placed her back on Cardizem. Recommends outpatient event recorder. Lopressor discontinued  6.   Dysphagia -was evaluated by speech therapist and started on puréed diet . Follow-up with speech therapist outpatient  7.  Diabetes mellitus type 2, controlled. 8.  Hypokalemia. Replaced. Continue potassium supplement  9.  Mild malnutrition  10. SVTs -evaluated by Dr. Marietta Pantoja. Recommends Cardizem 120 mg daily on discharge. Stop beta-blockers due to episodes of bradycardia. Ordered event recorder.   Follow-up with Dr. Marietta Pantoja in 4 to 6 weeks    CODE STATUS full    Disposition: SNF    Condition: Stable    Time Spent: 35 minutes      Electronically signed by Jose Antonio Morrow MD on 1/28/2022 at 7:28 AM  Discharging Hospitalist

## 2022-01-29 LAB
EKG ATRIAL RATE: 68 BPM
EKG P AXIS: 64 DEGREES
EKG P-R INTERVAL: 224 MS
EKG Q-T INTERVAL: 470 MS
EKG QRS DURATION: 136 MS
EKG QTC CALCULATION (BAZETT): 499 MS
EKG R AXIS: -27 DEGREES
EKG T AXIS: 19 DEGREES
EKG VENTRICULAR RATE: 68 BPM

## 2022-01-29 PROCEDURE — 93010 ELECTROCARDIOGRAM REPORT: CPT | Performed by: INTERNAL MEDICINE

## 2022-02-01 ENCOUNTER — TELEPHONE (OUTPATIENT)
Dept: CARDIOLOGY CLINIC | Age: 82
End: 2022-02-01

## 2022-02-01 NOTE — TELEPHONE ENCOUNTER
reviewed episode on event monitor 2.8 sec pause   Will stop cariazem 120 mg and start   cardiazem 30 mg bid. Elena called talked with Evelyne   Orders given. No passing out episodes   Was confused yesterday and was   Given bolus 500 ml saline IV yesterday and   Today, confusion is better today.

## 2022-02-14 ENCOUNTER — TELEPHONE (OUTPATIENT)
Dept: CARDIOLOGY CLINIC | Age: 82
End: 2022-02-14

## 2022-03-01 NOTE — PROCEDURES
[place] [address]                                 EVENT MONITOR    PATIENT NAME: Dima RASHID                   :        1940  MED REC NO:   [id number]                              ROOM:  ACCOUNT NO:   [de-identified]                           ADMIT DATE: 2022  PROVIDER:     Blaine Salazar      REASON FOR EVENT RECORDER:  Paroxysmal atrial fibrillation. The patient wore an event recorder from 2022 to 2022. We  received multiple rhythm strips. Her underlying rhythm was sinus  rhythm; however, she had multiple episodes of atrial fibrillation. She  also had SVT up to 170 beats per minute. In addition, she had  bradycardia while she was in atrial fibrillation with a heart rate of 46  with a 2-second pause. Her lowest heart rate was 29, which occurred at  5 o'clock in the morning. It was a brief episode. This patient has sick sinus syndrome. She had multiple episodes of  atrial fibrillation with also, what appeared to be, SVT up to 170 beats  per minute. However, during her atrial fibrillation at night, she went  down to a heart rate of high 20s and low 30s briefly. I suspect that she will need a pacemaker to be able to adequately  control her atrial fibrillation and SVT.         Melina Hughes    D: 2022 5:22:06       T: 2022 6:24:21     [email or web address]  Job#: [id number]     Doc#: [id number]    CC:

## 2022-03-10 ENCOUNTER — HOSPITAL ENCOUNTER (OUTPATIENT)
Dept: NON INVASIVE DIAGNOSTICS | Age: 82
Discharge: HOME | End: 2022-03-10
Payer: MEDICARE

## 2022-03-10 ENCOUNTER — OFFICE VISIT (OUTPATIENT)
Dept: CARDIOLOGY CLINIC | Age: 82
End: 2022-03-10
Payer: MEDICARE

## 2022-03-10 VITALS — OXYGEN SATURATION: 98 % | HEART RATE: 115 BPM | SYSTOLIC BLOOD PRESSURE: 135 MMHG | DIASTOLIC BLOOD PRESSURE: 83 MMHG

## 2022-03-10 DIAGNOSIS — I49.5 SSS (SICK SINUS SYNDROME) (HCC): Primary | ICD-10-CM

## 2022-03-10 DIAGNOSIS — I49.5 SSS (SICK SINUS SYNDROME) (HCC): ICD-10-CM

## 2022-03-10 PROCEDURE — 1090F PRES/ABSN URINE INCON ASSESS: CPT | Performed by: INTERNAL MEDICINE

## 2022-03-10 PROCEDURE — 4040F PNEUMOC VAC/ADMIN/RCVD: CPT | Performed by: INTERNAL MEDICINE

## 2022-03-10 PROCEDURE — 1036F TOBACCO NON-USER: CPT | Performed by: INTERNAL MEDICINE

## 2022-03-10 PROCEDURE — G8399 PT W/DXA RESULTS DOCUMENT: HCPCS | Performed by: INTERNAL MEDICINE

## 2022-03-10 PROCEDURE — 93270 REMOTE 30 DAY ECG REV/REPORT: CPT

## 2022-03-10 PROCEDURE — 1123F ACP DISCUSS/DSCN MKR DOCD: CPT | Performed by: INTERNAL MEDICINE

## 2022-03-10 PROCEDURE — 99214 OFFICE O/P EST MOD 30 MIN: CPT | Performed by: INTERNAL MEDICINE

## 2022-03-10 PROCEDURE — 1111F DSCHRG MED/CURRENT MED MERGE: CPT | Performed by: INTERNAL MEDICINE

## 2022-03-10 PROCEDURE — G8482 FLU IMMUNIZE ORDER/ADMIN: HCPCS | Performed by: INTERNAL MEDICINE

## 2022-03-10 PROCEDURE — G8427 DOCREV CUR MEDS BY ELIG CLIN: HCPCS | Performed by: INTERNAL MEDICINE

## 2022-03-10 PROCEDURE — G8417 CALC BMI ABV UP PARAM F/U: HCPCS | Performed by: INTERNAL MEDICINE

## 2022-03-10 RX ORDER — FLECAINIDE ACETATE 50 MG/1
50 TABLET ORAL 2 TIMES DAILY
COMMUNITY

## 2022-03-10 RX ORDER — AMMONIUM BROMIDE
GRANULES (GRAM) MISCELLANEOUS
COMMUNITY
End: 2022-04-18

## 2022-03-10 NOTE — PROGRESS NOTES
Ov DR Juan Camilo 6 week follow up from  In pt at the hospital   In and out of ED UTI   Admitted 1/22 COVID confusion  pneumonia  [facility] had event monitor on . No chest pain or sob. Doing physical therapy   Prior to all hospitalizations  Was living at home  Now staying at [facility]. Will be there until able to walk  On own again  Had kidney stones   Already had lithotripsy done on   12/16/21 per DR ROBERTSON. Will start flecainide 50 mg bid  Will put on another event monitor. See in 6 weeks.

## 2022-03-10 NOTE — PROGRESS NOTES
The patient was educated on the use of an event monitor. The patient's comprehension was medium. The patient was able to verbalize recall. The patient was instructed on how and when to return the monitor. Patient states, nurses/aids at [facility] know how to take care of monitor for her. They took care of last one for her. Patient told if any questions needed answered to have nurses call.

## 2022-03-10 NOTE — LETTER
Corie Eisenmenger, MD [facility]  [address] [place] [facility]  [phone]      March 10, 2022      Lyanne Sandhoff, MD  [address] [facility], [facility]      RE:   Patti Connolly  :        Dear Dr. Maco Osullivan:    CHIEF COMPLAINT:  Sick sinus syndrome with a history of SVT and also bradycardia. HISTORY OF PRESENT ILLNESS:  I had the pleasure of seeing Mrs. Clancy in our office on 03/10/2022. She is a pleasant 58-year-old female who resides at [facility] and has been there since 2021. On 2022, she came to the hospital and was unresponsive then. She was in sinus tachycardia when she arrived. She had a UTI, had been treated at [facility] with Fabien Adame; she was less responsive and more tachycardic on 2022. She has a history of SVT and has been on Cardizem and Lopressor. She was admitted for pyelonephritis and left lower lung pneumonia. She was in sinus rhythm. However, the following day, she developed bradycardia with the heart rates in the 30s and 40s. She also developed a junctional rhythm, although she was asymptomatic. She had been receiving IV Cardizem and Lopressor and I was asked to see her for bradycardia. I had seen her previously on 2021, when she had been hospitalized for a syncopal episode. In the emergency room at that time, she had short runs of SVT at 140 beats per minute. She had been placed on Cardizem and Lopressor at that time, and she had had an echocardiogram that showed normal LV function. Her diltiazem and metoprolol were stopped and she was given a 30-day event recorder. The 30-day event recorder was on from 2022 to 2022. She had multiple episodes of atrial fibrillation with also SVT up to 170 beats per minute. During her atrial fibrillation at night, she went down to a heart rate in the high 20s and low 30s briefly.   I felt that she may need a pacemaker to adequately control her fibrillation and SVT. We brought her back today for reevaluation. She is at [facility]. At this time, she is unable to walk on her own. She did have kidney stones and had lithotripsy done on 2021, by Dr. Alysia Lawson. When I see her, she is awake, alert and answered questions appropriately. She is in a wheelchair. She has never had a myocardial infarction or cardiac catheterization. CARDIAC RISK FACTORS:  Other Family Members:  Negative. Hypertension:  Positive. Hyperlipidemia:  Positive. Non-Insulin-Dependent Diabetes:  Positive. Peripheral Vascular Disease:  Negative. Smoking:  Negative. MEDICATIONS AT THIS TIME:  She is on Tylenol p.r.n., albuterol inhaler every 6 hours p.r.n., Zyloprim 100 mg daily, vitamin C 1000 mg daily, aspirin 81 mg daily, calcium 600 mg daily, Lasix 40 mg daily, Glucophage 500 mg b.i.d., Zocor 20 mg daily, sodium chloride 1 gm daily, vitamin D 1000 units daily, zinc 220 mg daily. PAST MEDICAL AND SURGICAL HISTORY:  1.  Basal cell carcinoma of the skin. 2.  COVID-19 diagnosed on 2022.  3.  Gout. 4.  Hyperlipidemia. 5.  Hypertension. 6.  SVT. 7.  Non-insulin-dependent diabetes. 8.  Appendectomy. 9.  D and C.  10.  Right knee replacement. 11.  Marked edema of both lower extremities. FAMILY HISTORY:  Mother passed away of CHF. Father, unknown. Two brothers and one sister had cancer, no heart disease. SOCIAL HISTORY:  She is 80years old. , three children. One girl  in a crib death. Two boys.  worked in the railroad for 40 years. REVIEW OF SYSTEMS:  Cardiac as above. Other systems reviewed including constitutional, eyes, ears, nose and throat, cardiovascular, respiratory, GI, , musculoskeletal, integumentary, neurologic, endocrine, hematologic and allergic/immunologic are negative except for what is described above.     PHYSICAL EXAMINATION:  VITAL SIGNS:  Her blood pressure was 135/83 with a heart rate of 115 and regular. Respiratory rate 18. O2 saturation 98%. GENERAL:  She is a pleasant 69-year-old female. Denied pain. She was oriented to person, place and time. Answered questions appropriately. SKIN:  No unusual skin changes. HEENT:  The pupils are equally round and intact. Mucous membranes were dry. NECK:  No JVD. Good carotid pulses. No carotid bruits. No lymphadenopathy or thyromegaly. CARDIOVASCULAR EXAM:  S1 and S2 were normal.  No S3 or S4. Soft systolic blowing type murmur. No diastolic murmur. PMI was normal.  No lift, thrust, or pericardial friction rub. LUNGS:  Fairly clear to auscultation and percussion. ABDOMEN:  Protuberant but soft and nontender. EXTREMITIES:  She had marked lymphedema in both lower extremities. Could not feel pulses. There did not appear to be cellulitis present. LABORATORY DATA:  Her sodium was 136, potassium 3.9, BUN 21, creatinine 0.47. GFR greater than 60. Her white count was 8.8, hemoglobin 12.0 with a platelet count of 183,274. EKG on 01/27/2022 showed sinus rhythm with marked sinus arrhythmia with first-degree AV block and right bundle-branch block. The event recorder from 01/28/2022 to 02/26/2022 showed sinus rhythm with multiple episodes of atrial fibrillation and SVT up to 170 beats per minute. She also had bradycardia with 2-second pauses with a low heart rate of 29. IMPRESSION:  1. Sick sinus syndrome with heart rates in the 30s and 40s, with also SVT up to 170 beats per minute and episodes of atrial fibrillation. 2.  History of normal LV function, EF of 55%, with mildly dilated right ventricle with mild mitral regurgitation and no aortic stenosis. 3.  Hypertension. 4.  Lymphedema in both lower extremities. 5.  Non-insulin-dependent diabetes. 6.  Hyperlipidemia. 7.  Status post COVID. PLAN:  1. We will start flecainide 50 mg b.i.d.  2.  We will place another 30-day event recorder.   3.  We will see in 6 weeks and we will be able to determine with the event recorder whether to make any adjustments in her flecainide. DISCUSSION:  Mrs. Clancy has sick sinus syndrome with SVT up to 170 beats per minute. When she was on Cardizem and Lopressor or even when alone, she developed sinus bradycardia with junctional rhythm. Even without any Cardizem or Lopressor, she had heart rates in the 30s and 40s. This was mainly when she had atrial fibrillation. I think she will end up with a pacemaker. However, I would like to try flecainide first, starting at 50 mg b.i.d.  I will have the 30-day event recorder on again to be able to monitor drug therapy with flecainide. If we can control her SVT and atrial fibrillation with flecainide, then we will hold off on a pacemaker. She is very unsteady, cannot walk at this time and she has physical therapy, and I feel that anticoagulation at this time would be high risk because of fall risk. Thank you very much for allowing me the privilege of seeing Mrs. Clancy. If you have any questions on my thoughts, please do not hesitate to contact me.     Sincerely,        Jannie Salinas    D: 03/13/2022 11:09:45     T: 03/14/2022 5:08:51     [email or web address]  Job#: [id number]   Doc#: [id number]

## 2022-03-15 NOTE — PROGRESS NOTES
Izabela Simental MD  [id number] [facility]  [address], [facility]  [phone]      March 10, 2022      Merlin Lafleur MD  [address] [facility], [facility]      RE:   Tiffanie Denson  :        Dear Dr. Shadia Us:    CHIEF COMPLAINT:  Sick sinus syndrome with a history of SVT and also bradycardia. HISTORY OF PRESENT ILLNESS:  I had the pleasure of seeing Mrs. Clancy in our office on 03/10/2022. She is a pleasant 61-year-old female who resides at [facility] and has been there since 2021. On 2022, she came to the hospital and was unresponsive then. She was in sinus tachycardia when she arrived. She had a UTI, had been treated at [facility] with Ava Re; she was less responsive and more tachycardic on 2022. She has a history of SVT and has been on Cardizem and Lopressor. She was admitted for pyelonephritis and left lower lung pneumonia. She was in sinus rhythm. However, the following day, she developed bradycardia with the heart rates in the 30s and 40s. She also developed a junctional rhythm, although she was asymptomatic. She had been receiving IV Cardizem and Lopressor and I was asked to see her for bradycardia. I had seen her previously on 2021, when she had been hospitalized for a syncopal episode. In the emergency room at that time, she had short runs of SVT at 140 beats per minute. She had been placed on Cardizem and Lopressor at that time, and she had had an echocardiogram that showed normal LV function. Her diltiazem and metoprolol were stopped and she was given a 30-day event recorder. The 30-day event recorder was on from 2022 to 2022. She had multiple episodes of atrial fibrillation with also SVT up to 170 beats per minute. During her atrial fibrillation at night, she went down to a heart rate in the high 20s and low 30s briefly.   I felt that she may need a pacemaker to adequately control her fibrillation and SVT. We brought her back today for reevaluation. She is at [facility]. At this time, she is unable to walk on her own. She did have kidney stones and had lithotripsy done on 2021, by Dr. Kedar Urbina. When I see her, she is awake, alert and answered questions appropriately. She is in a wheelchair. She has never had a myocardial infarction or cardiac catheterization. CARDIAC RISK FACTORS:  Other Family Members:  Negative. Hypertension:  Positive. Hyperlipidemia:  Positive. Non-Insulin-Dependent Diabetes:  Positive. Peripheral Vascular Disease:  Negative. Smoking:  Negative. MEDICATIONS AT THIS TIME:  She is on Tylenol p.r.n., albuterol inhaler every 6 hours p.r.n., Zyloprim 100 mg daily, vitamin C 1000 mg daily, aspirin 81 mg daily, calcium 600 mg daily, Lasix 40 mg daily, Glucophage 500 mg b.i.d., Zocor 20 mg daily, sodium chloride 1 gm daily, vitamin D 1000 units daily, zinc 220 mg daily. PAST MEDICAL AND SURGICAL HISTORY:  1.  Basal cell carcinoma of the skin. 2.  COVID-19 diagnosed on 2022.  3.  Gout. 4.  Hyperlipidemia. 5.  Hypertension. 6.  SVT. 7.  Non-insulin-dependent diabetes. 8.  Appendectomy. 9.  D and C.  10.  Right knee replacement. 11.  Marked edema of both lower extremities. FAMILY HISTORY:  Mother passed away of CHF. Father, unknown. Two brothers and one sister had cancer, no heart disease. SOCIAL HISTORY:  She is 80years old. , three children. One girl  in a crib death. Two boys.  worked in the railroad for 40 years. REVIEW OF SYSTEMS:  Cardiac as above. Other systems reviewed including constitutional, eyes, ears, nose and throat, cardiovascular, respiratory, GI, , musculoskeletal, integumentary, neurologic, endocrine, hematologic and allergic/immunologic are negative except for what is described above.     PHYSICAL EXAMINATION:  VITAL SIGNS:  Her blood pressure was 135/83 with a heart rate of 115 and regular. Respiratory rate 18. O2 saturation 98%. GENERAL:  She is a pleasant 80-year-old female. Denied pain. She was oriented to person, place and time. Answered questions appropriately. SKIN:  No unusual skin changes. HEENT:  The pupils are equally round and intact. Mucous membranes were dry. NECK:  No JVD. Good carotid pulses. No carotid bruits. No lymphadenopathy or thyromegaly. CARDIOVASCULAR EXAM:  S1 and S2 were normal.  No S3 or S4. Soft systolic blowing type murmur. No diastolic murmur. PMI was normal.  No lift, thrust, or pericardial friction rub. LUNGS:  Fairly clear to auscultation and percussion. ABDOMEN:  Protuberant but soft and nontender. EXTREMITIES:  She had marked lymphedema in both lower extremities. Could not feel pulses. There did not appear to be cellulitis present. LABORATORY DATA:  Her sodium was 136, potassium 3.9, BUN 21, creatinine 0.47. GFR greater than 60. Her white count was 8.8, hemoglobin 12.0 with a platelet count of 838,935. EKG on 01/27/2022 showed sinus rhythm with marked sinus arrhythmia with first-degree AV block and right bundle-branch block. The event recorder from 01/28/2022 to 02/26/2022 showed sinus rhythm with multiple episodes of atrial fibrillation and SVT up to 170 beats per minute. She also had bradycardia with 2-second pauses with a low heart rate of 29. IMPRESSION:  1. Sick sinus syndrome with heart rates in the 30s and 40s, with also SVT up to 170 beats per minute and episodes of atrial fibrillation. 2.  History of normal LV function, EF of 55%, with mildly dilated right ventricle with mild mitral regurgitation and no aortic stenosis. 3.  Hypertension. 4.  Lymphedema in both lower extremities. 5.  Non-insulin-dependent diabetes. 6.  Hyperlipidemia. 7.  Status post COVID. PLAN:  1. We will start flecainide 50 mg b.i.d.  2.  We will place another 30-day event recorder.   3.  We will see in 6 weeks and we will be able to determine with the event recorder whether to make any adjustments in her flecainide. DISCUSSION:  Mrs. Clancy has sick sinus syndrome with SVT up to 170 beats per minute. When she was on Cardizem and Lopressor or even when alone, she developed sinus bradycardia with junctional rhythm. Even without any Cardizem or Lopressor, she had heart rates in the 30s and 40s. This was mainly when she had atrial fibrillation. I think she will end up with a pacemaker. However, I would like to try flecainide first, starting at 50 mg b.i.d.  I will have the 30-day event recorder on again to be able to monitor drug therapy with flecainide. If we can control her SVT and atrial fibrillation with flecainide, then we will hold off on a pacemaker. She is very unsteady, cannot walk at this time and she has physical therapy, and I feel that anticoagulation at this time would be high risk because of fall risk. Thank you very much for allowing me the privilege of seeing Mrs. Clancy. If you have any questions on my thoughts, please do not hesitate to contact me.     Sincerely,        Will Trevizo    D: 03/13/2022 11:09:45     T: 03/14/2022 5:08:51     [email or web address]  Job#: [id number]   Doc#: [id number]

## 2022-03-16 ENCOUNTER — TELEPHONE (OUTPATIENT)
Dept: UROLOGY | Age: 82
End: 2022-03-16

## 2022-03-16 DIAGNOSIS — N20.1 URETERAL STONE: ICD-10-CM

## 2022-03-17 ENCOUNTER — OFFICE VISIT (OUTPATIENT)
Dept: UROLOGY | Age: 82
End: 2022-03-17
Payer: MEDICARE

## 2022-03-17 VITALS
SYSTOLIC BLOOD PRESSURE: 122 MMHG | DIASTOLIC BLOOD PRESSURE: 80 MMHG | WEIGHT: 224 LBS | BODY MASS INDEX: 43.98 KG/M2 | HEIGHT: 60 IN

## 2022-03-17 DIAGNOSIS — E66.01 OBESITY, CLASS III, BMI 40-49.9 (MORBID OBESITY) (HCC): ICD-10-CM

## 2022-03-17 DIAGNOSIS — N20.1 URETERAL STONE: Primary | ICD-10-CM

## 2022-03-17 PROCEDURE — G8427 DOCREV CUR MEDS BY ELIG CLIN: HCPCS | Performed by: UROLOGY

## 2022-03-17 PROCEDURE — 99213 OFFICE O/P EST LOW 20 MIN: CPT | Performed by: UROLOGY

## 2022-03-17 PROCEDURE — 4040F PNEUMOC VAC/ADMIN/RCVD: CPT | Performed by: UROLOGY

## 2022-03-17 PROCEDURE — 1036F TOBACCO NON-USER: CPT | Performed by: UROLOGY

## 2022-03-17 PROCEDURE — 1123F ACP DISCUSS/DSCN MKR DOCD: CPT | Performed by: UROLOGY

## 2022-03-17 PROCEDURE — G8482 FLU IMMUNIZE ORDER/ADMIN: HCPCS | Performed by: UROLOGY

## 2022-03-17 PROCEDURE — G8399 PT W/DXA RESULTS DOCUMENT: HCPCS | Performed by: UROLOGY

## 2022-03-17 PROCEDURE — G8417 CALC BMI ABV UP PARAM F/U: HCPCS | Performed by: UROLOGY

## 2022-03-17 PROCEDURE — 1090F PRES/ABSN URINE INCON ASSESS: CPT | Performed by: UROLOGY

## 2022-03-17 ASSESSMENT — ENCOUNTER SYMPTOMS
EYE PAIN: 0
SHORTNESS OF BREATH: 0
COUGH: 0
VOMITING: 0
NAUSEA: 0
WHEEZING: 0
ABDOMINAL PAIN: 0
COLOR CHANGE: 0
BACK PAIN: 0
EYE REDNESS: 0

## 2022-03-17 NOTE — PATIENT INSTRUCTIONS
SURVEY:    You may be receiving a survey from [employer] regarding your visit today. Please complete the survey to enable us to provide the highest quality of care to you and your family. If you cannot score us a very good on any question, please call the office to discuss how we could have made your experience a very good one. Thank you.

## 2022-03-17 NOTE — PROGRESS NOTES
HPI:        Patient is a 80 y.o. female in no acute distress. She is alert and oriented to person, place, and time. History  11/4/2021 - Patient presented to the emergency room on 11/1/2021 with progressive fatigue.  Patient had a white blood cell count of 12.4.  She was straight cathed for urine.  She did have a urinalysis indicative of urinary tract infection.  She was started on antibiotics -Levaquin.  She did have wounds on her buttocks and right popliteal.  Decision was made for her to go to [address] [place] [address] Road returned to the emergency room 11/2/2021 after a loss of consciousness.  In the emergency room it was discovered that she had SVT with heart rates in the 140s.  Patient was ultimately admitted to the hospital.  Patient's urine culture did grow beta-hemolytic strep as well as E. coli.  Patient was started on IV Rocephin.  Patient did have a CT abdomen pelvis with IV contrast which was independently reviewed showing no ureteral calculi.  There is calcification within a possible mass of the right kidney.  This is unable to be completely determined with current imaging. 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 Patient has had a 40 pound weight loss and loss of appetite over the past couple months.  Patient's BUN is 9, creatinine of 0.91.  Patient's white blood cell count is down to 6. 1.     12/16/2021 -right HL L    Currently  Patient is here today for 6-week follow-up. Patient is status post right-sided holmium laser lithotripsy. Patient did get a recent KUB. This film was independently reviewed today. Patient has no  calcifications identified on his KUB today. Patient is doing well. She has no recent gross hematuria dysuria. She has had no spontaneous stone passage. Patient does have extreme swelling of her bilateral lower extremities. No recent urinary tract infections. No pain today.     Past Medical History: Diagnosis Date    Cancer ([facility] [address]     basal cell skin cancer    COVID-19     Gout     Hyperlipidemia     Hypertension     Hypokalemia     Lymphedema of lower extremity     Obesity     Shingles     70's    SVT (supraventricular tachycardia) (HCC)     Type II or unspecified type diabetes mellitus without mention of complication, not stated as uncontrolled     Venous stasis dermatitis      Past Surgical History:   Procedure Laterality Date    APPENDECTOMY      COLONOSCOPY      CYSTOSCOPY Right 12/16/2021    RIGHT URETEROSCOPY WITH HOLMIUM LASER LITHOTRIPSY AND RIGHT STENT PLACEMENT performed by Amadeo Estrada MD at [address] [facility] knee    AR OFFICE/OUTPT VISIT,PROCEDURE ONLY Right 7/19/2018    RIGHT HIP MASS INCISION AND DRAINAGE performed by Josh Chaves MD at [address] [facility] OFFICE/OUTPT VISIT,PROCEDURE ONLY Right 8/1/2018    HIP INCISION AND DRAINAGE (Necrotic Fat Right Hip Debridement) performed by Josh Chaves MD at [facility] OR     Outpatient Encounter Medications as of 3/17/2022   Medication Sig Dispense Refill    Ammonium Bromide GRAN by Does not apply route      flecainide (TAMBOCOR) 50 MG tablet Take 50 mg by mouth 2 times daily      aspirin 81 MG EC tablet Take 1 tablet by mouth daily 30 tablet 3    sodium chloride 1 g tablet Take 1 tablet by mouth daily 90 tablet 3    zinc sulfate (ZINCATE) 220 (50 Zn) MG capsule Take 1 capsule by mouth daily 30 capsule 3    ascorbic acid (VITAMIN C) 1000 MG tablet Take 1 tablet by mouth daily 30 tablet 3    Vitamin D (CHOLECALCIFEROL) 25 MCG (1000 UT) TABS tablet Take 1 tablet by mouth daily 60 tablet     Probiotic Product (ACIDOPHILUS HIGH-POTENCY PO) Take 1 tablet by mouth daily      guaiFENesin (ROBITUSSIN) 100 MG/5ML syrup Take 400 mg by mouth 4 times daily as needed for Cough 10ML      acetaminophen (TYLENOL) 325 MG tablet Take 650 mg by mouth every 4 hours as needed for Pain tablet by mouth daily 60 tablet     Probiotic Product (ACIDOPHILUS HIGH-POTENCY PO) Take 1 tablet by mouth daily      guaiFENesin (ROBITUSSIN) 100 MG/5ML syrup Take 400 mg by mouth 4 times daily as needed for Cough 10ML      acetaminophen (TYLENOL) 325 MG tablet Take 650 mg by mouth every 4 hours as needed for Pain      Polyethylene Glycol 1450 POWD by Does not apply route      albuterol sulfate HFA (VENTOLIN HFA) 108 (90 Base) MCG/ACT inhaler Inhale 2 puffs into the lungs every 6 hours as needed for Wheezing      furosemide (LASIX) 40 MG tablet Take 1 tablet by mouth daily 60 tablet 3    potassium chloride (KLOR-CON M) 20 MEQ TBCR extended release tablet Take 1 tablet by mouth daily 30 tablet 3    Multiple Vitamin (MULTIVITAMIN) TABS tablet Take 1 tablet by mouth daily  0    Lancets (ONETOUCH DELICA PLUS NOOOIL75F) MISC Testing blood sugar once daily and as needed, uses OneTouch UltraMini 100 each 5    blood glucose test strips (ASCENSIA AUTODISC VI;ONE TOUCH ULTRA TEST VI) strip Testing blood sugar once daily and as needed 100 each 5    allopurinol (ZYLOPRIM) 100 MG tablet TAKE 1 TABLET EVERY DAY 90 tablet 1    metFORMIN (GLUCOPHAGE) 500 MG tablet Take 1 tablet by mouth 2 times daily (with meals) 180 tablet 1    simvastatin (ZOCOR) 20 MG tablet TAKE 1 TABLET EVERY NIGHT 90 tablet 1    blood glucose test strips (ONE TOUCH ULTRA TEST) strip Test blood sugar once daily and as needed. 100 strip 3    Calcium Carb-Cholecalciferol (CALCIUM 600 + D PO) Take by mouth daily      Glucose Blood (CHOICE DM FORA G20 TEST STRIPS VI) by In Vitro route. No current facility-administered medications on file prior to visit.      Adhesive tape and Sulfa antibiotics  Family History   Problem Relation Age of Onset    Heart Disease Mother     Cancer Sister     Mult Sclerosis Sister     Cancer Brother         lung     Social History     Tobacco Use   Smoking Status Never Smoker   Smokeless Tobacco Never Used Social History     Substance and Sexual Activity   Alcohol Use No       Review of Systems   Constitutional: Negative for appetite change, chills and fever. Eyes: Negative for pain, redness and visual disturbance. Respiratory: Negative for cough, shortness of breath and wheezing. Cardiovascular: Negative for chest pain and leg swelling. Gastrointestinal: Negative for abdominal pain, nausea and vomiting. Genitourinary: Negative for difficulty urinating, dysuria, flank pain, frequency, hematuria, pelvic pain, vaginal bleeding and vaginal discharge. Musculoskeletal: Negative for back pain, joint swelling and myalgias. Skin: Negative for color change, rash and wound. Neurological: Negative for dizziness, tremors and numbness. Hematological: Negative for adenopathy. Does not bruise/bleed easily. /78 (Site: Right Upper Arm, Position: Sitting, Cuff Size: Medium Adult)   Ht 5' (1.524 m)   Wt 224 lb (101.6 kg)   LMP  (LMP Unknown)   Breastfeeding No   BMI 43.75 kg/m²       PHYSICAL EXAM:  Constitutional: Patient resting comfortably, in no acute distress. Neuro: Alert and oriented to person place and time. Cranial nerves grossly intact. Psych: Mood and affect normal.  Skin: Warm, dry  HEENT: normocephalic, atraumatic  Lymphatics: No palpable lymphadenopathy  Lungs: Respiratory effort normal, unlabored  Cardiovascular:  Normal peripheral pulses  Abdomen: Soft, non-tender, non-distended with no organomegaly or palpable masses. : No CVA tenderness. Bladder non-tender and not distended. Pelvic: deferred    Lab Results   Component Value Date    BUN 21 01/27/2022     Lab Results   Component Value Date    CREATININE 0.47 (L) 01/27/2022       ASSESSMENT:  This is a 80 y.o. female with the following diagnoses:   Diagnosis Orders   1.  Ureteral stone  XR ABDOMEN (KUB) (SINGLE AP VIEW)   2. Obesity, Class III, BMI 40-49.9 (morbid obesity) ([facility] [address]           PLAN:  We will plan on seeing her back

## 2022-04-12 NOTE — PROCEDURES
[place] [address]                                 EVENT MONITOR    PATIENT NAME: Mahesh BRICENO                   :        1940  MED REC NO:   [id number]                              ROOM:  ACCOUNT NO:   [de-identified]                           ADMIT DATE: 03/10/2022  PROVIDER:     Izabela Simental    NAME OF TEST:  Event recorder. INDICATION:  Palpitations. The patient wore an event recorder from 03/10/2022 to 2022. We  received 14 transmissions. She remained in sinus rhythm in the 30 days. She had occasional PACs and occasional PVCs. She had mild sinus  tachycardia up to 130 beats per minute. Her lowest heart rate was 92  and her most rapid heart rate was 137. She had no runs of  tachyarrhythmias.         Lisa Germain    D: 2022 6:32:38       T: 2022 10:13:17     [id number]  Job#: [id number]     Doc#: [id number]    CC:  Merlin Lafleur

## 2022-04-18 ENCOUNTER — OFFICE VISIT (OUTPATIENT)
Dept: CARDIOLOGY CLINIC | Age: 82
End: 2022-04-18
Payer: MEDICARE

## 2022-04-18 ENCOUNTER — HOSPITAL ENCOUNTER (OUTPATIENT)
Age: 82
Discharge: HOME | End: 2022-04-18
Payer: MEDICARE

## 2022-04-18 VITALS — SYSTOLIC BLOOD PRESSURE: 129 MMHG | OXYGEN SATURATION: 95 % | DIASTOLIC BLOOD PRESSURE: 79 MMHG | HEART RATE: 105 BPM

## 2022-04-18 DIAGNOSIS — I49.5 SSS (SICK SINUS SYNDROME) (HCC): ICD-10-CM

## 2022-04-18 DIAGNOSIS — E55.9 VITAMIN D DEFICIENCY: ICD-10-CM

## 2022-04-18 DIAGNOSIS — I10 ESSENTIAL HYPERTENSION, BENIGN: ICD-10-CM

## 2022-04-18 DIAGNOSIS — E78.2 MIXED HYPERLIPIDEMIA: ICD-10-CM

## 2022-04-18 DIAGNOSIS — I49.5 SSS (SICK SINUS SYNDROME) (HCC): Primary | ICD-10-CM

## 2022-04-18 PROCEDURE — 93005 ELECTROCARDIOGRAM TRACING: CPT

## 2022-04-18 PROCEDURE — G8417 CALC BMI ABV UP PARAM F/U: HCPCS | Performed by: INTERNAL MEDICINE

## 2022-04-18 PROCEDURE — 1123F ACP DISCUSS/DSCN MKR DOCD: CPT | Performed by: INTERNAL MEDICINE

## 2022-04-18 PROCEDURE — 1090F PRES/ABSN URINE INCON ASSESS: CPT | Performed by: INTERNAL MEDICINE

## 2022-04-18 PROCEDURE — G8399 PT W/DXA RESULTS DOCUMENT: HCPCS | Performed by: INTERNAL MEDICINE

## 2022-04-18 PROCEDURE — G8427 DOCREV CUR MEDS BY ELIG CLIN: HCPCS | Performed by: INTERNAL MEDICINE

## 2022-04-18 PROCEDURE — 99214 OFFICE O/P EST MOD 30 MIN: CPT | Performed by: INTERNAL MEDICINE

## 2022-04-18 PROCEDURE — 4040F PNEUMOC VAC/ADMIN/RCVD: CPT | Performed by: INTERNAL MEDICINE

## 2022-04-18 PROCEDURE — 1036F TOBACCO NON-USER: CPT | Performed by: INTERNAL MEDICINE

## 2022-04-18 NOTE — PROGRESS NOTES
Ov Dr Erasmo Rausch follow up   To review event monitor. Still in [place]   No chest pain or sob  No palpitations. Will do EKG today   See in 6 mths.

## 2022-04-18 NOTE — LETTER
Reyes Pagan, M.D. [address] [place] [address]  [phone]        2022        Jairo Cruz MD  [address] [place] [address]    RE:   Camille Mccallum  :      Dear Dr. Yael Colunga:    CHIEF COMPLAINT:  1. Sick sinus syndrome. 2.  SVT with also bradycardia, which has been well controlled on flecainide 50 mg b.i.d. HISTORY OF PRESENT ILLNESS:  I met with Mrs. Clancy in our office on 2022. I trust you received my full H and P from 03/10/2022. She has a history of SVT with bradycardia. She had been on diltiazem and Cardizem. She was having SVT with also bradycardia in the 30s and 40s. I placed her on flecainide 50 mg b.i.d. and placed another 30-day event recorder and brought her back for reevaluation. Flecainide has done an excellent job. Her repeat event recorder did not show any SVT or bradycardia. She had rare PACs and rare PVCs. She really has no complaints as I see her today. She has had no palpitations, no unusual shortness of breath. She is hoping to get out of [place] at some point in time. She did receive the news that her brother-in-law has brain cancer. MEDICATIONS:  Zyloprim 100 mg daily, vitamin C daily, aspirin 81 mg daily, flecainide 50 mg b.i.d., Lasix 40 mg daily, metformin 500 mg b.i.d., multivitamins daily, Zocor 20 mg daily, sodium chloride 1 gm daily, vitamin D 1000 units daily, zinc 50 mg daily. PHYSICAL EXAMINATION:  VITAL SIGNS:  Her blood pressure was 138/70 with a heart rate of 60 and regular. Respiratory rate 18. O2 sat was 94%. GENERAL:  She is a very pleasant 80-year-old female. Denied pain. She was oriented to person, place and time. Answered questions appropriately. SKIN:  No unusual skin changes. HEENT:  The pupils are equally round and intact. Mucous membranes were dry. NECK:  No JVD. Good carotid pulses. No carotid bruits.   No lymphadenopathy or thyromegaly. CARDIOVASCULAR EXAM:  S1 and S2 were normal.  No S3 or S4. Soft systolic blowing type murmur. No diastolic murmur. PMI was normal.  No lift, thrust, or pericardial friction rub. LUNGS:  Clear to auscultation and percussion. ABDOMEN:  Soft and nontender. Good bowel sounds. EXTREMITIES:  Good femoral pulses. Good pedal pulses. She had 3+ edema, which is her baseline. IMPRESSION:  1. SVT with sick sinus syndrome with bradycardia on Cardizem and Lopressor along with multiple episodes of SVT. 2.  SVT, well controlled on flecainide 50 mg b.i.d. with no bradycardia. 3.  No chest pain or shortness of breath. 4.  Normal LV function. 5.  Hypertension. 6.  Non-insulin-dependent diabetes. PLAN:  1. We will do an EKG today. 2.  See in 6 months with a repeat EKG. DISCUSSION:  Mrs. Clancy is doing excellent on flecainide 50 mg b.i.d.  I made no changes in her medications. We will do an EKG to look at her QTc interval today. There is no indication for a pacemaker at this time. I am delighted on how she responded to flecainide. I will plan on seeing her in 6 months. Thank you very much for allowing me the privilege of seeing Mrs. Clancy. If you have any questions on my thoughts, please do not hesitate to contact me.     Sincerely,        Ariela Colbert    D: 04/18/2022 9:48:42     T: 04/18/2022 9:51:29     [email or web address]  Job#: [id number]   Doc#: [id number]

## 2022-04-19 LAB
EKG ATRIAL RATE: 108 BPM
EKG P AXIS: -9 DEGREES
EKG P-R INTERVAL: 224 MS
EKG Q-T INTERVAL: 374 MS
EKG QRS DURATION: 146 MS
EKG QTC CALCULATION (BAZETT): 501 MS
EKG R AXIS: 119 DEGREES
EKG T AXIS: 36 DEGREES
EKG VENTRICULAR RATE: 108 BPM

## 2022-04-19 PROCEDURE — 93010 ELECTROCARDIOGRAM REPORT: CPT | Performed by: INTERNAL MEDICINE

## 2022-04-21 NOTE — PROGRESS NOTES
Jenifer Knight M.D. [address] [place] [address]  [phone]        2022        Sheryl Riddle MD  [address] [place] [address]    RE:   Berlin Hwang  :      Dear Dr. Bolton Serum:    CHIEF COMPLAINT:  1. Sick sinus syndrome. 2.  SVT with also bradycardia, which has been well controlled on flecainide 50 mg b.i.d. HISTORY OF PRESENT ILLNESS:  I met with Mrs. Clancy in our office on 2022. I trust you received my full H and P from 03/10/2022. She has a history of SVT with bradycardia. She had been on diltiazem and Cardizem. She was having SVT with also bradycardia in the 30s and 40s. I placed her on flecainide 50 mg b.i.d. and placed another 30-day event recorder and brought her back for reevaluation. Flecainide has done an excellent job. Her repeat event recorder did not show any SVT or bradycardia. She had rare PACs and rare PVCs. She really has no complaints as I see her today. She has had no palpitations, no unusual shortness of breath. She is hoping to get out of [place] at some point in time. She did receive the news that her brother-in-law has brain cancer. MEDICATIONS:  Zyloprim 100 mg daily, vitamin C daily, aspirin 81 mg daily, flecainide 50 mg b.i.d., Lasix 40 mg daily, metformin 500 mg b.i.d., multivitamins daily, Zocor 20 mg daily, sodium chloride 1 gm daily, vitamin D 1000 units daily, zinc 50 mg daily. PHYSICAL EXAMINATION:  VITAL SIGNS:  Her blood pressure was 138/70 with a heart rate of 60 and regular. Respiratory rate 18. O2 sat was 94%. GENERAL:  She is a very pleasant 55-year-old female. Denied pain. She was oriented to person, place and time. Answered questions appropriately. SKIN:  No unusual skin changes. HEENT:  The pupils are equally round and intact. Mucous membranes were dry. NECK:  No JVD. Good carotid pulses. No carotid bruits.   No lymphadenopathy or thyromegaly. CARDIOVASCULAR EXAM:  S1 and S2 were normal.  No S3 or S4. Soft systolic blowing type murmur. No diastolic murmur. PMI was normal.  No lift, thrust, or pericardial friction rub. LUNGS:  Clear to auscultation and percussion. ABDOMEN:  Soft and nontender. Good bowel sounds. EXTREMITIES:  Good femoral pulses. Good pedal pulses. She had 3+ edema, which is her baseline. IMPRESSION:  1. SVT with sick sinus syndrome with bradycardia on Cardizem and Lopressor along with multiple episodes of SVT. 2.  SVT, well controlled on flecainide 50 mg b.i.d. with no bradycardia. 3.  No chest pain or shortness of breath. 4.  Normal LV function. 5.  Hypertension. 6.  Non-insulin-dependent diabetes. PLAN:  1. We will do an EKG today. 2.  See in 6 months with a repeat EKG. DISCUSSION:  Mrs. Clancy is doing excellent on flecainide 50 mg b.i.d.  I made no changes in her medications. We will do an EKG to look at her QTc interval today. There is no indication for a pacemaker at this time. I am delighted on how she responded to flecainide. I will plan on seeing her in 6 months. Thank you very much for allowing me the privilege of seeing Mrs. Clancy. If you have any questions on my thoughts, please do not hesitate to contact me.     Sincerely,        Jean-Claude Perdomo    D: 04/18/2022 9:48:42     T: 04/18/2022 9:51:29     [email or web address]  Job#: [id number]   Doc#: [id number]

## 2022-07-25 ENCOUNTER — TELEPHONE (OUTPATIENT)
Dept: CARDIOLOGY CLINIC | Age: 82
End: 2022-07-25

## 2022-07-25 NOTE — TELEPHONE ENCOUNTER
I faxed clearance letter to DR Wm Jordan on 7/21   Faxed again today to  [facility] and to [facility]

## 2022-08-23 ENCOUNTER — HOSPITAL ENCOUNTER (OUTPATIENT)
Dept: PREADMISSION TESTING | Age: 82
Setting detail: SPECIMEN
Discharge: HOME | End: 2022-08-23
Payer: MEDICARE

## 2022-08-23 PROCEDURE — U0003 INFECTIOUS AGENT DETECTION BY NUCLEIC ACID (DNA OR RNA); SEVERE ACUTE RESPIRATORY SYNDROME CORONAVIRUS 2 (SARS-COV-2) (CORONAVIRUS DISEASE [COVID-19]), AMPLIFIED PROBE TECHNIQUE, MAKING USE OF HIGH THROUGHPUT TECHNOLOGIES AS DESCRIBED BY CMS-2020-01-R: HCPCS

## 2022-08-23 PROCEDURE — C9803 HOPD COVID-19 SPEC COLLECT: HCPCS

## 2022-08-23 PROCEDURE — U0005 INFEC AGEN DETEC AMPLI PROBE: HCPCS

## 2022-08-25 LAB
SARS-COV-2: NORMAL
SARS-COV-2: NOT DETECTED
SOURCE: NORMAL

## 2022-10-18 ENCOUNTER — OFFICE VISIT (OUTPATIENT)
Dept: CARDIOLOGY CLINIC | Age: 82
End: 2022-10-18
Payer: MEDICARE

## 2022-10-18 VITALS — OXYGEN SATURATION: 96 % | DIASTOLIC BLOOD PRESSURE: 75 MMHG | SYSTOLIC BLOOD PRESSURE: 145 MMHG | HEART RATE: 96 BPM

## 2022-10-18 DIAGNOSIS — E78.2 MIXED HYPERLIPIDEMIA: ICD-10-CM

## 2022-10-18 DIAGNOSIS — I10 ESSENTIAL HYPERTENSION, BENIGN: Primary | ICD-10-CM

## 2022-10-18 PROCEDURE — G8417 CALC BMI ABV UP PARAM F/U: HCPCS | Performed by: INTERNAL MEDICINE

## 2022-10-18 PROCEDURE — G8399 PT W/DXA RESULTS DOCUMENT: HCPCS | Performed by: INTERNAL MEDICINE

## 2022-10-18 PROCEDURE — G8484 FLU IMMUNIZE NO ADMIN: HCPCS | Performed by: INTERNAL MEDICINE

## 2022-10-18 PROCEDURE — G8427 DOCREV CUR MEDS BY ELIG CLIN: HCPCS | Performed by: INTERNAL MEDICINE

## 2022-10-18 PROCEDURE — 1090F PRES/ABSN URINE INCON ASSESS: CPT | Performed by: INTERNAL MEDICINE

## 2022-10-18 PROCEDURE — 99214 OFFICE O/P EST MOD 30 MIN: CPT | Performed by: INTERNAL MEDICINE

## 2022-10-18 PROCEDURE — 1036F TOBACCO NON-USER: CPT | Performed by: INTERNAL MEDICINE

## 2022-10-18 PROCEDURE — 1123F ACP DISCUSS/DSCN MKR DOCD: CPT | Performed by: INTERNAL MEDICINE

## 2022-10-18 RX ORDER — TRAMADOL HYDROCHLORIDE 50 MG/1
50 TABLET ORAL PRN
COMMUNITY

## 2022-10-18 RX ORDER — LOPERAMIDE HYDROCHLORIDE 2 MG/1
2 CAPSULE ORAL 4 TIMES DAILY PRN
COMMUNITY

## 2022-10-18 RX ORDER — AMMONIUM LACTATE 12 G/100G
CREAM TOPICAL
COMMUNITY

## 2022-10-18 RX ORDER — ONDANSETRON 4 MG/1
4 TABLET, FILM COATED ORAL EVERY 8 HOURS PRN
COMMUNITY

## 2022-10-18 NOTE — LETTER
Edwina Hope M.D. [address] [place] [address]  [phone]          2022          Manasa Thompson MD  [address] [address]      RE:   Claudette Ege  :        Dear Dr. Severa Polka:    CHIEF COMPLAINT:  1. Sick sinus syndrome. 2.  SVT which has been well controlled with flecainide. HISTORY OF PRESENT ILLNESS:  I had the pleasure of seeing Mrs. Claudette Ege in our office on 10/18/2022. She is a pleasant 80-year-old female who resides at the [facility], where she has been since 2021. On 2021, she came to our hospital and was unresponsive. She was in sinus tachycardia and she had a UTI, which was treated with Invanz. She has a history of SVT and had been on Cardizem and Lopressor. She developed bradycardia with heart rates in the 30s and 40s. Diltiazem and metoprolol were stopped and she was given a 30-day event recorder from 2022, to 2022. She had multiple episodes of atrial fibrillation with also SVT. We started flecainide at 50 mg b.i.d. and placed another 30-day event recorder. Flecainide actually worked very well. She had no SVT or bradycardia or atrial fibrillation with flecainide. I last saw her on 2022. She has continued to do well. She does have left leg wound post surgery for basal cell carcinoma removed by Dr. Nicole Juan. She has marked edema in both lower extremities, which were both wrapped. She does not do any walking. She is wheelchair-bound. She denies any chest pain or chest discomfort. No unusual shortness of breath. She has had no syncope or near syncope. Denies any palpitations. Her  is also at the [facility] in a separate room (\"thank God. .. \"). She has never had a myocardial infarction or cardiac catheterization. CARDIAC RISK FACTORS:  Other Family Members:  Negative.   Hypertension: Positive. Hyperlipidemia:  Positive. Non-Insulin-Dependent Diabetes:  Positive. Peripheral Vascular Disease:  Negative. Smoking:  Negative. MEDICATIONS AT THIS TIME:  She is on albuterol inhaler p.r.n., Zyloprim 100 mg daily, aspirin 81 mg daily, Tambocor 50 mg b.i.d., Lasix 40 mg daily, Glucophage 500 mg b.i.d., potassium 20 mEq daily, Zocor 20 mg daily, sodium chloride 1 gm daily, vitamin D 1000 units daily, zinc 220 mg daily. PAST MEDICAL AND SURGICAL HISTORY:  1.  Basal cell carcinoma, with recent surgery in the left leg for removal.  2.  COVID-19 diagnosed on 2022.  3.  Gout. 4.  Hyperlipidemia. 5.  Hypertension. 6.  SVT, well controlled with flecainide. 7.  Non-insulin-dependent diabetes. 8.  Appendectomy. 9.  Right knee replacement. 10.  Marked lymphedema in both lower extremities. FAMILY HISTORY:  Mother passed away of CHF. Father, unknown. Two brothers and one sister had cancer. SOCIAL HISTORY:  She is 80years old, . Three children, one girl  in a crib death, two boys.  worked in the railroad for 40 years. She has been at the [facility] for one year. REVIEW OF SYSTEMS:  Cardiac as above. Other systems reviewed including constitutional, eyes, ears, nose and throat, cardiovascular, respiratory, GI, , musculoskeletal, integumentary, neurologic, endocrine, hematologic and allergic/immunologic are negative except for what is described above. No weight loss or weight gain. No change in bowel habits. No blood in stool. No fevers, sweats or chills. PHYSICAL EXAMINATION:  VITAL SIGNS:  Her blood pressure was 145/75 with a heart rate of 96 and regular. Respiratory rate 18. O2 sat 96%. GENERAL:  She is a pleasant 80-year-old female. Denied pain. She was oriented to person, place and time. Answered questions appropriately. SKIN:  No unusual skin changes. HEENT:  The pupils are equally round and intact. Mucous membranes were dry. NECK:  No JVD. Good carotid pulses. No carotid bruits. No lymphadenopathy or thyromegaly. CARDIOVASCULAR EXAM:  S1 and S2 were normal.  No S3 or S4. Soft systolic blowing type murmur. No diastolic murmur. PMI was normal.  No lift, thrust, or pericardial friction rub. LUNGS:  Clear to auscultation and percussion. ABDOMEN:  Soft and nontender. Good bowel sounds. EXTREMITIES:  She had marked lymphedema in both lower extremities and both were wrapped. NEUROLOGIC EXAM:  Unremarkable. PSYCHIATRIC EXAM:  Unremarkable. LABORATORY DATA:  Glucose 86, sodium 141, potassium 3.5, BUN 14, creatinine 0.8, calcium was 8.3. AST was 19, ALT was 11. Cholesterol 101, triglycerides 94, HDL was 44, LDL 38. Magnesium 1.4. White count 7.2, hemoglobin 9.2, which is about her baseline, platelet count of 200,956. TSH was 1.369. Vitamin D was 45. EKG showed sinus rhythm with right bundle-branch block and left posterior fascicular block. IMPRESSION:  1.  SVT, well controlled with flecainide. 2.  Sick sinus syndrome with no bradycardia with flecainide. 3.  Normal LV function, EF of 55% with mildly dilated right ventricle. 4.  Hypertension, well controlled. 5.  Lymphedema in both lower extremities. 6.  Non-insulin-dependent diabetes. 7.  Hyperlipidemia. PLAN:  1. No change in medications. 2.  Continue to monitor. 3.  We will see in one year, although have an EKG done in 6 months. DISCUSSION:  Mrs. Clancy, overall, is doing well. She has had no chest pain or chest discomfort or any unusual shortness of breath, although she is extremely inactive. As far as we can tell, she has not had any further SVT. She does have sick sinus syndrome but has not needed a pacemaker especially with flecainide. If she would have any lightheaded spells or dizzy episodes, we would of course want to see her. We would then place a Holter monitor. Thank you very much for allowing me the privilege of seeing Mrs. Hector Skinner.   If you have any questions on my thoughts, please do not hesitate to contact me.      Sincerely,        Lai Bailey    D: 10/18/2022 8:33:54     T: 10/18/2022 8:38:41     [id number]  Job#: [id number]   Doc#: [id number]

## 2022-10-18 NOTE — PROGRESS NOTES
Ov Dr. Mohamud Ledezma for 6 month f/u   Living at [facility]   Has been there 1 yr in nov   B/l legs are wrapped   Left leg has a wound - post surgery on leg-  Basal cell carcinoma of left lower leg   Right leg wrapped d/t edema   Dr. Oxana Cruz   No chest pain or heaviness  No sob    is at [facility] now also   Following pace maker insert   Mainly in wheelchair   Taking baby steps now   Has one son lives in [facility] [place]  Another son lives in [place] - does not see   Him much - he works on the [employer]    No changes  Follow up in one year

## 2022-10-23 NOTE — PROGRESS NOTES
Kaveh Mcgraw M.D. [address] [place] [address]  [phone]          2022          Duy Bynum MD  [address] [facility]      RE:   Akhil Cruz  :        Dear Dr. Ninoska Cuevas:    CHIEF COMPLAINT:  1. Sick sinus syndrome. 2.  SVT which has been well controlled with flecainide. HISTORY OF PRESENT ILLNESS:  I had the pleasure of seeing Mrs. Akhil Cruz in our office on 10/18/2022. She is a pleasant 45-year-old female who resides at the [facility], where she has been since 2021. On 2021, she came to our hospital and was unresponsive. She was in sinus tachycardia and she had a UTI, which was treated with Invanz. She has a history of SVT and had been on Cardizem and Lopressor. She developed bradycardia with heart rates in the 30s and 40s. Diltiazem and metoprolol were stopped and she was given a 30-day event recorder from 2022, to 2022. She had multiple episodes of atrial fibrillation with also SVT. We started flecainide at 50 mg b.i.d. and placed another 30-day event recorder. Flecainide actually worked very well. She had no SVT or bradycardia or atrial fibrillation with flecainide. I last saw her on 2022. She has continued to do well. She does have left leg wound post surgery for basal cell carcinoma removed by Dr. Jeff Martinez. She has marked edema in both lower extremities, which were both wrapped. She does not do any walking. She is wheelchair-bound. She denies any chest pain or chest discomfort. No unusual shortness of breath. She has had no syncope or near syncope. Denies any palpitations. Her  is also at the [facility] in a separate room (\"thank God. .. \"). She has never had a myocardial infarction or cardiac catheterization. CARDIAC RISK FACTORS:  Other Family Members:  Negative.   Hypertension: Positive. Hyperlipidemia:  Positive. Non-Insulin-Dependent Diabetes:  Positive. Peripheral Vascular Disease:  Negative. Smoking:  Negative. MEDICATIONS AT THIS TIME:  She is on albuterol inhaler p.r.n., Zyloprim 100 mg daily, aspirin 81 mg daily, Tambocor 50 mg b.i.d., Lasix 40 mg daily, Glucophage 500 mg b.i.d., potassium 20 mEq daily, Zocor 20 mg daily, sodium chloride 1 gm daily, vitamin D 1000 units daily, zinc 220 mg daily. PAST MEDICAL AND SURGICAL HISTORY:  1.  Basal cell carcinoma, with recent surgery in the left leg for removal.  2.  COVID-19 diagnosed on 2022.  3.  Gout. 4.  Hyperlipidemia. 5.  Hypertension. 6.  SVT, well controlled with flecainide. 7.  Non-insulin-dependent diabetes. 8.  Appendectomy. 9.  Right knee replacement. 10.  Marked lymphedema in both lower extremities. FAMILY HISTORY:  Mother passed away of CHF. Father, unknown. Two brothers and one sister had cancer. SOCIAL HISTORY:  She is 80years old, . Three children, one girl  in a crib death, two boys.  worked in the railroad for 40 years. She has been at the [facility] for one year. REVIEW OF SYSTEMS:  Cardiac as above. Other systems reviewed including constitutional, eyes, ears, nose and throat, cardiovascular, respiratory, GI, , musculoskeletal, integumentary, neurologic, endocrine, hematologic and allergic/immunologic are negative except for what is described above. No weight loss or weight gain. No change in bowel habits. No blood in stool. No fevers, sweats or chills. PHYSICAL EXAMINATION:  VITAL SIGNS:  Her blood pressure was 145/75 with a heart rate of 96 and regular. Respiratory rate 18. O2 sat 96%. GENERAL:  She is a pleasant 80-year-old female. Denied pain. She was oriented to person, place and time. Answered questions appropriately. SKIN:  No unusual skin changes. HEENT:  The pupils are equally round and intact. Mucous membranes were dry. NECK:  No JVD. Good carotid pulses. No carotid bruits. No lymphadenopathy or thyromegaly. CARDIOVASCULAR EXAM:  S1 and S2 were normal.  No S3 or S4. Soft systolic blowing type murmur. No diastolic murmur. PMI was normal.  No lift, thrust, or pericardial friction rub. LUNGS:  Clear to auscultation and percussion. ABDOMEN:  Soft and nontender. Good bowel sounds. EXTREMITIES:  She had marked lymphedema in both lower extremities and both were wrapped. NEUROLOGIC EXAM:  Unremarkable. PSYCHIATRIC EXAM:  Unremarkable. LABORATORY DATA:  Glucose 86, sodium 141, potassium 3.5, BUN 14, creatinine 0.8, calcium was 8.3. AST was 19, ALT was 11. Cholesterol 101, triglycerides 94, HDL was 44, LDL 38. Magnesium 1.4. White count 7.2, hemoglobin 9.2, which is about her baseline, platelet count of 789,954. TSH was 1.369. Vitamin D was 45. EKG showed sinus rhythm with right bundle-branch block and left posterior fascicular block. IMPRESSION:  1.  SVT, well controlled with flecainide. 2.  Sick sinus syndrome with no bradycardia with flecainide. 3.  Normal LV function, EF of 55% with mildly dilated right ventricle. 4.  Hypertension, well controlled. 5.  Lymphedema in both lower extremities. 6.  Non-insulin-dependent diabetes. 7.  Hyperlipidemia. PLAN:  1. No change in medications. 2.  Continue to monitor. 3.  We will see in one year, although have an EKG done in 6 months. DISCUSSION:  Mrs. Clancy, overall, is doing well. She has had no chest pain or chest discomfort or any unusual shortness of breath, although she is extremely inactive. As far as we can tell, she has not had any further SVT. She does have sick sinus syndrome but has not needed a pacemaker especially with flecainide. If she would have any lightheaded spells or dizzy episodes, we would of course want to see her. We would then place a Holter monitor. Thank you very much for allowing me the privilege of seeing Mrs. Merced Amin.   If you have any questions on my thoughts, please do not hesitate to contact me.      Sincerely,        Lai Bailey    D: 10/18/2022 8:33:54     T: 10/18/2022 8:38:41     [id number]  Job#: [id number]   Doc#: [id number]

## 2023-03-23 ENCOUNTER — HOSPITAL ENCOUNTER (OUTPATIENT)
Dept: GENERAL RADIOLOGY | Age: 83
Discharge: HOME | End: 2023-03-25
Payer: MEDICARE

## 2023-03-23 ENCOUNTER — OFFICE VISIT (OUTPATIENT)
Dept: UROLOGY | Age: 83
End: 2023-03-23

## 2023-03-23 ENCOUNTER — HOSPITAL ENCOUNTER (OUTPATIENT)
Age: 83
Discharge: HOME | End: 2023-03-25
Payer: MEDICARE

## 2023-03-23 VITALS
DIASTOLIC BLOOD PRESSURE: 58 MMHG | SYSTOLIC BLOOD PRESSURE: 120 MMHG | HEIGHT: 60 IN | WEIGHT: 217 LBS | BODY MASS INDEX: 42.6 KG/M2

## 2023-03-23 DIAGNOSIS — N39.46 MIXED STRESS AND URGE URINARY INCONTINENCE: ICD-10-CM

## 2023-03-23 DIAGNOSIS — N20.0 KIDNEY STONES: ICD-10-CM

## 2023-03-23 DIAGNOSIS — N20.0 KIDNEY STONES: Primary | ICD-10-CM

## 2023-03-23 PROCEDURE — 74018 RADEX ABDOMEN 1 VIEW: CPT

## 2023-03-23 ASSESSMENT — ENCOUNTER SYMPTOMS
ABDOMINAL PAIN: 0
COLOR CHANGE: 0
NAUSEA: 0
WHEEZING: 0
CONSTIPATION: 0
APNEA: 0
EYE REDNESS: 0
VOMITING: 0
SHORTNESS OF BREATH: 0
COUGH: 0
BACK PAIN: 0

## 2023-03-23 NOTE — PROGRESS NOTES
ondansetron (ZOFRAN) 4 MG tablet Take 4 mg by mouth every 8 hours as needed for Nausea or Vomiting      flecainide (TAMBOCOR) 50 MG tablet Take 50 mg by mouth 2 times daily      aspirin 81 MG EC tablet Take 1 tablet by mouth daily 30 tablet 3    sodium chloride 1 g tablet Take 1 tablet by mouth daily 90 tablet 3    ascorbic acid (VITAMIN C) 1000 MG tablet Take 1 tablet by mouth daily 30 tablet 3    Vitamin D (CHOLECALCIFEROL) 25 MCG (1000 UT) TABS tablet Take 1 tablet by mouth daily 60 tablet     guaiFENesin (ROBITUSSIN) 100 MG/5ML syrup Take 400 mg by mouth 4 times daily as needed for Cough 10ML      acetaminophen (TYLENOL) 325 MG tablet Take 650 mg by mouth every 4 hours as needed for Pain      Polyethylene Glycol 1450 POWD by Does not apply route      albuterol sulfate HFA (PROVENTIL;VENTOLIN;PROAIR) 108 (90 Base) MCG/ACT inhaler Inhale 2 puffs into the lungs every 6 hours as needed for Wheezing      furosemide (LASIX) 40 MG tablet Take 1 tablet by mouth daily 60 tablet 3    potassium chloride (KLOR-CON M) 20 MEQ TBCR extended release tablet Take 1 tablet by mouth daily 30 tablet 3    Multiple Vitamin (MULTIVITAMIN) TABS tablet Take 1 tablet by mouth daily  0    Lancets (ONETOUCH DELICA PLUS [id number]) MISC Testing blood sugar once daily and as needed, uses OneTouch UltraMini 100 each 5    blood glucose test strips (ASCENSIA AUTODISC VI;ONE TOUCH ULTRA TEST VI) strip Testing blood sugar once daily and as needed 100 each 5    allopurinol (ZYLOPRIM) 100 MG tablet TAKE 1 TABLET EVERY DAY 90 tablet 1    metFORMIN (GLUCOPHAGE) 500 MG tablet Take 1 tablet by mouth 2 times daily (with meals) 180 tablet 1    simvastatin (ZOCOR) 20 MG tablet TAKE 1 TABLET EVERY NIGHT 90 tablet 1    blood glucose test strips (ONE TOUCH ULTRA TEST) strip Test blood sugar once daily and as needed.  100 strip 3    Calcium Carb-Cholecalciferol (CALCIUM 600 + D PO) Take by mouth daily      Glucose Blood (CHOICE DM FORA G20 TEST STRIPS VI) by In

## 2023-04-15 ENCOUNTER — HOSPITAL ENCOUNTER (EMERGENCY)
Age: 83
Discharge: HOME | End: 2023-04-16
Attending: EMERGENCY MEDICINE
Payer: MEDICARE

## 2023-04-15 ENCOUNTER — APPOINTMENT (OUTPATIENT)
Dept: GENERAL RADIOLOGY | Age: 83
End: 2023-04-15
Payer: MEDICARE

## 2023-04-15 VITALS
OXYGEN SATURATION: 96 % | BODY MASS INDEX: 43.19 KG/M2 | WEIGHT: 220 LBS | RESPIRATION RATE: 18 BRPM | SYSTOLIC BLOOD PRESSURE: 140 MMHG | TEMPERATURE: 98.1 F | HEART RATE: 98 BPM | DIASTOLIC BLOOD PRESSURE: 82 MMHG | HEIGHT: 60 IN

## 2023-04-15 DIAGNOSIS — M19.011 LOCALIZED OSTEOARTHRITIS OF RIGHT SHOULDER: ICD-10-CM

## 2023-04-15 DIAGNOSIS — M25.511 ACUTE PAIN OF RIGHT SHOULDER: Primary | ICD-10-CM

## 2023-04-15 PROCEDURE — 73030 X-RAY EXAM OF SHOULDER: CPT

## 2023-04-15 PROCEDURE — 99283 EMERGENCY DEPT VISIT LOW MDM: CPT

## 2023-04-15 ASSESSMENT — PAIN - FUNCTIONAL ASSESSMENT: PAIN_FUNCTIONAL_ASSESSMENT: 0-10

## 2023-04-15 ASSESSMENT — LIFESTYLE VARIABLES
HOW OFTEN DO YOU HAVE A DRINK CONTAINING ALCOHOL: NEVER
HOW MANY STANDARD DRINKS CONTAINING ALCOHOL DO YOU HAVE ON A TYPICAL DAY: PATIENT DOES NOT DRINK

## 2023-04-15 ASSESSMENT — PAIN DESCRIPTION - ORIENTATION: ORIENTATION: RIGHT

## 2023-04-15 ASSESSMENT — PAIN SCALES - GENERAL: PAINLEVEL_OUTOF10: 9

## 2023-04-15 ASSESSMENT — PAIN DESCRIPTION - DESCRIPTORS: DESCRIPTORS: SHARP

## 2023-04-15 ASSESSMENT — PAIN DESCRIPTION - LOCATION: LOCATION: SHOULDER

## 2023-04-15 ASSESSMENT — PAIN DESCRIPTION - PAIN TYPE: TYPE: ACUTE PAIN

## 2023-04-15 ASSESSMENT — PAIN DESCRIPTION - FREQUENCY: FREQUENCY: INTERMITTENT

## 2023-09-27 DIAGNOSIS — N76.0 VAGINAL INFECTION: Primary | ICD-10-CM

## 2023-09-27 RX ORDER — CLINDAMYCIN PHOSPHATE 20 MG/G
CREAM VAGINAL
Qty: 40 G | Refills: 0 | Status: SHIPPED | OUTPATIENT
Start: 2023-09-27

## 2023-09-27 NOTE — TELEPHONE ENCOUNTER
Report from nursing home showing infection. Nursing home nurse Charan Boone informed of medication being sent in.

## 2023-10-03 ENCOUNTER — TELEPHONE (OUTPATIENT)
Dept: OBGYN | Age: 83
End: 2023-10-03

## 2023-10-03 NOTE — TELEPHONE ENCOUNTER
Due to Doc being out in surgery I called and spoke with Mita about moving appt. To 1pm 10/10. States that will be ok.

## 2023-10-10 ENCOUNTER — OFFICE VISIT (OUTPATIENT)
Dept: CARDIOLOGY CLINIC | Age: 83
End: 2023-10-10
Payer: MEDICARE

## 2023-10-10 ENCOUNTER — HOSPITAL ENCOUNTER (OUTPATIENT)
Age: 83
Discharge: HOME | End: 2023-10-10
Attending: INTERNAL MEDICINE
Payer: MEDICARE

## 2023-10-10 ENCOUNTER — HOSPITAL ENCOUNTER (OUTPATIENT)
Age: 83
Discharge: HOME | End: 2023-10-12
Attending: INTERNAL MEDICINE
Payer: MEDICARE

## 2023-10-10 VITALS — HEART RATE: 98 BPM | OXYGEN SATURATION: 98 % | DIASTOLIC BLOOD PRESSURE: 76 MMHG | SYSTOLIC BLOOD PRESSURE: 137 MMHG

## 2023-10-10 DIAGNOSIS — I47.10 SVT (SUPRAVENTRICULAR TACHYCARDIA): ICD-10-CM

## 2023-10-10 DIAGNOSIS — E55.9 VITAMIN D DEFICIENCY: ICD-10-CM

## 2023-10-10 DIAGNOSIS — E78.2 MIXED HYPERLIPIDEMIA: ICD-10-CM

## 2023-10-10 DIAGNOSIS — I10 ESSENTIAL HYPERTENSION, BENIGN: ICD-10-CM

## 2023-10-10 DIAGNOSIS — R73.9 ELEVATED BLOOD SUGAR: ICD-10-CM

## 2023-10-10 DIAGNOSIS — I47.10 SVT (SUPRAVENTRICULAR TACHYCARDIA): Primary | ICD-10-CM

## 2023-10-10 LAB
ALBUMIN SERPL-MCNC: 3.4 G/DL (ref 3.5–5.2)
ALP SERPL-CCNC: 359 U/L (ref 35–104)
ALT SERPL-CCNC: 23 U/L (ref 5–33)
ANION GAP SERPL CALCULATED.3IONS-SCNC: 8 MMOL/L (ref 9–17)
AST SERPL-CCNC: 31 U/L
ATYPICAL LYMPHOCYTE ABSOLUTE COUNT: 0.13 K/UL (ref 0–1)
ATYPICAL LYMPHOCYTES: 2 %
BASOPHILS # BLD: ABNORMAL K/UL (ref 0–0.2)
BASOPHILS NFR BLD: ABNORMAL % (ref 0–2)
BILIRUB SERPL-MCNC: 0.5 MG/DL (ref 0.3–1.2)
BUN SERPL-MCNC: 24 MG/DL (ref 8–23)
BUN/CREAT SERPL: 27 (ref 9–20)
CALCIUM SERPL-MCNC: 9.6 MG/DL (ref 8.6–10.4)
CHLORIDE SERPL-SCNC: 102 MMOL/L (ref 98–107)
CHOLEST SERPL-MCNC: 122 MG/DL
CHOLESTEROL/HDL RATIO: 1.8
CO2 SERPL-SCNC: 28 MMOL/L (ref 20–31)
CREAT SERPL-MCNC: 0.9 MG/DL (ref 0.5–0.9)
EOSINOPHIL # BLD: 0.06 K/UL (ref 0–0.4)
EOSINOPHILS RELATIVE PERCENT: 1 % (ref 0–5)
ERYTHROCYTE [DISTWIDTH] IN BLOOD BY AUTOMATED COUNT: 13.5 % (ref 12.1–15.2)
EST. AVERAGE GLUCOSE BLD GHB EST-MCNC: 114 MG/DL
GFR SERPL CREATININE-BSD FRML MDRD: >60 ML/MIN/1.73M2
GLUCOSE SERPL-MCNC: 119 MG/DL (ref 70–99)
HBA1C MFR BLD: 5.6 % (ref 4–6)
HCT VFR BLD AUTO: 33.2 % (ref 36–46)
HDLC SERPL-MCNC: 69 MG/DL
HGB BLD-MCNC: 11.4 G/DL (ref 12–16)
IMM GRANULOCYTES # BLD AUTO: ABNORMAL K/UL (ref 0–0.3)
IMM GRANULOCYTES NFR BLD: ABNORMAL %
LDLC SERPL CALC-MCNC: 39 MG/DL (ref 0–130)
LYMPHOCYTES NFR BLD: 1.34 K/UL (ref 1–4.8)
LYMPHOCYTES RELATIVE PERCENT: 21 % (ref 15–40)
MAGNESIUM SERPL-MCNC: 1.9 MG/DL (ref 1.6–2.6)
MCH RBC QN AUTO: 29.5 PG (ref 26–34)
MCHC RBC AUTO-ENTMCNC: 34.3 G/DL (ref 31–37)
MCV RBC AUTO: 85.8 FL (ref 80–100)
MONOCYTES NFR BLD: 0.51 K/UL (ref 0–1)
MONOCYTES NFR BLD: 8 % (ref 4–8)
MORPHOLOGY: ABNORMAL
NEUTROPHILS NFR BLD: 68 % (ref 47–75)
NEUTS SEG NFR BLD: 4.36 K/UL (ref 2.5–7)
PATIENT FASTING?: YES
PLATELET # BLD AUTO: 146 K/UL (ref 140–450)
PMV BLD AUTO: 9.5 FL (ref 6–12)
POTASSIUM SERPL-SCNC: 4.1 MMOL/L (ref 3.7–5.3)
PROT SERPL-MCNC: 8.2 G/DL (ref 6.4–8.3)
RBC # BLD AUTO: 3.87 M/UL (ref 4–5.2)
SODIUM SERPL-SCNC: 138 MMOL/L (ref 135–144)
TRIGL SERPL-MCNC: 70 MG/DL
TSH SERPL DL<=0.05 MIU/L-ACNC: 2.11 UIU/ML (ref 0.3–5)
WBC OTHER # BLD: 6.4 K/UL (ref 3.5–11)

## 2023-10-10 PROCEDURE — 1123F ACP DISCUSS/DSCN MKR DOCD: CPT | Performed by: INTERNAL MEDICINE

## 2023-10-10 PROCEDURE — G8399 PT W/DXA RESULTS DOCUMENT: HCPCS | Performed by: INTERNAL MEDICINE

## 2023-10-10 PROCEDURE — 80053 COMPREHEN METABOLIC PANEL: CPT

## 2023-10-10 PROCEDURE — 85025 COMPLETE CBC W/AUTO DIFF WBC: CPT

## 2023-10-10 PROCEDURE — G8484 FLU IMMUNIZE NO ADMIN: HCPCS | Performed by: INTERNAL MEDICINE

## 2023-10-10 PROCEDURE — 3078F DIAST BP <80 MM HG: CPT | Performed by: INTERNAL MEDICINE

## 2023-10-10 PROCEDURE — 99214 OFFICE O/P EST MOD 30 MIN: CPT | Performed by: INTERNAL MEDICINE

## 2023-10-10 PROCEDURE — 1036F TOBACCO NON-USER: CPT | Performed by: INTERNAL MEDICINE

## 2023-10-10 PROCEDURE — 3074F SYST BP LT 130 MM HG: CPT | Performed by: INTERNAL MEDICINE

## 2023-10-10 PROCEDURE — 93005 ELECTROCARDIOGRAM TRACING: CPT

## 2023-10-10 PROCEDURE — 83036 HEMOGLOBIN GLYCOSYLATED A1C: CPT

## 2023-10-10 PROCEDURE — G8427 DOCREV CUR MEDS BY ELIG CLIN: HCPCS | Performed by: INTERNAL MEDICINE

## 2023-10-10 PROCEDURE — 93246 EXT ECG>7D<15D RECORDING: CPT

## 2023-10-10 PROCEDURE — G8417 CALC BMI ABV UP PARAM F/U: HCPCS | Performed by: INTERNAL MEDICINE

## 2023-10-10 PROCEDURE — 1090F PRES/ABSN URINE INCON ASSESS: CPT | Performed by: INTERNAL MEDICINE

## 2023-10-10 PROCEDURE — 80061 LIPID PANEL: CPT

## 2023-10-10 PROCEDURE — 84443 ASSAY THYROID STIM HORMONE: CPT

## 2023-10-10 PROCEDURE — 36415 COLL VENOUS BLD VENIPUNCTURE: CPT

## 2023-10-10 PROCEDURE — 83735 ASSAY OF MAGNESIUM: CPT

## 2023-10-10 NOTE — PROGRESS NOTES
The patient was educated on the use of a epatch monitor. The patient's comprehension was medium. The patient was able to verbalize recall. The patient was instructed on how and when to return the monitor. Writer also called and talked to patient's Nurse Royal Stanley at [facility] with education.

## 2023-10-10 NOTE — PROGRESS NOTES
Ov Dr. Momo Arevalo for one year follow up   Still at [facility]  Did not get any testing done   No chest pain   No palpations   No sob   B/l legs stay wrapped 24/7  Changed weekly   lives at home now   Will get labs/ekg today       Follow up in one year   With testing

## 2023-10-11 LAB
EKG ATRIAL RATE: 83 BPM
EKG P AXIS: 44 DEGREES
EKG P-R INTERVAL: 228 MS
EKG Q-T INTERVAL: 424 MS
EKG QRS DURATION: 146 MS
EKG QTC CALCULATION (BAZETT): 498 MS
EKG R AXIS: -25 DEGREES
EKG T AXIS: 27 DEGREES
EKG VENTRICULAR RATE: 83 BPM

## 2023-10-11 PROCEDURE — 93010 ELECTROCARDIOGRAM REPORT: CPT | Performed by: INTERNAL MEDICINE

## 2023-10-12 NOTE — PROGRESS NOTES
Jean Pierre Mejia M.D. [facility]  [address]  [facility]  [phone]        October 10, 2023        Perla Rincon MD  [address] [facility]    RE:   Agnieszka Sick  :      Dear Dr. Fischer Cos:    CHIEF COMPLAINT:  1. Sick sinus syndrome. 2.  SVT, controlled with low dose flecainide. HISTORY OF PRESENT ILLNESS:  I had the pleasure of seeing Miguelito Ryan in the office on 10/10/2023. She is a pleasant 80-year-old female who is confined to a wheelchair and resides at [facility]. She has been at [facility] since 2021. Her  still is at home but he visits her every day. On 2021, she came to the hospital and was unresponsive and was in sinus tachycardia. She had a UTI, treated with Gustavo Solan. She had a history of SVT and had been on Cardizem and Lopressor, she developed bradycardia with the heart rates in the 30s and 40s. Diltiazem and metoprolol were stopped and she was given a 30-day event recorder on 2022, she had multiple episodes of atrial fibrillation and SVT. We started flecainide 50 mg b.i.d. and did another 30-day event recorder. It worked very well with no SVT, bradycardia or atrial fibrillation. She continues to do well. Her legs are wrapped and she has marked edema. Does no walking and is wheelchair bound. She did have COVID on 10/29/2022 from which she recovered nicely. She denies any chest pain or chest discomfort. No unusual shortness of breath. She wheels in her wheelchair \"all over the place. Nadeemiman Agreste Nadeem Agreste \"  She has never had a myocardial infarction or cardiac catheterization. She denies any palpitations. No syncope or near syncope. CARDIAC RISK FACTORS:  Other Family Members:  Negative. Hypertension:  Positive. Hyperlipidemia:  Positive. Non-Insulin-Dependent Diabetes:  Positive. Peripheral Vascular Disease:  Negative. Smoking:  Negative.     MEDICATIONS:

## 2023-10-23 ENCOUNTER — TELEPHONE (OUTPATIENT)
Dept: CARDIOLOGY CLINIC | Age: 83
End: 2023-10-23

## 2023-10-23 NOTE — TELEPHONE ENCOUNTER
Nurse Lovely Calderon at [facility] notified of holter   Per Dr. Christoph Cortez holter is good

## 2023-11-03 ENCOUNTER — OFFICE VISIT (OUTPATIENT)
Dept: OBGYN CLINIC | Age: 83
End: 2023-11-03

## 2023-11-03 ENCOUNTER — HOSPITAL ENCOUNTER (OUTPATIENT)
Age: 83
Setting detail: SPECIMEN
Discharge: HOME | End: 2023-11-03
Payer: MEDICARE

## 2023-11-03 VITALS
WEIGHT: 220 LBS | DIASTOLIC BLOOD PRESSURE: 72 MMHG | SYSTOLIC BLOOD PRESSURE: 116 MMHG | BODY MASS INDEX: 43.19 KG/M2 | HEIGHT: 60 IN

## 2023-11-03 DIAGNOSIS — N89.8 VAGINAL DISCHARGE: ICD-10-CM

## 2023-11-03 DIAGNOSIS — N89.8 VAGINAL DISCHARGE: Primary | ICD-10-CM

## 2023-11-03 DIAGNOSIS — N95.0 PMB (POSTMENOPAUSAL BLEEDING): ICD-10-CM

## 2023-11-03 PROCEDURE — 87070 CULTURE OTHR SPECIMN AEROBIC: CPT

## 2023-11-03 PROCEDURE — 87591 N.GONORRHOEAE DNA AMP PROB: CPT

## 2023-11-03 PROCEDURE — 87491 CHLMYD TRACH DNA AMP PROBE: CPT

## 2023-11-03 PROCEDURE — 87480 CANDIDA DNA DIR PROBE: CPT

## 2023-11-03 PROCEDURE — 87660 TRICHOMONAS VAGIN DIR PROBE: CPT

## 2023-11-03 PROCEDURE — 87510 GARDNER VAG DNA DIR PROBE: CPT

## 2023-11-03 RX ORDER — HYDROXYZINE PAMOATE 50 MG/1
CAPSULE ORAL
COMMUNITY
Start: 2023-09-13

## 2023-11-03 RX ORDER — MELOXICAM 15 MG/1
TABLET ORAL
COMMUNITY
Start: 2023-10-20

## 2023-11-03 RX ORDER — TOBRAMYCIN AND DEXAMETHASONE 3; 1 MG/ML; MG/ML
SUSPENSION/ DROPS OPHTHALMIC
COMMUNITY
Start: 2023-10-10

## 2023-11-03 RX ORDER — CEFTRIAXONE 1 G/1
INJECTION, POWDER, FOR SOLUTION INTRAMUSCULAR; INTRAVENOUS
COMMUNITY
Start: 2023-09-17

## 2023-11-03 RX ORDER — LIDOCAINE HYDROCHLORIDE 10 MG/ML
INJECTION, SOLUTION INFILTRATION; PERINEURAL
COMMUNITY
Start: 2023-09-17

## 2023-11-03 SDOH — ECONOMIC STABILITY: INCOME INSECURITY: HOW HARD IS IT FOR YOU TO PAY FOR THE VERY BASICS LIKE FOOD, HOUSING, MEDICAL CARE, AND HEATING?: NOT HARD AT ALL

## 2023-11-03 SDOH — ECONOMIC STABILITY: FOOD INSECURITY: WITHIN THE PAST 12 MONTHS, YOU WORRIED THAT YOUR FOOD WOULD RUN OUT BEFORE YOU GOT MONEY TO BUY MORE.: NEVER TRUE

## 2023-11-03 SDOH — ECONOMIC STABILITY: HOUSING INSECURITY
IN THE LAST 12 MONTHS, WAS THERE A TIME WHEN YOU DID NOT HAVE A STEADY PLACE TO SLEEP OR SLEPT IN A SHELTER (INCLUDING NOW)?: NO

## 2023-11-03 SDOH — ECONOMIC STABILITY: FOOD INSECURITY: WITHIN THE PAST 12 MONTHS, THE FOOD YOU BOUGHT JUST DIDN'T LAST AND YOU DIDN'T HAVE MONEY TO GET MORE.: NEVER TRUE

## 2023-11-03 ASSESSMENT — ENCOUNTER SYMPTOMS
COLOR CHANGE: 0
CONSTIPATION: 0
WHEEZING: 0
NAUSEA: 0
ABDOMINAL DISTENTION: 0
ABDOMINAL PAIN: 0
CHEST TIGHTNESS: 0
DIARRHEA: 0
COUGH: 0
SHORTNESS OF BREATH: 0
VOMITING: 0

## 2023-11-03 ASSESSMENT — PATIENT HEALTH QUESTIONNAIRE - PHQ9
SUM OF ALL RESPONSES TO PHQ9 QUESTIONS 1 & 2: 2
SUM OF ALL RESPONSES TO PHQ QUESTIONS 1-9: 2
SUM OF ALL RESPONSES TO PHQ QUESTIONS 1-9: 2
1. LITTLE INTEREST OR PLEASURE IN DOING THINGS: 1
2. FEELING DOWN, DEPRESSED OR HOPELESS: 1
SUM OF ALL RESPONSES TO PHQ QUESTIONS 1-9: 2
SUM OF ALL RESPONSES TO PHQ QUESTIONS 1-9: 2

## 2023-11-03 NOTE — PROGRESS NOTES
I called and spoke with patients nurse and let her know what was done today in office as well as gyn sono being ordered. Order sent back with patient and Manuel Montero the nurse aware that appt. Needs scheduled - scheduling number attached to paperwork.

## 2023-11-03 NOTE — PROGRESS NOTES
CHIEF COMPLAINT:     Chief Complaint   Patient presents with    Vaginal Discharge     Nursing home patient presents today for on going vaginal discharge that is sometimes blood tinged. HPI:   Alia Meza presents today from the nursing home for vaginal discharge that is sometimes blood tinged. Patient is unsure of what this discharge looks like because she does not change her brief and was only aware of this because that is what staff is telling her. She arrives without anyone from the nursing home to further explain the discharge. She does reports she has some tenderness but declines itching. She is not sexually active. REVIEW OF SYSTEMS:  Review of Systems   Constitutional:  Negative for activity change, chills, diaphoresis, fatigue and fever. HENT:  Negative for congestion. Respiratory:  Negative for cough, chest tightness, shortness of breath and wheezing. Cardiovascular:  Negative for chest pain, palpitations and leg swelling. Gastrointestinal:  Negative for abdominal distention, abdominal pain, constipation, diarrhea, nausea and vomiting. Genitourinary:  Positive for vaginal bleeding (possible blood tinged discharge) and vaginal discharge. Negative for dysuria, frequency, hematuria and urgency. Urinary incontinence     Musculoskeletal:  Negative for arthralgias. Skin:  Negative for color change. Neurological:  Negative for dizziness, speech difficulty, weakness, light-headedness, numbness and headaches. Psychiatric/Behavioral:  Negative for agitation, behavioral problems, confusion, dysphoric mood, self-injury and suicidal ideas. The patient is not nervous/anxious. PHYSICAL EXAM:  Constitutional:   Blood pressure 116/72, height 1.524 m (5'), weight 99.8 kg (220 lb), not currently breastfeeding. Wt Readings from Last 3 Encounters:   11/03/23 99.8 kg (220 lb)   04/15/23 99.8 kg (220 lb)   03/23/23 98.4 kg (217 lb)     Physical exam limited due to patient immobility.

## 2023-11-04 LAB
CANDIDA SPECIES: NEGATIVE
GARDNERELLA VAGINALIS: NEGATIVE
SOURCE: NORMAL
TRICHOMONAS: NEGATIVE

## 2023-11-05 LAB
MICROORGANISM SPEC CULT: NORMAL
MICROORGANISM SPEC CULT: NORMAL
SPECIMEN DESCRIPTION: NORMAL

## 2023-11-06 LAB
C TRACH DNA SPEC QL PROBE+SIG AMP: NEGATIVE
MICROORGANISM SPEC CULT: NORMAL
N GONORRHOEA DNA SPEC QL PROBE+SIG AMP: NEGATIVE
SPECIMEN DESCRIPTION: NORMAL
SPECIMEN DESCRIPTION: NORMAL

## 2023-11-21 ENCOUNTER — HOSPITAL ENCOUNTER (OUTPATIENT)
Dept: ULTRASOUND IMAGING | Age: 83
Discharge: HOME | End: 2023-11-23
Payer: MEDICARE

## 2023-11-21 DIAGNOSIS — N95.0 PMB (POSTMENOPAUSAL BLEEDING): ICD-10-CM

## 2023-11-21 PROCEDURE — 76856 US EXAM PELVIC COMPLETE: CPT

## 2023-11-21 PROCEDURE — 76830 TRANSVAGINAL US NON-OB: CPT

## 2024-06-07 DIAGNOSIS — N20.0 KIDNEY STONES: Primary | ICD-10-CM

## 2024-09-04 ENCOUNTER — HOSPITAL ENCOUNTER (OUTPATIENT)
Age: 84
Setting detail: SPECIMEN
Discharge: HOME | End: 2024-09-04
Payer: MEDICARE

## 2024-09-04 LAB
-: ABNORMAL
ABSOLUTE BANDS: 0.89 K/UL (ref 0–1)
ANION GAP SERPL CALCULATED.3IONS-SCNC: 10 MMOL/L (ref 9–17)
BACTERIA URNS QL MICRO: ABNORMAL
BANDS: 4 % (ref 0–10)
BASOPHILS # BLD: ABNORMAL K/UL (ref 0–0.2)
BASOPHILS NFR BLD: ABNORMAL % (ref 0–2)
BILIRUB UR QL STRIP: NEGATIVE
BUN SERPL-MCNC: 26 MG/DL (ref 8–23)
BUN/CREAT SERPL: 26 (ref 9–20)
CALCIUM SERPL-MCNC: 8.7 MG/DL (ref 8.6–10.4)
CHLORIDE SERPL-SCNC: 97 MMOL/L (ref 98–107)
CLARITY UR: ABNORMAL
CO2 SERPL-SCNC: 27 MMOL/L (ref 20–31)
COLOR UR: YELLOW
COMMENT: ABNORMAL
CREAT SERPL-MCNC: 1 MG/DL (ref 0.5–0.9)
EOSINOPHIL # BLD: ABNORMAL K/UL (ref 0–0.4)
EOSINOPHILS RELATIVE PERCENT: ABNORMAL % (ref 0–5)
EPI CELLS #/AREA URNS HPF: ABNORMAL /HPF
ERYTHROCYTE [DISTWIDTH] IN BLOOD BY AUTOMATED COUNT: 13.6 % (ref 12.1–15.2)
GFR, ESTIMATED: 56 ML/MIN/1.73M2
GLUCOSE SERPL-MCNC: 115 MG/DL (ref 70–99)
GLUCOSE UR STRIP-MCNC: NEGATIVE MG/DL
HCT VFR BLD AUTO: 30.8 % (ref 36–46)
HGB BLD-MCNC: 10.2 G/DL (ref 12–16)
HGB UR QL STRIP.AUTO: ABNORMAL
IMM GRANULOCYTES # BLD AUTO: ABNORMAL K/UL (ref 0–0.3)
IMM GRANULOCYTES NFR BLD: ABNORMAL %
KETONES UR STRIP-MCNC: NEGATIVE MG/DL
LEUKOCYTE ESTERASE UR QL STRIP: ABNORMAL
LYMPHOCYTES NFR BLD: 0.67 K/UL (ref 1–4.8)
LYMPHOCYTES RELATIVE PERCENT: 3 % (ref 15–40)
MCH RBC QN AUTO: 28.3 PG (ref 26–34)
MCHC RBC AUTO-ENTMCNC: 33.1 G/DL (ref 31–37)
MCV RBC AUTO: 85.3 FL (ref 80–100)
MONOCYTES NFR BLD: 0.67 K/UL (ref 0–1)
MONOCYTES NFR BLD: 3 % (ref 4–8)
MORPHOLOGY: ABNORMAL
MUCOUS THREADS URNS QL MICRO: ABNORMAL
NEUTROPHILS NFR BLD: 90 % (ref 47–75)
NEUTS SEG NFR BLD: 19.97 K/UL (ref 2.5–7)
NITRITE UR QL STRIP: NEGATIVE
PH UR STRIP: 6.5 [PH] (ref 5–8)
PLATELET # BLD AUTO: 89 K/UL (ref 140–450)
PMV BLD AUTO: 11 FL (ref 6–12)
POTASSIUM SERPL-SCNC: 3.7 MMOL/L (ref 3.7–5.3)
PROT UR STRIP-MCNC: ABNORMAL MG/DL
RBC # BLD AUTO: 3.61 M/UL (ref 4–5.2)
RBC #/AREA URNS HPF: ABNORMAL /HPF (ref 0–2)
SODIUM SERPL-SCNC: 134 MMOL/L (ref 135–144)
SP GR UR STRIP: 1.01 (ref 1–1.03)
UROBILINOGEN UR STRIP-ACNC: NORMAL EU/DL (ref 0–1)
WBC #/AREA URNS HPF: ABNORMAL /HPF
WBC OTHER # BLD: 22.2 K/UL (ref 3.5–11)

## 2024-09-04 PROCEDURE — 87186 SC STD MICRODIL/AGAR DIL: CPT

## 2024-09-04 PROCEDURE — 87077 CULTURE AEROBIC IDENTIFY: CPT

## 2024-09-04 PROCEDURE — 80048 BASIC METABOLIC PNL TOTAL CA: CPT

## 2024-09-04 PROCEDURE — 87086 URINE CULTURE/COLONY COUNT: CPT

## 2024-09-04 PROCEDURE — 81001 URINALYSIS AUTO W/SCOPE: CPT

## 2024-09-04 PROCEDURE — 85025 COMPLETE CBC W/AUTO DIFF WBC: CPT

## 2024-09-07 LAB
MICROORGANISM SPEC CULT: ABNORMAL
MICROORGANISM SPEC CULT: ABNORMAL
SPECIMEN DESCRIPTION: ABNORMAL

## 2024-09-13 NOTE — PROGRESS NOTES
Right side upper buttock dressing and packing changed per physician order, moderate yellow drainage noted when dressing removed. Patient denies pain. Previously negative

## 2024-10-01 DIAGNOSIS — I47.10 SVT (SUPRAVENTRICULAR TACHYCARDIA) (HCC): ICD-10-CM

## 2024-10-01 DIAGNOSIS — I10 ESSENTIAL HYPERTENSION, BENIGN: Primary | ICD-10-CM

## 2024-10-01 DIAGNOSIS — E55.9 VITAMIN D DEFICIENCY: ICD-10-CM

## 2024-10-01 DIAGNOSIS — E78.2 MIXED HYPERLIPIDEMIA: ICD-10-CM

## 2024-10-01 DIAGNOSIS — E11.9 TYPE 2 DIABETES MELLITUS WITHOUT COMPLICATION, WITHOUT LONG-TERM CURRENT USE OF INSULIN (HCC): ICD-10-CM

## 2024-10-08 ENCOUNTER — OFFICE VISIT (OUTPATIENT)
Dept: CARDIOLOGY CLINIC | Age: 84
End: 2024-10-08
Payer: MEDICARE

## 2024-10-08 ENCOUNTER — HOSPITAL ENCOUNTER (OUTPATIENT)
Age: 84
Discharge: HOME | End: 2024-10-10
Attending: INTERNAL MEDICINE
Payer: MEDICARE

## 2024-10-08 VITALS — HEART RATE: 92 BPM | SYSTOLIC BLOOD PRESSURE: 134 MMHG | DIASTOLIC BLOOD PRESSURE: 86 MMHG | OXYGEN SATURATION: 95 %

## 2024-10-08 DIAGNOSIS — I10 ESSENTIAL HYPERTENSION, BENIGN: ICD-10-CM

## 2024-10-08 DIAGNOSIS — E78.2 MIXED HYPERLIPIDEMIA: ICD-10-CM

## 2024-10-08 DIAGNOSIS — E55.9 VITAMIN D DEFICIENCY: ICD-10-CM

## 2024-10-08 DIAGNOSIS — R00.2 PALPITATIONS: ICD-10-CM

## 2024-10-08 DIAGNOSIS — I47.10 SVT (SUPRAVENTRICULAR TACHYCARDIA) (HCC): Primary | ICD-10-CM

## 2024-10-08 DIAGNOSIS — I47.10 SVT (SUPRAVENTRICULAR TACHYCARDIA) (HCC): ICD-10-CM

## 2024-10-08 PROCEDURE — G8399 PT W/DXA RESULTS DOCUMENT: HCPCS | Performed by: INTERNAL MEDICINE

## 2024-10-08 PROCEDURE — 3075F SYST BP GE 130 - 139MM HG: CPT | Performed by: INTERNAL MEDICINE

## 2024-10-08 PROCEDURE — G8427 DOCREV CUR MEDS BY ELIG CLIN: HCPCS | Performed by: INTERNAL MEDICINE

## 2024-10-08 PROCEDURE — G8417 CALC BMI ABV UP PARAM F/U: HCPCS | Performed by: INTERNAL MEDICINE

## 2024-10-08 PROCEDURE — 93246 EXT ECG>7D<15D RECORDING: CPT

## 2024-10-08 PROCEDURE — G8484 FLU IMMUNIZE NO ADMIN: HCPCS | Performed by: INTERNAL MEDICINE

## 2024-10-08 PROCEDURE — 1036F TOBACCO NON-USER: CPT | Performed by: INTERNAL MEDICINE

## 2024-10-08 PROCEDURE — 1090F PRES/ABSN URINE INCON ASSESS: CPT | Performed by: INTERNAL MEDICINE

## 2024-10-08 PROCEDURE — 99214 OFFICE O/P EST MOD 30 MIN: CPT | Performed by: INTERNAL MEDICINE

## 2024-10-08 PROCEDURE — 3079F DIAST BP 80-89 MM HG: CPT | Performed by: INTERNAL MEDICINE

## 2024-10-08 PROCEDURE — 1123F ACP DISCUSS/DSCN MKR DOCD: CPT | Performed by: INTERNAL MEDICINE

## 2024-10-08 RX ORDER — FUROSEMIDE 20 MG
20 TABLET ORAL DAILY
COMMUNITY

## 2024-10-08 RX ORDER — LANOLIN ALCOHOL/MO/W.PET/CERES
400 CREAM (GRAM) TOPICAL DAILY
COMMUNITY

## 2024-10-08 RX ORDER — SOTALOL HYDROCHLORIDE 80 MG/1
40 TABLET ORAL 2 TIMES DAILY
COMMUNITY

## 2024-10-08 RX ORDER — AMLODIPINE BESYLATE 5 MG/1
5 TABLET ORAL DAILY
COMMUNITY

## 2024-10-08 NOTE — PATIENT INSTRUCTIONS
STOP FLECAINIDE     START BETAPACE 80 MG 1/2 TABLET  TWICE A DAY     WILL SET UP FOR 14 DAY E PATCH     WILL NEED EKG IN 2 WEEKS    FOLLOW UP IN ONE YEAR

## 2024-10-08 NOTE — CARDIO/PULMONARY
The patient was educated on the use of a holter monitor. The patient's comprehension was medium. The patient was able to verbalize recall. The patient was instructed on how and when to return the monitor. Patient lives at [facility]. Instruction written for nursing at patient request.

## 2024-10-14 NOTE — PROGRESS NOTES
Ov Dr. Dewitt for one  year follow up   Currently at [facility]   No hospitalizations/procedures/er visits  No chest pain   No palpitations   Sometimes dizzy when getting out of   Bed-but goes slow and it \"straightens out\"   Per pt     STOP FLECAINIDE     START BETAPACE 80 MG 1/2 TABLET  TWICE A DAY     WILL SET UP FOR 14 DAY E PATCH     WILL NEED EKG IN 2 WEEKS    FOLLOW UP IN ONE YEAR   
controlled.    PLAN:    We will stop flecainide because of the prolonged QTc interval.  We will start on Betapace 80 mg half a tablet b.i.d.  We will do 14-day ePatch to monitor her rhythm for SVT on the Betapace, monitor her heart rate as she has a history of sick sinus syndrome.  EKG in 2 weeks.  Follow up in 1 year.    DISCUSSION:  Mrs. Clancy clinically is doing well.  She has had no known episodes of SVT, however, her QTc interval is prolonged at 522 milliseconds.    We will stop flecainide and place her on Betapace 80 mg half a tablet b.i.d.  We will monitor heart rate for 14 days with an ePatch and do an EKG in 2 weeks, also check her QTc interval.    We will plan on seeing her in 1 year.    Thank you very much for allowing me the privilege of seeing Mrs. Clancy.  If you have any questions on my thoughts, please do not hesitate to contact me.    Sincerely,          LEEROY SEGOVIA MD    D:  10/08/2024 08:20:01     T:  10/09/2024 02:50:25     GSRADHA/MOISE  Job #:  [id number]     Doc#:  [id number]

## 2025-01-20 ENCOUNTER — HOSPITAL ENCOUNTER (EMERGENCY)
Age: 85
Discharge: NURSING, REHAB OR LONG-TERM CARE | End: 2025-01-21
Attending: EMERGENCY MEDICINE
Payer: MEDICARE

## 2025-01-20 ENCOUNTER — APPOINTMENT (OUTPATIENT)
Dept: GENERAL RADIOLOGY | Age: 85
End: 2025-01-20
Payer: MEDICARE

## 2025-01-20 VITALS
DIASTOLIC BLOOD PRESSURE: 81 MMHG | HEART RATE: 91 BPM | TEMPERATURE: 99.3 F | HEIGHT: 60 IN | BODY MASS INDEX: 42.97 KG/M2 | SYSTOLIC BLOOD PRESSURE: 128 MMHG | RESPIRATION RATE: 16 BRPM | OXYGEN SATURATION: 99 %

## 2025-01-20 DIAGNOSIS — J40 BRONCHITIS: Primary | ICD-10-CM

## 2025-01-20 PROCEDURE — 71045 X-RAY EXAM CHEST 1 VIEW: CPT

## 2025-01-20 PROCEDURE — 99284 EMERGENCY DEPT VISIT MOD MDM: CPT

## 2025-01-20 PROCEDURE — 94664 DEMO&/EVAL PT USE INHALER: CPT

## 2025-01-20 PROCEDURE — 6370000000 HC RX 637 (ALT 250 FOR IP): Performed by: EMERGENCY MEDICINE

## 2025-01-20 PROCEDURE — 6360000002 HC RX W HCPCS: Performed by: EMERGENCY MEDICINE

## 2025-01-20 PROCEDURE — 96372 THER/PROPH/DIAG INJ SC/IM: CPT

## 2025-01-20 RX ORDER — GUAIFENESIN 600 MG/1
1200 TABLET, EXTENDED RELEASE ORAL 2 TIMES DAILY
COMMUNITY

## 2025-01-20 RX ORDER — IPRATROPIUM BROMIDE AND ALBUTEROL SULFATE 2.5; .5 MG/3ML; MG/3ML
1 SOLUTION RESPIRATORY (INHALATION) EVERY 6 HOURS
COMMUNITY

## 2025-01-20 RX ORDER — PHENOL 1.4 %
1 AEROSOL, SPRAY (ML) MUCOUS MEMBRANE SEE ADMIN INSTRUCTIONS
COMMUNITY
End: 2025-01-20

## 2025-01-20 RX ORDER — HYDROCODONE BITARTRATE AND HOMATROPINE METHYLBROMIDE ORAL SOLUTION 5; 1.5 MG/5ML; MG/5ML
5 LIQUID ORAL EVERY 6 HOURS PRN
Qty: 100 EACH | Refills: 0 | Status: SHIPPED | OUTPATIENT
Start: 2025-01-20 | End: 2025-01-21

## 2025-01-20 RX ORDER — IPRATROPIUM BROMIDE AND ALBUTEROL SULFATE 2.5; .5 MG/3ML; MG/3ML
1 SOLUTION RESPIRATORY (INHALATION) ONCE
Status: COMPLETED | OUTPATIENT
Start: 2025-01-20 | End: 2025-01-20

## 2025-01-20 RX ORDER — CODEINE PHOSPHATE AND GUAIFENESIN 10; 100 MG/5ML; MG/5ML
5 SOLUTION ORAL 3 TIMES DAILY PRN
COMMUNITY

## 2025-01-20 RX ORDER — NYSTATIN 100000 U/G
1 CREAM TOPICAL 2 TIMES DAILY
COMMUNITY

## 2025-01-20 RX ORDER — CYCLOBENZAPRINE HCL 5 MG
5 TABLET ORAL 3 TIMES DAILY PRN
COMMUNITY

## 2025-01-20 RX ORDER — DOXYCYCLINE 100 MG/1
100 CAPSULE ORAL 2 TIMES DAILY
Qty: 14 CAPSULE | Refills: 0 | Status: SHIPPED | OUTPATIENT
Start: 2025-01-20

## 2025-01-20 RX ORDER — POLYETHYLENE GLYCOL 3350 17 G/17G
17 POWDER, FOR SOLUTION ORAL DAILY PRN
COMMUNITY

## 2025-01-20 RX ORDER — SIMVASTATIN 20 MG
20 TABLET ORAL NIGHTLY
COMMUNITY

## 2025-01-20 RX ORDER — PREDNISONE 20 MG/1
40 TABLET ORAL DAILY
Qty: 10 TABLET | Refills: 0 | Status: SHIPPED | OUTPATIENT
Start: 2025-01-20 | End: 2025-01-25

## 2025-01-20 RX ORDER — HYDROCODONE BITARTRATE AND HOMATROPINE METHYLBROMIDE ORAL SOLUTION 5; 1.5 MG/5ML; MG/5ML
5 LIQUID ORAL ONCE
Status: COMPLETED | OUTPATIENT
Start: 2025-01-20 | End: 2025-01-20

## 2025-01-20 RX ADMIN — IPRATROPIUM BROMIDE AND ALBUTEROL SULFATE 1 DOSE: .5; 3 SOLUTION RESPIRATORY (INHALATION) at 20:06

## 2025-01-20 RX ADMIN — HYDROCODONE BITARTRATE AND HOMATROPINE METHYLBROMIDE 5 ML: 5; 1.5 SOLUTION ORAL at 18:48

## 2025-01-20 RX ADMIN — LIDOCAINE HYDROCHLORIDE 1000 MG: 10 INJECTION, SOLUTION EPIDURAL; INFILTRATION; INTRACAUDAL; PERINEURAL at 21:34

## 2025-01-20 ASSESSMENT — PAIN - FUNCTIONAL ASSESSMENT: PAIN_FUNCTIONAL_ASSESSMENT: NONE - DENIES PAIN

## 2025-01-20 ASSESSMENT — PAIN SCALES - GENERAL: PAINLEVEL_OUTOF10: 0

## 2025-01-20 NOTE — ED PROVIDER NOTES
EMERGENCY DEPARTMENT ENCOUNTER      CHIEF COMPLAINT    Chief Complaint   Patient presents with    Cough     Pt has had a cough for three week and can not stop coughing       HPI    Nathalia Clancy is a 84 y.o. female who presentsto ED with nonproductive cough for the past 3 weeks.  Patient is a nursing home resident.  Patient had negative COVID and flu test.  Patient had negative x-ray last week.    PAST MEDICAL HISTORY    Past Medical History:   Diagnosis Date    Cancer (HCC)     basal cell skin cancer    COVID-19     Gout     Hyperlipidemia     Hypertension     Hypokalemia     Lymphedema of lower extremity     Obesity     Shingles     70's    SVT (supraventricular tachycardia)     Type II or unspecified type diabetes mellitus without mention of complication, not stated as uncontrolled     Venous stasis dermatitis        SURGICAL HISTORY    Past Surgical History:   Procedure Laterality Date    APPENDECTOMY      COLONOSCOPY      CYSTOSCOPY Right 12/16/2021    RIGHT URETEROSCOPY WITH HOLMIUM LASER LITHOTRIPSY AND RIGHT STENT PLACEMENT performed by Henrique Zamorano MD at [facility] OR    DILATION AND CURETTAGE OF UTERUS      JOINT REPLACEMENT      Rt knee    IL OFFICE/OUTPT VISIT,PROCEDURE ONLY Right 7/19/2018    RIGHT HIP MASS INCISION AND DRAINAGE performed by Bry Gipson MD at [facility] OR    IL OFFICE/OUTPT VISIT,PROCEDURE ONLY Right 8/1/2018    HIP INCISION AND DRAINAGE (Necrotic Fat Right Hip Debridement) performed by Bry Gipson MD at [facility] OR       CURRENT MEDICATIONS    Current Outpatient Rx   Medication Sig Dispense Refill    polyethylene glycol (GLYCOLAX) 17 g packet Take 1 packet by mouth daily as needed for Constipation      simvastatin (ZOCOR) 20 MG tablet Take 1 tablet by mouth nightly      guaiFENesin (MUCINEX) 600 MG extended release tablet Take 2 tablets by mouth 2 times daily      nystatin (MYCOSTATIN) 279945 UNIT/GM cream Apply 1 Application topically 2 times daily Apply topically 2 times daily. To

## 2025-01-21 RX ORDER — HYDROCODONE BITARTRATE AND HOMATROPINE METHYLBROMIDE ORAL SOLUTION 5; 1.5 MG/5ML; MG/5ML
5 LIQUID ORAL EVERY 6 HOURS PRN
Qty: 100 EACH | Refills: 0 | Status: SHIPPED | OUTPATIENT
Start: 2025-01-21 | End: 2025-01-26

## 2025-07-08 RX ORDER — SPIRONOLACTONE 25 MG/1
25 TABLET ORAL DAILY
Qty: 90 TABLET | Refills: 3
Start: 2025-07-08

## 2025-07-08 RX ORDER — SPIRONOLACTONE 25 MG/1
25 TABLET ORAL DAILY
Qty: 90 TABLET | Refills: 1
Start: 2025-07-08

## 2025-07-08 NOTE — TELEPHONE ENCOUNTER
Jocelyn at [facility] called to state that Spironolactone 25 mg once daily was added to Nathalia's med list.

## (undated) DEVICE — FIBER LASER HOLM DISP 10611] FORTEC MEDICAL INC]

## (undated) DEVICE — APPLICATOR PREP 26ML 0.7% IOD POVACRYLEX 74% ISO ALC ST

## (undated) DEVICE — SKIN MARKER,REGULAR TIP WITH RULER: Brand: DEVON

## (undated) DEVICE — GUIDEWIRE VASC STR 3 CM 0.035 INX150 CM STD NIT ZIPWIRE

## (undated) DEVICE — SOLUTION IV IRRIG WATER 1000ML POUR BRL 2F7114

## (undated) DEVICE — GAUZE,SPONGE,4"X4",16PLY,XRAY,STRL,LF: Brand: MEDLINE

## (undated) DEVICE — SYRINGE BULB 50/CS 48/PLT: Brand: MEDEGEN MEDICAL PRODUCTS, LLC

## (undated) DEVICE — DBD-PACK,LAPAROTOMY,2 REINFORCED GOWNS: Brand: MEDLINE

## (undated) DEVICE — 20 ML SYRINGE LUER-LOCK TIP: Brand: MONOJECT

## (undated) DEVICE — CONMED ACCESSORY ELECTRODE, NEEDLE ELECTRODE: Brand: CONMED

## (undated) DEVICE — ELECTRODE ES AD CRD L15FT DISP FOR PT BELOW 30LB REM

## (undated) DEVICE — FRAZIER SUCTION INSTRUMENT 12 FR W/CONTROL VENT & OBTURATOR: Brand: FRAZIER

## (undated) DEVICE — Z DISCONTINUED BY MEDLINE USE 2711682 TRAY SKIN PREP DRY W/ PREM GLV

## (undated) DEVICE — SINGLE ACTION PUMPING SYSTEM

## (undated) DEVICE — BANDAGE,GAUZE,BULKEE II,4.5"X4.1YD,STRL: Brand: MEDLINE

## (undated) DEVICE — CONTROL SYRINGE LUER-LOCK TIP: Brand: MONOJECT

## (undated) DEVICE — CYSTOSCOPY PACK: Brand: CONVERTORS

## (undated) DEVICE — ASTOUND STANDARD SURGICAL GOWN, XL: Brand: CONVERTORS

## (undated) DEVICE — TIP ELECSURG BOVIE

## (undated) DEVICE — ADAPTER URO SCP UROLOK LL

## (undated) DEVICE — INTENDED FOR TISSUE SEPARATION, AND OTHER PROCEDURES THAT REQUIRE A SHARP SURGICAL BLADE TO PUNCTURE OR CUT.: Brand: BARD-PARKER ® CARBON RIB-BACK BLADES

## (undated) DEVICE — STANDARD HYPODERMIC NEEDLE,POLYPROPYLENE HUB: Brand: MONOJECT

## (undated) DEVICE — SOLUTION SURG PREP CHLORHEXIDINE GLUC 4% 8 OZ EXIDINE

## (undated) DEVICE — NEEDLE HYPO 18GA L1IN PNK POLYPR HUB S STL REG BVL STR W/O

## (undated) DEVICE — GOWN,AURORA,NON-REINFORCED,2XL: Brand: MEDLINE

## (undated) DEVICE — TUBING, SUCTION, 1/4" X 10', STRAIGHT: Brand: MEDLINE

## (undated) DEVICE — GLOVE SURG SZ 7 L12IN FNGR THK87MIL WHT LTX FREE

## (undated) DEVICE — SOLUTION IRRIG 3000ML 0.9% SOD CHL USP UROMATIC PLAS CONT

## (undated) DEVICE — MEDI-VAC NON-CONDUCTIVE SUCTION TUBING 6MM X 6.1M (20 FT.) L: Brand: CARDINAL HEALTH

## (undated) DEVICE — DRAPE,UTILTY,TAPE,15X26, 4EA/PK: Brand: MEDLINE

## (undated) DEVICE — 4-PORT MANIFOLD: Brand: NEPTUNE 2

## (undated) DEVICE — SOLUTION IV IRRIG POUR BRL 0.9% SODIUM CHL 2F7124

## (undated) DEVICE — SYRINGE MED 10ML LUERLOCK TIP W/O SFTY DISP

## (undated) DEVICE — DUAL LUMEN URETERAL CATHETER

## (undated) DEVICE — URETEROSCOPE FLX DIGITAL 3.1X650 MM 1.2 MM AXIS DISP

## (undated) DEVICE — Z DISCONTINUED USE 2624853 GLOVE SURG SZ 75 L12IN THK91MIL BRN LTX FREE

## (undated) DEVICE — Device